# Patient Record
Sex: MALE | Race: WHITE | Employment: OTHER | URBAN - NONMETROPOLITAN AREA
[De-identification: names, ages, dates, MRNs, and addresses within clinical notes are randomized per-mention and may not be internally consistent; named-entity substitution may affect disease eponyms.]

---

## 2017-05-03 ENCOUNTER — HOSPITAL ENCOUNTER (OUTPATIENT)
Dept: LAB | Age: 67
Discharge: HOME | End: 2017-05-03
Payer: COMMERCIAL

## 2017-05-03 ENCOUNTER — HOSPITAL ENCOUNTER (OUTPATIENT)
Dept: GENERAL RADIOLOGY | Age: 67
Discharge: HOME | End: 2017-05-03
Payer: COMMERCIAL

## 2017-05-03 DIAGNOSIS — L29.9 ITCH OF SKIN: ICD-10-CM

## 2017-05-03 LAB
ALBUMIN SERPL-MCNC: 4.5 G/DL (ref 3.5–5.2)
ALP BLD-CCNC: 106 U/L (ref 40–130)
ALT SERPL-CCNC: 14 U/L (ref 5–41)
ANION GAP SERPL CALCULATED.3IONS-SCNC: 17 MMOL/L (ref 7–19)
AST SERPL-CCNC: 15 U/L (ref 5–40)
BASOPHILS ABSOLUTE: 0.1 K/UL (ref 0–0.2)
BASOPHILS RELATIVE PERCENT: 1.3 % (ref 0–1)
BILIRUB SERPL-MCNC: 0.7 MG/DL (ref 0.2–1.2)
BUN BLDV-MCNC: 12 MG/DL (ref 8–23)
CALCIUM SERPL-MCNC: 9.2 MG/DL (ref 8.8–10.2)
CHLORIDE BLD-SCNC: 102 MMOL/L (ref 98–111)
CO2: 24 MMOL/L (ref 22–29)
CREAT SERPL-MCNC: 0.9 MG/DL (ref 0.5–1.2)
EOSINOPHILS ABSOLUTE: 0.5 K/UL (ref 0–0.6)
EOSINOPHILS RELATIVE PERCENT: 7.8 % (ref 0–5)
GFR NON-AFRICAN AMERICAN: >60
GLOBULIN: 2.8 G/DL
GLUCOSE BLD-MCNC: 77 MG/DL (ref 74–109)
HAV IGM SER IA-ACNC: NORMAL
HCT VFR BLD CALC: 48.8 % (ref 42–52)
HEMOGLOBIN: 16.4 G/DL (ref 14–18)
HEPATITIS B CORE IGM ANTIBODY: NORMAL
HEPATITIS B SURFACE ANTIGEN INTERPRETATION: NORMAL
HEPATITIS C ANTIBODY INTERPRETATION: NORMAL
LYMPHOCYTES ABSOLUTE: 1.6 K/UL (ref 1.1–4.5)
LYMPHOCYTES RELATIVE PERCENT: 24.9 % (ref 20–40)
MCH RBC QN AUTO: 31.1 PG (ref 27–31)
MCHC RBC AUTO-ENTMCNC: 33.6 G/DL (ref 33–37)
MCV RBC AUTO: 92.6 FL (ref 80–94)
MONOCYTES ABSOLUTE: 0.7 K/UL (ref 0–0.9)
MONOCYTES RELATIVE PERCENT: 10.6 % (ref 0–10)
NEUTROPHILS ABSOLUTE: 3.5 K/UL (ref 1.5–7.5)
NEUTROPHILS RELATIVE PERCENT: 55.2 % (ref 50–65)
PDW BLD-RTO: 12.9 % (ref 11.5–14.5)
PLATELET # BLD: 303 K/UL (ref 130–400)
PMV BLD AUTO: 9.3 FL (ref 7.4–10.4)
POTASSIUM SERPL-SCNC: 4 MMOL/L (ref 3.5–5)
RBC # BLD: 5.27 M/UL (ref 4.7–6.1)
SEDIMENTATION RATE, ERYTHROCYTE: 2 MM/HR (ref 0–15)
SODIUM BLD-SCNC: 143 MMOL/L (ref 136–145)
T3 FREE: 3.5 PG/ML (ref 2–4.4)
T4 FREE: 1.5 NG/ML (ref 0.9–1.7)
TOTAL PROTEIN: 7.3 G/DL (ref 6.6–8.7)
TSH SERPL DL<=0.05 MIU/L-ACNC: 1.15 UIU/ML (ref 0.27–4.2)
WBC # BLD: 6.3 K/UL (ref 4.8–10.8)

## 2017-05-03 PROCEDURE — 71020 XR CHEST STANDARD TWO VW: CPT

## 2017-05-06 LAB — TRYPTASE: 13.7 UG/L

## 2017-05-09 LAB — MISCELLANEOUS LAB TEST RESULT: NORMAL

## 2017-05-10 LAB — IGE: 95 KU/L

## 2017-05-16 ENCOUNTER — APPOINTMENT (OUTPATIENT)
Dept: GENERAL RADIOLOGY | Age: 67
End: 2017-05-16
Payer: COMMERCIAL

## 2017-05-16 ENCOUNTER — HOSPITAL ENCOUNTER (EMERGENCY)
Age: 67
Discharge: HOME | End: 2017-05-16
Payer: COMMERCIAL

## 2017-05-16 VITALS
WEIGHT: 172 LBS | DIASTOLIC BLOOD PRESSURE: 79 MMHG | HEART RATE: 80 BPM | TEMPERATURE: 98.8 F | RESPIRATION RATE: 16 BRPM | OXYGEN SATURATION: 98 % | SYSTOLIC BLOOD PRESSURE: 155 MMHG | BODY MASS INDEX: 22.07 KG/M2 | HEIGHT: 74 IN

## 2017-05-16 DIAGNOSIS — S90.32XA CONTUSION OF LEFT FOOT, INITIAL ENCOUNTER: Primary | ICD-10-CM

## 2017-05-16 PROCEDURE — 99282 EMERGENCY DEPT VISIT SF MDM: CPT | Performed by: PHYSICIAN ASSISTANT

## 2017-05-16 PROCEDURE — 73630 X-RAY EXAM OF FOOT: CPT

## 2017-05-16 PROCEDURE — 99283 EMERGENCY DEPT VISIT LOW MDM: CPT

## 2017-05-16 RX ORDER — BUDESONIDE AND FORMOTEROL FUMARATE DIHYDRATE 160; 4.5 UG/1; UG/1
2 AEROSOL RESPIRATORY (INHALATION) 2 TIMES DAILY
COMMUNITY
End: 2020-07-07

## 2017-05-16 ASSESSMENT — PAIN DESCRIPTION - DESCRIPTORS: DESCRIPTORS: SHARP;ACHING

## 2017-05-16 ASSESSMENT — ENCOUNTER SYMPTOMS
DIARRHEA: 0
ABDOMINAL PAIN: 0
VOMITING: 0
SHORTNESS OF BREATH: 0
NAUSEA: 0
BACK PAIN: 0
CONSTIPATION: 0
WHEEZING: 0
COUGH: 0

## 2017-05-16 ASSESSMENT — PAIN DESCRIPTION - ORIENTATION: ORIENTATION: LEFT

## 2017-05-16 ASSESSMENT — PAIN DESCRIPTION - LOCATION: LOCATION: FOOT

## 2017-05-16 ASSESSMENT — PAIN SCALES - GENERAL: PAINLEVEL_OUTOF10: 6

## 2017-05-16 ASSESSMENT — PAIN DESCRIPTION - PAIN TYPE: TYPE: ACUTE PAIN

## 2017-07-07 ENCOUNTER — HOSPITAL ENCOUNTER (OUTPATIENT)
Dept: CT IMAGING | Facility: HOSPITAL | Age: 67
Discharge: HOME | End: 2017-07-07
Admitting: NURSE PRACTITIONER

## 2017-07-07 ENCOUNTER — LAB (OUTPATIENT)
Dept: LAB | Facility: HOSPITAL | Age: 67
End: 2017-07-07

## 2017-07-07 ENCOUNTER — TRANSCRIBE ORDERS (OUTPATIENT)
Dept: ADMINISTRATIVE | Facility: HOSPITAL | Age: 67
End: 2017-07-07

## 2017-07-07 DIAGNOSIS — R05.9 COUGH: ICD-10-CM

## 2017-07-07 DIAGNOSIS — R05.9 COUGH: Primary | ICD-10-CM

## 2017-07-07 DIAGNOSIS — M79.10 MYALGIA: ICD-10-CM

## 2017-07-07 LAB
ALBUMIN SERPL-MCNC: 4.5 G/DL (ref 3.5–5)
ALBUMIN/GLOB SERPL: 1.1 G/DL (ref 1.1–2.5)
ALP SERPL-CCNC: 136 U/L (ref 24–120)
ALT SERPL W P-5'-P-CCNC: <15 U/L (ref 0–54)
ANION GAP SERPL CALCULATED.3IONS-SCNC: 11 MMOL/L (ref 4–13)
AST SERPL-CCNC: 19 U/L (ref 7–45)
AUTO MIXED CELLS #: 1.2 10*3/MM3 (ref 0.1–2.6)
AUTO MIXED CELLS %: 7.8 % (ref 0.1–24)
BACTERIA UR QL AUTO: ABNORMAL /HPF
BILIRUB SERPL-MCNC: 2.2 MG/DL (ref 0.1–1)
BILIRUB UR QL STRIP: ABNORMAL
BUN BLD-MCNC: 13 MG/DL (ref 5–21)
BUN/CREAT SERPL: 12.7
CALCIUM SPEC-SCNC: 9.5 MG/DL (ref 8.4–10.4)
CHLORIDE SERPL-SCNC: 101 MMOL/L (ref 98–110)
CLARITY UR: CLEAR
CO2 SERPL-SCNC: 27 MMOL/L (ref 24–31)
COLOR UR: ABNORMAL
CREAT BLD-MCNC: 1.02 MG/DL (ref 0.5–1.4)
CRP SERPL-MCNC: 8.49 MG/DL (ref 0–0.99)
ERYTHROCYTE [DISTWIDTH] IN BLOOD BY AUTOMATED COUNT: 13.3 % (ref 12–15)
ERYTHROCYTE [SEDIMENTATION RATE] IN BLOOD: 8 MM/HR (ref 0–15)
GFR SERPL CREATININE-BSD FRML MDRD: 74 ML/MIN/1.73
GLOBULIN UR ELPH-MCNC: 4.1 GM/DL
GLUCOSE BLD-MCNC: 111 MG/DL (ref 70–100)
GLUCOSE UR STRIP-MCNC: NEGATIVE MG/DL
GRAN CASTS URNS QL MICRO: ABNORMAL /LPF
HCT VFR BLD AUTO: 44.9 % (ref 40–52)
HGB BLD-MCNC: 16.3 G/DL (ref 14–18)
HGB UR QL STRIP.AUTO: ABNORMAL
HYALINE CASTS UR QL AUTO: ABNORMAL /LPF
KETONES UR QL STRIP: ABNORMAL
LEUKOCYTE ESTERASE UR QL STRIP.AUTO: NEGATIVE
LYMPHOCYTES # BLD AUTO: 1.4 10*3/MM3 (ref 0.8–7)
LYMPHOCYTES NFR BLD AUTO: 8.9 % (ref 15–45)
MCH RBC QN AUTO: 32 PG (ref 28–32)
MCHC RBC AUTO-ENTMCNC: 36.3 G/DL (ref 33–36)
MCV RBC AUTO: 88.2 FL (ref 82–95)
MUCOUS THREADS URNS QL MICRO: ABNORMAL /HPF
NEUTROPHILS # BLD AUTO: 13 10*3/MM3 (ref 1.5–8.3)
NEUTROPHILS NFR BLD AUTO: 83.3 % (ref 39–78)
NITRITE UR QL STRIP: POSITIVE
PH UR STRIP.AUTO: 6 [PH] (ref 5–8)
PLATELET # BLD AUTO: 284 10*3/MM3 (ref 130–400)
PMV BLD AUTO: 8.1 FL (ref 6–12)
POTASSIUM BLD-SCNC: 3.8 MMOL/L (ref 3.5–5.3)
PROCALCITONIN SERPL-MCNC: <0.25 NG/ML
PROT SERPL-MCNC: 8.6 G/DL (ref 6.3–8.7)
PROT UR QL STRIP: ABNORMAL
RBC # BLD AUTO: 5.09 10*6/MM3 (ref 4.2–5.4)
RBC # UR: ABNORMAL /HPF
REF LAB TEST METHOD: ABNORMAL
SODIUM BLD-SCNC: 139 MMOL/L (ref 135–145)
SP GR UR STRIP: >=1.03 (ref 1–1.03)
SQUAMOUS #/AREA URNS HPF: ABNORMAL /HPF
UROBILINOGEN UR QL STRIP: ABNORMAL
WBC NRBC COR # BLD: 15.6 10*3/MM3 (ref 4.8–10.8)
WBC UR QL AUTO: ABNORMAL /HPF

## 2017-07-07 PROCEDURE — 87086 URINE CULTURE/COLONY COUNT: CPT | Performed by: NURSE PRACTITIONER

## 2017-07-07 PROCEDURE — 84145 PROCALCITONIN (PCT): CPT | Performed by: NURSE PRACTITIONER

## 2017-07-07 PROCEDURE — 87205 SMEAR GRAM STAIN: CPT | Performed by: NURSE PRACTITIONER

## 2017-07-07 PROCEDURE — 86666 EHRLICHIA ANTIBODY: CPT | Performed by: NURSE PRACTITIONER

## 2017-07-07 PROCEDURE — 71250 CT THORAX DX C-: CPT

## 2017-07-07 PROCEDURE — 80053 COMPREHEN METABOLIC PANEL: CPT | Performed by: NURSE PRACTITIONER

## 2017-07-07 PROCEDURE — 87185 SC STD ENZYME DETCJ PER NZM: CPT | Performed by: NURSE PRACTITIONER

## 2017-07-07 PROCEDURE — 87070 CULTURE OTHR SPECIMN AEROBIC: CPT | Performed by: NURSE PRACTITIONER

## 2017-07-07 PROCEDURE — 85025 COMPLETE CBC W/AUTO DIFF WBC: CPT | Performed by: NURSE PRACTITIONER

## 2017-07-07 PROCEDURE — 87184 SC STD DISK METHOD PER PLATE: CPT | Performed by: NURSE PRACTITIONER

## 2017-07-07 PROCEDURE — 81001 URINALYSIS AUTO W/SCOPE: CPT

## 2017-07-07 PROCEDURE — 36415 COLL VENOUS BLD VENIPUNCTURE: CPT

## 2017-07-07 PROCEDURE — 86617 LYME DISEASE ANTIBODY: CPT | Performed by: NURSE PRACTITIONER

## 2017-07-07 PROCEDURE — 86140 C-REACTIVE PROTEIN: CPT | Performed by: NURSE PRACTITIONER

## 2017-07-09 LAB — BACTERIA SPEC AEROBE CULT: NORMAL

## 2017-07-11 LAB
B BURGDOR IGG PATRN SER IB-IMP: NEGATIVE
B BURGDOR IGM PATRN SER IB-IMP: NEGATIVE
B BURGDOR18KD IGG SER QL IB: NORMAL
B BURGDOR23KD IGG SER QL IB: NORMAL
B BURGDOR23KD IGM SER QL IB: NORMAL
B BURGDOR28KD IGG SER QL IB: NORMAL
B BURGDOR30KD IGG SER QL IB: NORMAL
B BURGDOR39KD IGG SER QL IB: NORMAL
B BURGDOR39KD IGM SER QL IB: NORMAL
B BURGDOR41KD IGG SER QL IB: NORMAL
B BURGDOR41KD IGM SER QL IB: NORMAL
B BURGDOR45KD IGG SER QL IB: NORMAL
B BURGDOR58KD IGG SER QL IB: NORMAL
B BURGDOR66KD IGG SER QL IB: NORMAL
B BURGDOR93KD IGG SER QL IB: NORMAL
M PNEUMONIAE IGG ABS: 290 U/ML (ref 0–99)
M PNEUMONIAE IGM ABS: <770 U/ML (ref 0–769)
RICK SF IGG TITR SER IF: NEGATIVE {TITER}

## 2017-07-12 LAB
A PHAGOCYTOPH IGM TITR SER IF: NEGATIVE {TITER}
CONV HGE IGG TITER: NEGATIVE
E CHAFFEENSIS IGG TITR SER IF: NEGATIVE {TITER}
E. CHAFFEENSIS (HME) IGM TITER: NEGATIVE
R RICKETTSI IGM TITR SER: 0.57 INDEX (ref 0–0.89)

## 2017-07-15 LAB
B-LACTAMASE USUAL SUSC ISLT: NEGATIVE
BACTERIA SPEC RESP CULT: ABNORMAL
BACTERIA SPEC RESP CULT: ABNORMAL
GRAM STN SPEC: ABNORMAL

## 2017-11-13 ENCOUNTER — TRANSCRIBE ORDERS (OUTPATIENT)
Dept: ADMINISTRATIVE | Facility: HOSPITAL | Age: 67
End: 2017-11-13

## 2017-11-13 DIAGNOSIS — R91.8 PULMONARY INFILTRATE: Primary | ICD-10-CM

## 2017-11-20 ENCOUNTER — HOSPITAL ENCOUNTER (OUTPATIENT)
Dept: CT IMAGING | Facility: HOSPITAL | Age: 67
Discharge: HOME | End: 2017-11-20
Attending: INTERNAL MEDICINE | Admitting: INTERNAL MEDICINE

## 2017-11-20 DIAGNOSIS — R91.8 PULMONARY INFILTRATE: ICD-10-CM

## 2017-11-20 PROCEDURE — 71250 CT THORAX DX C-: CPT

## 2017-11-22 ENCOUNTER — TRANSCRIBE ORDERS (OUTPATIENT)
Dept: LAB | Facility: HOSPITAL | Age: 67
End: 2017-11-22

## 2017-11-22 ENCOUNTER — LAB (OUTPATIENT)
Dept: LAB | Facility: HOSPITAL | Age: 67
End: 2017-11-22

## 2017-11-22 ENCOUNTER — HOSPITAL ENCOUNTER (OUTPATIENT)
Dept: GENERAL RADIOLOGY | Facility: HOSPITAL | Age: 67
Discharge: HOME | End: 2017-11-22
Admitting: NURSE PRACTITIONER

## 2017-11-22 DIAGNOSIS — R17 ELEVATED BILIRUBIN: ICD-10-CM

## 2017-11-22 DIAGNOSIS — R39.198 ABNORMALITY OF URINATION: ICD-10-CM

## 2017-11-22 DIAGNOSIS — R05.9 COUGH: ICD-10-CM

## 2017-11-22 DIAGNOSIS — R05.9 COUGH: Primary | ICD-10-CM

## 2017-11-22 LAB
ALBUMIN SERPL-MCNC: 4.9 G/DL (ref 3.5–5)
ALBUMIN/GLOB SERPL: 1.1 G/DL (ref 1.1–2.5)
ALP SERPL-CCNC: 125 U/L (ref 24–120)
ALT SERPL W P-5'-P-CCNC: 31 U/L (ref 0–54)
ANION GAP SERPL CALCULATED.3IONS-SCNC: 14 MMOL/L (ref 4–13)
AST SERPL-CCNC: 22 U/L (ref 7–45)
AUTO MIXED CELLS #: 0.8 10*3/MM3 (ref 0.1–2.6)
AUTO MIXED CELLS %: 5.8 % (ref 0.1–24)
BACTERIA UR QL AUTO: ABNORMAL /HPF
BILIRUB SERPL-MCNC: 1.9 MG/DL (ref 0.1–1)
BILIRUB UR QL STRIP: ABNORMAL
BUN BLD-MCNC: 15 MG/DL (ref 5–21)
BUN/CREAT SERPL: 14.9
CALCIUM SPEC-SCNC: 9.3 MG/DL (ref 8.4–10.4)
CHLORIDE SERPL-SCNC: 103 MMOL/L (ref 98–110)
CLARITY UR: CLEAR
CO2 SERPL-SCNC: 26 MMOL/L (ref 24–31)
COLOR UR: ABNORMAL
CREAT BLD-MCNC: 1.01 MG/DL (ref 0.5–1.4)
CRP SERPL-MCNC: 8.6 MG/DL (ref 0–0.99)
ERYTHROCYTE [DISTWIDTH] IN BLOOD BY AUTOMATED COUNT: 12.9 % (ref 12–15)
GFR SERPL CREATININE-BSD FRML MDRD: 74 ML/MIN/1.73
GLOBULIN UR ELPH-MCNC: 4.3 GM/DL
GLUCOSE BLD-MCNC: 107 MG/DL (ref 70–100)
GLUCOSE UR STRIP-MCNC: NEGATIVE MG/DL
HAV IGM SERPL QL IA: NEGATIVE
HBV CORE IGM SERPL QL IA: NEGATIVE
HBV SURFACE AG SERPL QL IA: NEGATIVE
HCT VFR BLD AUTO: 48.5 % (ref 40–52)
HCV AB SER DONR QL: NEGATIVE
HCV S/C RATIO: 0.06 (ref 0–0.99)
HGB BLD-MCNC: 17.3 G/DL (ref 14–18)
HGB UR QL STRIP.AUTO: ABNORMAL
HYALINE CASTS UR QL AUTO: ABNORMAL /LPF
KETONES UR QL STRIP: ABNORMAL
LEUKOCYTE ESTERASE UR QL STRIP.AUTO: NEGATIVE
LYMPHOCYTES # BLD AUTO: 1.3 10*3/MM3 (ref 0.8–7)
LYMPHOCYTES NFR BLD AUTO: 8.9 % (ref 15–45)
MCH RBC QN AUTO: 31.7 PG (ref 28–32)
MCHC RBC AUTO-ENTMCNC: 35.7 G/DL (ref 33–36)
MCV RBC AUTO: 89 FL (ref 82–95)
NEUTROPHILS # BLD AUTO: 12.2 10*3/MM3 (ref 1.5–8.3)
NEUTROPHILS NFR BLD AUTO: 85.3 % (ref 39–78)
NITRITE UR QL STRIP: NEGATIVE
PH UR STRIP.AUTO: 6 [PH] (ref 5–8)
PLATELET # BLD AUTO: 321 10*3/MM3 (ref 130–400)
PMV BLD AUTO: 7.9 FL (ref 6–12)
POTASSIUM BLD-SCNC: 4 MMOL/L (ref 3.5–5.3)
PROCALCITONIN SERPL-MCNC: <0.25 NG/ML
PROT SERPL-MCNC: 9.2 G/DL (ref 6.3–8.7)
PROT UR QL STRIP: ABNORMAL
PSA SERPL-MCNC: 1.64 NG/ML (ref 0–4)
RBC # BLD AUTO: 5.45 10*6/MM3 (ref 4.2–5.4)
RBC # UR: ABNORMAL /HPF
REF LAB TEST METHOD: ABNORMAL
SODIUM BLD-SCNC: 143 MMOL/L (ref 135–145)
SP GR UR STRIP: 1.02 (ref 1–1.03)
SQUAMOUS #/AREA URNS HPF: ABNORMAL /HPF
UROBILINOGEN UR QL STRIP: ABNORMAL
WBC NRBC COR # BLD: 14.3 10*3/MM3 (ref 4.8–10.8)
WBC UR QL AUTO: ABNORMAL /HPF

## 2017-11-22 PROCEDURE — 86140 C-REACTIVE PROTEIN: CPT | Performed by: NURSE PRACTITIONER

## 2017-11-22 PROCEDURE — 71020 HC CHEST PA AND LATERAL: CPT

## 2017-11-22 PROCEDURE — 81001 URINALYSIS AUTO W/SCOPE: CPT

## 2017-11-22 PROCEDURE — G0103 PSA SCREENING: HCPCS | Performed by: NURSE PRACTITIONER

## 2017-11-22 PROCEDURE — 85025 COMPLETE CBC W/AUTO DIFF WBC: CPT

## 2017-11-22 PROCEDURE — 36415 COLL VENOUS BLD VENIPUNCTURE: CPT

## 2017-11-22 PROCEDURE — 80074 ACUTE HEPATITIS PANEL: CPT | Performed by: NURSE PRACTITIONER

## 2017-11-22 PROCEDURE — 84145 PROCALCITONIN (PCT): CPT | Performed by: NURSE PRACTITIONER

## 2017-11-22 PROCEDURE — 80053 COMPREHEN METABOLIC PANEL: CPT

## 2017-11-24 LAB
M PNEUMONIAE IGG ABS: 229 U/ML (ref 0–99)
M PNEUMONIAE IGM ABS: <770 U/ML (ref 0–769)

## 2017-11-29 ENCOUNTER — OFFICE VISIT (OUTPATIENT)
Dept: UROLOGY | Facility: CLINIC | Age: 67
End: 2017-11-29

## 2017-11-29 VITALS
TEMPERATURE: 97.6 F | HEIGHT: 74 IN | DIASTOLIC BLOOD PRESSURE: 90 MMHG | BODY MASS INDEX: 22.33 KG/M2 | WEIGHT: 174 LBS | SYSTOLIC BLOOD PRESSURE: 150 MMHG

## 2017-11-29 DIAGNOSIS — R31.21 ASYMPTOMATIC MICROSCOPIC HEMATURIA: Primary | ICD-10-CM

## 2017-11-29 LAB
BILIRUB BLD-MCNC: NEGATIVE MG/DL
CLARITY, POC: CLEAR
COLOR UR: YELLOW
GLUCOSE UR STRIP-MCNC: NEGATIVE MG/DL
KETONES UR QL: NEGATIVE
LEUKOCYTE EST, POC: NEGATIVE
NITRITE UR-MCNC: NEGATIVE MG/ML
PH UR: 6.5 [PH] (ref 5–8)
PROT UR STRIP-MCNC: NEGATIVE MG/DL
RBC # UR STRIP: ABNORMAL /UL
SP GR UR: 1.03 (ref 1–1.03)
UROBILINOGEN UR QL: NORMAL

## 2017-11-29 PROCEDURE — 51798 US URINE CAPACITY MEASURE: CPT | Performed by: UROLOGY

## 2017-11-29 PROCEDURE — 99203 OFFICE O/P NEW LOW 30 MIN: CPT | Performed by: UROLOGY

## 2017-11-29 PROCEDURE — 81003 URINALYSIS AUTO W/O SCOPE: CPT | Performed by: UROLOGY

## 2017-11-29 RX ORDER — RANITIDINE 150 MG/1
150 TABLET ORAL 2 TIMES DAILY
COMMUNITY
End: 2018-08-21

## 2017-11-29 RX ORDER — CETIRIZINE HYDROCHLORIDE 10 MG/1
10 TABLET ORAL 2 TIMES DAILY
COMMUNITY
End: 2018-08-21

## 2017-11-29 RX ORDER — BUDESONIDE AND FORMOTEROL FUMARATE DIHYDRATE 160; 4.5 UG/1; UG/1
2 AEROSOL RESPIRATORY (INHALATION) NIGHTLY
COMMUNITY
End: 2022-12-30 | Stop reason: SDDI

## 2017-12-06 ENCOUNTER — OFFICE VISIT (OUTPATIENT)
Dept: GASTROENTEROLOGY | Facility: CLINIC | Age: 67
End: 2017-12-06

## 2017-12-06 VITALS
HEIGHT: 74 IN | DIASTOLIC BLOOD PRESSURE: 80 MMHG | OXYGEN SATURATION: 97 % | SYSTOLIC BLOOD PRESSURE: 152 MMHG | HEART RATE: 70 BPM | WEIGHT: 174 LBS | BODY MASS INDEX: 22.33 KG/M2 | TEMPERATURE: 96.7 F

## 2017-12-06 DIAGNOSIS — R17 JAUNDICE: ICD-10-CM

## 2017-12-06 DIAGNOSIS — Z86.19 HISTORY OF HEPATITIS: Primary | ICD-10-CM

## 2017-12-06 DIAGNOSIS — R17 ELEVATED BILIRUBIN: ICD-10-CM

## 2017-12-06 PROCEDURE — 99204 OFFICE O/P NEW MOD 45 MIN: CPT | Performed by: NURSE PRACTITIONER

## 2017-12-07 ENCOUNTER — LAB (OUTPATIENT)
Dept: LAB | Facility: HOSPITAL | Age: 67
End: 2017-12-07

## 2017-12-07 ENCOUNTER — HOSPITAL ENCOUNTER (OUTPATIENT)
Dept: ULTRASOUND IMAGING | Facility: HOSPITAL | Age: 67
Discharge: HOME | End: 2017-12-07
Admitting: NURSE PRACTITIONER

## 2017-12-07 DIAGNOSIS — Z86.19 HISTORY OF HEPATITIS: ICD-10-CM

## 2017-12-07 DIAGNOSIS — R17 ELEVATED BILIRUBIN: ICD-10-CM

## 2017-12-07 DIAGNOSIS — R17 JAUNDICE: ICD-10-CM

## 2017-12-07 LAB
FERRITIN SERPL-MCNC: 362 NG/ML (ref 17.9–464)
IRON 24H UR-MRATE: 98 MCG/DL (ref 42–180)
IRON SATN MFR SERPL: 35 % (ref 20–45)
TIBC SERPL-MCNC: 281 MCG/DL (ref 225–420)

## 2017-12-07 PROCEDURE — 86235 NUCLEAR ANTIGEN ANTIBODY: CPT | Performed by: NURSE PRACTITIONER

## 2017-12-07 PROCEDURE — 83516 IMMUNOASSAY NONANTIBODY: CPT | Performed by: NURSE PRACTITIONER

## 2017-12-07 PROCEDURE — 83550 IRON BINDING TEST: CPT | Performed by: NURSE PRACTITIONER

## 2017-12-07 PROCEDURE — 82728 ASSAY OF FERRITIN: CPT | Performed by: NURSE PRACTITIONER

## 2017-12-07 PROCEDURE — 86225 DNA ANTIBODY NATIVE: CPT | Performed by: NURSE PRACTITIONER

## 2017-12-07 PROCEDURE — 82390 ASSAY OF CERULOPLASMIN: CPT | Performed by: NURSE PRACTITIONER

## 2017-12-07 PROCEDURE — 83540 ASSAY OF IRON: CPT | Performed by: NURSE PRACTITIONER

## 2017-12-07 PROCEDURE — 82103 ALPHA-1-ANTITRYPSIN TOTAL: CPT | Performed by: NURSE PRACTITIONER

## 2017-12-07 PROCEDURE — 86038 ANTINUCLEAR ANTIBODIES: CPT | Performed by: NURSE PRACTITIONER

## 2017-12-07 PROCEDURE — 76705 ECHO EXAM OF ABDOMEN: CPT

## 2017-12-07 PROCEDURE — 36415 COLL VENOUS BLD VENIPUNCTURE: CPT | Performed by: NURSE PRACTITIONER

## 2017-12-08 LAB
A1AT SERPL-MCNC: 178 MG/DL (ref 90–200)
ANA SER QL IA: POSITIVE
ANA SPECKLED TITR SER: NORMAL {TITER}
CENTROMERE B AB SER-ACNC: <0.2 AI (ref 0–0.9)
CERULOPLASMIN SERPL-MCNC: 28 MG/DL (ref 16–31)
CHROMATIN AB SERPL-ACNC: <0.2 AI (ref 0–0.9)
DEPRECATED MITOCHONDRIA M2 IGG SER-ACNC: <20 UNITS (ref 0–20)
DSDNA AB SER-ACNC: <1 IU/ML (ref 0–9)
ENA JO1 AB SER-ACNC: <0.2 AI (ref 0–0.9)
ENA RNP AB SER-ACNC: 1.3 AI (ref 0–0.9)
ENA SCL70 AB SER-ACNC: <0.2 AI (ref 0–0.9)
ENA SM AB SER-ACNC: <0.2 AI (ref 0–0.9)
ENA SS-A AB SER-ACNC: 0.4 AI (ref 0–0.9)
ENA SS-B AB SER-ACNC: <0.2 AI (ref 0–0.9)
Lab: ABNORMAL
Lab: NORMAL

## 2017-12-09 LAB — ACTIN IGG SERPL-ACNC: 17 UNITS (ref 0–19)

## 2017-12-11 ENCOUNTER — TELEPHONE (OUTPATIENT)
Dept: GASTROENTEROLOGY | Facility: CLINIC | Age: 67
End: 2017-12-11

## 2017-12-11 DIAGNOSIS — R17 ELEVATED BILIRUBIN: Primary | ICD-10-CM

## 2018-03-16 ENCOUNTER — LAB (OUTPATIENT)
Dept: LAB | Facility: HOSPITAL | Age: 68
End: 2018-03-16

## 2018-03-16 ENCOUNTER — TRANSCRIBE ORDERS (OUTPATIENT)
Dept: ADMINISTRATIVE | Facility: HOSPITAL | Age: 68
End: 2018-03-16

## 2018-03-16 ENCOUNTER — HOSPITAL ENCOUNTER (OUTPATIENT)
Dept: GENERAL RADIOLOGY | Facility: HOSPITAL | Age: 68
Discharge: HOME | End: 2018-03-16

## 2018-03-16 ENCOUNTER — HOSPITAL ENCOUNTER (OUTPATIENT)
Dept: CT IMAGING | Facility: HOSPITAL | Age: 68
Discharge: HOME | End: 2018-03-16
Admitting: NURSE PRACTITIONER

## 2018-03-16 DIAGNOSIS — R05.9 COUGH: ICD-10-CM

## 2018-03-16 DIAGNOSIS — R59.0 LOCALIZED ENLARGED LYMPH NODES: ICD-10-CM

## 2018-03-16 DIAGNOSIS — R59.0 LOCALIZED ENLARGED LYMPH NODES: Primary | ICD-10-CM

## 2018-03-16 DIAGNOSIS — R53.83 OTHER FATIGUE: ICD-10-CM

## 2018-03-16 LAB
ALBUMIN SERPL-MCNC: 4.8 G/DL (ref 3.5–5)
ALBUMIN/GLOB SERPL: 1.1 G/DL (ref 1.1–2.5)
ALP SERPL-CCNC: 118 U/L (ref 24–120)
ALT SERPL W P-5'-P-CCNC: 27 U/L (ref 0–54)
ANION GAP SERPL CALCULATED.3IONS-SCNC: 10 MMOL/L (ref 4–13)
AST SERPL-CCNC: 21 U/L (ref 7–45)
AUTO MIXED CELLS #: 1 10*3/MM3 (ref 0.1–2.6)
AUTO MIXED CELLS %: 15.8 % (ref 0.1–24)
BILIRUB SERPL-MCNC: 0.6 MG/DL (ref 0.1–1)
BUN BLD-MCNC: 14 MG/DL (ref 5–21)
BUN/CREAT SERPL: 12.4
CALCIUM SPEC-SCNC: 9.6 MG/DL (ref 8.4–10.4)
CHLORIDE SERPL-SCNC: 103 MMOL/L (ref 98–110)
CO2 SERPL-SCNC: 32 MMOL/L (ref 24–31)
CREAT BLD-MCNC: 1.13 MG/DL (ref 0.5–1.4)
CRP SERPL-MCNC: 1.68 MG/DL (ref 0–0.99)
ERYTHROCYTE [DISTWIDTH] IN BLOOD BY AUTOMATED COUNT: 13 % (ref 12–15)
ERYTHROCYTE [SEDIMENTATION RATE] IN BLOOD: <1 MM/HR (ref 0–15)
GFR SERPL CREATININE-BSD FRML MDRD: 65 ML/MIN/1.73
GLOBULIN UR ELPH-MCNC: 4.3 GM/DL
GLUCOSE BLD-MCNC: 83 MG/DL (ref 70–100)
HCT VFR BLD AUTO: 48.8 % (ref 40–52)
HGB BLD-MCNC: 17.8 G/DL (ref 14–18)
LYMPHOCYTES # BLD AUTO: 1.8 10*3/MM3 (ref 0.8–7)
LYMPHOCYTES NFR BLD AUTO: 27.2 % (ref 15–45)
MCH RBC QN AUTO: 32.4 PG (ref 28–32)
MCHC RBC AUTO-ENTMCNC: 36.5 G/DL (ref 33–36)
MCV RBC AUTO: 88.9 FL (ref 82–95)
NEUTROPHILS # BLD AUTO: 3.7 10*3/MM3 (ref 1.5–8.3)
NEUTROPHILS NFR BLD AUTO: 57 % (ref 39–78)
PLATELET # BLD AUTO: 328 10*3/MM3 (ref 130–400)
PMV BLD AUTO: 7.9 FL (ref 6–12)
POTASSIUM BLD-SCNC: 4 MMOL/L (ref 3.5–5.3)
PROT SERPL-MCNC: 9.1 G/DL (ref 6.3–8.7)
RBC # BLD AUTO: 5.49 10*6/MM3 (ref 4.2–5.4)
SODIUM BLD-SCNC: 145 MMOL/L (ref 135–145)
TSH SERPL DL<=0.05 MIU/L-ACNC: 0.93 MIU/ML (ref 0.47–4.68)
WBC NRBC COR # BLD: 6.5 10*3/MM3 (ref 4.8–10.8)

## 2018-03-16 PROCEDURE — 71046 X-RAY EXAM CHEST 2 VIEWS: CPT

## 2018-03-16 PROCEDURE — 85025 COMPLETE CBC W/AUTO DIFF WBC: CPT

## 2018-03-16 PROCEDURE — 84443 ASSAY THYROID STIM HORMONE: CPT | Performed by: NURSE PRACTITIONER

## 2018-03-16 PROCEDURE — 81003 URINALYSIS AUTO W/O SCOPE: CPT

## 2018-03-16 PROCEDURE — 70491 CT SOFT TISSUE NECK W/DYE: CPT

## 2018-03-16 PROCEDURE — 80053 COMPREHEN METABOLIC PANEL: CPT

## 2018-03-16 PROCEDURE — 86140 C-REACTIVE PROTEIN: CPT | Performed by: NURSE PRACTITIONER

## 2018-03-16 PROCEDURE — 25010000002 IOPAMIDOL 61 % SOLUTION: Performed by: NURSE PRACTITIONER

## 2018-03-16 PROCEDURE — 85652 RBC SED RATE AUTOMATED: CPT

## 2018-03-16 PROCEDURE — 36415 COLL VENOUS BLD VENIPUNCTURE: CPT

## 2018-03-16 RX ADMIN — IOPAMIDOL 100 ML: 612 INJECTION, SOLUTION INTRAVENOUS at 15:45

## 2018-03-17 LAB
BILIRUB UR QL STRIP: NEGATIVE
CLARITY UR: CLEAR
COLOR UR: YELLOW
GLUCOSE UR STRIP-MCNC: NEGATIVE MG/DL
HGB UR QL STRIP.AUTO: NEGATIVE
KETONES UR QL STRIP: NEGATIVE
LEUKOCYTE ESTERASE UR QL STRIP.AUTO: NEGATIVE
NITRITE UR QL STRIP: NEGATIVE
PH UR STRIP.AUTO: 6.5 [PH] (ref 5–8)
PROT UR QL STRIP: NEGATIVE
SP GR UR STRIP: 1.01 (ref 1–1.03)
UROBILINOGEN UR QL STRIP: NORMAL

## 2018-03-20 ENCOUNTER — OFFICE VISIT (OUTPATIENT)
Dept: OTOLARYNGOLOGY | Facility: CLINIC | Age: 68
End: 2018-03-20

## 2018-03-20 VITALS
TEMPERATURE: 97.8 F | DIASTOLIC BLOOD PRESSURE: 88 MMHG | BODY MASS INDEX: 23.1 KG/M2 | SYSTOLIC BLOOD PRESSURE: 162 MMHG | HEIGHT: 74 IN | WEIGHT: 180 LBS

## 2018-03-20 DIAGNOSIS — R59.0 CERVICAL LYMPHADENOPATHY: Primary | ICD-10-CM

## 2018-03-20 DIAGNOSIS — Z72.0 TOBACCO USE: ICD-10-CM

## 2018-03-20 PROCEDURE — 99204 OFFICE O/P NEW MOD 45 MIN: CPT | Performed by: OTOLARYNGOLOGY

## 2018-03-20 PROCEDURE — 31575 DIAGNOSTIC LARYNGOSCOPY: CPT | Performed by: OTOLARYNGOLOGY

## 2018-03-21 ENCOUNTER — APPOINTMENT (OUTPATIENT)
Dept: PREADMISSION TESTING | Facility: HOSPITAL | Age: 68
End: 2018-03-21

## 2018-03-21 VITALS
HEART RATE: 84 BPM | SYSTOLIC BLOOD PRESSURE: 158 MMHG | WEIGHT: 180.78 LBS | OXYGEN SATURATION: 99 % | RESPIRATION RATE: 18 BRPM | DIASTOLIC BLOOD PRESSURE: 74 MMHG | BODY MASS INDEX: 23.96 KG/M2 | HEIGHT: 73 IN

## 2018-03-21 DIAGNOSIS — R59.0 CERVICAL LYMPHADENOPATHY: ICD-10-CM

## 2018-03-21 LAB
ALBUMIN SERPL-MCNC: 4.3 G/DL (ref 3.5–5)
ALBUMIN/GLOB SERPL: 1.3 G/DL (ref 1.1–2.5)
ALP SERPL-CCNC: 96 U/L (ref 24–120)
ALT SERPL W P-5'-P-CCNC: 20 U/L (ref 0–54)
ANION GAP SERPL CALCULATED.3IONS-SCNC: 12 MMOL/L (ref 4–13)
AST SERPL-CCNC: 19 U/L (ref 7–45)
BILIRUB SERPL-MCNC: 0.4 MG/DL (ref 0.1–1)
BUN BLD-MCNC: 13 MG/DL (ref 5–21)
BUN/CREAT SERPL: 13.4 (ref 7–25)
CALCIUM SPEC-SCNC: 9.4 MG/DL (ref 8.4–10.4)
CHLORIDE SERPL-SCNC: 103 MMOL/L (ref 98–110)
CO2 SERPL-SCNC: 27 MMOL/L (ref 24–31)
CREAT BLD-MCNC: 0.97 MG/DL (ref 0.5–1.4)
DEPRECATED RDW RBC AUTO: 40.5 FL (ref 40–54)
ERYTHROCYTE [DISTWIDTH] IN BLOOD BY AUTOMATED COUNT: 12.7 % (ref 12–15)
GFR SERPL CREATININE-BSD FRML MDRD: 78 ML/MIN/1.73
GLOBULIN UR ELPH-MCNC: 3.4 GM/DL
GLUCOSE BLD-MCNC: 83 MG/DL (ref 70–100)
HCT VFR BLD AUTO: 47.6 % (ref 40–52)
HGB BLD-MCNC: 16.7 G/DL (ref 14–18)
MCH RBC QN AUTO: 30.8 PG (ref 28–32)
MCHC RBC AUTO-ENTMCNC: 35.1 G/DL (ref 33–36)
MCV RBC AUTO: 87.7 FL (ref 82–95)
PLATELET # BLD AUTO: 306 10*3/MM3 (ref 130–400)
PMV BLD AUTO: 9.2 FL (ref 6–12)
POTASSIUM BLD-SCNC: 4 MMOL/L (ref 3.5–5.3)
PROT SERPL-MCNC: 7.7 G/DL (ref 6.3–8.7)
RBC # BLD AUTO: 5.43 10*6/MM3 (ref 4.8–5.9)
SODIUM BLD-SCNC: 142 MMOL/L (ref 135–145)
WBC NRBC COR # BLD: 6.59 10*3/MM3 (ref 4.8–10.8)

## 2018-03-21 PROCEDURE — 93005 ELECTROCARDIOGRAM TRACING: CPT

## 2018-03-21 PROCEDURE — 36415 COLL VENOUS BLD VENIPUNCTURE: CPT

## 2018-03-21 PROCEDURE — 93010 ELECTROCARDIOGRAM REPORT: CPT | Performed by: INTERNAL MEDICINE

## 2018-03-21 PROCEDURE — 80053 COMPREHEN METABOLIC PANEL: CPT | Performed by: OTOLARYNGOLOGY

## 2018-03-21 PROCEDURE — 85027 COMPLETE CBC AUTOMATED: CPT | Performed by: OTOLARYNGOLOGY

## 2018-03-26 ENCOUNTER — TELEPHONE (OUTPATIENT)
Dept: OTOLARYNGOLOGY | Facility: CLINIC | Age: 68
End: 2018-03-26

## 2018-03-26 ENCOUNTER — HOSPITAL ENCOUNTER (OUTPATIENT)
Facility: HOSPITAL | Age: 68
Setting detail: OBSERVATION
Discharge: HOME | End: 2018-03-28
Attending: FAMILY MEDICINE | Admitting: FAMILY MEDICINE

## 2018-03-26 ENCOUNTER — ANESTHESIA (OUTPATIENT)
Dept: PERIOP | Facility: HOSPITAL | Age: 68
End: 2018-03-26

## 2018-03-26 ENCOUNTER — ANESTHESIA EVENT (OUTPATIENT)
Dept: PERIOP | Facility: HOSPITAL | Age: 68
End: 2018-03-26

## 2018-03-26 ENCOUNTER — HOSPITAL ENCOUNTER (OUTPATIENT)
Facility: HOSPITAL | Age: 68
Setting detail: HOSPITAL OUTPATIENT SURGERY
Discharge: HOME | End: 2018-03-26
Attending: OTOLARYNGOLOGY | Admitting: OTOLARYNGOLOGY

## 2018-03-26 VITALS
OXYGEN SATURATION: 96 % | DIASTOLIC BLOOD PRESSURE: 82 MMHG | HEART RATE: 70 BPM | RESPIRATION RATE: 16 BRPM | SYSTOLIC BLOOD PRESSURE: 181 MMHG | TEMPERATURE: 97.8 F

## 2018-03-26 DIAGNOSIS — R59.0 CERVICAL LYMPHADENOPATHY: ICD-10-CM

## 2018-03-26 DIAGNOSIS — K92.0 COFFEE GROUND EMESIS: Primary | ICD-10-CM

## 2018-03-26 LAB
ANION GAP SERPL CALCULATED.3IONS-SCNC: 14 MMOL/L (ref 4–13)
BASOPHILS # BLD AUTO: 0.03 10*3/MM3 (ref 0–0.2)
BASOPHILS NFR BLD AUTO: 0.4 % (ref 0–2)
BUN BLD-MCNC: 19 MG/DL (ref 5–21)
BUN/CREAT SERPL: 17.1 (ref 7–25)
CALCIUM SPEC-SCNC: 9.5 MG/DL (ref 8.4–10.4)
CHLORIDE SERPL-SCNC: 101 MMOL/L (ref 98–110)
CLUMPED PLATELETS: PRESENT
CO2 SERPL-SCNC: 28 MMOL/L (ref 24–31)
CREAT BLD-MCNC: 1.11 MG/DL (ref 0.5–1.4)
DEPRECATED RDW RBC AUTO: 40.1 FL (ref 40–54)
EOSINOPHIL # BLD AUTO: 0 10*3/MM3 (ref 0–0.7)
EOSINOPHIL NFR BLD AUTO: 0 % (ref 0–4)
ERYTHROCYTE [DISTWIDTH] IN BLOOD BY AUTOMATED COUNT: 15.7 % (ref 12–15)
GFR SERPL CREATININE-BSD FRML MDRD: 66 ML/MIN/1.73
GLUCOSE BLD-MCNC: 160 MG/DL (ref 70–100)
HCT VFR BLD AUTO: 50 % (ref 40–52)
HGB BLD-MCNC: 17.5 G/DL (ref 14–18)
IMM GRANULOCYTES # BLD: 0.05 10*3/MM3 (ref 0–0.03)
IMM GRANULOCYTES NFR BLD: 0.7 % (ref 0–5)
LARGE PLATELETS: NORMAL
LYMPHOCYTES # BLD AUTO: 0.71 10*3/MM3 (ref 0.72–4.86)
LYMPHOCYTES NFR BLD AUTO: 9.5 % (ref 15–45)
MACROCYTES BLD QL SMEAR: NORMAL
MCH RBC QN AUTO: 37.2 PG (ref 28–32)
MCHC RBC AUTO-ENTMCNC: 35 G/DL (ref 33–36)
MCV RBC AUTO: 106.2 FL (ref 82–95)
MONOCYTES # BLD AUTO: 0.26 10*3/MM3 (ref 0.19–1.3)
MONOCYTES NFR BLD AUTO: 3.5 % (ref 4–12)
NEUTROPHILS # BLD AUTO: 6.4 10*3/MM3 (ref 1.87–8.4)
NEUTROPHILS NFR BLD AUTO: 85.9 % (ref 39–78)
NRBC BLD MANUAL-RTO: 0 /100 WBC (ref 0–0)
PLATELET # BLD AUTO: 392 10*3/MM3 (ref 130–400)
PMV BLD AUTO: 8.9 FL (ref 6–12)
POLYCHROMASIA BLD QL SMEAR: NORMAL
POTASSIUM BLD-SCNC: 4 MMOL/L (ref 3.5–5.3)
RBC # BLD AUTO: 4.71 10*6/MM3 (ref 4.8–5.9)
SODIUM BLD-SCNC: 143 MMOL/L (ref 135–145)
WBC MORPH BLD: NORMAL
WBC NRBC COR # BLD: 7.45 10*3/MM3 (ref 4.8–10.8)

## 2018-03-26 PROCEDURE — 25010000002 DEXAMETHASONE PER 1 MG: Performed by: ANESTHESIOLOGY

## 2018-03-26 PROCEDURE — 25010000002 ONDANSETRON PER 1 MG: Performed by: FAMILY MEDICINE

## 2018-03-26 PROCEDURE — 25010000002 PROPOFOL 10 MG/ML EMULSION: Performed by: NURSE ANESTHETIST, CERTIFIED REGISTERED

## 2018-03-26 PROCEDURE — 96375 TX/PRO/DX INJ NEW DRUG ADDON: CPT

## 2018-03-26 PROCEDURE — 25010000002 FENTANYL CITRATE (PF) 100 MCG/2ML SOLUTION: Performed by: NURSE ANESTHETIST, CERTIFIED REGISTERED

## 2018-03-26 PROCEDURE — 85025 COMPLETE CBC W/AUTO DIFF WBC: CPT | Performed by: FAMILY MEDICINE

## 2018-03-26 PROCEDURE — 88341 IMHCHEM/IMCYTCHM EA ADD ANTB: CPT

## 2018-03-26 PROCEDURE — 88342 IMHCHEM/IMCYTCHM 1ST ANTB: CPT

## 2018-03-26 PROCEDURE — 99284 EMERGENCY DEPT VISIT MOD MDM: CPT

## 2018-03-26 PROCEDURE — 31535 LARYNGOSCOPY W/BIOPSY: CPT | Performed by: OTOLARYNGOLOGY

## 2018-03-26 PROCEDURE — 80048 BASIC METABOLIC PNL TOTAL CA: CPT | Performed by: FAMILY MEDICINE

## 2018-03-26 PROCEDURE — 88185 FLOWCYTOMETRY/TC ADD-ON: CPT | Performed by: OTOLARYNGOLOGY

## 2018-03-26 PROCEDURE — 96376 TX/PRO/DX INJ SAME DRUG ADON: CPT

## 2018-03-26 PROCEDURE — 88184 FLOWCYTOMETRY/ TC 1 MARKER: CPT

## 2018-03-26 PROCEDURE — 25010000002 MIDAZOLAM PER 1 MG: Performed by: ANESTHESIOLOGY

## 2018-03-26 PROCEDURE — 25010000002 PROMETHAZINE PER 50 MG: Performed by: FAMILY MEDICINE

## 2018-03-26 PROCEDURE — 88185 FLOWCYTOMETRY/TC ADD-ON: CPT

## 2018-03-26 PROCEDURE — 38510 BIOPSY/REMOVAL LYMPH NODES: CPT | Performed by: OTOLARYNGOLOGY

## 2018-03-26 PROCEDURE — 96374 THER/PROPH/DIAG INJ IV PUSH: CPT

## 2018-03-26 PROCEDURE — 88305 TISSUE EXAM BY PATHOLOGIST: CPT | Performed by: OTOLARYNGOLOGY

## 2018-03-26 PROCEDURE — 88184 FLOWCYTOMETRY/ TC 1 MARKER: CPT | Performed by: OTOLARYNGOLOGY

## 2018-03-26 PROCEDURE — 85007 BL SMEAR W/DIFF WBC COUNT: CPT | Performed by: FAMILY MEDICINE

## 2018-03-26 RX ORDER — DEXAMETHASONE SODIUM PHOSPHATE 4 MG/ML
4 INJECTION, SOLUTION INTRA-ARTICULAR; INTRALESIONAL; INTRAMUSCULAR; INTRAVENOUS; SOFT TISSUE ONCE AS NEEDED
Status: COMPLETED | OUTPATIENT
Start: 2018-03-26 | End: 2018-03-26

## 2018-03-26 RX ORDER — LIDOCAINE HYDROCHLORIDE 20 MG/ML
INJECTION, SOLUTION INFILTRATION; PERINEURAL AS NEEDED
Status: DISCONTINUED | OUTPATIENT
Start: 2018-03-26 | End: 2018-03-26 | Stop reason: SURG

## 2018-03-26 RX ORDER — MIDAZOLAM HYDROCHLORIDE 1 MG/ML
1 INJECTION INTRAMUSCULAR; INTRAVENOUS
Status: DISCONTINUED | OUTPATIENT
Start: 2018-03-26 | End: 2018-03-26 | Stop reason: HOSPADM

## 2018-03-26 RX ORDER — MEPERIDINE HYDROCHLORIDE 25 MG/ML
12.5 INJECTION INTRAMUSCULAR; INTRAVENOUS; SUBCUTANEOUS
Status: DISCONTINUED | OUTPATIENT
Start: 2018-03-26 | End: 2018-03-26 | Stop reason: HOSPADM

## 2018-03-26 RX ORDER — MAGNESIUM HYDROXIDE 1200 MG/15ML
LIQUID ORAL AS NEEDED
Status: DISCONTINUED | OUTPATIENT
Start: 2018-03-26 | End: 2018-03-26 | Stop reason: HOSPADM

## 2018-03-26 RX ORDER — SODIUM CHLORIDE 0.9 % (FLUSH) 0.9 %
1-10 SYRINGE (ML) INJECTION AS NEEDED
Status: DISCONTINUED | OUTPATIENT
Start: 2018-03-26 | End: 2018-03-26 | Stop reason: HOSPADM

## 2018-03-26 RX ORDER — SODIUM CHLORIDE, SODIUM LACTATE, POTASSIUM CHLORIDE, CALCIUM CHLORIDE 600; 310; 30; 20 MG/100ML; MG/100ML; MG/100ML; MG/100ML
1000 INJECTION, SOLUTION INTRAVENOUS CONTINUOUS
Status: DISCONTINUED | OUTPATIENT
Start: 2018-03-26 | End: 2018-03-26 | Stop reason: HOSPADM

## 2018-03-26 RX ORDER — ONDANSETRON 2 MG/ML
4 INJECTION INTRAMUSCULAR; INTRAVENOUS AS NEEDED
Status: DISCONTINUED | OUTPATIENT
Start: 2018-03-26 | End: 2018-03-26 | Stop reason: HOSPADM

## 2018-03-26 RX ORDER — ONDANSETRON 2 MG/ML
4 INJECTION INTRAMUSCULAR; INTRAVENOUS ONCE
Status: COMPLETED | OUTPATIENT
Start: 2018-03-26 | End: 2018-03-26

## 2018-03-26 RX ORDER — FENTANYL CITRATE 50 UG/ML
INJECTION, SOLUTION INTRAMUSCULAR; INTRAVENOUS AS NEEDED
Status: DISCONTINUED | OUTPATIENT
Start: 2018-03-26 | End: 2018-03-26 | Stop reason: SURG

## 2018-03-26 RX ORDER — MIDAZOLAM HYDROCHLORIDE 1 MG/ML
2 INJECTION INTRAMUSCULAR; INTRAVENOUS
Status: DISCONTINUED | OUTPATIENT
Start: 2018-03-26 | End: 2018-03-26 | Stop reason: HOSPADM

## 2018-03-26 RX ORDER — OXYMETAZOLINE HYDROCHLORIDE 0.05 G/100ML
SPRAY NASAL AS NEEDED
Status: DISCONTINUED | OUTPATIENT
Start: 2018-03-26 | End: 2018-03-26 | Stop reason: HOSPADM

## 2018-03-26 RX ORDER — HYDROCODONE BITARTRATE AND ACETAMINOPHEN 5; 325 MG/1; MG/1
1 TABLET ORAL ONCE AS NEEDED
Status: DISCONTINUED | OUTPATIENT
Start: 2018-03-26 | End: 2018-03-26 | Stop reason: HOSPADM

## 2018-03-26 RX ORDER — PROPOFOL 10 MG/ML
VIAL (ML) INTRAVENOUS AS NEEDED
Status: DISCONTINUED | OUTPATIENT
Start: 2018-03-26 | End: 2018-03-26 | Stop reason: SURG

## 2018-03-26 RX ORDER — PROMETHAZINE HYDROCHLORIDE 25 MG/ML
25 INJECTION, SOLUTION INTRAMUSCULAR; INTRAVENOUS ONCE
Status: COMPLETED | OUTPATIENT
Start: 2018-03-26 | End: 2018-03-26

## 2018-03-26 RX ORDER — ATRACURIUM BESYLATE 10 MG/ML
INJECTION, SOLUTION INTRAVENOUS AS NEEDED
Status: DISCONTINUED | OUTPATIENT
Start: 2018-03-26 | End: 2018-03-26 | Stop reason: SURG

## 2018-03-26 RX ORDER — IPRATROPIUM BROMIDE AND ALBUTEROL SULFATE 2.5; .5 MG/3ML; MG/3ML
3 SOLUTION RESPIRATORY (INHALATION) ONCE
Status: COMPLETED | OUTPATIENT
Start: 2018-03-26 | End: 2018-03-26

## 2018-03-26 RX ORDER — IPRATROPIUM BROMIDE AND ALBUTEROL SULFATE 2.5; .5 MG/3ML; MG/3ML
3 SOLUTION RESPIRATORY (INHALATION) ONCE AS NEEDED
Status: DISCONTINUED | OUTPATIENT
Start: 2018-03-26 | End: 2018-03-26 | Stop reason: HOSPADM

## 2018-03-26 RX ORDER — HYDRALAZINE HYDROCHLORIDE 20 MG/ML
5 INJECTION INTRAMUSCULAR; INTRAVENOUS
Status: DISCONTINUED | OUTPATIENT
Start: 2018-03-26 | End: 2018-03-26 | Stop reason: HOSPADM

## 2018-03-26 RX ORDER — FLUMAZENIL 0.1 MG/ML
0.2 INJECTION INTRAVENOUS AS NEEDED
Status: DISCONTINUED | OUTPATIENT
Start: 2018-03-26 | End: 2018-03-26 | Stop reason: HOSPADM

## 2018-03-26 RX ORDER — SODIUM CHLORIDE, SODIUM LACTATE, POTASSIUM CHLORIDE, CALCIUM CHLORIDE 600; 310; 30; 20 MG/100ML; MG/100ML; MG/100ML; MG/100ML
100 INJECTION, SOLUTION INTRAVENOUS CONTINUOUS
Status: DISCONTINUED | OUTPATIENT
Start: 2018-03-26 | End: 2018-03-26 | Stop reason: HOSPADM

## 2018-03-26 RX ORDER — ONDANSETRON 2 MG/ML
4 INJECTION INTRAMUSCULAR; INTRAVENOUS ONCE AS NEEDED
Status: DISCONTINUED | OUTPATIENT
Start: 2018-03-26 | End: 2018-03-26 | Stop reason: HOSPADM

## 2018-03-26 RX ORDER — LIDOCAINE HYDROCHLORIDE AND EPINEPHRINE 10; 10 MG/ML; UG/ML
INJECTION, SOLUTION INFILTRATION; PERINEURAL AS NEEDED
Status: DISCONTINUED | OUTPATIENT
Start: 2018-03-26 | End: 2018-03-26 | Stop reason: HOSPADM

## 2018-03-26 RX ORDER — SODIUM CHLORIDE 9 MG/ML
75 INJECTION, SOLUTION INTRAVENOUS CONTINUOUS
Status: DISCONTINUED | OUTPATIENT
Start: 2018-03-26 | End: 2018-03-28 | Stop reason: HOSPADM

## 2018-03-26 RX ORDER — METOCLOPRAMIDE HYDROCHLORIDE 5 MG/ML
5 INJECTION INTRAMUSCULAR; INTRAVENOUS
Status: DISCONTINUED | OUTPATIENT
Start: 2018-03-26 | End: 2018-03-26 | Stop reason: HOSPADM

## 2018-03-26 RX ORDER — NALOXONE HCL 0.4 MG/ML
0.04 VIAL (ML) INJECTION AS NEEDED
Status: DISCONTINUED | OUTPATIENT
Start: 2018-03-26 | End: 2018-03-26 | Stop reason: HOSPADM

## 2018-03-26 RX ORDER — ONDANSETRON 4 MG/1
4 TABLET, FILM COATED ORAL EVERY 8 HOURS PRN
Qty: 10 TABLET | Refills: 1 | Status: SHIPPED | OUTPATIENT
Start: 2018-03-26 | End: 2018-08-21

## 2018-03-26 RX ORDER — CEPHALEXIN 500 MG/1
500 CAPSULE ORAL 4 TIMES DAILY
Qty: 28 CAPSULE | Refills: 0 | Status: SHIPPED | OUTPATIENT
Start: 2018-03-26 | End: 2018-03-31

## 2018-03-26 RX ORDER — HYDROCODONE BITARTRATE AND ACETAMINOPHEN 5; 325 MG/1; MG/1
1-2 TABLET ORAL EVERY 4 HOURS PRN
Qty: 15 TABLET | Refills: 0 | Status: SHIPPED | OUTPATIENT
Start: 2018-03-26 | End: 2018-03-29

## 2018-03-26 RX ORDER — SODIUM CHLORIDE 0.9 % (FLUSH) 0.9 %
3 SYRINGE (ML) INJECTION AS NEEDED
Status: DISCONTINUED | OUTPATIENT
Start: 2018-03-26 | End: 2018-03-26 | Stop reason: HOSPADM

## 2018-03-26 RX ORDER — PANTOPRAZOLE SODIUM 40 MG/10ML
80 INJECTION, POWDER, LYOPHILIZED, FOR SOLUTION INTRAVENOUS ONCE
Status: COMPLETED | OUTPATIENT
Start: 2018-03-26 | End: 2018-03-26

## 2018-03-26 RX ORDER — LABETALOL HYDROCHLORIDE 5 MG/ML
5 INJECTION, SOLUTION INTRAVENOUS
Status: DISCONTINUED | OUTPATIENT
Start: 2018-03-26 | End: 2018-03-26 | Stop reason: HOSPADM

## 2018-03-26 RX ORDER — MORPHINE SULFATE 2 MG/ML
2 INJECTION, SOLUTION INTRAMUSCULAR; INTRAVENOUS AS NEEDED
Status: DISCONTINUED | OUTPATIENT
Start: 2018-03-26 | End: 2018-03-26 | Stop reason: HOSPADM

## 2018-03-26 RX ADMIN — IPRATROPIUM BROMIDE AND ALBUTEROL SULFATE 3 ML: 2.5; .5 SOLUTION RESPIRATORY (INHALATION) at 12:38

## 2018-03-26 RX ADMIN — SODIUM CHLORIDE, POTASSIUM CHLORIDE, SODIUM LACTATE AND CALCIUM CHLORIDE: 600; 310; 30; 20 INJECTION, SOLUTION INTRAVENOUS at 13:12

## 2018-03-26 RX ADMIN — LIDOCAINE HYDROCHLORIDE 0.5 ML: 10 INJECTION, SOLUTION EPIDURAL; INFILTRATION; INTRACAUDAL; PERINEURAL at 09:24

## 2018-03-26 RX ADMIN — DEXAMETHASONE SODIUM PHOSPHATE 4 MG: 4 INJECTION, SOLUTION INTRA-ARTICULAR; INTRALESIONAL; INTRAMUSCULAR; INTRAVENOUS; SOFT TISSUE at 12:40

## 2018-03-26 RX ADMIN — ONDANSETRON HYDROCHLORIDE 4 MG: 2 INJECTION INTRAMUSCULAR; INTRAVENOUS at 22:25

## 2018-03-26 RX ADMIN — PANTOPRAZOLE SODIUM 80 MG: 40 INJECTION, POWDER, FOR SOLUTION INTRAVENOUS at 23:47

## 2018-03-26 RX ADMIN — ATRACURIUM BESYLATE 10 MG: 10 INJECTION, SOLUTION INTRAVENOUS at 13:12

## 2018-03-26 RX ADMIN — SODIUM CHLORIDE 125 ML/HR: 9 INJECTION, SOLUTION INTRAVENOUS at 22:30

## 2018-03-26 RX ADMIN — SODIUM CHLORIDE, POTASSIUM CHLORIDE, SODIUM LACTATE AND CALCIUM CHLORIDE 1000 ML: 600; 310; 30; 20 INJECTION, SOLUTION INTRAVENOUS at 09:25

## 2018-03-26 RX ADMIN — FENTANYL CITRATE 100 MCG: 50 INJECTION, SOLUTION INTRAMUSCULAR; INTRAVENOUS at 13:12

## 2018-03-26 RX ADMIN — PROMETHAZINE HYDROCHLORIDE 25 MG: 25 INJECTION INTRAMUSCULAR; INTRAVENOUS at 23:11

## 2018-03-26 RX ADMIN — LIDOCAINE HYDROCHLORIDE 100 MG: 20 INJECTION, SOLUTION INFILTRATION; PERINEURAL at 13:12

## 2018-03-26 RX ADMIN — PROMETHAZINE HYDROCHLORIDE 25 MG: 25 INJECTION INTRAMUSCULAR; INTRAVENOUS at 23:50

## 2018-03-26 RX ADMIN — MIDAZOLAM HYDROCHLORIDE 2 MG: 1 INJECTION, SOLUTION INTRAMUSCULAR; INTRAVENOUS at 12:40

## 2018-03-26 RX ADMIN — PROPOFOL 200 MG: 10 INJECTION, EMULSION INTRAVENOUS at 13:12

## 2018-03-27 ENCOUNTER — APPOINTMENT (OUTPATIENT)
Dept: CT IMAGING | Facility: HOSPITAL | Age: 68
End: 2018-03-27

## 2018-03-27 ENCOUNTER — TELEPHONE (OUTPATIENT)
Dept: GASTROENTEROLOGY | Facility: CLINIC | Age: 68
End: 2018-03-27

## 2018-03-27 PROBLEM — K92.0 COFFEE GROUND EMESIS: Status: ACTIVE | Noted: 2018-03-27

## 2018-03-27 LAB
HCT VFR BLD AUTO: 31.5 % (ref 40–52)
HCT VFR BLD AUTO: 33.5 % (ref 40–52)
HCT VFR BLD AUTO: 36.7 % (ref 40–52)
HGB BLD-MCNC: 13.8 G/DL (ref 14–18)
HGB BLD-MCNC: 14.6 G/DL (ref 14–18)
HGB BLD-MCNC: 15.2 G/DL (ref 14–18)

## 2018-03-27 PROCEDURE — 25010000002 IOPAMIDOL 61 % SOLUTION: Performed by: FAMILY MEDICINE

## 2018-03-27 PROCEDURE — 99213 OFFICE O/P EST LOW 20 MIN: CPT | Performed by: OTOLARYNGOLOGY

## 2018-03-27 PROCEDURE — 85014 HEMATOCRIT: CPT | Performed by: FAMILY MEDICINE

## 2018-03-27 PROCEDURE — 99214 OFFICE O/P EST MOD 30 MIN: CPT | Performed by: INTERNAL MEDICINE

## 2018-03-27 PROCEDURE — 94799 UNLISTED PULMONARY SVC/PX: CPT

## 2018-03-27 PROCEDURE — 85014 HEMATOCRIT: CPT | Performed by: NURSE PRACTITIONER

## 2018-03-27 PROCEDURE — 96365 THER/PROPH/DIAG IV INF INIT: CPT

## 2018-03-27 PROCEDURE — G0378 HOSPITAL OBSERVATION PER HR: HCPCS

## 2018-03-27 PROCEDURE — 85018 HEMOGLOBIN: CPT | Performed by: NURSE PRACTITIONER

## 2018-03-27 PROCEDURE — 85018 HEMOGLOBIN: CPT | Performed by: FAMILY MEDICINE

## 2018-03-27 PROCEDURE — 74177 CT ABD & PELVIS W/CONTRAST: CPT

## 2018-03-27 PROCEDURE — 96366 THER/PROPH/DIAG IV INF ADDON: CPT

## 2018-03-27 PROCEDURE — 94640 AIRWAY INHALATION TREATMENT: CPT

## 2018-03-27 RX ORDER — CEPHALEXIN 500 MG/1
500 CAPSULE ORAL 4 TIMES DAILY
Status: DISCONTINUED | OUTPATIENT
Start: 2018-03-27 | End: 2018-03-28 | Stop reason: HOSPADM

## 2018-03-27 RX ORDER — BUDESONIDE AND FORMOTEROL FUMARATE DIHYDRATE 160; 4.5 UG/1; UG/1
2 AEROSOL RESPIRATORY (INHALATION) NIGHTLY
Status: DISCONTINUED | OUTPATIENT
Start: 2018-03-27 | End: 2018-03-28 | Stop reason: HOSPADM

## 2018-03-27 RX ORDER — SODIUM CHLORIDE 0.9 % (FLUSH) 0.9 %
1-10 SYRINGE (ML) INJECTION AS NEEDED
Status: DISCONTINUED | OUTPATIENT
Start: 2018-03-27 | End: 2018-03-28 | Stop reason: HOSPADM

## 2018-03-27 RX ORDER — CETIRIZINE HYDROCHLORIDE 10 MG/1
10 TABLET ORAL DAILY
Status: DISCONTINUED | OUTPATIENT
Start: 2018-03-27 | End: 2018-03-28 | Stop reason: HOSPADM

## 2018-03-27 RX ADMIN — CEPHALEXIN 500 MG: 500 CAPSULE ORAL at 21:04

## 2018-03-27 RX ADMIN — SODIUM CHLORIDE 125 ML/HR: 9 INJECTION, SOLUTION INTRAVENOUS at 07:28

## 2018-03-27 RX ADMIN — SODIUM CHLORIDE 8 MG/HR: 900 INJECTION INTRAVENOUS at 14:54

## 2018-03-27 RX ADMIN — SODIUM CHLORIDE 8 MG/HR: 900 INJECTION INTRAVENOUS at 21:04

## 2018-03-27 RX ADMIN — SODIUM CHLORIDE 8 MG/HR: 900 INJECTION INTRAVENOUS at 00:01

## 2018-03-27 RX ADMIN — CETIRIZINE HYDROCHLORIDE 10 MG: 10 TABLET, FILM COATED ORAL at 10:04

## 2018-03-27 RX ADMIN — BUDESONIDE AND FORMOTEROL FUMARATE DIHYDRATE 2 PUFF: 160; 4.5 AEROSOL RESPIRATORY (INHALATION) at 21:39

## 2018-03-27 RX ADMIN — CEPHALEXIN 500 MG: 500 CAPSULE ORAL at 13:01

## 2018-03-27 RX ADMIN — SODIUM CHLORIDE 8 MG/HR: 900 INJECTION INTRAVENOUS at 09:29

## 2018-03-27 RX ADMIN — CEPHALEXIN 500 MG: 500 CAPSULE ORAL at 17:14

## 2018-03-27 RX ADMIN — IOPAMIDOL 100 ML: 612 INJECTION, SOLUTION INTRAVENOUS at 02:45

## 2018-03-27 RX ADMIN — CEPHALEXIN 500 MG: 500 CAPSULE ORAL at 10:04

## 2018-03-27 RX ADMIN — SODIUM CHLORIDE 75 ML/HR: 9 INJECTION, SOLUTION INTRAVENOUS at 14:54

## 2018-03-27 RX ADMIN — SODIUM CHLORIDE 8 MG/HR: 900 INJECTION INTRAVENOUS at 05:21

## 2018-03-28 VITALS
BODY MASS INDEX: 22.05 KG/M2 | OXYGEN SATURATION: 94 % | SYSTOLIC BLOOD PRESSURE: 156 MMHG | TEMPERATURE: 98.7 F | HEIGHT: 74 IN | RESPIRATION RATE: 16 BRPM | WEIGHT: 171.8 LBS | DIASTOLIC BLOOD PRESSURE: 71 MMHG | HEART RATE: 51 BPM

## 2018-03-28 LAB
ANION GAP SERPL CALCULATED.3IONS-SCNC: 6 MMOL/L (ref 4–13)
BASOPHILS # BLD AUTO: 0.05 10*3/MM3 (ref 0–0.2)
BASOPHILS NFR BLD AUTO: 0.7 % (ref 0–2)
BUN BLD-MCNC: 19 MG/DL (ref 5–21)
BUN/CREAT SERPL: 18.8 (ref 7–25)
CALCIUM SPEC-SCNC: 8 MG/DL (ref 8.4–10.4)
CHLORIDE SERPL-SCNC: 108 MMOL/L (ref 98–110)
CO2 SERPL-SCNC: 26 MMOL/L (ref 24–31)
CREAT BLD-MCNC: 1.01 MG/DL (ref 0.5–1.4)
DEPRECATED RDW RBC AUTO: 42 FL (ref 40–54)
EOSINOPHIL # BLD AUTO: 0.31 10*3/MM3 (ref 0–0.7)
EOSINOPHIL NFR BLD AUTO: 4.6 % (ref 0–4)
ERYTHROCYTE [DISTWIDTH] IN BLOOD BY AUTOMATED COUNT: 12.9 % (ref 12–15)
GFR SERPL CREATININE-BSD FRML MDRD: 74 ML/MIN/1.73
GLUCOSE BLD-MCNC: 82 MG/DL (ref 70–100)
HCT VFR BLD AUTO: 36.2 % (ref 40–52)
HCT VFR BLD AUTO: 37.5 % (ref 40–52)
HGB BLD-MCNC: 12.8 G/DL (ref 14–18)
HGB BLD-MCNC: 13.1 G/DL (ref 14–18)
IMM GRANULOCYTES # BLD: 0.02 10*3/MM3 (ref 0–0.03)
IMM GRANULOCYTES NFR BLD: 0.3 % (ref 0–5)
LYMPHOCYTES # BLD AUTO: 1.95 10*3/MM3 (ref 0.72–4.86)
LYMPHOCYTES NFR BLD AUTO: 29.1 % (ref 15–45)
MCH RBC QN AUTO: 31.1 PG (ref 28–32)
MCHC RBC AUTO-ENTMCNC: 34.9 G/DL (ref 33–36)
MCV RBC AUTO: 89.1 FL (ref 82–95)
MONOCYTES # BLD AUTO: 0.58 10*3/MM3 (ref 0.19–1.3)
MONOCYTES NFR BLD AUTO: 8.7 % (ref 4–12)
NEUTROPHILS # BLD AUTO: 3.78 10*3/MM3 (ref 1.87–8.4)
NEUTROPHILS NFR BLD AUTO: 56.6 % (ref 39–78)
NRBC BLD MANUAL-RTO: 0 /100 WBC (ref 0–0)
PLATELET # BLD AUTO: 250 10*3/MM3 (ref 130–400)
PMV BLD AUTO: 9 FL (ref 6–12)
POTASSIUM BLD-SCNC: 4 MMOL/L (ref 3.5–5.3)
RBC # BLD AUTO: 4.21 10*6/MM3 (ref 4.8–5.9)
SODIUM BLD-SCNC: 140 MMOL/L (ref 135–145)
WBC NRBC COR # BLD: 6.69 10*3/MM3 (ref 4.8–10.8)

## 2018-03-28 PROCEDURE — 80048 BASIC METABOLIC PNL TOTAL CA: CPT | Performed by: NURSE PRACTITIONER

## 2018-03-28 PROCEDURE — 99213 OFFICE O/P EST LOW 20 MIN: CPT | Performed by: INTERNAL MEDICINE

## 2018-03-28 PROCEDURE — 94799 UNLISTED PULMONARY SVC/PX: CPT

## 2018-03-28 PROCEDURE — 96361 HYDRATE IV INFUSION ADD-ON: CPT

## 2018-03-28 PROCEDURE — G0378 HOSPITAL OBSERVATION PER HR: HCPCS

## 2018-03-28 PROCEDURE — 85014 HEMATOCRIT: CPT | Performed by: NURSE PRACTITIONER

## 2018-03-28 PROCEDURE — 85025 COMPLETE CBC W/AUTO DIFF WBC: CPT | Performed by: NURSE PRACTITIONER

## 2018-03-28 PROCEDURE — 96366 THER/PROPH/DIAG IV INF ADDON: CPT

## 2018-03-28 PROCEDURE — 85018 HEMOGLOBIN: CPT | Performed by: NURSE PRACTITIONER

## 2018-03-28 RX ADMIN — CETIRIZINE HYDROCHLORIDE 10 MG: 10 TABLET, FILM COATED ORAL at 09:00

## 2018-03-28 RX ADMIN — SODIUM CHLORIDE 8 MG/HR: 900 INJECTION INTRAVENOUS at 05:39

## 2018-03-28 RX ADMIN — BUDESONIDE AND FORMOTEROL FUMARATE DIHYDRATE 2 PUFF: 160; 4.5 AEROSOL RESPIRATORY (INHALATION) at 06:47

## 2018-03-28 RX ADMIN — CEPHALEXIN 500 MG: 500 CAPSULE ORAL at 08:59

## 2018-03-28 RX ADMIN — CEPHALEXIN 500 MG: 500 CAPSULE ORAL at 12:05

## 2018-03-28 RX ADMIN — SODIUM CHLORIDE 8 MG/HR: 900 INJECTION INTRAVENOUS at 00:35

## 2018-03-28 RX ADMIN — SODIUM CHLORIDE 75 ML/HR: 9 INJECTION, SOLUTION INTRAVENOUS at 03:58

## 2018-03-30 ENCOUNTER — PREP FOR SURGERY (OUTPATIENT)
Dept: OTHER | Facility: HOSPITAL | Age: 68
End: 2018-03-30

## 2018-03-30 DIAGNOSIS — R13.10 DYSPHAGIA, UNSPECIFIED TYPE: Primary | ICD-10-CM

## 2018-04-04 LAB
CYTO UR: NORMAL
LAB AP CASE REPORT: NORMAL
LAB AP SYNOPTIC CHECKLIST: NORMAL
Lab: NORMAL
PATH REPORT.FINAL DX SPEC: NORMAL
PATH REPORT.GROSS SPEC: NORMAL

## 2018-04-09 ENCOUNTER — TELEPHONE (OUTPATIENT)
Dept: OTOLARYNGOLOGY | Facility: CLINIC | Age: 68
End: 2018-04-09

## 2018-04-09 PROBLEM — R13.10 DYSPHAGIA: Status: ACTIVE | Noted: 2018-04-09

## 2018-04-12 PROBLEM — C81.11 NODULAR SCLEROSIS HODGKIN LYMPHOMA OF LYMPH NODES OF NECK (HCC): Status: ACTIVE | Noted: 2018-04-12

## 2018-04-13 ENCOUNTER — OFFICE VISIT (OUTPATIENT)
Dept: OTOLARYNGOLOGY | Facility: CLINIC | Age: 68
End: 2018-04-13

## 2018-04-13 VITALS
WEIGHT: 178 LBS | TEMPERATURE: 97.9 F | HEIGHT: 74 IN | BODY MASS INDEX: 22.84 KG/M2 | SYSTOLIC BLOOD PRESSURE: 158 MMHG | DIASTOLIC BLOOD PRESSURE: 90 MMHG

## 2018-04-13 DIAGNOSIS — C81.11 NODULAR SCLEROSIS HODGKIN LYMPHOMA OF LYMPH NODES OF NECK (HCC): Primary | ICD-10-CM

## 2018-04-13 PROCEDURE — 99212 OFFICE O/P EST SF 10 MIN: CPT | Performed by: OTOLARYNGOLOGY

## 2018-04-19 ENCOUNTER — TRANSCRIBE ORDERS (OUTPATIENT)
Dept: ADMINISTRATIVE | Facility: HOSPITAL | Age: 68
End: 2018-04-19

## 2018-04-19 DIAGNOSIS — C81.01 NODLR LYMPHOCY PREDOM HDGKN LYMPH, NODES OF HEAD, FACE, & NK (HCC): Primary | ICD-10-CM

## 2018-04-24 ENCOUNTER — HOSPITAL ENCOUNTER (OUTPATIENT)
Dept: CT IMAGING | Facility: HOSPITAL | Age: 68
Discharge: HOME | End: 2018-04-24
Attending: INTERNAL MEDICINE

## 2018-04-24 ENCOUNTER — HOSPITAL ENCOUNTER (OUTPATIENT)
Dept: CT IMAGING | Facility: HOSPITAL | Age: 68
Discharge: HOME | End: 2018-04-24
Attending: INTERNAL MEDICINE | Admitting: INTERNAL MEDICINE

## 2018-04-24 DIAGNOSIS — C81.01 NODLR LYMPHOCY PREDOM HDGKN LYMPH, NODES OF HEAD, FACE, & NK (HCC): ICD-10-CM

## 2018-04-24 PROCEDURE — 78815 PET IMAGE W/CT SKULL-THIGH: CPT

## 2018-04-24 PROCEDURE — A9552 F18 FDG: HCPCS | Performed by: INTERNAL MEDICINE

## 2018-04-24 PROCEDURE — 0 FLUDEOXYGLUCOSE F18 SOLUTION: Performed by: INTERNAL MEDICINE

## 2018-04-24 RX ADMIN — FLUDEOXYGLUCOSE F18 1 DOSE: 300 INJECTION INTRAVENOUS at 09:30

## 2018-04-25 ENCOUNTER — HOSPITAL ENCOUNTER (OUTPATIENT)
Dept: CT IMAGING | Facility: HOSPITAL | Age: 68
Discharge: HOME | End: 2018-04-25
Attending: INTERNAL MEDICINE | Admitting: INTERNAL MEDICINE

## 2018-04-25 DIAGNOSIS — C81.01 NODLR LYMPHOCY PREDOM HDGKN LYMPH, NODES OF HEAD, FACE, & NK (HCC): ICD-10-CM

## 2018-04-25 LAB — CREAT BLDA-MCNC: 0.9 MG/DL (ref 0.6–1.3)

## 2018-04-25 PROCEDURE — 71260 CT THORAX DX C+: CPT

## 2018-04-25 PROCEDURE — 82565 ASSAY OF CREATININE: CPT

## 2018-04-25 PROCEDURE — 25010000002 IOPAMIDOL 61 % SOLUTION: Performed by: INTERNAL MEDICINE

## 2018-04-25 RX ADMIN — IOPAMIDOL 100 ML: 612 INJECTION, SOLUTION INTRAVENOUS at 13:00

## 2018-04-26 ENCOUNTER — TRANSCRIBE ORDERS (OUTPATIENT)
Dept: ADMINISTRATIVE | Facility: HOSPITAL | Age: 68
End: 2018-04-26

## 2018-04-26 DIAGNOSIS — R91.1 PULMONARY NODULE: Primary | ICD-10-CM

## 2018-05-08 ENCOUNTER — TELEPHONE (OUTPATIENT)
Dept: GASTROENTEROLOGY | Facility: CLINIC | Age: 68
End: 2018-05-08

## 2018-05-09 ENCOUNTER — HOSPITAL ENCOUNTER (OUTPATIENT)
Dept: PREADMISSION TESTING | Age: 68
Setting detail: OUTPATIENT SURGERY
Discharge: HOME | End: 2018-05-13

## 2018-05-09 VITALS — HEIGHT: 74 IN | WEIGHT: 175 LBS | BODY MASS INDEX: 22.46 KG/M2

## 2018-05-09 RX ORDER — CETIRIZINE HYDROCHLORIDE 10 MG/1
10 TABLET ORAL DAILY
COMMUNITY
End: 2020-02-19

## 2018-05-09 RX ORDER — RANITIDINE 150 MG/1
150 TABLET ORAL 2 TIMES DAILY
COMMUNITY
End: 2020-02-19

## 2018-05-10 ENCOUNTER — HOSPITAL ENCOUNTER (OUTPATIENT)
Age: 68
Setting detail: OUTPATIENT SURGERY
Discharge: HOME | End: 2018-05-10
Attending: SPECIALIST | Admitting: SPECIALIST

## 2018-05-10 ENCOUNTER — ANESTHESIA EVENT (OUTPATIENT)
Dept: OPERATING ROOM | Age: 68
End: 2018-05-10

## 2018-05-10 ENCOUNTER — ANESTHESIA (OUTPATIENT)
Dept: OPERATING ROOM | Age: 68
End: 2018-05-10

## 2018-05-10 ENCOUNTER — HOSPITAL ENCOUNTER (OUTPATIENT)
Dept: GENERAL RADIOLOGY | Age: 68
Discharge: HOME | End: 2018-05-10
Payer: MEDICARE

## 2018-05-10 VITALS
RESPIRATION RATE: 18 BRPM | TEMPERATURE: 98.7 F | HEART RATE: 59 BPM | WEIGHT: 175 LBS | HEIGHT: 74 IN | OXYGEN SATURATION: 98 % | BODY MASS INDEX: 22.46 KG/M2 | SYSTOLIC BLOOD PRESSURE: 136 MMHG | DIASTOLIC BLOOD PRESSURE: 79 MMHG

## 2018-05-10 VITALS — OXYGEN SATURATION: 99 % | SYSTOLIC BLOOD PRESSURE: 149 MMHG | DIASTOLIC BLOOD PRESSURE: 81 MMHG

## 2018-05-10 DIAGNOSIS — C85.90 LYMPHOMA, UNSPECIFIED BODY REGION, UNSPECIFIED LYMPHOMA TYPE (HCC): Primary | ICD-10-CM

## 2018-05-10 PROCEDURE — G8907 PT DOC NO EVENTS ON DISCHARG: HCPCS

## 2018-05-10 PROCEDURE — C1788 PORT, INDWELLING, IMP: HCPCS | Performed by: SPECIALIST

## 2018-05-10 PROCEDURE — G8916 PT W IV AB GIVEN ON TIME: HCPCS

## 2018-05-10 PROCEDURE — 3209999900 FLUORO FOR SURGICAL PROCEDURES

## 2018-05-10 PROCEDURE — 71045 X-RAY EXAM CHEST 1 VIEW: CPT

## 2018-05-10 PROCEDURE — 36561 INSERT TUNNELED CV CATH: CPT

## 2018-05-10 DEVICE — PORT VASC ACCS SGL LUMN DETACHED SIL CATH 9.6 FR SMRT PRT: Type: IMPLANTABLE DEVICE | Site: CHEST | Status: FUNCTIONAL

## 2018-05-10 RX ORDER — LIDOCAINE HYDROCHLORIDE 10 MG/ML
INJECTION, SOLUTION INFILTRATION; PERINEURAL PRN
Status: DISCONTINUED | OUTPATIENT
Start: 2018-05-10 | End: 2018-05-10 | Stop reason: HOSPADM

## 2018-05-10 RX ORDER — LIDOCAINE HYDROCHLORIDE 10 MG/ML
1 INJECTION, SOLUTION EPIDURAL; INFILTRATION; INTRACAUDAL; PERINEURAL
Status: DISCONTINUED | OUTPATIENT
Start: 2018-05-10 | End: 2018-05-10 | Stop reason: HOSPADM

## 2018-05-10 RX ORDER — ONDANSETRON 4 MG/1
4 TABLET, FILM COATED ORAL ONCE
Status: COMPLETED | OUTPATIENT
Start: 2018-05-10 | End: 2018-05-10

## 2018-05-10 RX ORDER — MIDAZOLAM HYDROCHLORIDE 1 MG/ML
INJECTION INTRAMUSCULAR; INTRAVENOUS PRN
Status: DISCONTINUED | OUTPATIENT
Start: 2018-05-10 | End: 2018-05-10 | Stop reason: SDUPTHER

## 2018-05-10 RX ORDER — HYDROCODONE BITARTRATE AND ACETAMINOPHEN 7.5; 325 MG/1; MG/1
1 TABLET ORAL EVERY 4 HOURS PRN
Qty: 30 TABLET | Refills: 0
Start: 2018-05-10 | End: 2018-05-17

## 2018-05-10 RX ORDER — PROPOFOL 10 MG/ML
INJECTION, EMULSION INTRAVENOUS PRN
Status: DISCONTINUED | OUTPATIENT
Start: 2018-05-10 | End: 2018-05-10 | Stop reason: SDUPTHER

## 2018-05-10 RX ORDER — FENTANYL CITRATE 50 UG/ML
INJECTION, SOLUTION INTRAMUSCULAR; INTRAVENOUS PRN
Status: DISCONTINUED | OUTPATIENT
Start: 2018-05-10 | End: 2018-05-10 | Stop reason: SDUPTHER

## 2018-05-10 RX ORDER — SODIUM CHLORIDE, SODIUM LACTATE, POTASSIUM CHLORIDE, CALCIUM CHLORIDE 600; 310; 30; 20 MG/100ML; MG/100ML; MG/100ML; MG/100ML
INJECTION, SOLUTION INTRAVENOUS CONTINUOUS
Status: DISCONTINUED | OUTPATIENT
Start: 2018-05-10 | End: 2018-05-10 | Stop reason: HOSPADM

## 2018-05-10 RX ORDER — PROMETHAZINE HYDROCHLORIDE 25 MG/ML
12.5 INJECTION, SOLUTION INTRAMUSCULAR; INTRAVENOUS EVERY 6 HOURS PRN
Status: DISCONTINUED | OUTPATIENT
Start: 2018-05-10 | End: 2018-05-10 | Stop reason: HOSPADM

## 2018-05-10 RX ADMIN — PROMETHAZINE HYDROCHLORIDE 12.5 MG: 25 INJECTION, SOLUTION INTRAMUSCULAR; INTRAVENOUS at 14:20

## 2018-05-10 RX ADMIN — PROPOFOL 150 MG: 10 INJECTION, EMULSION INTRAVENOUS at 12:55

## 2018-05-10 RX ADMIN — MIDAZOLAM HYDROCHLORIDE 2 MG: 1 INJECTION INTRAMUSCULAR; INTRAVENOUS at 12:49

## 2018-05-10 RX ADMIN — FENTANYL CITRATE 25 MCG: 50 INJECTION, SOLUTION INTRAMUSCULAR; INTRAVENOUS at 13:07

## 2018-05-10 RX ADMIN — FENTANYL CITRATE 50 MCG: 50 INJECTION, SOLUTION INTRAMUSCULAR; INTRAVENOUS at 13:03

## 2018-05-10 RX ADMIN — SODIUM CHLORIDE, SODIUM LACTATE, POTASSIUM CHLORIDE, CALCIUM CHLORIDE: 600; 310; 30; 20 INJECTION, SOLUTION INTRAVENOUS at 12:27

## 2018-05-10 RX ADMIN — ONDANSETRON 4 MG: 4 TABLET, FILM COATED ORAL at 14:25

## 2018-05-10 RX ADMIN — FENTANYL CITRATE 50 MCG: 50 INJECTION, SOLUTION INTRAMUSCULAR; INTRAVENOUS at 12:58

## 2018-05-10 ASSESSMENT — COPD QUESTIONNAIRES: CAT_SEVERITY: NO INTERVAL CHANGE

## 2018-05-10 ASSESSMENT — LIFESTYLE VARIABLES: SMOKING_STATUS: 1

## 2018-07-02 ENCOUNTER — HOSPITAL ENCOUNTER (OUTPATIENT)
Dept: RADIATION ONCOLOGY | Facility: HOSPITAL | Age: 68
Setting detail: RADIATION/ONCOLOGY SERIES
End: 2018-07-02

## 2018-07-06 ENCOUNTER — TRANSCRIBE ORDERS (OUTPATIENT)
Dept: ADMINISTRATIVE | Facility: HOSPITAL | Age: 68
End: 2018-07-06

## 2018-07-06 DIAGNOSIS — C81.01 NODULAR LYMPHOCYTE PREDOMINANT HODGKIN LYMPHOMA OF LYMPH NODES OF NECK (HCC): Primary | ICD-10-CM

## 2018-07-18 ENCOUNTER — HOSPITAL ENCOUNTER (OUTPATIENT)
Dept: CT IMAGING | Facility: HOSPITAL | Age: 68
Discharge: HOME | End: 2018-07-18
Attending: INTERNAL MEDICINE

## 2018-07-18 ENCOUNTER — HOSPITAL ENCOUNTER (OUTPATIENT)
Dept: CT IMAGING | Facility: HOSPITAL | Age: 68
Discharge: HOME | End: 2018-07-18
Attending: INTERNAL MEDICINE | Admitting: INTERNAL MEDICINE

## 2018-07-18 DIAGNOSIS — C81.01 NODULAR LYMPHOCYTE PREDOMINANT HODGKIN LYMPHOMA OF LYMPH NODES OF NECK (HCC): ICD-10-CM

## 2018-07-18 PROCEDURE — A9552 F18 FDG: HCPCS | Performed by: INTERNAL MEDICINE

## 2018-07-18 PROCEDURE — 0 FLUDEOXYGLUCOSE F18 SOLUTION: Performed by: INTERNAL MEDICINE

## 2018-07-18 PROCEDURE — 78815 PET IMAGE W/CT SKULL-THIGH: CPT

## 2018-07-18 RX ADMIN — FLUDEOXYGLUCOSE F18 1 DOSE: 300 INJECTION INTRAVENOUS at 09:15

## 2018-07-26 ENCOUNTER — CONSULT (OUTPATIENT)
Dept: RADIATION ONCOLOGY | Facility: HOSPITAL | Age: 68
End: 2018-07-26

## 2018-07-26 ENCOUNTER — LAB (OUTPATIENT)
Dept: LAB | Facility: HOSPITAL | Age: 68
End: 2018-07-26

## 2018-07-26 VITALS
BODY MASS INDEX: 20.77 KG/M2 | DIASTOLIC BLOOD PRESSURE: 63 MMHG | HEART RATE: 83 BPM | SYSTOLIC BLOOD PRESSURE: 119 MMHG | WEIGHT: 161.8 LBS

## 2018-07-26 DIAGNOSIS — T45.1X5A ANTINEOPLASTIC CHEMOTHERAPY INDUCED ANEMIA: ICD-10-CM

## 2018-07-26 DIAGNOSIS — F17.200 CURRENT EVERY DAY SMOKER: ICD-10-CM

## 2018-07-26 DIAGNOSIS — C81.11 NODULAR SCLEROSIS HODGKIN LYMPHOMA OF LYMPH NODES OF NECK (HCC): Primary | ICD-10-CM

## 2018-07-26 DIAGNOSIS — Z71.9 ENCOUNTER FOR CONSULTATION: ICD-10-CM

## 2018-07-26 DIAGNOSIS — D64.81 ANTINEOPLASTIC CHEMOTHERAPY INDUCED ANEMIA: ICD-10-CM

## 2018-07-26 DIAGNOSIS — R53.1 WEAKNESS: ICD-10-CM

## 2018-07-26 DIAGNOSIS — C81.11 NODULAR SCLEROSIS HODGKIN LYMPHOMA OF LYMPH NODES OF NECK (HCC): ICD-10-CM

## 2018-07-26 LAB
BASOPHILS # BLD AUTO: 0.04 10*3/MM3 (ref 0–0.2)
BASOPHILS NFR BLD AUTO: 0.6 % (ref 0–2)
DEPRECATED RDW RBC AUTO: 50.1 FL (ref 40–54)
EOSINOPHIL # BLD AUTO: 0.07 10*3/MM3 (ref 0–0.7)
EOSINOPHIL NFR BLD AUTO: 1.1 % (ref 0–4)
ERYTHROCYTE [DISTWIDTH] IN BLOOD BY AUTOMATED COUNT: 15.5 % (ref 12–15)
HCT VFR BLD AUTO: 32.4 % (ref 40–52)
HGB BLD-MCNC: 11.3 G/DL (ref 14–18)
LYMPHOCYTES # BLD AUTO: 0.88 10*3/MM3 (ref 0.72–4.86)
LYMPHOCYTES NFR BLD AUTO: 14 % (ref 15–45)
MCH RBC QN AUTO: 31.8 PG (ref 28–32)
MCHC RBC AUTO-ENTMCNC: 34.9 G/DL (ref 33–36)
MCV RBC AUTO: 91.3 FL (ref 82–95)
MONOCYTES # BLD AUTO: 1.19 10*3/MM3 (ref 0.19–1.3)
MONOCYTES NFR BLD AUTO: 19 % (ref 4–12)
NEUTROPHILS # BLD AUTO: 4.03 10*3/MM3 (ref 1.87–8.4)
NEUTROPHILS NFR BLD AUTO: 64.3 % (ref 39–78)
PLATELET # BLD AUTO: 361 10*3/MM3 (ref 130–400)
PMV BLD AUTO: 8.3 FL (ref 6–12)
RBC # BLD AUTO: 3.55 10*6/MM3 (ref 4.8–5.9)
WBC NRBC COR # BLD: 6.43 10*3/MM3 (ref 4.8–10.8)

## 2018-07-26 PROCEDURE — 85025 COMPLETE CBC W/AUTO DIFF WBC: CPT | Performed by: PHYSICIAN ASSISTANT

## 2018-07-26 PROCEDURE — 36415 COLL VENOUS BLD VENIPUNCTURE: CPT

## 2018-07-26 PROCEDURE — 77290 THER RAD SIMULAJ FIELD CPLX: CPT | Performed by: RADIOLOGY

## 2018-08-01 ENCOUNTER — HOSPITAL ENCOUNTER (OUTPATIENT)
Dept: RADIATION ONCOLOGY | Facility: HOSPITAL | Age: 68
Setting detail: RADIATION/ONCOLOGY SERIES
End: 2018-08-01

## 2018-08-13 PROCEDURE — 77334 RADIATION TREATMENT AID(S): CPT | Performed by: RADIOLOGY

## 2018-08-13 PROCEDURE — 77300 RADIATION THERAPY DOSE PLAN: CPT | Performed by: RADIOLOGY

## 2018-08-13 PROCEDURE — 77295 3-D RADIOTHERAPY PLAN: CPT | Performed by: RADIOLOGY

## 2018-08-21 ENCOUNTER — OFFICE VISIT (OUTPATIENT)
Dept: GASTROENTEROLOGY | Facility: CLINIC | Age: 68
End: 2018-08-21

## 2018-08-21 VITALS
DIASTOLIC BLOOD PRESSURE: 70 MMHG | WEIGHT: 166 LBS | OXYGEN SATURATION: 99 % | SYSTOLIC BLOOD PRESSURE: 142 MMHG | BODY MASS INDEX: 21.3 KG/M2 | TEMPERATURE: 97.8 F | HEIGHT: 74 IN | HEART RATE: 75 BPM

## 2018-08-21 DIAGNOSIS — K59.09 OTHER CONSTIPATION: ICD-10-CM

## 2018-08-21 DIAGNOSIS — R94.8 ABNORMAL GASTROINTESTINAL POSITRON EMISSION TOMOGRAPHY (PET) SCAN: Primary | ICD-10-CM

## 2018-08-21 DIAGNOSIS — Z86.010 HX OF COLONIC POLYPS: ICD-10-CM

## 2018-08-21 PROBLEM — Z86.0100 HX OF COLONIC POLYPS: Status: ACTIVE | Noted: 2018-08-21

## 2018-08-21 PROCEDURE — 99214 OFFICE O/P EST MOD 30 MIN: CPT | Performed by: NURSE PRACTITIONER

## 2018-08-21 RX ORDER — DOXEPIN HYDROCHLORIDE 25 MG/1
CAPSULE ORAL
Refills: 3 | COMMUNITY
Start: 2018-08-17 | End: 2020-09-03

## 2018-08-21 RX ORDER — SODIUM, POTASSIUM,MAG SULFATES 17.5-3.13G
2 SOLUTION, RECONSTITUTED, ORAL ORAL ONCE
Qty: 2 BOTTLE | Refills: 0 | Status: SHIPPED | OUTPATIENT
Start: 2018-08-21 | End: 2018-08-21

## 2018-08-22 ENCOUNTER — HOSPITAL ENCOUNTER (OUTPATIENT)
Dept: RADIATION ONCOLOGY | Facility: HOSPITAL | Age: 68
Discharge: HOME | End: 2018-08-22

## 2018-08-22 PROCEDURE — 77386: CPT | Performed by: RADIOLOGY

## 2018-08-22 PROCEDURE — 77412 RADIATION TX DELIVERY LVL 3: CPT | Performed by: RADIOLOGY

## 2018-08-23 ENCOUNTER — HOSPITAL ENCOUNTER (OUTPATIENT)
Dept: RADIATION ONCOLOGY | Facility: HOSPITAL | Age: 68
Discharge: HOME | End: 2018-08-23

## 2018-08-23 PROCEDURE — 77412 RADIATION TX DELIVERY LVL 3: CPT | Performed by: RADIOLOGY

## 2018-08-24 ENCOUNTER — HOSPITAL ENCOUNTER (OUTPATIENT)
Dept: RADIATION ONCOLOGY | Facility: HOSPITAL | Age: 68
Setting detail: RADIATION/ONCOLOGY SERIES
Discharge: HOME | End: 2018-08-24

## 2018-08-24 PROCEDURE — 77412 RADIATION TX DELIVERY LVL 3: CPT | Performed by: RADIOLOGY

## 2018-08-24 PROCEDURE — 77336 RADIATION PHYSICS CONSULT: CPT | Performed by: RADIOLOGY

## 2018-08-27 ENCOUNTER — HOSPITAL ENCOUNTER (OUTPATIENT)
Dept: RADIATION ONCOLOGY | Facility: HOSPITAL | Age: 68
Setting detail: RADIATION/ONCOLOGY SERIES
Discharge: HOME | End: 2018-08-27

## 2018-08-27 PROCEDURE — 77412 RADIATION TX DELIVERY LVL 3: CPT | Performed by: RADIOLOGY

## 2018-08-28 ENCOUNTER — HOSPITAL ENCOUNTER (OUTPATIENT)
Dept: RADIATION ONCOLOGY | Facility: HOSPITAL | Age: 68
Setting detail: RADIATION/ONCOLOGY SERIES
Discharge: HOME | End: 2018-08-28

## 2018-08-28 DIAGNOSIS — C81.11 NODULAR SCLEROSIS HODGKIN LYMPHOMA OF LYMPH NODES OF NECK (HCC): Primary | ICD-10-CM

## 2018-08-28 DIAGNOSIS — R13.10 DYSPHAGIA, UNSPECIFIED TYPE: ICD-10-CM

## 2018-08-28 PROCEDURE — 77412 RADIATION TX DELIVERY LVL 3: CPT | Performed by: RADIOLOGY

## 2018-08-29 ENCOUNTER — HOSPITAL ENCOUNTER (OUTPATIENT)
Dept: RADIATION ONCOLOGY | Facility: HOSPITAL | Age: 68
Setting detail: RADIATION/ONCOLOGY SERIES
Discharge: HOME | End: 2018-08-29

## 2018-08-29 PROCEDURE — 77412 RADIATION TX DELIVERY LVL 3: CPT | Performed by: RADIOLOGY

## 2018-08-30 ENCOUNTER — HOSPITAL ENCOUNTER (OUTPATIENT)
Dept: RADIATION ONCOLOGY | Facility: HOSPITAL | Age: 68
Setting detail: RADIATION/ONCOLOGY SERIES
Discharge: HOME | End: 2018-08-30

## 2018-08-30 PROCEDURE — 77412 RADIATION TX DELIVERY LVL 3: CPT | Performed by: RADIOLOGY

## 2018-08-31 ENCOUNTER — HOSPITAL ENCOUNTER (OUTPATIENT)
Dept: RADIATION ONCOLOGY | Facility: HOSPITAL | Age: 68
Setting detail: RADIATION/ONCOLOGY SERIES
Discharge: HOME | End: 2018-08-31

## 2018-08-31 PROCEDURE — 77412 RADIATION TX DELIVERY LVL 3: CPT | Performed by: RADIOLOGY

## 2018-09-04 ENCOUNTER — HOSPITAL ENCOUNTER (OUTPATIENT)
Dept: RADIATION ONCOLOGY | Facility: HOSPITAL | Age: 68
Setting detail: RADIATION/ONCOLOGY SERIES
Discharge: HOME | End: 2018-09-04

## 2018-09-04 ENCOUNTER — TREATMENT (OUTPATIENT)
Dept: SPEECH THERAPY | Facility: HOSPITAL | Age: 68
End: 2018-09-04

## 2018-09-04 ENCOUNTER — HOSPITAL ENCOUNTER (OUTPATIENT)
Dept: RADIATION ONCOLOGY | Facility: HOSPITAL | Age: 68
Setting detail: RADIATION/ONCOLOGY SERIES
End: 2018-09-04

## 2018-09-04 DIAGNOSIS — R13.12 OROPHARYNGEAL DYSPHAGIA: ICD-10-CM

## 2018-09-04 PROCEDURE — 92610 EVALUATE SWALLOWING FUNCTION: CPT

## 2018-09-04 PROCEDURE — G8997 SWALLOW GOAL STATUS: HCPCS

## 2018-09-04 PROCEDURE — G8996 SWALLOW CURRENT STATUS: HCPCS

## 2018-09-04 PROCEDURE — 77412 RADIATION TX DELIVERY LVL 3: CPT | Performed by: RADIOLOGY

## 2018-09-05 ENCOUNTER — HOSPITAL ENCOUNTER (OUTPATIENT)
Dept: RADIATION ONCOLOGY | Facility: HOSPITAL | Age: 68
Setting detail: RADIATION/ONCOLOGY SERIES
Discharge: HOME | End: 2018-09-05

## 2018-09-05 PROCEDURE — 77412 RADIATION TX DELIVERY LVL 3: CPT | Performed by: RADIOLOGY

## 2018-09-05 PROCEDURE — 77336 RADIATION PHYSICS CONSULT: CPT | Performed by: RADIOLOGY

## 2018-09-18 ENCOUNTER — ANESTHESIA EVENT (OUTPATIENT)
Dept: GASTROENTEROLOGY | Facility: HOSPITAL | Age: 68
End: 2018-09-18

## 2018-09-18 ENCOUNTER — ANESTHESIA (OUTPATIENT)
Dept: GASTROENTEROLOGY | Facility: HOSPITAL | Age: 68
End: 2018-09-18

## 2018-09-18 ENCOUNTER — HOSPITAL ENCOUNTER (OUTPATIENT)
Facility: HOSPITAL | Age: 68
Setting detail: HOSPITAL OUTPATIENT SURGERY
Discharge: HOME | End: 2018-09-18
Attending: INTERNAL MEDICINE | Admitting: INTERNAL MEDICINE

## 2018-09-18 VITALS
OXYGEN SATURATION: 98 % | HEART RATE: 78 BPM | HEIGHT: 74 IN | DIASTOLIC BLOOD PRESSURE: 65 MMHG | SYSTOLIC BLOOD PRESSURE: 113 MMHG | TEMPERATURE: 97.1 F | BODY MASS INDEX: 20.02 KG/M2 | RESPIRATION RATE: 18 BRPM | WEIGHT: 156 LBS

## 2018-09-18 DIAGNOSIS — K59.09 OTHER CONSTIPATION: ICD-10-CM

## 2018-09-18 DIAGNOSIS — Z86.010 HX OF COLONIC POLYPS: ICD-10-CM

## 2018-09-18 DIAGNOSIS — R94.8 ABNORMAL GASTROINTESTINAL POSITRON EMISSION TOMOGRAPHY (PET) SCAN: ICD-10-CM

## 2018-09-18 PROCEDURE — 88305 TISSUE EXAM BY PATHOLOGIST: CPT | Performed by: INTERNAL MEDICINE

## 2018-09-18 PROCEDURE — 45380 COLONOSCOPY AND BIOPSY: CPT | Performed by: INTERNAL MEDICINE

## 2018-09-18 PROCEDURE — 25010000002 PROPOFOL 10 MG/ML EMULSION: Performed by: NURSE ANESTHETIST, CERTIFIED REGISTERED

## 2018-09-18 RX ORDER — SODIUM CHLORIDE 9 MG/ML
500 INJECTION, SOLUTION INTRAVENOUS CONTINUOUS PRN
Status: DISCONTINUED | OUTPATIENT
Start: 2018-09-18 | End: 2018-09-18 | Stop reason: HOSPADM

## 2018-09-18 RX ORDER — SODIUM CHLORIDE 0.9 % (FLUSH) 0.9 %
1-10 SYRINGE (ML) INJECTION AS NEEDED
Status: CANCELLED | OUTPATIENT
Start: 2018-09-18

## 2018-09-18 RX ORDER — LIDOCAINE HYDROCHLORIDE 20 MG/ML
INJECTION, SOLUTION INFILTRATION; PERINEURAL AS NEEDED
Status: DISCONTINUED | OUTPATIENT
Start: 2018-09-18 | End: 2018-09-18 | Stop reason: SURG

## 2018-09-18 RX ORDER — PROPOFOL 10 MG/ML
VIAL (ML) INTRAVENOUS AS NEEDED
Status: DISCONTINUED | OUTPATIENT
Start: 2018-09-18 | End: 2018-09-18 | Stop reason: SURG

## 2018-09-18 RX ORDER — SODIUM CHLORIDE 0.9 % (FLUSH) 0.9 %
3 SYRINGE (ML) INJECTION AS NEEDED
Status: DISCONTINUED | OUTPATIENT
Start: 2018-09-18 | End: 2018-09-18 | Stop reason: HOSPADM

## 2018-09-18 RX ORDER — SODIUM CHLORIDE 9 MG/ML
100 INJECTION, SOLUTION INTRAVENOUS CONTINUOUS
Status: CANCELLED | OUTPATIENT
Start: 2018-09-18

## 2018-09-18 RX ORDER — IPRATROPIUM BROMIDE AND ALBUTEROL SULFATE 2.5; .5 MG/3ML; MG/3ML
3 SOLUTION RESPIRATORY (INHALATION) ONCE
Status: COMPLETED | OUTPATIENT
Start: 2018-09-18 | End: 2018-09-18

## 2018-09-18 RX ADMIN — PROPOFOL 400 MG: 10 INJECTION, EMULSION INTRAVENOUS at 10:25

## 2018-09-18 RX ADMIN — IPRATROPIUM BROMIDE AND ALBUTEROL SULFATE 3 ML: 2.5; .5 SOLUTION RESPIRATORY (INHALATION) at 08:18

## 2018-09-18 RX ADMIN — LIDOCAINE HYDROCHLORIDE 50 MG: 20 INJECTION, SOLUTION INFILTRATION; PERINEURAL at 10:23

## 2018-09-18 RX ADMIN — SODIUM CHLORIDE 500 ML: 9 INJECTION, SOLUTION INTRAVENOUS at 08:18

## 2018-09-19 LAB
CYTO UR: NORMAL
LAB AP CASE REPORT: NORMAL
LAB AP CLINICAL INFORMATION: NORMAL
PATH REPORT.FINAL DX SPEC: NORMAL
PATH REPORT.GROSS SPEC: NORMAL

## 2018-09-20 ENCOUNTER — TELEPHONE (OUTPATIENT)
Dept: GASTROENTEROLOGY | Facility: CLINIC | Age: 68
End: 2018-09-20

## 2018-10-01 ENCOUNTER — HOSPITAL ENCOUNTER (OUTPATIENT)
Dept: RADIATION ONCOLOGY | Facility: HOSPITAL | Age: 68
Setting detail: RADIATION/ONCOLOGY SERIES
End: 2018-10-01

## 2018-10-16 ENCOUNTER — TELEPHONE (OUTPATIENT)
Dept: GASTROENTEROLOGY | Facility: CLINIC | Age: 68
End: 2018-10-16

## 2018-10-20 PROBLEM — Z08 ENCOUNTER FOR FOLLOW-UP EXAMINATION AFTER TREATMENT FOR MALIGNANT NEOPLASM: Status: ACTIVE | Noted: 2018-10-20

## 2018-10-20 PROBLEM — Z92.3 HISTORY OF RADIATION THERAPY: Status: ACTIVE | Noted: 2018-10-20

## 2018-10-22 ENCOUNTER — OFFICE VISIT (OUTPATIENT)
Dept: RADIATION ONCOLOGY | Facility: HOSPITAL | Age: 68
End: 2018-10-22

## 2018-10-22 ENCOUNTER — APPOINTMENT (OUTPATIENT)
Dept: SPEECH THERAPY | Facility: HOSPITAL | Age: 68
End: 2018-10-22

## 2018-10-22 ENCOUNTER — TREATMENT (OUTPATIENT)
Dept: SPEECH THERAPY | Facility: HOSPITAL | Age: 68
End: 2018-10-22

## 2018-10-22 VITALS
BODY MASS INDEX: 20.66 KG/M2 | SYSTOLIC BLOOD PRESSURE: 154 MMHG | HEIGHT: 74 IN | DIASTOLIC BLOOD PRESSURE: 82 MMHG | WEIGHT: 161 LBS

## 2018-10-22 DIAGNOSIS — Z08 ENCOUNTER FOR FOLLOW-UP EXAMINATION AFTER TREATMENT FOR MALIGNANT NEOPLASM: ICD-10-CM

## 2018-10-22 DIAGNOSIS — C81.11 NODULAR SCLEROSIS HODGKIN LYMPHOMA OF LYMPH NODES OF NECK (HCC): Primary | ICD-10-CM

## 2018-10-22 DIAGNOSIS — F17.200 CURRENT EVERY DAY SMOKER: ICD-10-CM

## 2018-10-22 DIAGNOSIS — Z92.3 HISTORY OF RADIATION THERAPY: ICD-10-CM

## 2018-10-22 PROCEDURE — G0463 HOSPITAL OUTPT CLINIC VISIT: HCPCS | Performed by: RADIOLOGY

## 2019-01-11 ENCOUNTER — TRANSCRIBE ORDERS (OUTPATIENT)
Dept: ADMINISTRATIVE | Facility: HOSPITAL | Age: 69
End: 2019-01-11

## 2019-01-11 DIAGNOSIS — C81.01 NODULAR LYMPHOCYTE PREDOMINANT HODGKIN LYMPHOMA, LYMPH NODES OF HEAD, FACE, AND NECK (HCC): Primary | ICD-10-CM

## 2019-04-26 ENCOUNTER — HOSPITAL ENCOUNTER (OUTPATIENT)
Dept: CT IMAGING | Facility: HOSPITAL | Age: 69
Discharge: HOME | End: 2019-04-26
Attending: INTERNAL MEDICINE | Admitting: INTERNAL MEDICINE

## 2019-04-26 ENCOUNTER — APPOINTMENT (OUTPATIENT)
Dept: CT IMAGING | Facility: HOSPITAL | Age: 69
End: 2019-04-26
Attending: INTERNAL MEDICINE

## 2019-04-26 DIAGNOSIS — C81.01 NODULAR LYMPHOCYTE PREDOMINANT HODGKIN LYMPHOMA, LYMPH NODES OF HEAD, FACE, AND NECK (HCC): ICD-10-CM

## 2019-04-26 LAB — CREAT BLDA-MCNC: 1.1 MG/DL (ref 0.6–1.3)

## 2019-04-26 PROCEDURE — 82565 ASSAY OF CREATININE: CPT

## 2019-04-26 PROCEDURE — 0 IOPAMIDOL PER 1 ML: Performed by: INTERNAL MEDICINE

## 2019-04-26 PROCEDURE — 71260 CT THORAX DX C+: CPT

## 2019-04-26 PROCEDURE — 70491 CT SOFT TISSUE NECK W/DYE: CPT

## 2019-04-26 PROCEDURE — 74177 CT ABD & PELVIS W/CONTRAST: CPT

## 2019-04-26 RX ADMIN — IOPAMIDOL 150 ML: 755 INJECTION, SOLUTION INTRAVENOUS at 08:12

## 2019-05-03 ENCOUNTER — TRANSCRIBE ORDERS (OUTPATIENT)
Dept: ADMINISTRATIVE | Facility: HOSPITAL | Age: 69
End: 2019-05-03

## 2019-05-03 DIAGNOSIS — C81.01 NODLR LYMPHOCY PREDOM HDGKN LYMPH, NODES OF HEAD, FACE, & NK (HCC): Primary | ICD-10-CM

## 2019-06-28 ENCOUNTER — HOSPITAL ENCOUNTER (OUTPATIENT)
Dept: INFUSION THERAPY | Age: 69
Discharge: HOME | End: 2019-06-28
Payer: MEDICARE

## 2019-06-28 PROCEDURE — 2580000003 HC RX 258: Performed by: INTERNAL MEDICINE

## 2019-06-28 PROCEDURE — 96523 IRRIG DRUG DELIVERY DEVICE: CPT

## 2019-06-28 PROCEDURE — 6360000002 HC RX W HCPCS: Performed by: INTERNAL MEDICINE

## 2019-06-28 RX ORDER — SODIUM CHLORIDE 0.9 % (FLUSH) 0.9 %
10 SYRINGE (ML) INJECTION ONCE
Status: DISCONTINUED | OUTPATIENT
Start: 2019-06-28 | End: 2019-06-30 | Stop reason: HOSPADM

## 2019-10-29 ENCOUNTER — TELEPHONE (OUTPATIENT)
Dept: HEMATOLOGY | Age: 69
End: 2019-10-29

## 2019-11-05 ENCOUNTER — APPOINTMENT (OUTPATIENT)
Dept: CT IMAGING | Facility: HOSPITAL | Age: 69
End: 2019-11-05

## 2020-01-31 VITALS
OXYGEN SATURATION: 99 % | WEIGHT: 165 LBS | BODY MASS INDEX: 21.18 KG/M2 | DIASTOLIC BLOOD PRESSURE: 74 MMHG | SYSTOLIC BLOOD PRESSURE: 122 MMHG | HEART RATE: 64 BPM

## 2020-02-12 ENCOUNTER — HOSPITAL ENCOUNTER (OUTPATIENT)
Dept: CT IMAGING | Facility: HOSPITAL | Age: 70
Discharge: HOME | End: 2020-02-12
Admitting: INTERNAL MEDICINE

## 2020-02-12 ENCOUNTER — HOSPITAL ENCOUNTER (OUTPATIENT)
Dept: CT IMAGING | Facility: HOSPITAL | Age: 70
Discharge: HOME | End: 2020-02-12

## 2020-02-12 LAB — CREAT BLDA-MCNC: 1.1 MG/DL (ref 0.6–1.3)

## 2020-02-12 PROCEDURE — 71260 CT THORAX DX C+: CPT

## 2020-02-12 PROCEDURE — 82565 ASSAY OF CREATININE: CPT

## 2020-02-12 PROCEDURE — 70491 CT SOFT TISSUE NECK W/DYE: CPT

## 2020-02-12 PROCEDURE — 25010000002 IOPAMIDOL 61 % SOLUTION: Performed by: INTERNAL MEDICINE

## 2020-02-12 RX ADMIN — IOPAMIDOL 100 ML: 612 INJECTION, SOLUTION INTRAVENOUS at 09:27

## 2020-02-14 VITALS
HEART RATE: 64 BPM | BODY MASS INDEX: 21.18 KG/M2 | SYSTOLIC BLOOD PRESSURE: 122 MMHG | WEIGHT: 165 LBS | DIASTOLIC BLOOD PRESSURE: 74 MMHG

## 2020-02-14 PROBLEM — C85.90 LYMPHOMA (HCC): Status: ACTIVE | Noted: 2020-02-14

## 2020-02-17 NOTE — PROGRESS NOTES
Jorge Sees   1950 2/19/2020     Chief Complaint   Patient presents with    Lymphoma        INTERVAL HISTORY/HISTORY OF PRESENT ILLNESS:  Diagnosis   NS-Classical Hodgkin lymphoma, March 2018   IPS score 2 points   Stage IA  Treatment summary  5/11/2018 through 7/6/2018- completion of ABVD chemotherapy x 2 cycles. 7/18/2018-PET CT scan performed after 2 cycles of ABVD showed complete resolution of the hypermetabolic lymph nodes consistent with a complete metabolic response and therefore Deauville score 1. As per NCCN guideline Version 3.2018 recommendation for favorable stage IA is 2 cycles of ABVD followed by involved site radiation 20Gy. Referred to radiation oncology   8/22/2018 through 9/5/2018- ISRT 20Gy  Interval history:  Fercho Ramos is a pleasant 82-year-old gentleman who is status-post completion of treatment with ABVD for classical Hodgkin lymphoma. Post completion of 2 cycles of ABVD on 7/6/2018 followed by ISRT completed on 9/5/2018 by Dr. Buddy Phillips. He denies any peripheral adenopathy or B symptoms. Unfortunately, he continues to smoke 1 pack day. He has been doing well otherwise. Patient reports reaction to \"nuclear\" from PET scan. Pt states he experienced hives and \"heart-attack\" like chest pain 30 minutes after arriving home. Pt has intermittent neuropathy in bilateral hands and L foot. Pt does not see primary care provider at this time. Hematology history  Mr Anthony Vazquez was seen in initial oncology consultation on 4/19/2018 referred by Dr. Usman Ha, ENT at [facility] for a diagnosis of nodular sclerosis Hodgkin's lymphoma. He was seen initially by Dr. Usman Ha for complains of left side neck adenopathy. He underwent a biopsy revealing nodular sclerosis Hodgkin's lymphoma. 3/16/2018-CT of the neck showed multiple enlarged lymph nodes at the left side of the neck with the largest measures 3.1 x 1.9 cm. Another lymph node measuring 2.1 x 1.8 cm.  A third lymph node measuring 1.9 x 1.6 cm. 2 soft tissue nodules within the inferior left parathyroid gland largest measuring 1.2 cm centrally necrotic. 3/26/2018-he underwent a laryngoscopy and excisional lymph node biopsy by Dr. Clare Devine. 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 They're negative for CD20, PAX-5, BCL 6 and all tested B-cell antigens. Flow cytometry showed no immunophenotypic evidence of a clonal B-cell or abnormal T-cell population. 3/20/2018-CT of the abdomen and pelvis with contrast showed no evidence of intra-abdominal adenopathy   4/19/2018-he was first seen by me. HIV negative, hepatitis B negative, ESR 3, albumin 4.5. IPI score = 2.   4/24/2018-bone marrow biopsy showed no signs of involvement by classical Hodgkin lymphoma. One atypical lymphoid aggregates composed of CD20 positive B cells with positive B-cell gene rearrangement, which raises a concern for non-Hodgkin lymphoma. 4/24/2018-PET CT scan showed a left cervical chain and left supraclavicular region adenopathy with maximum SUV 8.2. Largest lymph node measuring 2.1 cm. Otherwise, no hypermetabolic adenopathy in the chest or abdomen. 4/25/2018- CT scan of the chest with contrast documented prominent lymph nodes in the left subclavicular region measuring up to 1.1 cm. A noncalcified 3 mm nodule in the lingual and an ill-defined 1.2 cm groundglass opacity in the right lower lobe. 5/4/2018-referred for Mediport placement. Stage IA classical Hodgkin lymphoma. As per NCCN guidelines recommended 2 cycles of ABVD followed by PET scan. If PET/CT scan Deauville 1-3 -> ISRT. If PET/CT scan Deauville score 4-> consider ABVD ×2 additional cycles followed by PET/CT. 5/11/2018 through 7/6/2018- completion of ABVD chemotherapy x 2 cycles.    7/18/2018-PET CT scan performed after 2 cycles of ABVD showed complete resolution of the hypermetabolic lymph nodes HERNIA REPAIR      x3    DE INSJ PRPH CTR VAD W/SUBQ PORT AGE 5 YR/> N/A 5/10/2018    PORT INSERTION AND PORT ACCESSED performed by Marcus Lara MD at [place] [address]:  Social History     Socioeconomic History    Marital status:      Spouse name: Not on file    Number of children: Not on file    Years of education: Not on file    Highest education level: Not on file   Occupational History    Not on file   Social Needs    Financial resource strain: Not on file    Food insecurity:     Worry: Not on file     Inability: Not on file    Transportation needs:     Medical: Not on file     Non-medical: Not on file   Tobacco Use    Smoking status: Current Every Day Smoker     Packs/day: 1.00     Types: Cigarettes    Smokeless tobacco: Never Used   Substance and Sexual Activity    Alcohol use: No    Drug use: No    Sexual activity: Not on file   Lifestyle    Physical activity:     Days per week: Not on file     Minutes per session: Not on file    Stress: Not on file   Relationships    Social connections:     Talks on phone: Not on file     Gets together: Not on file     Attends Protestant service: Not on file     Active member of club or organization: Not on file     Attends meetings of clubs or organizations: Not on file     Relationship status: Not on file    Intimate partner violence:     Fear of current or ex partner: Not on file     Emotionally abused: Not on file     Physically abused: Not on file     Forced sexual activity: Not on file   Other Topics Concern    Not on file   Social History Narrative    Not on file       FAMILY HISTORY:  Family History   Problem Relation Age of Onset    Cancer Mother     Breast Cancer Mother     Cancer Son     Diabetes Son         Current Outpatient Medications   Medication Sig Dispense Refill    budesonide-formoterol (SYMBICORT) 160-4.5 MCG/ACT AERO Inhale 2 puffs into the lungs 2 times daily       No current facility-administered medications for this visit. REVIEW OF SYSTEMS:    Constitutional: no fever, no night sweats, no fatigue;   HEENT: no blurring of vision, no double vision, no hearing difficulty, no tinnitus,no ulceration, no dysphagia  Lungs: no cough, no shortness of breath, no wheeze;   CVS: no palpitation, no chest pain, no shortness of breath;  GI: no abdominal pain, no nausea , no vomiting, no constipation;   MARTHA: no dysuria, frequency and urgency, no hematuria, no kidney stones;   Musculoskeletal: no joint pain, swelling , stiffness;   Endocrine: no polyuria, polydypsia, no cold or heat intolerence; Hematology/lymphatic: no easy brusing or bleeding, no hx of clotting disorder; no peripheral adenopathy. Dermatology: no skin rash, no eczema, no pruritis;   Psychiatry: no depression, no anxiety,no panic attacks, no suicide ideation; Neurology: no syncope, no seizures, no numbness or tingling of hands, no numbness or tingling of feet, no paresis;     PHYSICAL EXAM:    Vitals signs:  Pulse 62   Ht 6' 2\" (1.88 m)   Wt 167 lb 14.4 oz (76.2 kg)   SpO2 91%   BMI 21.56 kg/m²    /80    Pulse 62   Ht 6' 2\" (1.88 m)   Wt 167 lb 14.4 oz (76.2 kg)   SpO2 91%   BMI 21.56 kg/m²    Pain scale:  Pain Score:   0 - No pain     CONSTITUTIONAL: Alert, appropriate, no acute distress,   EYES: Non icteric, EOM intact, pupils equal round and reactive to light and accommodation. ENT: Oral mucus membranes moist, no oral pharyngeal lesions. External inspection of ears and nose are normal.   NECK: Supple, no masses. No palpable thyroid mass    CHEST/LUNGS: CTA bilaterally, normal respiratory effort   CARDIOVASCULAR: RRR, no murmurs. No lower extremity edema   ABDOMEN: soft non-tender, active bowel sounds, no hepatosplenomegaly. No palpable masses. EXTREMITIES: warm, Full ROM of all fours extremities. No focal weakness.   SKIN: warm, dry with no rashes or lesions  LYMPH: No cervical, clavicular, axillary, or inguinal therefore Deauville score 1. As per NCCN guideline Version 3.2018 recommendation for favorable stage IA is 2 cycles of ABVD followed by involved site radiation 20Gy. Referred to radiation oncology   8/22/2018 through 9/5/2018- ISRT 20Gy  Hodgkin lymphoma, stage IA  Yamile Tabor is a pleasant 61-year-old gentleman who is presently receiving treatment with ABVD for classical Hodgkin lymphoma. He returns today status post completion of 2 cycles of ABVD on 7/6/2018 followed by ISRT completed on 9/5/2018 by Dr. Fabiana Orozco. He denies any peripheral adenopathy or B symptoms. Unfortunately, he continues to smoke. Continue surveillance as per NCCN guidelines:  H&P 3-6 months for 1-2 years, then every 6-12 months until year 3 and then annually. TSH at least annually if RT to neck. CBC, CMP as clinically indicated. CT neck, chest, abdomen, pelvis at 6, 12 and 24 months following completion of therapy. CD20 positive Non-Hodgkin's lymphoma??  Incidental finding on bone marrow biopsy on 4/24/2018 of a small aggregate of a monoclonal B-cell CD20 positive population. The significance of this finding is unknown. The patient has no other adenopathy or B symptoms. There was no involvement by classical Hodgkin lymphoma in the bone marrow. This will be followed closely clinically and CBCs. Abnormal GI PET scan-PET scan showed increase SUV activity in the esophagus and rectal area with high SUV, concern for a secondary process. 9/18/2018 Colonoscopy was unremarkable. EGD was not performed. He has no new GI complaints. Allergy to PET scan contrast- hives and \"heart-attack\" like chest pain. Health maintenance  - The patient is to establish care to follow-up with primary care for further recommendation regarding age appropriate screening, well-being visit, follow-up and treatment of other medical comorbidities. Offer CT low dose lung cancer screening starting next year.    Side effects monitoring-  The selection, dosing administration

## 2020-02-19 ENCOUNTER — TRANSCRIBE ORDERS (OUTPATIENT)
Dept: ADMINISTRATIVE | Facility: HOSPITAL | Age: 70
End: 2020-02-19

## 2020-02-19 ENCOUNTER — OFFICE VISIT (OUTPATIENT)
Dept: HEMATOLOGY | Age: 70
End: 2020-02-19
Payer: MEDICARE

## 2020-02-19 ENCOUNTER — HOSPITAL ENCOUNTER (OUTPATIENT)
Dept: INFUSION THERAPY | Age: 70
Discharge: HOME | End: 2020-02-19
Payer: MEDICARE

## 2020-02-19 VITALS — BODY MASS INDEX: 21.55 KG/M2 | HEIGHT: 74 IN | HEART RATE: 62 BPM | WEIGHT: 167.9 LBS | OXYGEN SATURATION: 91 %

## 2020-02-19 DIAGNOSIS — C81.91 HODGKIN LYMPHOMA OF LYMPH NODES OF NECK, UNSPECIFIED HODGKIN LYMPHOMA TYPE (HCC): ICD-10-CM

## 2020-02-19 DIAGNOSIS — C81.90 HODGKIN LYMPHOMA, UNSPECIFIED HODGKIN LYMPHOMA TYPE, UNSPECIFIED BODY REGION (HCC): ICD-10-CM

## 2020-02-19 DIAGNOSIS — C81.90 HODGKIN LYMPHOMA, UNSPECIFIED HODGKIN LYMPHOMA TYPE, UNSPECIFIED BODY REGION (HCC): Primary | ICD-10-CM

## 2020-02-19 DIAGNOSIS — Z45.2 ENCOUNTER FOR CENTRAL LINE CARE: Primary | ICD-10-CM

## 2020-02-19 PROCEDURE — 80053 COMPREHEN METABOLIC PANEL: CPT

## 2020-02-19 PROCEDURE — 96523 IRRIG DRUG DELIVERY DEVICE: CPT

## 2020-02-19 PROCEDURE — G8427 DOCREV CUR MEDS BY ELIG CLIN: HCPCS | Performed by: INTERNAL MEDICINE

## 2020-02-19 PROCEDURE — 3017F COLORECTAL CA SCREEN DOC REV: CPT | Performed by: INTERNAL MEDICINE

## 2020-02-19 PROCEDURE — 4040F PNEUMOC VAC/ADMIN/RCVD: CPT | Performed by: INTERNAL MEDICINE

## 2020-02-19 PROCEDURE — G8484 FLU IMMUNIZE NO ADMIN: HCPCS | Performed by: INTERNAL MEDICINE

## 2020-02-19 PROCEDURE — 6360000002 HC RX W HCPCS: Performed by: INTERNAL MEDICINE

## 2020-02-19 PROCEDURE — 2580000003 HC RX 258: Performed by: INTERNAL MEDICINE

## 2020-02-19 PROCEDURE — 36415 COLL VENOUS BLD VENIPUNCTURE: CPT

## 2020-02-19 PROCEDURE — 1123F ACP DISCUSS/DSCN MKR DOCD: CPT | Performed by: INTERNAL MEDICINE

## 2020-02-19 PROCEDURE — 4004F PT TOBACCO SCREEN RCVD TLK: CPT | Performed by: INTERNAL MEDICINE

## 2020-02-19 PROCEDURE — G8420 CALC BMI NORM PARAMETERS: HCPCS | Performed by: INTERNAL MEDICINE

## 2020-02-19 PROCEDURE — 99213 OFFICE O/P EST LOW 20 MIN: CPT | Performed by: INTERNAL MEDICINE

## 2020-02-19 PROCEDURE — 84443 ASSAY THYROID STIM HORMONE: CPT

## 2020-02-19 PROCEDURE — 85025 COMPLETE CBC W/AUTO DIFF WBC: CPT

## 2020-02-19 RX ORDER — HEPARIN SODIUM (PORCINE) LOCK FLUSH IV SOLN 100 UNIT/ML 100 UNIT/ML
500 SOLUTION INTRAVENOUS PRN
Status: CANCELLED | OUTPATIENT
Start: 2020-02-19

## 2020-02-19 RX ORDER — SODIUM CHLORIDE 0.9 % (FLUSH) 0.9 %
20 SYRINGE (ML) INJECTION PRN
Status: CANCELLED | OUTPATIENT
Start: 2020-02-19

## 2020-02-19 RX ORDER — SODIUM CHLORIDE 0.9 % (FLUSH) 0.9 %
20 SYRINGE (ML) INJECTION PRN
Status: DISCONTINUED | OUTPATIENT
Start: 2020-02-19 | End: 2020-02-20 | Stop reason: HOSPADM

## 2020-02-19 RX ORDER — HEPARIN SODIUM (PORCINE) LOCK FLUSH IV SOLN 100 UNIT/ML 100 UNIT/ML
500 SOLUTION INTRAVENOUS PRN
Status: DISCONTINUED | OUTPATIENT
Start: 2020-02-19 | End: 2020-02-20 | Stop reason: HOSPADM

## 2020-02-19 RX ORDER — SODIUM CHLORIDE 0.9 % (FLUSH) 0.9 %
10 SYRINGE (ML) INJECTION PRN
Status: CANCELLED | OUTPATIENT
Start: 2020-02-19

## 2020-02-19 RX ORDER — SODIUM CHLORIDE 0.9 % (FLUSH) 0.9 %
10 SYRINGE (ML) INJECTION PRN
Status: DISCONTINUED | OUTPATIENT
Start: 2020-02-19 | End: 2020-02-20 | Stop reason: HOSPADM

## 2020-02-19 RX ADMIN — SODIUM CHLORIDE, PRESERVATIVE FREE 10 ML: 5 INJECTION INTRAVENOUS at 09:47

## 2020-02-19 RX ADMIN — HEPARIN 500 UNITS: 100 SYRINGE at 09:48

## 2020-04-15 ENCOUNTER — HOSPITAL ENCOUNTER (OUTPATIENT)
Dept: INFUSION THERAPY | Age: 70
End: 2020-04-15
Payer: MEDICARE

## 2020-04-17 ENCOUNTER — HOSPITAL ENCOUNTER (OUTPATIENT)
Dept: INFUSION THERAPY | Age: 70
Discharge: HOME | End: 2020-04-17
Payer: MEDICARE

## 2020-04-17 DIAGNOSIS — Z45.2 ENCOUNTER FOR CENTRAL LINE CARE: Primary | ICD-10-CM

## 2020-04-17 PROCEDURE — 6360000002 HC RX W HCPCS: Performed by: INTERNAL MEDICINE

## 2020-04-17 PROCEDURE — 96523 IRRIG DRUG DELIVERY DEVICE: CPT

## 2020-04-17 PROCEDURE — 2580000003 HC RX 258: Performed by: INTERNAL MEDICINE

## 2020-04-17 RX ORDER — HEPARIN SODIUM (PORCINE) LOCK FLUSH IV SOLN 100 UNIT/ML 100 UNIT/ML
500 SOLUTION INTRAVENOUS PRN
Status: CANCELLED | OUTPATIENT
Start: 2020-04-17

## 2020-04-17 RX ORDER — SODIUM CHLORIDE 0.9 % (FLUSH) 0.9 %
20 SYRINGE (ML) INJECTION PRN
Status: CANCELLED | OUTPATIENT
Start: 2020-04-17

## 2020-04-17 RX ORDER — HEPARIN SODIUM (PORCINE) LOCK FLUSH IV SOLN 100 UNIT/ML 100 UNIT/ML
500 SOLUTION INTRAVENOUS PRN
Status: DISCONTINUED | OUTPATIENT
Start: 2020-04-17 | End: 2020-04-18 | Stop reason: HOSPADM

## 2020-04-17 RX ORDER — SODIUM CHLORIDE 0.9 % (FLUSH) 0.9 %
20 SYRINGE (ML) INJECTION PRN
Status: DISCONTINUED | OUTPATIENT
Start: 2020-04-17 | End: 2020-04-18 | Stop reason: HOSPADM

## 2020-04-17 RX ORDER — SODIUM CHLORIDE 0.9 % (FLUSH) 0.9 %
10 SYRINGE (ML) INJECTION PRN
Status: CANCELLED | OUTPATIENT
Start: 2020-04-17

## 2020-04-17 RX ADMIN — SODIUM CHLORIDE, PRESERVATIVE FREE 20 ML: 5 INJECTION INTRAVENOUS at 15:29

## 2020-04-17 RX ADMIN — HEPARIN 500 UNITS: 100 SYRINGE at 15:29

## 2020-06-17 ENCOUNTER — HOSPITAL ENCOUNTER (OUTPATIENT)
Dept: INFUSION THERAPY | Age: 70
Discharge: HOME | End: 2020-06-17
Payer: MEDICARE

## 2020-06-17 DIAGNOSIS — Z45.2 ENCOUNTER FOR CENTRAL LINE CARE: Primary | ICD-10-CM

## 2020-06-17 PROCEDURE — 2580000003 HC RX 258: Performed by: INTERNAL MEDICINE

## 2020-06-17 PROCEDURE — 6360000002 HC RX W HCPCS: Performed by: INTERNAL MEDICINE

## 2020-06-17 PROCEDURE — 96523 IRRIG DRUG DELIVERY DEVICE: CPT

## 2020-06-17 RX ORDER — SODIUM CHLORIDE 0.9 % (FLUSH) 0.9 %
10 SYRINGE (ML) INJECTION PRN
Status: CANCELLED | OUTPATIENT
Start: 2020-06-17

## 2020-06-17 RX ORDER — SODIUM CHLORIDE 0.9 % (FLUSH) 0.9 %
20 SYRINGE (ML) INJECTION PRN
Status: CANCELLED | OUTPATIENT
Start: 2020-06-17

## 2020-06-17 RX ORDER — HEPARIN SODIUM (PORCINE) LOCK FLUSH IV SOLN 100 UNIT/ML 100 UNIT/ML
500 SOLUTION INTRAVENOUS PRN
Status: CANCELLED | OUTPATIENT
Start: 2020-06-17

## 2020-06-17 RX ORDER — HEPARIN SODIUM (PORCINE) LOCK FLUSH IV SOLN 100 UNIT/ML 100 UNIT/ML
500 SOLUTION INTRAVENOUS PRN
Status: DISCONTINUED | OUTPATIENT
Start: 2020-06-17 | End: 2020-06-18 | Stop reason: HOSPADM

## 2020-06-17 RX ORDER — SODIUM CHLORIDE 0.9 % (FLUSH) 0.9 %
20 SYRINGE (ML) INJECTION PRN
Status: DISCONTINUED | OUTPATIENT
Start: 2020-06-17 | End: 2020-06-18 | Stop reason: HOSPADM

## 2020-06-17 RX ADMIN — HEPARIN 500 UNITS: 100 SYRINGE at 14:12

## 2020-06-17 RX ADMIN — SODIUM CHLORIDE, PRESERVATIVE FREE 20 ML: 5 INJECTION INTRAVENOUS at 14:12

## 2020-07-07 ENCOUNTER — APPOINTMENT (OUTPATIENT)
Dept: CT IMAGING | Age: 70
End: 2020-07-07
Payer: MEDICARE

## 2020-07-07 ENCOUNTER — HOSPITAL ENCOUNTER (EMERGENCY)
Age: 70
Discharge: HOME | End: 2020-07-07
Attending: EMERGENCY MEDICINE
Payer: MEDICARE

## 2020-07-07 VITALS
TEMPERATURE: 97.7 F | DIASTOLIC BLOOD PRESSURE: 74 MMHG | RESPIRATION RATE: 18 BRPM | WEIGHT: 170 LBS | HEART RATE: 58 BPM | BODY MASS INDEX: 21.82 KG/M2 | HEIGHT: 74 IN | OXYGEN SATURATION: 93 % | SYSTOLIC BLOOD PRESSURE: 144 MMHG

## 2020-07-07 PROCEDURE — 96372 THER/PROPH/DIAG INJ SC/IM: CPT

## 2020-07-07 PROCEDURE — 6360000002 HC RX W HCPCS: Performed by: EMERGENCY MEDICINE

## 2020-07-07 PROCEDURE — 99283 EMERGENCY DEPT VISIT LOW MDM: CPT

## 2020-07-07 PROCEDURE — 72128 CT CHEST SPINE W/O DYE: CPT

## 2020-07-07 PROCEDURE — 72131 CT LUMBAR SPINE W/O DYE: CPT

## 2020-07-07 RX ORDER — HYDROMORPHONE HYDROCHLORIDE 1 MG/ML
1 INJECTION, SOLUTION INTRAMUSCULAR; INTRAVENOUS; SUBCUTANEOUS ONCE
Status: COMPLETED | OUTPATIENT
Start: 2020-07-07 | End: 2020-07-07

## 2020-07-07 RX ORDER — ORPHENADRINE CITRATE 30 MG/ML
60 INJECTION INTRAMUSCULAR; INTRAVENOUS ONCE
Status: COMPLETED | OUTPATIENT
Start: 2020-07-07 | End: 2020-07-07

## 2020-07-07 RX ORDER — ONDANSETRON 4 MG/1
4 TABLET, ORALLY DISINTEGRATING ORAL EVERY 8 HOURS PRN
Qty: 30 TABLET | Refills: 0 | Status: SHIPPED | OUTPATIENT
Start: 2020-07-07 | End: 2020-09-16

## 2020-07-07 RX ORDER — PREDNISONE 20 MG/1
20 TABLET ORAL DAILY
Qty: 5 TABLET | Refills: 0 | Status: SHIPPED | OUTPATIENT
Start: 2020-07-07 | End: 2020-07-12

## 2020-07-07 RX ORDER — METHOCARBAMOL 750 MG/1
750 TABLET, FILM COATED ORAL EVERY 8 HOURS PRN
Qty: 15 TABLET | Refills: 0 | Status: SHIPPED | OUTPATIENT
Start: 2020-07-07 | End: 2020-09-16

## 2020-07-07 RX ADMIN — ORPHENADRINE CITRATE 60 MG: 30 INJECTION INTRAMUSCULAR; INTRAVENOUS at 07:52

## 2020-07-07 RX ADMIN — HYDROMORPHONE HYDROCHLORIDE 1 MG: 1 INJECTION, SOLUTION INTRAMUSCULAR; INTRAVENOUS; SUBCUTANEOUS at 07:52

## 2020-07-07 ASSESSMENT — PAIN SCALES - GENERAL: PAINLEVEL_OUTOF10: 8

## 2020-07-07 ASSESSMENT — ENCOUNTER SYMPTOMS
BACK PAIN: 1
RHINORRHEA: 0
ABDOMINAL PAIN: 0
SHORTNESS OF BREATH: 0
DIARRHEA: 0
SORE THROAT: 0
VOMITING: 0
COUGH: 0
NAUSEA: 0

## 2020-07-08 ENCOUNTER — HOSPITAL ENCOUNTER (EMERGENCY)
Facility: HOSPITAL | Age: 70
Discharge: HOME | End: 2020-07-08
Attending: EMERGENCY MEDICINE | Admitting: EMERGENCY MEDICINE

## 2020-07-08 ENCOUNTER — APPOINTMENT (OUTPATIENT)
Dept: CT IMAGING | Facility: HOSPITAL | Age: 70
End: 2020-07-08

## 2020-07-08 VITALS
TEMPERATURE: 98.2 F | BODY MASS INDEX: 20.79 KG/M2 | DIASTOLIC BLOOD PRESSURE: 75 MMHG | HEIGHT: 74 IN | WEIGHT: 162 LBS | SYSTOLIC BLOOD PRESSURE: 151 MMHG | RESPIRATION RATE: 18 BRPM | HEART RATE: 55 BPM | OXYGEN SATURATION: 99 %

## 2020-07-08 DIAGNOSIS — I77.819 AORTIC ECTASIA (HCC): ICD-10-CM

## 2020-07-08 DIAGNOSIS — R10.9 LEFT FLANK PAIN: ICD-10-CM

## 2020-07-08 DIAGNOSIS — N20.0 RENAL CALCULUS, BILATERAL: ICD-10-CM

## 2020-07-08 DIAGNOSIS — M54.50 CHRONIC LEFT-SIDED LOW BACK PAIN WITHOUT SCIATICA: Primary | ICD-10-CM

## 2020-07-08 DIAGNOSIS — G89.29 CHRONIC LEFT-SIDED LOW BACK PAIN WITHOUT SCIATICA: Primary | ICD-10-CM

## 2020-07-08 LAB
ALBUMIN SERPL-MCNC: 4.4 G/DL (ref 3.5–5.2)
ALBUMIN/GLOB SERPL: 1.8 G/DL
ALP SERPL-CCNC: 108 U/L (ref 39–117)
ALT SERPL W P-5'-P-CCNC: 43 U/L (ref 1–41)
ANION GAP SERPL CALCULATED.3IONS-SCNC: 10 MMOL/L (ref 5–15)
AST SERPL-CCNC: 31 U/L (ref 1–40)
BILIRUB SERPL-MCNC: 0.8 MG/DL (ref 0–1.2)
BILIRUB UR QL STRIP: NEGATIVE
BUN SERPL-MCNC: 13 MG/DL (ref 8–23)
BUN/CREAT SERPL: 13.3 (ref 7–25)
CALCIUM SPEC-SCNC: 8.9 MG/DL (ref 8.6–10.5)
CHLORIDE SERPL-SCNC: 104 MMOL/L (ref 98–107)
CLARITY UR: CLEAR
CO2 SERPL-SCNC: 26 MMOL/L (ref 22–29)
COLOR UR: YELLOW
CREAT SERPL-MCNC: 0.98 MG/DL (ref 0.76–1.27)
DEPRECATED RDW RBC AUTO: 39.8 FL (ref 37–54)
ERYTHROCYTE [DISTWIDTH] IN BLOOD BY AUTOMATED COUNT: 12.4 % (ref 12.3–15.4)
GFR SERPL CREATININE-BSD FRML MDRD: 76 ML/MIN/1.73
GLOBULIN UR ELPH-MCNC: 2.4 GM/DL
GLUCOSE SERPL-MCNC: 116 MG/DL (ref 65–99)
GLUCOSE UR STRIP-MCNC: NEGATIVE MG/DL
HCT VFR BLD AUTO: 43.3 % (ref 37.5–51)
HGB BLD-MCNC: 15.4 G/DL (ref 13–17.7)
HGB UR QL STRIP.AUTO: NEGATIVE
KETONES UR QL STRIP: NEGATIVE
LEUKOCYTE ESTERASE UR QL STRIP.AUTO: NEGATIVE
LYMPHOCYTES # BLD MANUAL: 0.91 10*3/MM3 (ref 0.7–3.1)
LYMPHOCYTES NFR BLD MANUAL: 13 % (ref 19.6–45.3)
LYMPHOCYTES NFR BLD MANUAL: 3 % (ref 5–12)
MCH RBC QN AUTO: 31.5 PG (ref 26.6–33)
MCHC RBC AUTO-ENTMCNC: 35.6 G/DL (ref 31.5–35.7)
MCV RBC AUTO: 88.5 FL (ref 79–97)
MONOCYTES # BLD AUTO: 0.21 10*3/MM3 (ref 0.1–0.9)
NEUTROPHILS # BLD AUTO: 5.86 10*3/MM3 (ref 1.7–7)
NEUTROPHILS NFR BLD MANUAL: 84 % (ref 42.7–76)
NITRITE UR QL STRIP: NEGATIVE
PH UR STRIP.AUTO: 5.5 [PH] (ref 5–8)
PLAT MORPH BLD: NORMAL
PLATELET # BLD AUTO: 137 10*3/MM3 (ref 140–450)
PMV BLD AUTO: 9.8 FL (ref 6–12)
POLYCHROMASIA BLD QL SMEAR: ABNORMAL
POTASSIUM SERPL-SCNC: 3.9 MMOL/L (ref 3.5–5.2)
PROT SERPL-MCNC: 6.8 G/DL (ref 6–8.5)
PROT UR QL STRIP: NEGATIVE
RBC # BLD AUTO: 4.89 10*6/MM3 (ref 4.14–5.8)
SODIUM SERPL-SCNC: 140 MMOL/L (ref 136–145)
SP GR UR STRIP: 1.01 (ref 1–1.03)
UROBILINOGEN UR QL STRIP: NORMAL
WBC # BLD AUTO: 6.98 10*3/MM3 (ref 3.4–10.8)
WBC MORPH BLD: NORMAL

## 2020-07-08 PROCEDURE — 74176 CT ABD & PELVIS W/O CONTRAST: CPT

## 2020-07-08 PROCEDURE — 80053 COMPREHEN METABOLIC PANEL: CPT | Performed by: EMERGENCY MEDICINE

## 2020-07-08 PROCEDURE — 96374 THER/PROPH/DIAG INJ IV PUSH: CPT

## 2020-07-08 PROCEDURE — 85025 COMPLETE CBC W/AUTO DIFF WBC: CPT | Performed by: EMERGENCY MEDICINE

## 2020-07-08 PROCEDURE — 25010000003 HYDROMORPHONE 1 MG/ML SOLUTION: Performed by: EMERGENCY MEDICINE

## 2020-07-08 PROCEDURE — 99284 EMERGENCY DEPT VISIT MOD MDM: CPT

## 2020-07-08 PROCEDURE — 96376 TX/PRO/DX INJ SAME DRUG ADON: CPT

## 2020-07-08 PROCEDURE — 96375 TX/PRO/DX INJ NEW DRUG ADDON: CPT

## 2020-07-08 PROCEDURE — 25010000002 ONDANSETRON PER 1 MG: Performed by: EMERGENCY MEDICINE

## 2020-07-08 PROCEDURE — 81003 URINALYSIS AUTO W/O SCOPE: CPT | Performed by: EMERGENCY MEDICINE

## 2020-07-08 PROCEDURE — 85007 BL SMEAR W/DIFF WBC COUNT: CPT | Performed by: EMERGENCY MEDICINE

## 2020-07-08 RX ORDER — ONDANSETRON 2 MG/ML
4 INJECTION INTRAMUSCULAR; INTRAVENOUS ONCE
Status: COMPLETED | OUTPATIENT
Start: 2020-07-08 | End: 2020-07-08

## 2020-07-08 RX ORDER — BACLOFEN 10 MG/1
10 TABLET ORAL 3 TIMES DAILY
Qty: 12 TABLET | Refills: 0 | Status: SHIPPED | OUTPATIENT
Start: 2020-07-08 | End: 2020-09-03

## 2020-07-08 RX ORDER — KETOROLAC TROMETHAMINE 10 MG/1
10 TABLET, FILM COATED ORAL EVERY 6 HOURS PRN
Qty: 10 TABLET | Refills: 0 | Status: SHIPPED | OUTPATIENT
Start: 2020-07-08 | End: 2020-09-03

## 2020-07-08 RX ADMIN — HYDROMORPHONE HYDROCHLORIDE 1 MG: 1 INJECTION, SOLUTION INTRAMUSCULAR; INTRAVENOUS; SUBCUTANEOUS at 10:48

## 2020-07-08 RX ADMIN — ONDANSETRON HYDROCHLORIDE 4 MG: 2 SOLUTION INTRAMUSCULAR; INTRAVENOUS at 10:46

## 2020-07-08 RX ADMIN — ONDANSETRON HYDROCHLORIDE 4 MG: 2 SOLUTION INTRAMUSCULAR; INTRAVENOUS at 11:56

## 2020-08-25 ENCOUNTER — TRANSCRIBE ORDERS (OUTPATIENT)
Dept: ADMINISTRATIVE | Facility: HOSPITAL | Age: 70
End: 2020-08-25

## 2020-08-25 ENCOUNTER — LAB (OUTPATIENT)
Dept: LAB | Facility: HOSPITAL | Age: 70
End: 2020-08-25

## 2020-08-25 ENCOUNTER — TELEPHONE (OUTPATIENT)
Dept: VASCULAR SURGERY | Facility: CLINIC | Age: 70
End: 2020-08-25

## 2020-08-25 DIAGNOSIS — Z00.00 ENCOUNTER FOR GENERAL ADULT MEDICAL EXAMINATION WITHOUT ABNORMAL FINDINGS: Primary | ICD-10-CM

## 2020-08-25 DIAGNOSIS — R53.83 OTHER FATIGUE: ICD-10-CM

## 2020-08-25 DIAGNOSIS — Z12.5 ENCOUNTER FOR SPECIAL SCREENING EXAMINATION FOR NEOPLASM OF PROSTATE: ICD-10-CM

## 2020-08-25 DIAGNOSIS — E55.9 VITAMIN D DEFICIENCY, UNSPECIFIED: ICD-10-CM

## 2020-08-25 LAB
ALBUMIN SERPL-MCNC: 4.1 G/DL (ref 3.5–5)
ALBUMIN/GLOB SERPL: 1.3 G/DL (ref 1.1–2.5)
ALP SERPL-CCNC: 94 U/L (ref 24–120)
ALT SERPL W P-5'-P-CCNC: 16 U/L (ref 0–50)
ANION GAP SERPL CALCULATED.3IONS-SCNC: 7 MMOL/L (ref 4–13)
AST SERPL-CCNC: 21 U/L (ref 7–45)
AUTO MIXED CELLS #: 0.8 10*3/MM3 (ref 0.1–2.6)
AUTO MIXED CELLS %: 12 % (ref 0.1–24)
BACTERIA UR QL AUTO: ABNORMAL /HPF
BILIRUB SERPL-MCNC: 0.7 MG/DL (ref 0.1–1)
BILIRUB UR QL STRIP: ABNORMAL
BUN SERPL-MCNC: 10 MG/DL (ref 5–21)
BUN/CREAT SERPL: 9
CALCIUM SPEC-SCNC: 9 MG/DL (ref 8.4–10.4)
CHLORIDE SERPL-SCNC: 104 MMOL/L (ref 98–110)
CHOLEST SERPL-MCNC: 199 MG/DL (ref 130–200)
CLARITY UR: CLEAR
CO2 SERPL-SCNC: 28 MMOL/L (ref 24–31)
COLOR UR: YELLOW
CREAT SERPL-MCNC: 1.11 MG/DL (ref 0.5–1.4)
ERYTHROCYTE [DISTWIDTH] IN BLOOD BY AUTOMATED COUNT: 13 % (ref 12.3–15.4)
GFR SERPL CREATININE-BSD FRML MDRD: 66 ML/MIN/1.73
GLOBULIN UR ELPH-MCNC: 3.2 GM/DL
GLUCOSE SERPL-MCNC: 114 MG/DL (ref 70–100)
GLUCOSE UR STRIP-MCNC: NEGATIVE MG/DL
GRAN CASTS URNS QL MICRO: ABNORMAL /LPF
HCT VFR BLD AUTO: 42 % (ref 37.5–51)
HDLC SERPL-MCNC: 37 MG/DL
HGB BLD-MCNC: 14.9 G/DL (ref 13–17.7)
HGB UR QL STRIP.AUTO: ABNORMAL
HYALINE CASTS UR QL AUTO: ABNORMAL /LPF
KETONES UR QL STRIP: ABNORMAL
LDLC SERPL CALC-MCNC: 125 MG/DL (ref 0–99)
LDLC/HDLC SERPL: 3.37 {RATIO}
LEUKOCYTE ESTERASE UR QL STRIP.AUTO: NEGATIVE
LYMPHOCYTES # BLD AUTO: 1.2 10*3/MM3 (ref 0.7–3.1)
LYMPHOCYTES NFR BLD AUTO: 18.3 % (ref 19.6–45.3)
MCH RBC QN AUTO: 31.8 PG (ref 26.6–33)
MCHC RBC AUTO-ENTMCNC: 35.5 G/DL (ref 31.5–35.7)
MCV RBC AUTO: 89.7 FL (ref 79–97)
MUCOUS THREADS URNS QL MICRO: ABNORMAL /HPF
NEUTROPHILS NFR BLD AUTO: 4.5 10*3/MM3 (ref 1.7–7)
NEUTROPHILS NFR BLD AUTO: 69.7 % (ref 42.7–76)
NITRITE UR QL STRIP: NEGATIVE
PH UR STRIP.AUTO: 6 [PH] (ref 5–8)
PLATELET # BLD AUTO: 238 10*3/MM3 (ref 140–450)
PMV BLD AUTO: 7.6 FL (ref 6–12)
POTASSIUM SERPL-SCNC: 3.8 MMOL/L (ref 3.5–5.3)
PROT SERPL-MCNC: 7.3 G/DL (ref 6.3–8.7)
PROT UR QL STRIP: ABNORMAL
RBC # BLD AUTO: 4.68 10*6/MM3 (ref 4.14–5.8)
RBC # UR: ABNORMAL /HPF
REF LAB TEST METHOD: ABNORMAL
SODIUM SERPL-SCNC: 139 MMOL/L (ref 135–145)
SP GR UR STRIP: >=1.03 (ref 1–1.03)
SQUAMOUS #/AREA URNS HPF: ABNORMAL /HPF
TRIGL SERPL-MCNC: 186 MG/DL (ref 0–149)
UROBILINOGEN UR QL STRIP: ABNORMAL
VLDLC SERPL-MCNC: 37.2 MG/DL
WBC # BLD AUTO: 6.5 10*3/MM3 (ref 3.4–10.8)
WBC UR QL AUTO: ABNORMAL /HPF

## 2020-08-25 PROCEDURE — G0103 PSA SCREENING: HCPCS | Performed by: NURSE PRACTITIONER

## 2020-08-25 PROCEDURE — 85025 COMPLETE CBC W/AUTO DIFF WBC: CPT | Performed by: NURSE PRACTITIONER

## 2020-08-25 PROCEDURE — 80061 LIPID PANEL: CPT | Performed by: NURSE PRACTITIONER

## 2020-08-25 PROCEDURE — 84443 ASSAY THYROID STIM HORMONE: CPT | Performed by: NURSE PRACTITIONER

## 2020-08-25 PROCEDURE — 81001 URINALYSIS AUTO W/SCOPE: CPT | Performed by: NURSE PRACTITIONER

## 2020-08-25 PROCEDURE — 87086 URINE CULTURE/COLONY COUNT: CPT | Performed by: NURSE PRACTITIONER

## 2020-08-25 PROCEDURE — 36415 COLL VENOUS BLD VENIPUNCTURE: CPT | Performed by: NURSE PRACTITIONER

## 2020-08-25 PROCEDURE — 80053 COMPREHEN METABOLIC PANEL: CPT | Performed by: NURSE PRACTITIONER

## 2020-08-25 PROCEDURE — 82306 VITAMIN D 25 HYDROXY: CPT | Performed by: NURSE PRACTITIONER

## 2020-08-26 LAB
25(OH)D3 SERPL-MCNC: 40.9 NG/ML (ref 30–100)
BACTERIA SPEC AEROBE CULT: NO GROWTH
PSA SERPL-MCNC: 2.29 NG/ML (ref 0–4)
TSH SERPL DL<=0.05 MIU/L-ACNC: 1.1 UIU/ML (ref 0.27–4.2)

## 2020-09-02 ENCOUNTER — TELEPHONE (OUTPATIENT)
Dept: VASCULAR SURGERY | Facility: CLINIC | Age: 70
End: 2020-09-02

## 2020-09-03 ENCOUNTER — OFFICE VISIT (OUTPATIENT)
Dept: VASCULAR SURGERY | Facility: CLINIC | Age: 70
End: 2020-09-03

## 2020-09-03 VITALS
OXYGEN SATURATION: 100 % | SYSTOLIC BLOOD PRESSURE: 138 MMHG | WEIGHT: 163 LBS | HEART RATE: 64 BPM | DIASTOLIC BLOOD PRESSURE: 88 MMHG | HEIGHT: 74 IN | BODY MASS INDEX: 20.92 KG/M2

## 2020-09-03 DIAGNOSIS — F17.200 CURRENT EVERY DAY SMOKER: ICD-10-CM

## 2020-09-03 DIAGNOSIS — I73.9 PAD (PERIPHERAL ARTERY DISEASE) (HCC): Primary | ICD-10-CM

## 2020-09-03 DIAGNOSIS — I65.23 BILATERAL CAROTID ARTERY STENOSIS: ICD-10-CM

## 2020-09-03 PROCEDURE — 99204 OFFICE O/P NEW MOD 45 MIN: CPT | Performed by: SURGERY

## 2020-09-03 RX ORDER — AMLODIPINE BESYLATE 10 MG/1
10 TABLET ORAL DAILY
COMMUNITY
Start: 2020-08-25 | End: 2021-10-26

## 2020-09-04 RX ORDER — PREDNISONE 50 MG/1
TABLET ORAL
Qty: 3 TABLET | Refills: 0 | Status: SHIPPED | OUTPATIENT
Start: 2020-09-04 | End: 2020-09-16

## 2020-09-08 ENCOUNTER — APPOINTMENT (OUTPATIENT)
Dept: CT IMAGING | Facility: HOSPITAL | Age: 70
End: 2020-09-08

## 2020-09-10 ENCOUNTER — HOSPITAL ENCOUNTER (OUTPATIENT)
Dept: CT IMAGING | Facility: HOSPITAL | Age: 70
Discharge: HOME | End: 2020-09-10
Admitting: INTERNAL MEDICINE

## 2020-09-10 DIAGNOSIS — C81.90 HODGKIN LYMPHOMA, UNSPECIFIED HODGKIN LYMPHOMA TYPE, UNSPECIFIED BODY REGION (HCC): ICD-10-CM

## 2020-09-10 LAB — CREAT BLDA-MCNC: 1 MG/DL (ref 0.6–1.3)

## 2020-09-10 PROCEDURE — 71260 CT THORAX DX C+: CPT

## 2020-09-10 PROCEDURE — 82565 ASSAY OF CREATININE: CPT

## 2020-09-10 PROCEDURE — 25010000002 IOPAMIDOL 61 % SOLUTION: Performed by: INTERNAL MEDICINE

## 2020-09-10 PROCEDURE — 70491 CT SOFT TISSUE NECK W/DYE: CPT

## 2020-09-10 RX ADMIN — IOPAMIDOL 100 ML: 612 INJECTION, SOLUTION INTRAVENOUS at 13:51

## 2020-09-15 NOTE — PROGRESS NOTES
left side of the neck with the largest measures 3.1 x 1.9 cm. Another lymph node measuring 2.1 x 1.8 cm. A third lymph node measuring 1.9 x 1.6 cm. 2 soft tissue nodules within the inferior left parathyroid gland largest measuring 1.2 cm centrally necrotic. 3/26/2018-he underwent a laryngoscopy and excisional lymph node biopsy by Dr. Ashley Miranda. 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 They're negative for CD20, PAX-5, BCL 6 and all tested B-cell antigens. Flow cytometry showed no immunophenotypic evidence of a clonal B-cell or abnormal T-cell population. 3/20/2018-CT of the abdomen and pelvis with contrast showed no evidence of intra-abdominal adenopathy   4/19/2018-he was first seen by me. HIV negative, hepatitis B negative, ESR 3, albumin 4.5. IPI score = 2.   4/24/2018-bone marrow biopsy showed no signs of involvement by classical Hodgkin lymphoma. One atypical lymphoid aggregates composed of CD20 positive B cells with positive B-cell gene rearrangement, which raises a concern for non-Hodgkin lymphoma. 4/24/2018-PET CT scan showed a left cervical chain and left supraclavicular region adenopathy with maximum SUV 8.2. Largest lymph node measuring 2.1 cm. Otherwise, no hypermetabolic adenopathy in the chest or abdomen. 4/25/2018- CT scan of the chest with contrast documented prominent lymph nodes in the left subclavicular region measuring up to 1.1 cm. A noncalcified 3 mm nodule in the lingual and an ill-defined 1.2 cm groundglass opacity in the right lower lobe. 5/4/2018-referred for Mediport placement. Stage IA classical Hodgkin lymphoma. As per NCCN guidelines recommended 2 cycles of ABVD followed by PET scan. If PET/CT scan Deauville 1-3 -> ISRT. If PET/CT scan Deauville score 4-> consider ABVD ×2 additional cycles followed by PET/CT.    5/11/2018 through 7/6/2018- completion of disease) ([facility] [address]     Emphysema (subcutaneous) (surgical) resulting from a procedure     Hx of hepatitis     Liver disease           PAST SURGICAL HISTORY:  Past Surgical History:   Procedure Laterality Date    CHOLECYSTECTOMY      COLONOSCOPY  09/18/2018    Hany Wright MD     HERNIA REPAIR      x3    DE INSJ PRPH CTR VAD W/SUBQ PORT AGE 5 YR/> N/A 5/10/2018    PORT INSERTION AND PORT ACCESSED performed by Shelbi Ramos MD at [place] [address]:  Social History     Socioeconomic History    Marital status:      Spouse name: Not on file    Number of children: Not on file    Years of education: Not on file    Highest education level: Not on file   Occupational History    Not on file   Social Needs    Financial resource strain: Not on file    Food insecurity     Worry: Not on file     Inability: Not on file    Transportation needs     Medical: Not on file     Non-medical: Not on file   Tobacco Use    Smoking status: Current Every Day Smoker     Packs/day: 1.00     Types: Cigarettes    Smokeless tobacco: Never Used   Substance and Sexual Activity    Alcohol use: No    Drug use: No    Sexual activity: Not on file   Lifestyle    Physical activity     Days per week: Not on file     Minutes per session: Not on file    Stress: Not on file   Relationships    Social connections     Talks on phone: Not on file     Gets together: Not on file     Attends Scientologist service: Not on file     Active member of club or organization: Not on file     Attends meetings of clubs or organizations: Not on file     Relationship status: Not on file    Intimate partner violence     Fear of current or ex partner: Not on file     Emotionally abused: Not on file     Physically abused: Not on file     Forced sexual activity: Not on file   Other Topics Concern    Not on file   Social History Narrative    Not on file       FAMILY HISTORY:  Family History   Problem Relation Age of Onset    Cancer Mother  Breast Cancer Mother     Cancer Son     Diabetes Son         Current Outpatient Medications   Medication Sig Dispense Refill    budesonide-formoterol (SYMBICORT) 160-4.5 MCG/ACT AERO Inhale 2 puffs into the lungs 2 times daily       No current facility-administered medications for this visit. Facility-Administered Medications Ordered in Other Visits   Medication Dose Route Frequency Provider Last Rate Last Dose    sodium chloride flush 0.9 % injection 20 mL  20 mL Intravenous PRN Cam Lacy MD   10 mL at 09/16/20 1058    heparin flush 100 UNIT/ML injection 500 Units  500 Units Intracatheter PRN Cam Lacy MD   500 Units at 09/16/20 1058        REVIEW OF SYSTEMS:    Constitutional: no fever, no night sweats, no fatigue;   HEENT: no blurring of vision, no double vision, no hearing difficulty, no tinnitus,no ulceration, no dysphagia  Lungs: no cough, no shortness of breath, no wheeze;   CVS: no palpitation, no chest pain, no shortness of breath;  GI: no abdominal pain, no nausea , no vomiting, no constipation;   MARTHA: no dysuria, frequency and urgency, no hematuria, no kidney stones;   Musculoskeletal: no joint pain, swelling , stiffness;   Endocrine: no polyuria, polydypsia, no cold or heat intolerence; Hematology/lymphatic: no easy brusing or bleeding, no hx of clotting disorder; no peripheral adenopathy. Dermatology: no skin rash, no eczema, no pruritis;   Psychiatry: no depression, no anxiety,no panic attacks, no suicide ideation;    Neurology: no syncope, no seizures, no numbness or tingling of hands, no numbness or tingling of feet, no paresis;     PHYSICAL EXAM:    Vitals signs:  /78   Pulse 65   Temp 97.3 °F (36.3 °C) (Temporal)   Ht 6' 2\" (1.88 m)   Wt 161 lb 6.4 oz (73.2 kg)   SpO2 99%   BMI 20.72 kg/m²    /80    /78   Pulse 65   Temp 97.3 °F (36.3 °C) (Temporal)   Ht 6' 2\" (1.88 m)   Wt 161 lb 6.4 oz (73.2 kg)   SpO2 99%   BMI 20.72 kg/m² Pain scale:  Pain Score:   0 - No pain     CONSTITUTIONAL: Alert, appropriate, no acute distress,   EYES: Non icteric, EOM intact, pupils equal round and reactive to light and accommodation. ENT: Oral mucus membranes moist, no oral pharyngeal lesions. External inspection of ears and nose are normal.   NECK: Supple, no masses. No palpable thyroid mass    CHEST/LUNGS: CTA bilaterally, normal respiratory effort   CARDIOVASCULAR: RRR, no murmurs. No lower extremity edema   ABDOMEN: soft non-tender, active bowel sounds, no hepatosplenomegaly. No palpable masses. EXTREMITIES: warm, Full ROM of all fours extremities. No focal weakness. SKIN: warm, dry with no rashes or lesions  LYMPH: No cervical, clavicular, axillary, or inguinal lymphadenopathy  NEUROLOGIC: follows commands, non focal.   PSYCH: mood and affect appropriate. Alert and oriented to time and place and person. Relevant Lab findings/reviewed by me:  Lab Results   Component Value Date    WBC 7.91 09/16/2020    HGB 15.4 09/16/2020    HCT 45.4 09/16/2020    MCV 99.1 (H) 09/16/2020     09/16/2020     Lab Results   Component Value Date    NEUTROABS 4.85 09/16/2020     Lab Results   Component Value Date     05/03/2017    K 4.0 05/03/2017     05/03/2017    CO2 24 05/03/2017    BUN 12 05/03/2017    CREATININE 0.9 05/03/2017    GLUCOSE 77 05/03/2017    CALCIUM 9.2 05/03/2017    PROT 7.3 05/03/2017    LABALBU 4.5 05/03/2017    BILITOT 0.7 05/03/2017    ALKPHOS 106 05/03/2017    AST 15 05/03/2017    ALT 14 05/03/2017    LABGLOM >60 05/03/2017    GLOB 2.8 05/03/2017           Relevant Imaging studies/reviewed by me:  9/10/20 CT chest -No evidence of thoracic lymphadenopathy. 9/10/20 CT soft tissue neck-No lymphadenopathy in the neck.       ASSESSMENT    Orders Placed This Encounter   Procedures   Tono Laughlin MD, [facility]     Referral Priority:   Routine     Referral Type:   Eval and Treat     Referral Reason: Specialty Services Required     Referred to Provider:   Irais Higgins PA-C     Requested Specialty:   Physician Assistant     Number of Visits Requested:   1      eLw Sr was seen today for follow-up. Diagnoses and all orders for this visit:    Hodgkin lymphoma, unspecified Hodgkin lymphoma type, unspecified body region [facility])    Care plan discussed with patient    Toxicity, late effect, due to drug, medicine, or biological substance    Other microscopic hematuria  -     Andrew Canales MD, Urology, [place]    Kidney stones  -     Andrew Canales MD, Urology, [place]    Hematuria, unspecified type       Diagnosis   NS-Classical Hodgkin lymphoma, March 2018   IPS score 2 points   Stage IA  Treatment summary  5/11/2018 through 7/6/2018- completion of ABVD chemotherapy x 2 cycles. 7/18/2018-PET CT scan performed after 2 cycles of ABVD showed complete resolution of the hypermetabolic lymph nodes consistent with a complete metabolic response and therefore Deauville score 1. As per NCCN guideline Version 3.2018 recommendation for favorable stage IA is 2 cycles of ABVD followed by involved site radiation 20Gy. Referred to radiation oncology   8/22/2018 through 9/5/2018- ISRT 20Gy  Hodgkin lymphoma, stage IA  Lew Sr is a pleasant 68-year-old gentleman who is presently receiving treatment with ABVD for classical Hodgkin lymphoma. He returns today status post completion of 2 cycles of ABVD on 7/6/2018 followed by ISRT completed on 9/5/2018 by Dr. Kavitha Templeton. He denies any peripheral adenopathy or B symptoms. Unfortunately, he continues to smoke. Continue surveillance as per NCCN guidelines:  H&P 3-6 months for 1-2 years, then every 6-12 months until year 3 and then annually. TSH at least annually if RT to neck. CBC, CMP as clinically indicated. CT neck, chest, abdomen, pelvis at 6, 12 and 24 months following completion of therapy.   CD20 positive Non-Hodgkin's lymphoma??  Incidental finding on bone marrow biopsy on 4/24/2018 of a small aggregate of a monoclonal B-cell CD20 positive population. The significance of this finding is unknown. The patient has no other adenopathy or B symptoms. There was no involvement by classical Hodgkin lymphoma in the bone marrow. This will be followed closely clinically and CBCs. Abnormal GI PET scan-PET scan showed increase SUV activity in the esophagus and rectal area with high SUV, concern for a secondary process. 9/18/2018 Colonoscopy was unremarkable. EGD was not performed. He has no new GI complaints. Allergy to PET scan contrast- hives and \"heart-attack\" like chest pain. Health maintenance  - The patient is to establish care to follow-up with primary care for further recommendation regarding age appropriate screening, well-being visit, follow-up and treatment of other medical comorbidities. Offer CT low dose lung cancer screening starting next year. Side effects monitoring-  The selection, dosing administration of anticancer agents in the management of associated toxicities are complex. Modifications of drug dose and schedule and the initiation of supportive care interventions are often necessary because of expected toxicities individual patient variability, prior treatment and comorbidity. Smoke cessation- I strongly encouraged him to quit. He is contemplative. Hematuria-patient had one episode of hematuria. He has a history of kidney stones. Refer to urology for further evaluation. He is a smoker. Summary of plan:  1. RTC 6 months   2. Port flush today and every 8 weeks   3. Encouraged to establish care with a PCP for other health issues. 4. Refer to [facility] for hematuria and kidney stones    I have seen, examined and reviewed this patient medication list, appropriate labs and imaging studies. I reviewed relevant medical records and others physicians notes. I discussed the plans of care with the patient.  I answered all the questions to the patients satisfaction. Please note that portions of this note may have been completed with a voice recognition program. Efforts were made to edit the dictations, but occasionally words are mis- transcribed. Follow Up:     Return in about 6 months (around 3/16/2021) for CBC. Refer to Dr. Mehdi Crockett for hematuria & kidney stones  Port flush every 8 weeks  RTC with MD 6 months     IHumaira, vannesa scribing for Shireen Jansen MD. Electronically signed by Humaira Aj on 9/16/2020 at 4:57 PM.     I, Dr Candelario Alvarez, personally performed the services described in this documentation as scribed by Humaira Aj MA in my presence and is both accurate and complete. Over 50% of the total visit time of 25 minutes in face to face encounter with the patient, out of which more than 50% of the time was spent in counseling patient or family and coordination of care. Counseling included but was not limited to time spent reviewing labs, imaging studies/ treatment plan and answering questions.

## 2020-09-16 ENCOUNTER — OFFICE VISIT (OUTPATIENT)
Dept: HEMATOLOGY | Age: 70
End: 2020-09-16
Payer: MEDICARE

## 2020-09-16 ENCOUNTER — HOSPITAL ENCOUNTER (OUTPATIENT)
Dept: INFUSION THERAPY | Age: 70
Discharge: HOME | End: 2020-09-16
Payer: MEDICARE

## 2020-09-16 VITALS
WEIGHT: 161.4 LBS | BODY MASS INDEX: 20.71 KG/M2 | OXYGEN SATURATION: 99 % | HEIGHT: 74 IN | TEMPERATURE: 97.3 F | SYSTOLIC BLOOD PRESSURE: 132 MMHG | HEART RATE: 65 BPM | DIASTOLIC BLOOD PRESSURE: 78 MMHG

## 2020-09-16 DIAGNOSIS — C81.91 HODGKIN LYMPHOMA OF LYMPH NODES OF NECK, UNSPECIFIED HODGKIN LYMPHOMA TYPE (HCC): Primary | ICD-10-CM

## 2020-09-16 DIAGNOSIS — Z45.2 ENCOUNTER FOR CENTRAL LINE CARE: ICD-10-CM

## 2020-09-16 LAB
BASOPHILS ABSOLUTE: 0.04 K/UL (ref 0.01–0.08)
BASOPHILS RELATIVE PERCENT: 0.5 % (ref 0.1–1.2)
EOSINOPHILS ABSOLUTE: 0.36 K/UL (ref 0.04–0.54)
EOSINOPHILS RELATIVE PERCENT: 4.6 % (ref 0.7–7)
HCT VFR BLD CALC: 45.4 % (ref 40.1–51)
HEMOGLOBIN: 15.4 G/DL (ref 13.7–17.5)
LYMPHOCYTES ABSOLUTE: 1.52 K/UL (ref 1.18–3.74)
LYMPHOCYTES RELATIVE PERCENT: 19.2 % (ref 19.3–53.1)
MCH RBC QN AUTO: 33.6 PG (ref 25.7–32.2)
MCHC RBC AUTO-ENTMCNC: 33.9 G/DL (ref 32.3–36.5)
MCV RBC AUTO: 99.1 FL (ref 79–92.2)
MONOCYTES ABSOLUTE: 1.14 K/UL (ref 0.24–0.82)
MONOCYTES RELATIVE PERCENT: 14.4 % (ref 4.7–12.5)
NEUTROPHILS ABSOLUTE: 4.85 K/UL (ref 1.56–6.13)
NEUTROPHILS RELATIVE PERCENT: 61.3 % (ref 34–71.1)
PDW BLD-RTO: 13.1 % (ref 11.6–14.4)
PLATELET # BLD: 215 K/UL (ref 163–337)
PMV BLD AUTO: 8.7 FL (ref 7.4–10.4)
RBC # BLD: 4.58 M/UL (ref 4.63–6.08)
WBC # BLD: 7.91 K/UL (ref 4.23–9.07)

## 2020-09-16 PROCEDURE — 1123F ACP DISCUSS/DSCN MKR DOCD: CPT | Performed by: INTERNAL MEDICINE

## 2020-09-16 PROCEDURE — 85025 COMPLETE CBC W/AUTO DIFF WBC: CPT

## 2020-09-16 PROCEDURE — 99214 OFFICE O/P EST MOD 30 MIN: CPT | Performed by: INTERNAL MEDICINE

## 2020-09-16 PROCEDURE — 4004F PT TOBACCO SCREEN RCVD TLK: CPT | Performed by: INTERNAL MEDICINE

## 2020-09-16 PROCEDURE — 4040F PNEUMOC VAC/ADMIN/RCVD: CPT | Performed by: INTERNAL MEDICINE

## 2020-09-16 PROCEDURE — 2580000003 HC RX 258: Performed by: INTERNAL MEDICINE

## 2020-09-16 PROCEDURE — 3017F COLORECTAL CA SCREEN DOC REV: CPT | Performed by: INTERNAL MEDICINE

## 2020-09-16 PROCEDURE — 6360000002 HC RX W HCPCS: Performed by: INTERNAL MEDICINE

## 2020-09-16 PROCEDURE — G8420 CALC BMI NORM PARAMETERS: HCPCS | Performed by: INTERNAL MEDICINE

## 2020-09-16 PROCEDURE — G8427 DOCREV CUR MEDS BY ELIG CLIN: HCPCS | Performed by: INTERNAL MEDICINE

## 2020-09-16 PROCEDURE — 96523 IRRIG DRUG DELIVERY DEVICE: CPT

## 2020-09-16 RX ORDER — SODIUM CHLORIDE 0.9 % (FLUSH) 0.9 %
20 SYRINGE (ML) INJECTION PRN
Status: CANCELLED | OUTPATIENT
Start: 2020-09-16

## 2020-09-16 RX ORDER — SODIUM CHLORIDE 0.9 % (FLUSH) 0.9 %
20 SYRINGE (ML) INJECTION PRN
Status: DISCONTINUED | OUTPATIENT
Start: 2020-09-16 | End: 2020-09-17 | Stop reason: HOSPADM

## 2020-09-16 RX ORDER — DOCUSATE SODIUM 100 MG/1
CAPSULE, LIQUID FILLED ORAL
COMMUNITY
Start: 2020-08-25 | End: 2020-09-16

## 2020-09-16 RX ORDER — BACLOFEN 10 MG/1
TABLET ORAL
COMMUNITY
Start: 2020-07-08 | End: 2020-09-16 | Stop reason: ALTCHOICE

## 2020-09-16 RX ORDER — KETOROLAC TROMETHAMINE 10 MG/1
TABLET, FILM COATED ORAL
COMMUNITY
Start: 2020-07-08 | End: 2020-09-16

## 2020-09-16 RX ORDER — AMLODIPINE BESYLATE 5 MG/1
TABLET ORAL
COMMUNITY
Start: 2020-08-08 | End: 2020-09-16 | Stop reason: ALTCHOICE

## 2020-09-16 RX ORDER — SODIUM CHLORIDE 0.9 % (FLUSH) 0.9 %
10 SYRINGE (ML) INJECTION PRN
Status: CANCELLED | OUTPATIENT
Start: 2020-09-16

## 2020-09-16 RX ORDER — HEPARIN SODIUM (PORCINE) LOCK FLUSH IV SOLN 100 UNIT/ML 100 UNIT/ML
500 SOLUTION INTRAVENOUS PRN
Status: DISCONTINUED | OUTPATIENT
Start: 2020-09-16 | End: 2020-09-17 | Stop reason: HOSPADM

## 2020-09-16 RX ORDER — BUDESONIDE AND FORMOTEROL FUMARATE DIHYDRATE 160; 4.5 UG/1; UG/1
2 AEROSOL RESPIRATORY (INHALATION) 2 TIMES DAILY
COMMUNITY

## 2020-09-16 RX ORDER — HEPARIN SODIUM (PORCINE) LOCK FLUSH IV SOLN 100 UNIT/ML 100 UNIT/ML
500 SOLUTION INTRAVENOUS PRN
Status: CANCELLED | OUTPATIENT
Start: 2020-09-16

## 2020-09-16 RX ADMIN — SODIUM CHLORIDE, PRESERVATIVE FREE 10 ML: 5 INJECTION INTRAVENOUS at 10:58

## 2020-09-16 RX ADMIN — HEPARIN 500 UNITS: 100 SYRINGE at 10:58

## 2020-09-29 ENCOUNTER — OFFICE VISIT (OUTPATIENT)
Dept: UROLOGY | Age: 70
End: 2020-09-29
Payer: MEDICARE

## 2020-09-29 VITALS — HEIGHT: 74 IN | BODY MASS INDEX: 21.05 KG/M2 | WEIGHT: 164 LBS | TEMPERATURE: 97.7 F

## 2020-09-29 LAB
APPEARANCE FLUID: CLEAR
BILIRUBIN, POC: NORMAL
BLOOD URINE, POC: NORMAL
CLARITY, POC: CLEAR
COLOR, POC: YELLOW
GLUCOSE URINE, POC: NORMAL
KETONES, POC: NORMAL
LEUKOCYTE EST, POC: NORMAL
NITRITE, POC: NORMAL
PH, POC: 7
PROTEIN, POC: NORMAL
SPECIFIC GRAVITY, POC: 1.02
UROBILINOGEN, POC: 0.2

## 2020-09-29 PROCEDURE — 3017F COLORECTAL CA SCREEN DOC REV: CPT | Performed by: PHYSICIAN ASSISTANT

## 2020-09-29 PROCEDURE — G8420 CALC BMI NORM PARAMETERS: HCPCS | Performed by: PHYSICIAN ASSISTANT

## 2020-09-29 PROCEDURE — 1123F ACP DISCUSS/DSCN MKR DOCD: CPT | Performed by: PHYSICIAN ASSISTANT

## 2020-09-29 PROCEDURE — 4040F PNEUMOC VAC/ADMIN/RCVD: CPT | Performed by: PHYSICIAN ASSISTANT

## 2020-09-29 PROCEDURE — 99202 OFFICE O/P NEW SF 15 MIN: CPT | Performed by: PHYSICIAN ASSISTANT

## 2020-09-29 PROCEDURE — 81002 URINALYSIS NONAUTO W/O SCOPE: CPT | Performed by: PHYSICIAN ASSISTANT

## 2020-09-29 PROCEDURE — 4004F PT TOBACCO SCREEN RCVD TLK: CPT | Performed by: PHYSICIAN ASSISTANT

## 2020-09-29 PROCEDURE — G8427 DOCREV CUR MEDS BY ELIG CLIN: HCPCS | Performed by: PHYSICIAN ASSISTANT

## 2020-09-29 RX ORDER — AMLODIPINE BESYLATE 10 MG/1
TABLET ORAL
COMMUNITY
Start: 2020-09-23

## 2020-09-29 ASSESSMENT — ENCOUNTER SYMPTOMS
COUGH: 0
EYE DISCHARGE: 0
BACK PAIN: 0
DIARRHEA: 0
ABDOMINAL PAIN: 0
EYE REDNESS: 0
CONSTIPATION: 0
WHEEZING: 0
SHORTNESS OF BREATH: 0

## 2020-09-29 NOTE — PROGRESS NOTES
Ana Luisa Hoff is a 81 y.o. male who presents today   Chief Complaint   Patient presents with    New Patient     I am here through referral for microhematuria and kidney stones. I do have a history of stones        Kidney stones:  Patient referred to us by Dr. Estephania Fink. Patient has history of Hodgkin's lymphoma and in July 2020 in the course of his follow-ups he had a CT of the abdomen and pelvis done at [facility] which described bilateral kidney stones. Largest stone measured at 9 mm in the right kidney. Also stones in the left kidney seen. Patient states he had no stone episodes but did pass some stone several years ago. He had some microscopic hematuria although his urine is clear today he states he is never actually seen gross hematuria. He is never had any urologic intervention for stones in the past.    Past Medical History:   Diagnosis Date    Arthritis     Cancer ([facility] [address]     hodgkins lymphoma    Colon polyps     COPD (chronic obstructive pulmonary disease) ([facility] [address]     Emphysema (subcutaneous) (surgical) resulting from a procedure     Hx of hepatitis     Liver disease        Past Surgical History:   Procedure Laterality Date    CHOLECYSTECTOMY      COLONOSCOPY  09/18/2018    Lane Wright MD     HERNIA REPAIR      x3    CT INSJ PRPH CTR VAD W/SUBQ PORT AGE 5 YR/> N/A 5/10/2018    PORT INSERTION AND PORT ACCESSED performed by Wily Ye MD at [facility] OR       Current Outpatient Medications   Medication Sig Dispense Refill    amLODIPine (NORVASC) 10 MG tablet TAKE 1 TABLET BY MOUTH EVERY DAY      budesonide-formoterol (SYMBICORT) 160-4.5 MCG/ACT AERO Inhale 2 puffs into the lungs 2 times daily       No current facility-administered medications for this visit.         Allergies   Allergen Reactions    Codeine Nausea Only       Social History     Socioeconomic History    Marital status:      Spouse name: None    Number of children: None    Years of education: None    Highest education level: None   Occupational History    None   Social Needs    Financial resource strain: None    Food insecurity     Worry: None     Inability: None    Transportation needs     Medical: None     Non-medical: None   Tobacco Use    Smoking status: Current Every Day Smoker     Packs/day: 1.00     Types: Cigarettes    Smokeless tobacco: Never Used   Substance and Sexual Activity    Alcohol use: No    Drug use: No    Sexual activity: None   Lifestyle    Physical activity     Days per week: None     Minutes per session: None    Stress: None   Relationships    Social connections     Talks on phone: None     Gets together: None     Attends Worship service: None     Active member of club or organization: None     Attends meetings of clubs or organizations: None     Relationship status: None    Intimate partner violence     Fear of current or ex partner: None     Emotionally abused: None     Physically abused: None     Forced sexual activity: None   Other Topics Concern    None   Social History Narrative    None       Family History   Problem Relation Age of Onset    Cancer Mother     Breast Cancer Mother     Cancer Son     Diabetes Son        REVIEW OF SYSTEMS:  Review of Systems   Constitutional: Negative for chills and fever. HENT: Negative for congestion and hearing loss. Eyes: Negative for discharge and redness. Respiratory: Negative for cough, shortness of breath and wheezing. Cardiovascular: Negative for leg swelling. Gastrointestinal: Negative for abdominal pain, constipation and diarrhea. Endocrine: Negative for cold intolerance and heat intolerance. Genitourinary: Negative for decreased urine volume, difficulty urinating, dysuria, enuresis, flank pain, frequency, genital sores, hematuria and urgency. Musculoskeletal: Negative for back pain and gait problem. Skin: Negative for rash. Allergic/Immunologic: Negative for environmental allergies.    Neurological: Negative for dizziness, weakness and headaches. Hematological: Does not bruise/bleed easily. Psychiatric/Behavioral: Negative for behavioral problems, confusion and sleep disturbance. PHYSICAL EXAM:  Temp 97.7 °F (36.5 °C) (Temporal)   Ht 6' 2\" (1.88 m)   Wt 164 lb (74.4 kg)   BMI 21.06 kg/m²   Physical Exam  Vitals signs reviewed. Constitutional:       General: He is not in acute distress. Appearance: Normal appearance. He is normal weight. He is not ill-appearing. HENT:      Head: Normocephalic and atraumatic. Right Ear: External ear normal.      Left Ear: External ear normal.      Nose: No congestion. Eyes:      Conjunctiva/sclera: Conjunctivae normal.   Neck:      Comments: I observed no obvious neck masses  Pulmonary:      Effort: Pulmonary effort is normal.   Abdominal:      General: Abdomen is flat. There is no distension. Palpations: Abdomen is soft. Tenderness: There is no abdominal tenderness. There is no right CVA tenderness or left CVA tenderness. Musculoskeletal:      Comments: Patient ambulates without difficulty   Skin:     Coloration: Skin is not pale. Neurological:      General: No focal deficit present. Mental Status: He is alert and oriented to person, place, and time.    Psychiatric:         Mood and Affect: Mood normal.         Behavior: Behavior normal.             DATA:  U/A:    Lab Results   Component Value Date    NITRITE neg 09/29/2020    COLORU yellow 09/29/2020    COLORU YELLOW 05/29/2015    PROTEINU neg 09/29/2020    PROTEINU NEGATIVE 05/29/2015    PHUR 7.0 09/29/2020    PHUR 5.5 05/29/2015    WBCUA 1 05/29/2015    RBCUA 2 05/29/2015    BACTERIA NEGATIVE 05/29/2015    CLARITYU clear 09/29/2020    SPECGRAV 1.020 09/29/2020    LEUKOCYTESUR neg 09/29/2020    LEUKOCYTESUR NEGATIVE 05/29/2015    UROBILINOGEN 1.0 05/29/2015    BILIRUBINUR neg 09/29/2020    BLOODU neg 09/29/2020    GLUCOSEU neg 09/29/2020    GLUCOSEU NEGATIVE 05/29/2015 Imaging:  Was not able to view and image however according to the CT report done July 2020 at [facility] there are bilateral kidney stones largest stone measuring 9 mm in the right kidney. 1. Bilateral kidney stones  CT July revealed no ureteral stones or hydronephrosis he does have bilateral stones the largest stone measuring 9 mm in the right kidney. I discussed that stones that are larger and if these are seen on KUB can be treated preemptively with shockwave lithotripsy however he would like to wait for now just follow-up in 3 months with a KUB. Again if the stones are well visualized on KUB could treat the larger stones with ESWL. Orders Placed This Encounter   Procedures    POCT Urinalysis no Micro     No orders of the defined types were placed in this encounter. Plan:  Follow-up 3 months with KUB prior.

## 2020-11-19 ENCOUNTER — OFFICE VISIT (OUTPATIENT)
Dept: URGENT CARE | Age: 70
End: 2020-11-19
Payer: MEDICARE

## 2020-11-19 VITALS
DIASTOLIC BLOOD PRESSURE: 74 MMHG | RESPIRATION RATE: 18 BRPM | SYSTOLIC BLOOD PRESSURE: 142 MMHG | OXYGEN SATURATION: 98 % | WEIGHT: 166 LBS | HEIGHT: 74 IN | BODY MASS INDEX: 21.3 KG/M2 | HEART RATE: 75 BPM | TEMPERATURE: 97.8 F

## 2020-11-19 PROCEDURE — 4040F PNEUMOC VAC/ADMIN/RCVD: CPT | Performed by: NURSE PRACTITIONER

## 2020-11-19 PROCEDURE — G8484 FLU IMMUNIZE NO ADMIN: HCPCS | Performed by: NURSE PRACTITIONER

## 2020-11-19 PROCEDURE — 99203 OFFICE O/P NEW LOW 30 MIN: CPT | Performed by: NURSE PRACTITIONER

## 2020-11-19 PROCEDURE — 3017F COLORECTAL CA SCREEN DOC REV: CPT | Performed by: NURSE PRACTITIONER

## 2020-11-19 PROCEDURE — 1123F ACP DISCUSS/DSCN MKR DOCD: CPT | Performed by: NURSE PRACTITIONER

## 2020-11-19 PROCEDURE — 4004F PT TOBACCO SCREEN RCVD TLK: CPT | Performed by: NURSE PRACTITIONER

## 2020-11-19 PROCEDURE — G8420 CALC BMI NORM PARAMETERS: HCPCS | Performed by: NURSE PRACTITIONER

## 2020-11-19 PROCEDURE — G8427 DOCREV CUR MEDS BY ELIG CLIN: HCPCS | Performed by: NURSE PRACTITIONER

## 2020-11-19 ASSESSMENT — ENCOUNTER SYMPTOMS
SHORTNESS OF BREATH: 0
COUGH: 0
RHINORRHEA: 0
SORE THROAT: 0

## 2020-11-19 NOTE — LETTER
[facility]  [address] [place] [address] [address] [id number]  Phone: [phone]  Fax: GOPAL Lazo        November 19, 2020     Patient: Tim Chamberlain   YOB: 1950   Date of Visit: 11/19/2020       To Whom it May Concern:    Kiana Lofton was seen in my clinic on 11/19/2020. He may return to work on 11/21/2020. If you have any questions or concerns, please don't hesitate to call.     Sincerely,         GOPAL Keita

## 2020-11-19 NOTE — PATIENT INSTRUCTIONS
Patient Education        Upper Respiratory Infection (Cold): Care Instructions  Your Care Instructions     An upper respiratory infection, or URI, is an infection of the nose, sinuses, or throat. URIs are spread by coughs, sneezes, and direct contact. The common cold is the most frequent kind of URI. The flu and sinus infections are other kinds of URIs. Almost all URIs are caused by viruses. Antibiotics won't cure them. But you can treat most infections with home care. This may include drinking lots of fluids and taking over-the-counter pain medicine. You will probably feel better in 4 to 10 days. The doctor has checked you carefully, but problems can develop later. If you notice any problems or new symptoms, get medical treatment right away. Follow-up care is a key part of your treatment and safety. Be sure to make and go to all appointments, and call your doctor if you are having problems. It's also a good idea to know your test results and keep a list of the medicines you take. How can you care for yourself at home? · To prevent dehydration, drink plenty of fluids, enough so that your urine is light yellow or clear like water. Choose water and other caffeine-free clear liquids until you feel better. If you have kidney, heart, or liver disease and have to limit fluids, talk with your doctor before you increase the amount of fluids you drink. · Take an over-the-counter pain medicine, such as acetaminophen (Tylenol), ibuprofen (Advil, Motrin), or naproxen (Aleve). Read and follow all instructions on the label. · Before you use cough and cold medicines, check the label. These medicines may not be safe for young children or for people with certain health problems. · Be careful when taking over-the-counter cold or flu medicines and Tylenol at the same time. Many of these medicines have acetaminophen, which is Tylenol. Read the labels to make sure that you are not taking more than the recommended dose.  Too much acetaminophen (Tylenol) can be harmful. · Get plenty of rest.  · Do not smoke or allow others to smoke around you. If you need help quitting, talk to your doctor about stop-smoking programs and medicines. These can increase your chances of quitting for good. When should you call for help? Call 911 anytime you think you may need emergency care. For example, call if:    · You have severe trouble breathing. Call your doctor now or seek immediate medical care if:    · You seem to be getting much sicker.     · You have new or worse trouble breathing.     · You have a new or higher fever.     · You have a new rash. Watch closely for changes in your health, and be sure to contact your doctor if:    · You have a new symptom, such as a sore throat, an earache, or sinus pain.     · You cough more deeply or more often, especially if you notice more mucus or a change in the color of your mucus.     · You do not get better as expected. Where can you learn more? Go to https://HeartThis.FlyCleaners. org and sign in to your Security Scorecard account. Enter L034 in the Contract Cloud box to learn more about \"Upper Respiratory Infection (Cold): Care Instructions. \"     If you do not have an account, please click on the \"Sign Up Now\" link. Current as of: February 24, 2020               Content Version: 12.6  © 2323-4709 [employer]. Care instructions adapted under license by [facility] ([facility]). If you have questions about a medical condition or this instruction, always ask your healthcare professional. [place] [address] any warranty or liability for your use of this information. 1. Rest and increase fluid intake. 2. Continue over the counter medicine per package instructions   3. Monitor for fever and treat as needed with tylenol or ibuprofen  4.  If patient is not improving or developing any new/worsening symptoms then return to clinic

## 2020-11-19 NOTE — PROGRESS NOTES
[address] [facility]  [address] [facility] [address] [address]  Dept: [phone]  Dept Fax: [phone]  Loc: [phone]    Lenita Phoenix is a 78 y.o. male who presents today for his medical conditions/complaintsas noted below. Lenita Phoenix is c/o of Head Congestion (onset few days ago, sneezing )        HPI:     HPI  John Kraft presents today with a complaint of head congestion and sneezing. Started a few days ago. No fever. No cough. No congestion. States his work is concerned and wanted him seen at a clinic. No known exposure to covid-19. Has taken yesi seltzer and has felt better. No chills no bodyaches. Past Medical History:   Diagnosis Date    Arthritis     Cancer ([facility] [address]     hodgkins lymphoma    Colon polyps     COPD (chronic obstructive pulmonary disease) ([facility] [address]     Emphysema (subcutaneous) (surgical) resulting from a procedure     Hx of hepatitis     Liver disease      Past Surgical History:   Procedure Laterality Date    CHOLECYSTECTOMY      COLONOSCOPY  09/18/2018    Hermann Wright MD     HERNIA REPAIR      x3    MI INSJ PRPH CTR VAD W/SUBQ PORT AGE 5 YR/> N/A 5/10/2018    PORT INSERTION AND PORT ACCESSED performed by Cristian Joaquin MD at [facility]       Family History   Problem Relation Age of Onset    Cancer Mother     Breast Cancer Mother     Cancer Son     Diabetes Son        Social History     Tobacco Use    Smoking status: Current Every Day Smoker     Packs/day: 1.00     Types: Cigarettes    Smokeless tobacco: Never Used   Substance Use Topics    Alcohol use: No      Current Outpatient Medications   Medication Sig Dispense Refill    amLODIPine (NORVASC) 10 MG tablet TAKE 1 TABLET BY MOUTH EVERY DAY      budesonide-formoterol (SYMBICORT) 160-4.5 MCG/ACT AERO Inhale 2 puffs into the lungs 2 times daily       No current facility-administered medications for this visit.       Allergies   Allergen Reactions    Codeine Nausea Only       Health Maintenance   Topic Date Due    AAA screen  1950    DTaP/Tdap/Td vaccine (1 - Tdap) 11/04/1971    Lipid screen  11/04/1992    Shingles Vaccine (1 of 2) 11/04/2002    Colon cancer screen colonoscopy  11/04/2002    Pneumococcal 65+ yrs at Risk Vaccine (1 of 2 - PCV13) 11/04/2017    Annual Wellness Visit (AWV)  06/23/2019    Flu vaccine (1) 09/01/2020    Hepatitis C screen  Completed    Hepatitis A vaccine  Aged Out    Hepatitis B vaccine  Aged Out    Hib vaccine  Aged Out    Meningococcal (ACWY) vaccine  Aged Out       Subjective:     Review of Systems   Constitutional: Negative for chills, fatigue and fever. HENT: Positive for congestion, postnasal drip and sneezing. Negative for rhinorrhea and sore throat. Respiratory: Negative for cough and shortness of breath. Musculoskeletal: Negative for myalgias. Neurological: Negative for headaches. All other systems reviewed and are negative.      :Objective      Physical Exam  Vitals signs and nursing note reviewed. Constitutional:       General: He is not in acute distress. Appearance: Normal appearance. He is well-developed. He is not ill-appearing or diaphoretic. HENT:      Head: Normocephalic and atraumatic. Right Ear: Tympanic membrane, ear canal and external ear normal.      Left Ear: Tympanic membrane, ear canal and external ear normal.      Nose: Nose normal.      Mouth/Throat:      Mouth: Mucous membranes are moist.      Pharynx: Oropharynx is clear. No posterior oropharyngeal erythema. Eyes:      Conjunctiva/sclera: Conjunctivae normal.      Pupils: Pupils are equal, round, and reactive to light. Cardiovascular:      Rate and Rhythm: Normal rate and regular rhythm. Heart sounds: Normal heart sounds. No murmur. Pulmonary:      Effort: Pulmonary effort is normal. No respiratory distress. Breath sounds: Normal breath sounds. No wheezing. Skin:     General: Skin is warm and dry. Findings: No rash. Neurological:      Mental Status: He is alert and oriented to person, place, and time. Psychiatric:         Mood and Affect: Mood normal.         Behavior: Behavior normal.       BP (!) 142/74   Pulse 75   Temp 97.8 °F (36.6 °C) (Temporal)   Resp 18   Ht 6' 2\" (1.88 m)   Wt 166 lb (75.3 kg)   SpO2 98%   BMI 21.31 kg/m²     :Assessment       Diagnosis Orders   1. Acute nasopharyngitis (common cold)         :Plan   Pt declines COVID-19 testing today. We did discuss that with 357% certainty that I could not say he does not have covid-19. Pt to be off work today and tomorrow. Off the weekend. Pt advised to return to clinic with fever or worsening symptoms for covid testing. Pt voiced understanding and is agreeable with plan. No orders of the defined types were placed in this encounter. No follow-ups on file. No orders of the defined types were placed in this encounter. Patient given educational materials- see patient instructions. Discussed use, benefit, and side effects of prescribedmedications. All patient questions answered. Pt voiced understanding. Patient Instructions       Patient Education        Upper Respiratory Infection (Cold): Care Instructions  Your Care Instructions     An upper respiratory infection, or URI, is an infection of the nose, sinuses, or throat. URIs are spread by coughs, sneezes, and direct contact. The common cold is the most frequent kind of URI. The flu and sinus infections are other kinds of URIs. Almost all URIs are caused by viruses. Antibiotics won't cure them. But you can treat most infections with home care. This may include drinking lots of fluids and taking over-the-counter pain medicine. You will probably feel better in 4 to 10 days. The doctor has checked you carefully, but problems can develop later. If you notice any problems or new symptoms, get medical treatment right away. Follow-up care is a key part of your treatment and safety.  Be sure to make and go to all appointments, and call your doctor if you are having problems. It's also a good idea to know your test results and keep a list of the medicines you take. How can you care for yourself at home? · To prevent dehydration, drink plenty of fluids, enough so that your urine is light yellow or clear like water. Choose water and other caffeine-free clear liquids until you feel better. If you have kidney, heart, or liver disease and have to limit fluids, talk with your doctor before you increase the amount of fluids you drink. · Take an over-the-counter pain medicine, such as acetaminophen (Tylenol), ibuprofen (Advil, Motrin), or naproxen (Aleve). Read and follow all instructions on the label. · Before you use cough and cold medicines, check the label. These medicines may not be safe for young children or for people with certain health problems. · Be careful when taking over-the-counter cold or flu medicines and Tylenol at the same time. Many of these medicines have acetaminophen, which is Tylenol. Read the labels to make sure that you are not taking more than the recommended dose. Too much acetaminophen (Tylenol) can be harmful. · Get plenty of rest.  · Do not smoke or allow others to smoke around you. If you need help quitting, talk to your doctor about stop-smoking programs and medicines. These can increase your chances of quitting for good. When should you call for help? Call 911 anytime you think you may need emergency care. For example, call if:    · You have severe trouble breathing. Call your doctor now or seek immediate medical care if:    · You seem to be getting much sicker.     · You have new or worse trouble breathing.     · You have a new or higher fever.     · You have a new rash.    Watch closely for changes in your health, and be sure to contact your doctor if:    · You have a new symptom, such as a sore throat, an earache, or sinus pain.     · You cough more deeply or more often, especially if you notice more mucus or a change in the color of your mucus.     · You do not get better as expected. Where can you learn more? Go to https://RF-iT SolutionspeChekkt.com.PathJump. org and sign in to your Fifteen Reasons account. Enter P246 in the [facility] box to learn more about \"Upper Respiratory Infection (Cold): Care Instructions. \"     If you do not have an account, please click on the \"Sign Up Now\" link. Current as of: February 24, 2020               Content Version: 12.6  © 7974-0140 [employer]. Care instructions adapted under license by [facility] ([facility]). If you have questions about a medical condition or this instruction, always ask your healthcare professional. [address] any warranty or liability for your use of this information. 1. Rest and increase fluid intake. 2. Continue over the counter medicine per package instructions   3. Monitor for fever and treat as needed with tylenol or ibuprofen  4.  If patient is not improving or developing any new/worsening symptoms then return to clinic           Electronically signed by GOPAL Doshi on 11/19/2020 at 12:27 PM

## 2020-11-28 ENCOUNTER — APPOINTMENT (OUTPATIENT)
Dept: GENERAL RADIOLOGY | Facility: HOSPITAL | Age: 70
End: 2020-11-28

## 2020-11-28 ENCOUNTER — HOSPITAL ENCOUNTER (EMERGENCY)
Facility: HOSPITAL | Age: 70
Discharge: HOME | End: 2020-11-28
Admitting: EMERGENCY MEDICINE

## 2020-11-28 VITALS
RESPIRATION RATE: 18 BRPM | HEART RATE: 88 BPM | HEIGHT: 74 IN | WEIGHT: 160 LBS | SYSTOLIC BLOOD PRESSURE: 145 MMHG | OXYGEN SATURATION: 99 % | TEMPERATURE: 98 F | BODY MASS INDEX: 20.53 KG/M2 | DIASTOLIC BLOOD PRESSURE: 80 MMHG

## 2020-11-28 DIAGNOSIS — M25.552 LEFT HIP PAIN: ICD-10-CM

## 2020-11-28 DIAGNOSIS — M25.522 LEFT ELBOW PAIN: ICD-10-CM

## 2020-11-28 DIAGNOSIS — S93.402A SPRAIN OF LEFT ANKLE, UNSPECIFIED LIGAMENT, INITIAL ENCOUNTER: ICD-10-CM
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
## 2020-11-28 PROCEDURE — 73502 X-RAY EXAM HIP UNI 2-3 VIEWS: CPT

## 2020-11-28 PROCEDURE — 73080 X-RAY EXAM OF ELBOW: CPT

## 2020-11-28 PROCEDURE — 96372 THER/PROPH/DIAG INJ SC/IM: CPT

## 2020-11-28 PROCEDURE — 99283 EMERGENCY DEPT VISIT LOW MDM: CPT

## 2020-11-28 PROCEDURE — 73590 X-RAY EXAM OF LOWER LEG: CPT

## 2020-11-28 PROCEDURE — 25010000002 KETOROLAC TROMETHAMINE PER 15 MG: Performed by: PHYSICIAN ASSISTANT

## 2020-11-28 PROCEDURE — 73610 X-RAY EXAM OF ANKLE: CPT

## 2020-11-28 RX ORDER — MELOXICAM 7.5 MG/1
7.5 TABLET ORAL DAILY
Qty: 7 TABLET | Refills: 0 | Status: SHIPPED | OUTPATIENT
Start: 2020-11-28 | End: 2021-02-28 | Stop reason: HOSPADM

## 2020-11-28 RX ORDER — ACETAMINOPHEN 500 MG
1000 TABLET ORAL ONCE
Status: COMPLETED | OUTPATIENT
Start: 2020-11-28 | End: 2020-11-28

## 2020-11-28 RX ORDER — KETOROLAC TROMETHAMINE 30 MG/ML
30 INJECTION, SOLUTION INTRAMUSCULAR; INTRAVENOUS ONCE
Status: COMPLETED | OUTPATIENT
Start: 2020-11-28 | End: 2020-11-28

## 2020-11-28 RX ADMIN — ACETAMINOPHEN 1000 MG: 500 TABLET, FILM COATED ORAL at 09:44

## 2020-11-28 RX ADMIN — KETOROLAC TROMETHAMINE 30 MG: 30 INJECTION, SOLUTION INTRAMUSCULAR at 09:44

## 2021-01-06 ENCOUNTER — HOSPITAL ENCOUNTER (OUTPATIENT)
Dept: INFUSION THERAPY | Age: 71
Discharge: HOME | End: 2021-01-06
Payer: MEDICARE

## 2021-01-06 DIAGNOSIS — Z45.2 ENCOUNTER FOR CENTRAL LINE CARE: Primary | ICD-10-CM

## 2021-01-06 PROCEDURE — 6360000002 HC RX W HCPCS: Performed by: INTERNAL MEDICINE

## 2021-01-06 PROCEDURE — 96523 IRRIG DRUG DELIVERY DEVICE: CPT

## 2021-01-06 PROCEDURE — 2580000003 HC RX 258: Performed by: INTERNAL MEDICINE

## 2021-01-06 RX ORDER — HEPARIN SODIUM (PORCINE) LOCK FLUSH IV SOLN 100 UNIT/ML 100 UNIT/ML
500 SOLUTION INTRAVENOUS PRN
Status: CANCELLED | OUTPATIENT
Start: 2021-01-06

## 2021-01-06 RX ORDER — SODIUM CHLORIDE 0.9 % (FLUSH) 0.9 %
20 SYRINGE (ML) INJECTION PRN
Status: CANCELLED | OUTPATIENT
Start: 2021-01-06

## 2021-01-06 RX ORDER — SODIUM CHLORIDE 0.9 % (FLUSH) 0.9 %
20 SYRINGE (ML) INJECTION PRN
Status: DISCONTINUED | OUTPATIENT
Start: 2021-01-06 | End: 2021-01-07 | Stop reason: HOSPADM

## 2021-01-06 RX ORDER — SODIUM CHLORIDE 0.9 % (FLUSH) 0.9 %
10 SYRINGE (ML) INJECTION PRN
Status: CANCELLED | OUTPATIENT
Start: 2021-01-06

## 2021-01-06 RX ORDER — HEPARIN SODIUM (PORCINE) LOCK FLUSH IV SOLN 100 UNIT/ML 100 UNIT/ML
500 SOLUTION INTRAVENOUS PRN
Status: DISCONTINUED | OUTPATIENT
Start: 2021-01-06 | End: 2021-01-07 | Stop reason: HOSPADM

## 2021-01-06 RX ADMIN — HEPARIN 500 UNITS: 100 SYRINGE at 14:42

## 2021-01-06 RX ADMIN — SODIUM CHLORIDE, PRESERVATIVE FREE 20 ML: 5 INJECTION INTRAVENOUS at 14:42

## 2021-02-16 DIAGNOSIS — I10 ESSENTIAL (PRIMARY) HYPERTENSION: ICD-10-CM

## 2021-02-16 RX ORDER — AMLODIPINE BESYLATE 10 MG/1
TABLET ORAL
Qty: 90 TABLET | OUTPATIENT
Start: 2021-02-16

## 2021-02-20 ENCOUNTER — NURSE TRIAGE (OUTPATIENT)
Dept: CALL CENTER | Facility: HOSPITAL | Age: 71
End: 2021-02-20

## 2021-02-20 RX ORDER — AMLODIPINE BESYLATE 10 MG/1
TABLET ORAL
Qty: 90 TABLET | OUTPATIENT
Start: 2021-02-20

## 2021-02-22 RX ORDER — AMLODIPINE BESYLATE 10 MG/1
TABLET ORAL
Qty: 90 TABLET | OUTPATIENT
Start: 2021-02-22

## 2021-02-27 ENCOUNTER — APPOINTMENT (OUTPATIENT)
Dept: NUCLEAR MEDICINE | Facility: HOSPITAL | Age: 71
End: 2021-02-27

## 2021-02-27 ENCOUNTER — HOSPITAL ENCOUNTER (OUTPATIENT)
Facility: HOSPITAL | Age: 71
Setting detail: OBSERVATION
LOS: 1 days | Discharge: HOME | End: 2021-02-28
Attending: EMERGENCY MEDICINE | Admitting: INTERNAL MEDICINE

## 2021-02-27 ENCOUNTER — APPOINTMENT (OUTPATIENT)
Dept: GENERAL RADIOLOGY | Facility: HOSPITAL | Age: 71
End: 2021-02-27

## 2021-02-27 ENCOUNTER — APPOINTMENT (OUTPATIENT)
Dept: CT IMAGING | Facility: HOSPITAL | Age: 71
End: 2021-02-27

## 2021-02-27 ENCOUNTER — APPOINTMENT (OUTPATIENT)
Dept: ULTRASOUND IMAGING | Facility: HOSPITAL | Age: 71
End: 2021-02-27

## 2021-02-27 DIAGNOSIS — R55 SYNCOPE, UNSPECIFIED SYNCOPE TYPE: Primary | ICD-10-CM

## 2021-02-27 LAB
ALBUMIN SERPL-MCNC: 4.2 G/DL (ref 3.5–5.2)
ALBUMIN/GLOB SERPL: 1.4 G/DL
ALP SERPL-CCNC: 136 U/L (ref 39–117)
ALT SERPL W P-5'-P-CCNC: 11 U/L (ref 1–41)
ANION GAP SERPL CALCULATED.3IONS-SCNC: 16 MMOL/L (ref 5–15)
AST SERPL-CCNC: 13 U/L (ref 1–40)
BACTERIA UR QL AUTO: ABNORMAL /HPF
BASOPHILS # BLD AUTO: 0.1 10*3/MM3 (ref 0–0.2)
BASOPHILS NFR BLD AUTO: 1 % (ref 0–1.5)
BILIRUB SERPL-MCNC: 0.7 MG/DL (ref 0–1.2)
BILIRUB UR QL STRIP: NEGATIVE
BUN SERPL-MCNC: 12 MG/DL (ref 8–23)
BUN/CREAT SERPL: 10.9 (ref 7–25)
CALCIUM SPEC-SCNC: 9.6 MG/DL (ref 8.6–10.5)
CHLORIDE SERPL-SCNC: 103 MMOL/L (ref 98–107)
CLARITY UR: CLEAR
CO2 SERPL-SCNC: 22 MMOL/L (ref 22–29)
COLOR UR: YELLOW
CREAT SERPL-MCNC: 1.1 MG/DL (ref 0.76–1.27)
D DIMER PPP FEU-MCNC: 0.85 MG/L (FEU) (ref 0–0.5)
DEPRECATED RDW RBC AUTO: 42.3 FL (ref 37–54)
EOSINOPHIL # BLD AUTO: 0.25 10*3/MM3 (ref 0–0.4)
EOSINOPHIL NFR BLD AUTO: 2.6 % (ref 0.3–6.2)
ERYTHROCYTE [DISTWIDTH] IN BLOOD BY AUTOMATED COUNT: 12.8 % (ref 12.3–15.4)
GFR SERPL CREATININE-BSD FRML MDRD: 67 ML/MIN/1.73
GLOBULIN UR ELPH-MCNC: 3.1 GM/DL
GLUCOSE SERPL-MCNC: 123 MG/DL (ref 65–99)
GLUCOSE UR STRIP-MCNC: NEGATIVE MG/DL
HCT VFR BLD AUTO: 45.8 % (ref 37.5–51)
HGB BLD-MCNC: 15.8 G/DL (ref 13–17.7)
HGB UR QL STRIP.AUTO: ABNORMAL
HOLD SPECIMEN: NORMAL
HOLD SPECIMEN: NORMAL
HYALINE CASTS UR QL AUTO: ABNORMAL /LPF
IMM GRANULOCYTES # BLD AUTO: 0.04 10*3/MM3 (ref 0–0.05)
IMM GRANULOCYTES NFR BLD AUTO: 0.4 % (ref 0–0.5)
INR PPP: 0.95 (ref 0.91–1.09)
KETONES UR QL STRIP: ABNORMAL
LEUKOCYTE ESTERASE UR QL STRIP.AUTO: NEGATIVE
LIPASE SERPL-CCNC: 26 U/L (ref 13–60)
LYMPHOCYTES # BLD AUTO: 1.85 10*3/MM3 (ref 0.7–3.1)
LYMPHOCYTES NFR BLD AUTO: 19.3 % (ref 19.6–45.3)
MCH RBC QN AUTO: 31.6 PG (ref 26.6–33)
MCHC RBC AUTO-ENTMCNC: 34.5 G/DL (ref 31.5–35.7)
MCV RBC AUTO: 91.6 FL (ref 79–97)
MONOCYTES # BLD AUTO: 0.86 10*3/MM3 (ref 0.1–0.9)
MONOCYTES NFR BLD AUTO: 9 % (ref 5–12)
NEUTROPHILS NFR BLD AUTO: 6.5 10*3/MM3 (ref 1.7–7)
NEUTROPHILS NFR BLD AUTO: 67.7 % (ref 42.7–76)
NITRITE UR QL STRIP: NEGATIVE
NRBC BLD AUTO-RTO: 0 /100 WBC (ref 0–0.2)
PH UR STRIP.AUTO: 7.5 [PH] (ref 5–8)
PLATELET # BLD AUTO: 271 10*3/MM3 (ref 140–450)
PMV BLD AUTO: 8.7 FL (ref 6–12)
POTASSIUM SERPL-SCNC: 4.2 MMOL/L (ref 3.5–5.2)
PROT SERPL-MCNC: 7.3 G/DL (ref 6–8.5)
PROT UR QL STRIP: NEGATIVE
PROTHROMBIN TIME: 12.3 SECONDS (ref 11.9–14.6)
RBC # BLD AUTO: 5 10*6/MM3 (ref 4.14–5.8)
RBC # UR: ABNORMAL /HPF
REF LAB TEST METHOD: ABNORMAL
SARS-COV-2 RDRP RESP QL NAA+PROBE: NORMAL
SODIUM SERPL-SCNC: 141 MMOL/L (ref 136–145)
SP GR UR STRIP: 1.02 (ref 1–1.03)
SQUAMOUS #/AREA URNS HPF: ABNORMAL /HPF
TROPONIN T SERPL-MCNC: <0.01 NG/ML (ref 0–0.03)
TROPONIN T SERPL-MCNC: <0.01 NG/ML (ref 0–0.03)
UROBILINOGEN UR QL STRIP: ABNORMAL
WBC # BLD AUTO: 9.6 10*3/MM3 (ref 3.4–10.8)
WBC UR QL AUTO: ABNORMAL /HPF
WHOLE BLOOD HOLD SPECIMEN: NORMAL
WHOLE BLOOD HOLD SPECIMEN: NORMAL

## 2021-02-27 PROCEDURE — 85379 FIBRIN DEGRADATION QUANT: CPT | Performed by: EMERGENCY MEDICINE

## 2021-02-27 PROCEDURE — 93970 EXTREMITY STUDY: CPT

## 2021-02-27 PROCEDURE — 78582 LUNG VENTILAT&PERFUS IMAGING: CPT

## 2021-02-27 PROCEDURE — G0378 HOSPITAL OBSERVATION PER HR: HCPCS

## 2021-02-27 PROCEDURE — 84484 ASSAY OF TROPONIN QUANT: CPT | Performed by: EMERGENCY MEDICINE

## 2021-02-27 PROCEDURE — 94640 AIRWAY INHALATION TREATMENT: CPT

## 2021-02-27 PROCEDURE — 96375 TX/PRO/DX INJ NEW DRUG ADDON: CPT

## 2021-02-27 PROCEDURE — 93005 ELECTROCARDIOGRAM TRACING: CPT | Performed by: EMERGENCY MEDICINE

## 2021-02-27 PROCEDURE — 25010000002 LORAZEPAM PER 2 MG: Performed by: EMERGENCY MEDICINE

## 2021-02-27 PROCEDURE — A9540 TC99M MAA: HCPCS | Performed by: EMERGENCY MEDICINE

## 2021-02-27 PROCEDURE — 80053 COMPREHEN METABOLIC PANEL: CPT | Performed by: EMERGENCY MEDICINE

## 2021-02-27 PROCEDURE — 94799 UNLISTED PULMONARY SVC/PX: CPT

## 2021-02-27 PROCEDURE — 71045 X-RAY EXAM CHEST 1 VIEW: CPT

## 2021-02-27 PROCEDURE — 25010000002 ENOXAPARIN PER 10 MG: Performed by: EMERGENCY MEDICINE

## 2021-02-27 PROCEDURE — 85025 COMPLETE CBC W/AUTO DIFF WBC: CPT | Performed by: EMERGENCY MEDICINE

## 2021-02-27 PROCEDURE — 25010000002 ONDANSETRON PER 1 MG: Performed by: EMERGENCY MEDICINE

## 2021-02-27 PROCEDURE — 93970 EXTREMITY STUDY: CPT | Performed by: SURGERY

## 2021-02-27 PROCEDURE — 25010000002 CEFTRIAXONE PER 250 MG: Performed by: EMERGENCY MEDICINE

## 2021-02-27 PROCEDURE — 85610 PROTHROMBIN TIME: CPT | Performed by: EMERGENCY MEDICINE

## 2021-02-27 PROCEDURE — 74176 CT ABD & PELVIS W/O CONTRAST: CPT

## 2021-02-27 PROCEDURE — 83690 ASSAY OF LIPASE: CPT | Performed by: EMERGENCY MEDICINE

## 2021-02-27 PROCEDURE — 93010 ELECTROCARDIOGRAM REPORT: CPT | Performed by: INTERNAL MEDICINE

## 2021-02-27 PROCEDURE — 96372 THER/PROPH/DIAG INJ SC/IM: CPT

## 2021-02-27 PROCEDURE — 96365 THER/PROPH/DIAG IV INF INIT: CPT

## 2021-02-27 PROCEDURE — 96376 TX/PRO/DX INJ SAME DRUG ADON: CPT

## 2021-02-27 PROCEDURE — 0 TECHNETIUM ALBUMIN AGGREGATED: Performed by: EMERGENCY MEDICINE

## 2021-02-27 PROCEDURE — 36415 COLL VENOUS BLD VENIPUNCTURE: CPT

## 2021-02-27 PROCEDURE — 99285 EMERGENCY DEPT VISIT HI MDM: CPT

## 2021-02-27 PROCEDURE — C9803 HOPD COVID-19 SPEC COLLECT: HCPCS

## 2021-02-27 PROCEDURE — 87635 SARS-COV-2 COVID-19 AMP PRB: CPT | Performed by: EMERGENCY MEDICINE

## 2021-02-27 PROCEDURE — 81001 URINALYSIS AUTO W/SCOPE: CPT | Performed by: EMERGENCY MEDICINE

## 2021-02-27 RX ORDER — SODIUM CHLORIDE 9 MG/ML
75 INJECTION, SOLUTION INTRAVENOUS CONTINUOUS
Status: DISCONTINUED | OUTPATIENT
Start: 2021-02-27 | End: 2021-02-28

## 2021-02-27 RX ORDER — AMLODIPINE BESYLATE 10 MG/1
10 TABLET ORAL DAILY
Status: DISCONTINUED | OUTPATIENT
Start: 2021-02-28 | End: 2021-02-28 | Stop reason: HOSPADM

## 2021-02-27 RX ORDER — SODIUM CHLORIDE 0.9 % (FLUSH) 0.9 %
10 SYRINGE (ML) INJECTION AS NEEDED
Status: DISCONTINUED | OUTPATIENT
Start: 2021-02-27 | End: 2021-02-28

## 2021-02-27 RX ORDER — HYDROMORPHONE HYDROCHLORIDE 1 MG/ML
0.25 INJECTION, SOLUTION INTRAMUSCULAR; INTRAVENOUS; SUBCUTANEOUS EVERY 4 HOURS PRN
Status: DISCONTINUED | OUTPATIENT
Start: 2021-02-27 | End: 2021-02-28

## 2021-02-27 RX ORDER — SODIUM CHLORIDE 0.9 % (FLUSH) 0.9 %
10 SYRINGE (ML) INJECTION EVERY 12 HOURS SCHEDULED
Status: DISCONTINUED | OUTPATIENT
Start: 2021-02-27 | End: 2021-02-28

## 2021-02-27 RX ORDER — GUAIFENESIN 600 MG/1
1200 TABLET, EXTENDED RELEASE ORAL EVERY 12 HOURS SCHEDULED
Status: DISCONTINUED | OUTPATIENT
Start: 2021-02-27 | End: 2021-02-28 | Stop reason: HOSPADM

## 2021-02-27 RX ORDER — ACETAMINOPHEN 325 MG/1
650 TABLET ORAL EVERY 4 HOURS PRN
Status: DISCONTINUED | OUTPATIENT
Start: 2021-02-27 | End: 2021-02-28 | Stop reason: HOSPADM

## 2021-02-27 RX ORDER — SODIUM CHLORIDE 0.9 % (FLUSH) 0.9 %
10 SYRINGE (ML) INJECTION AS NEEDED
Status: DISCONTINUED | OUTPATIENT
Start: 2021-02-27 | End: 2021-02-28 | Stop reason: HOSPADM

## 2021-02-27 RX ORDER — ONDANSETRON 2 MG/ML
4 INJECTION INTRAMUSCULAR; INTRAVENOUS ONCE
Status: COMPLETED | OUTPATIENT
Start: 2021-02-27 | End: 2021-02-27

## 2021-02-27 RX ORDER — LORAZEPAM 2 MG/ML
0.5 INJECTION INTRAMUSCULAR ONCE
Status: COMPLETED | OUTPATIENT
Start: 2021-02-27 | End: 2021-02-27

## 2021-02-27 RX ORDER — ONDANSETRON 2 MG/ML
4 INJECTION INTRAMUSCULAR; INTRAVENOUS EVERY 6 HOURS PRN
Status: DISCONTINUED | OUTPATIENT
Start: 2021-02-27 | End: 2021-02-28 | Stop reason: HOSPADM

## 2021-02-27 RX ORDER — IPRATROPIUM BROMIDE AND ALBUTEROL SULFATE 2.5; .5 MG/3ML; MG/3ML
3 SOLUTION RESPIRATORY (INHALATION)
Status: DISCONTINUED | OUTPATIENT
Start: 2021-02-27 | End: 2021-02-28

## 2021-02-27 RX ORDER — BUDESONIDE AND FORMOTEROL FUMARATE DIHYDRATE 160; 4.5 UG/1; UG/1
2 AEROSOL RESPIRATORY (INHALATION) NIGHTLY
Status: DISCONTINUED | OUTPATIENT
Start: 2021-02-27 | End: 2021-02-28 | Stop reason: HOSPADM

## 2021-02-27 RX ORDER — FAMOTIDINE 10 MG/ML
20 INJECTION, SOLUTION INTRAVENOUS EVERY 12 HOURS SCHEDULED
Status: DISCONTINUED | OUTPATIENT
Start: 2021-02-27 | End: 2021-02-28

## 2021-02-27 RX ADMIN — ENOXAPARIN SODIUM 70 MG: 80 INJECTION SUBCUTANEOUS at 20:18

## 2021-02-27 RX ADMIN — IPRATROPIUM BROMIDE AND ALBUTEROL SULFATE 3 ML: 2.5; .5 SOLUTION RESPIRATORY (INHALATION) at 23:44

## 2021-02-27 RX ADMIN — CEFTRIAXONE SODIUM 1 G: 1 INJECTION, POWDER, FOR SOLUTION INTRAMUSCULAR; INTRAVENOUS at 15:33

## 2021-02-27 RX ADMIN — SODIUM CHLORIDE, POTASSIUM CHLORIDE, SODIUM LACTATE AND CALCIUM CHLORIDE 500 ML: 600; 310; 30; 20 INJECTION, SOLUTION INTRAVENOUS at 12:17

## 2021-02-27 RX ADMIN — BUDESONIDE AND FORMOTEROL FUMARATE DIHYDRATE 2 PUFF: 160; 4.5 AEROSOL RESPIRATORY (INHALATION) at 23:45

## 2021-02-27 RX ADMIN — KIT FOR THE PREPARATION OF TECHNETIUM TC 99M ALBUMIN AGGREGATED 1 DOSE: 2.5 INJECTION, POWDER, FOR SOLUTION INTRAVENOUS at 16:25

## 2021-02-27 RX ADMIN — ONDANSETRON HYDROCHLORIDE 4 MG: 2 SOLUTION INTRAMUSCULAR; INTRAVENOUS at 12:17

## 2021-02-27 RX ADMIN — ONDANSETRON HYDROCHLORIDE 4 MG: 2 SOLUTION INTRAMUSCULAR; INTRAVENOUS at 13:23

## 2021-02-27 RX ADMIN — LORAZEPAM 0.5 MG: 2 INJECTION INTRAMUSCULAR; INTRAVENOUS at 15:33

## 2021-02-28 ENCOUNTER — APPOINTMENT (OUTPATIENT)
Dept: CARDIOLOGY | Facility: HOSPITAL | Age: 71
End: 2021-02-28

## 2021-02-28 ENCOUNTER — APPOINTMENT (OUTPATIENT)
Dept: CT IMAGING | Facility: HOSPITAL | Age: 71
End: 2021-02-28

## 2021-02-28 VITALS
HEIGHT: 74 IN | DIASTOLIC BLOOD PRESSURE: 63 MMHG | WEIGHT: 152.4 LBS | OXYGEN SATURATION: 94 % | HEART RATE: 67 BPM | SYSTOLIC BLOOD PRESSURE: 128 MMHG | RESPIRATION RATE: 16 BRPM | BODY MASS INDEX: 19.56 KG/M2 | TEMPERATURE: 98.4 F

## 2021-02-28 PROBLEM — R79.89 ELEVATED D-DIMER: Status: ACTIVE | Noted: 2021-02-28

## 2021-02-28 PROBLEM — J18.9 RIGHT LOWER LOBE PNEUMONIA: Status: ACTIVE | Noted: 2021-02-28

## 2021-02-28 LAB
ALBUMIN SERPL-MCNC: 3.8 G/DL (ref 3.5–5.2)
ALBUMIN/GLOB SERPL: 1.4 G/DL
ALP SERPL-CCNC: 127 U/L (ref 39–117)
ALT SERPL W P-5'-P-CCNC: 8 U/L (ref 1–41)
AMPHET+METHAMPHET UR QL: NEGATIVE
AMPHETAMINES UR QL: NEGATIVE
ANION GAP SERPL CALCULATED.3IONS-SCNC: 9 MMOL/L (ref 5–15)
AST SERPL-CCNC: 16 U/L (ref 1–40)
BARBITURATES UR QL SCN: NEGATIVE
BASOPHILS # BLD AUTO: 0.03 10*3/MM3 (ref 0–0.2)
BASOPHILS NFR BLD AUTO: 0.3 % (ref 0–1.5)
BENZODIAZ UR QL SCN: POSITIVE
BH CV STRESS BP STAGE 1: NORMAL
BH CV STRESS BP STAGE 2: NORMAL
BH CV STRESS BP STAGE 3: NORMAL
BH CV STRESS BP STAGE 4: NORMAL
BH CV STRESS DOSE DOBUTAMINE STAGE 1: 10
BH CV STRESS DOSE DOBUTAMINE STAGE 2: 20
BH CV STRESS DOSE DOBUTAMINE STAGE 3: 30
BH CV STRESS DOSE DOBUTAMINE STAGE 4: 40
BH CV STRESS DURATION MIN STAGE 1: 3
BH CV STRESS DURATION MIN STAGE 2: 3
BH CV STRESS DURATION MIN STAGE 3: 3
BH CV STRESS DURATION MIN STAGE 4: 1
BH CV STRESS DURATION SEC STAGE 1: 0
BH CV STRESS DURATION SEC STAGE 2: 0
BH CV STRESS DURATION SEC STAGE 3: 0
BH CV STRESS DURATION SEC STAGE 4: 44
BH CV STRESS HR STAGE 1: 68
BH CV STRESS HR STAGE 2: 102
BH CV STRESS HR STAGE 3: 116
BH CV STRESS HR STAGE 4: 131
BH CV STRESS PROTOCOL 1: NORMAL
BH CV STRESS RECOVERY BP: NORMAL MMHG
BH CV STRESS RECOVERY HR: 96 BPM
BH CV STRESS STAGE 1: 1
BH CV STRESS STAGE 2: 2
BH CV STRESS STAGE 3: 3
BH CV STRESS STAGE 4: 4
BILIRUB SERPL-MCNC: 0.6 MG/DL (ref 0–1.2)
BUN SERPL-MCNC: 20 MG/DL (ref 8–23)
BUN/CREAT SERPL: 21.1 (ref 7–25)
BUPRENORPHINE SERPL-MCNC: NEGATIVE NG/ML
CALCIUM SPEC-SCNC: 8.5 MG/DL (ref 8.6–10.5)
CANNABINOIDS SERPL QL: NEGATIVE
CHLORIDE SERPL-SCNC: 105 MMOL/L (ref 98–107)
CO2 SERPL-SCNC: 27 MMOL/L (ref 22–29)
COCAINE UR QL: NEGATIVE
CREAT SERPL-MCNC: 0.95 MG/DL (ref 0.76–1.27)
DEPRECATED RDW RBC AUTO: 39 FL (ref 37–54)
EOSINOPHIL # BLD AUTO: 0.01 10*3/MM3 (ref 0–0.4)
EOSINOPHIL NFR BLD AUTO: 0.1 % (ref 0.3–6.2)
ERYTHROCYTE [DISTWIDTH] IN BLOOD BY AUTOMATED COUNT: 12.6 % (ref 12.3–15.4)
GFR SERPL CREATININE-BSD FRML MDRD: 79 ML/MIN/1.73
GLOBULIN UR ELPH-MCNC: 2.7 GM/DL
GLUCOSE SERPL-MCNC: 106 MG/DL (ref 65–99)
HCT VFR BLD AUTO: 39.9 % (ref 37.5–51)
HGB BLD-MCNC: 14.8 G/DL (ref 13–17.7)
IMM GRANULOCYTES # BLD AUTO: 0.06 10*3/MM3 (ref 0–0.05)
IMM GRANULOCYTES NFR BLD AUTO: 0.6 % (ref 0–0.5)
L PNEUMO1 AG UR QL IA: NEGATIVE
LYMPHOCYTES # BLD AUTO: 1.39 10*3/MM3 (ref 0.7–3.1)
LYMPHOCYTES NFR BLD AUTO: 13.4 % (ref 19.6–45.3)
MAGNESIUM SERPL-MCNC: 2.2 MG/DL (ref 1.6–2.4)
MAXIMAL PREDICTED HEART RATE: 152 BPM
MCH RBC QN AUTO: 31.5 PG (ref 26.6–33)
MCHC RBC AUTO-ENTMCNC: 37.1 G/DL (ref 31.5–35.7)
MCV RBC AUTO: 84.9 FL (ref 79–97)
METHADONE UR QL SCN: NEGATIVE
MONOCYTES # BLD AUTO: 0.78 10*3/MM3 (ref 0.1–0.9)
MONOCYTES NFR BLD AUTO: 7.5 % (ref 5–12)
NEUTROPHILS NFR BLD AUTO: 78.1 % (ref 42.7–76)
NEUTROPHILS NFR BLD AUTO: 8.13 10*3/MM3 (ref 1.7–7)
NRBC BLD AUTO-RTO: 0 /100 WBC (ref 0–0.2)
OPIATES UR QL: NEGATIVE
OXYCODONE UR QL SCN: NEGATIVE
PCP UR QL SCN: NEGATIVE
PERCENT MAX PREDICTED HR: 86.18 %
PHOSPHATE SERPL-MCNC: 3.5 MG/DL (ref 2.5–4.5)
PLATELET # BLD AUTO: 207 10*3/MM3 (ref 140–450)
PMV BLD AUTO: 8.5 FL (ref 6–12)
POTASSIUM SERPL-SCNC: 3.8 MMOL/L (ref 3.5–5.2)
PROCALCITONIN SERPL-MCNC: 0.18 NG/ML (ref 0–0.25)
PROPOXYPH UR QL: NEGATIVE
PROT SERPL-MCNC: 6.5 G/DL (ref 6–8.5)
RBC # BLD AUTO: 4.7 10*6/MM3 (ref 4.14–5.8)
S PNEUM AG SPEC QL LA: NEGATIVE
SODIUM SERPL-SCNC: 141 MMOL/L (ref 136–145)
STRESS BASELINE BP: NORMAL MMHG
STRESS BASELINE HR: 72 BPM
STRESS PERCENT HR: 101 %
STRESS POST PEAK BP: NORMAL MMHG
STRESS POST PEAK HR: 131 BPM
STRESS TARGET HR: 129 BPM
TRICYCLICS UR QL SCN: NEGATIVE
TROPONIN T SERPL-MCNC: <0.01 NG/ML (ref 0–0.03)
WBC # BLD AUTO: 10.4 10*3/MM3 (ref 3.4–10.8)

## 2021-02-28 PROCEDURE — 96372 THER/PROPH/DIAG INJ SC/IM: CPT

## 2021-02-28 PROCEDURE — 80053 COMPREHEN METABOLIC PANEL: CPT | Performed by: INTERNAL MEDICINE

## 2021-02-28 PROCEDURE — 84145 PROCALCITONIN (PCT): CPT | Performed by: INTERNAL MEDICINE

## 2021-02-28 PROCEDURE — 25010000002 AZITHROMYCIN PER 500 MG: Performed by: INTERNAL MEDICINE

## 2021-02-28 PROCEDURE — 96375 TX/PRO/DX INJ NEW DRUG ADDON: CPT

## 2021-02-28 PROCEDURE — 93352 ADMIN ECG CONTRAST AGENT: CPT | Performed by: INTERNAL MEDICINE

## 2021-02-28 PROCEDURE — 25010000002 ONDANSETRON PER 1 MG: Performed by: INTERNAL MEDICINE

## 2021-02-28 PROCEDURE — 83735 ASSAY OF MAGNESIUM: CPT | Performed by: INTERNAL MEDICINE

## 2021-02-28 PROCEDURE — 25010000002 ENOXAPARIN PER 10 MG: Performed by: INTERNAL MEDICINE

## 2021-02-28 PROCEDURE — 25010000002 DIPHENHYDRAMINE PER 50 MG: Performed by: INTERNAL MEDICINE

## 2021-02-28 PROCEDURE — 25010000002 CEFTRIAXONE PER 250 MG: Performed by: INTERNAL MEDICINE

## 2021-02-28 PROCEDURE — 80306 DRUG TEST PRSMV INSTRMNT: CPT | Performed by: INTERNAL MEDICINE

## 2021-02-28 PROCEDURE — 93350 STRESS TTE ONLY: CPT

## 2021-02-28 PROCEDURE — 71275 CT ANGIOGRAPHY CHEST: CPT

## 2021-02-28 PROCEDURE — 87899 AGENT NOS ASSAY W/OPTIC: CPT | Performed by: INTERNAL MEDICINE

## 2021-02-28 PROCEDURE — 93325 DOPPLER ECHO COLOR FLOW MAPG: CPT

## 2021-02-28 PROCEDURE — 0 IOPAMIDOL PER 1 ML: Performed by: INTERNAL MEDICINE

## 2021-02-28 PROCEDURE — 87205 SMEAR GRAM STAIN: CPT | Performed by: INTERNAL MEDICINE

## 2021-02-28 PROCEDURE — 87070 CULTURE OTHR SPECIMN AEROBIC: CPT | Performed by: INTERNAL MEDICINE

## 2021-02-28 PROCEDURE — 25010000002 PERFLUTREN 6.52 MG/ML SUSPENSION: Performed by: INTERNAL MEDICINE

## 2021-02-28 PROCEDURE — 84100 ASSAY OF PHOSPHORUS: CPT | Performed by: INTERNAL MEDICINE

## 2021-02-28 PROCEDURE — 93018 CV STRESS TEST I&R ONLY: CPT | Performed by: INTERNAL MEDICINE

## 2021-02-28 PROCEDURE — G0378 HOSPITAL OBSERVATION PER HR: HCPCS

## 2021-02-28 PROCEDURE — 93320 DOPPLER ECHO COMPLETE: CPT

## 2021-02-28 PROCEDURE — 25010000002 METHYLPREDNISOLONE PER 40 MG: Performed by: INTERNAL MEDICINE

## 2021-02-28 PROCEDURE — 93350 STRESS TTE ONLY: CPT | Performed by: INTERNAL MEDICINE

## 2021-02-28 PROCEDURE — 25010000003 DOBUTAMINE PER 250 MG: Performed by: INTERNAL MEDICINE

## 2021-02-28 PROCEDURE — 85025 COMPLETE CBC W/AUTO DIFF WBC: CPT | Performed by: INTERNAL MEDICINE

## 2021-02-28 PROCEDURE — 93017 CV STRESS TEST TRACING ONLY: CPT

## 2021-02-28 PROCEDURE — 84484 ASSAY OF TROPONIN QUANT: CPT | Performed by: INTERNAL MEDICINE

## 2021-02-28 PROCEDURE — 96376 TX/PRO/DX INJ SAME DRUG ADON: CPT

## 2021-02-28 PROCEDURE — 94799 UNLISTED PULMONARY SVC/PX: CPT

## 2021-02-28 RX ORDER — LEVOFLOXACIN 750 MG/1
750 TABLET ORAL DAILY
Qty: 5 TABLET | Refills: 0 | Status: SHIPPED | OUTPATIENT
Start: 2021-02-28 | End: 2021-10-26

## 2021-02-28 RX ORDER — IPRATROPIUM BROMIDE AND ALBUTEROL SULFATE 2.5; .5 MG/3ML; MG/3ML
3 SOLUTION RESPIRATORY (INHALATION)
Status: DISCONTINUED | OUTPATIENT
Start: 2021-02-28 | End: 2021-02-28 | Stop reason: HOSPADM

## 2021-02-28 RX ORDER — AMOXICILLIN 250 MG
1 CAPSULE ORAL 2 TIMES DAILY
Status: DISCONTINUED | OUTPATIENT
Start: 2021-02-28 | End: 2021-02-28 | Stop reason: HOSPADM

## 2021-02-28 RX ORDER — FAMOTIDINE 20 MG/1
20 TABLET, FILM COATED ORAL
Status: DISCONTINUED | OUTPATIENT
Start: 2021-02-28 | End: 2021-02-28 | Stop reason: HOSPADM

## 2021-02-28 RX ORDER — HYDROCODONE BITARTRATE AND ACETAMINOPHEN 5; 325 MG/1; MG/1
1 TABLET ORAL EVERY 6 HOURS PRN
Status: DISCONTINUED | OUTPATIENT
Start: 2021-02-28 | End: 2021-02-28 | Stop reason: HOSPADM

## 2021-02-28 RX ORDER — ALBUTEROL SULFATE 90 UG/1
2 AEROSOL, METERED RESPIRATORY (INHALATION) EVERY 4 HOURS PRN
Qty: 6.7 G | Refills: 0 | Status: SHIPPED | OUTPATIENT
Start: 2021-02-28 | End: 2022-07-25

## 2021-02-28 RX ORDER — DIPHENHYDRAMINE HYDROCHLORIDE 50 MG/ML
50 INJECTION INTRAMUSCULAR; INTRAVENOUS ONCE
Status: COMPLETED | OUTPATIENT
Start: 2021-02-28 | End: 2021-02-28

## 2021-02-28 RX ORDER — METHYLPREDNISOLONE SODIUM SUCCINATE 40 MG/ML
40 INJECTION, POWDER, LYOPHILIZED, FOR SOLUTION INTRAMUSCULAR; INTRAVENOUS EVERY 4 HOURS
Status: COMPLETED | OUTPATIENT
Start: 2021-02-28 | End: 2021-02-28

## 2021-02-28 RX ORDER — DOBUTAMINE HYDROCHLORIDE 100 MG/100ML
10-50 INJECTION INTRAVENOUS CONTINUOUS
Status: DISCONTINUED | OUTPATIENT
Start: 2021-02-28 | End: 2021-02-28

## 2021-02-28 RX ORDER — POLYETHYLENE GLYCOL 3350 17 G/17G
17 POWDER, FOR SOLUTION ORAL DAILY
Status: DISCONTINUED | OUTPATIENT
Start: 2021-02-28 | End: 2021-02-28 | Stop reason: HOSPADM

## 2021-02-28 RX ADMIN — DIPHENHYDRAMINE HYDROCHLORIDE 50 MG: 50 INJECTION, SOLUTION INTRAMUSCULAR; INTRAVENOUS at 09:03

## 2021-02-28 RX ADMIN — GUAIFENESIN 1200 MG: 600 TABLET, EXTENDED RELEASE ORAL at 09:03

## 2021-02-28 RX ADMIN — AZITHROMYCIN DIHYDRATE 500 MG: 500 INJECTION, POWDER, LYOPHILIZED, FOR SOLUTION INTRAVENOUS at 00:29

## 2021-02-28 RX ADMIN — ENOXAPARIN SODIUM 70 MG: 100 INJECTION SUBCUTANEOUS at 09:04

## 2021-02-28 RX ADMIN — SODIUM CHLORIDE 75 ML/HR: 9 INJECTION, SOLUTION INTRAVENOUS at 00:29

## 2021-02-28 RX ADMIN — METHYLPREDNISOLONE SODIUM SUCCINATE 40 MG: 40 INJECTION, POWDER, FOR SOLUTION INTRAMUSCULAR; INTRAVENOUS at 15:56

## 2021-02-28 RX ADMIN — METHYLPREDNISOLONE SODIUM SUCCINATE 40 MG: 40 INJECTION, POWDER, FOR SOLUTION INTRAMUSCULAR; INTRAVENOUS at 12:38

## 2021-02-28 RX ADMIN — METHYLPREDNISOLONE SODIUM SUCCINATE 40 MG: 40 INJECTION, POWDER, FOR SOLUTION INTRAMUSCULAR; INTRAVENOUS at 09:04

## 2021-02-28 RX ADMIN — GUAIFENESIN 1200 MG: 600 TABLET, EXTENDED RELEASE ORAL at 00:29

## 2021-02-28 RX ADMIN — Medication 10 MCG/KG/MIN: at 13:21

## 2021-02-28 RX ADMIN — SODIUM CHLORIDE, PRESERVATIVE FREE 10 ML: 5 INJECTION INTRAVENOUS at 00:30

## 2021-02-28 RX ADMIN — ONDANSETRON HYDROCHLORIDE 4 MG: 2 SOLUTION INTRAMUSCULAR; INTRAVENOUS at 00:48

## 2021-02-28 RX ADMIN — IOPAMIDOL 100 ML: 755 INJECTION, SOLUTION INTRAVENOUS at 11:30

## 2021-02-28 RX ADMIN — AMLODIPINE BESYLATE 10 MG: 10 TABLET ORAL at 10:06

## 2021-02-28 RX ADMIN — PERFLUTREN 8.48 MG: 6.52 INJECTION, SUSPENSION INTRAVENOUS at 13:22

## 2021-02-28 RX ADMIN — CEFTRIAXONE SODIUM 1 G: 1 INJECTION, POWDER, FOR SOLUTION INTRAMUSCULAR; INTRAVENOUS at 15:56

## 2021-02-28 RX ADMIN — FAMOTIDINE 20 MG: 10 INJECTION INTRAVENOUS at 09:03

## 2021-02-28 RX ADMIN — FAMOTIDINE 20 MG: 10 INJECTION INTRAVENOUS at 00:30

## 2021-03-01 LAB
QT INTERVAL: 402 MS
QT INTERVAL: 440 MS
QTC INTERVAL: 435 MS
QTC INTERVAL: 475 MS

## 2021-03-02 LAB
BACTERIA SPEC RESP CULT: NORMAL
GRAM STN SPEC: NORMAL

## 2021-03-17 ENCOUNTER — HOSPITAL ENCOUNTER (OUTPATIENT)
Dept: INFUSION THERAPY | Age: 71
Discharge: HOME | End: 2021-03-17
Payer: MEDICARE

## 2021-03-17 ENCOUNTER — HOSPITAL ENCOUNTER (OUTPATIENT)
Dept: GENERAL RADIOLOGY | Age: 71
Discharge: HOME | End: 2021-03-17
Payer: MEDICARE

## 2021-03-17 ENCOUNTER — OFFICE VISIT (OUTPATIENT)
Dept: HEMATOLOGY | Age: 71
End: 2021-03-17
Payer: MEDICARE

## 2021-03-17 VITALS
DIASTOLIC BLOOD PRESSURE: 76 MMHG | BODY MASS INDEX: 20.83 KG/M2 | WEIGHT: 162.3 LBS | OXYGEN SATURATION: 97 % | HEIGHT: 74 IN | TEMPERATURE: 97.5 F | SYSTOLIC BLOOD PRESSURE: 140 MMHG | HEART RATE: 69 BPM

## 2021-03-17 DIAGNOSIS — Z45.2 ENCOUNTER FOR CENTRAL LINE CARE: ICD-10-CM

## 2021-03-17 DIAGNOSIS — Z71.6 TOBACCO ABUSE COUNSELING: ICD-10-CM

## 2021-03-17 DIAGNOSIS — Z71.89 CARE PLAN DISCUSSED WITH PATIENT: ICD-10-CM

## 2021-03-17 DIAGNOSIS — C81.90 HODGKIN LYMPHOMA, UNSPECIFIED HODGKIN LYMPHOMA TYPE, UNSPECIFIED BODY REGION (HCC): Primary | ICD-10-CM

## 2021-03-17 DIAGNOSIS — J44.9 CHRONIC OBSTRUCTIVE PULMONARY DISEASE, UNSPECIFIED COPD TYPE (HCC): ICD-10-CM

## 2021-03-17 DIAGNOSIS — Z13.29 THYROID DISORDER SCREEN: ICD-10-CM

## 2021-03-17 DIAGNOSIS — R55 SYNCOPE, UNSPECIFIED SYNCOPE TYPE: ICD-10-CM

## 2021-03-17 DIAGNOSIS — N20.0 KIDNEY STONES: ICD-10-CM

## 2021-03-17 LAB
BASOPHILS ABSOLUTE: 0.04 K/UL (ref 0.01–0.08)
BASOPHILS RELATIVE PERCENT: 0.6 % (ref 0.1–1.2)
EOSINOPHILS ABSOLUTE: 0.3 K/UL (ref 0.04–0.54)
EOSINOPHILS RELATIVE PERCENT: 4.5 % (ref 0.7–7)
HCT VFR BLD CALC: 42.8 % (ref 40.1–51)
HEMOGLOBIN: 14.6 G/DL (ref 13.7–17.5)
LYMPHOCYTES ABSOLUTE: 1.66 K/UL (ref 1.18–3.74)
LYMPHOCYTES RELATIVE PERCENT: 24.7 % (ref 19.3–53.1)
MCH RBC QN AUTO: 33.3 PG (ref 25.7–32.2)
MCHC RBC AUTO-ENTMCNC: 34.1 G/DL (ref 32.3–36.5)
MCV RBC AUTO: 97.7 FL (ref 79–92.2)
MONOCYTES ABSOLUTE: 0.84 K/UL (ref 0.24–0.82)
MONOCYTES RELATIVE PERCENT: 12.5 % (ref 4.7–12.5)
NEUTROPHILS ABSOLUTE: 3.88 K/UL (ref 1.56–6.13)
NEUTROPHILS RELATIVE PERCENT: 57.7 % (ref 34–71.1)
PDW BLD-RTO: 13.2 % (ref 11.6–14.4)
PLATELET # BLD: 217 K/UL (ref 163–337)
PMV BLD AUTO: 9.3 FL (ref 7.4–10.4)
RBC # BLD: 4.38 M/UL (ref 4.63–6.08)
WBC # BLD: 6.72 K/UL (ref 4.23–9.07)

## 2021-03-17 PROCEDURE — 4040F PNEUMOC VAC/ADMIN/RCVD: CPT | Performed by: INTERNAL MEDICINE

## 2021-03-17 PROCEDURE — G8427 DOCREV CUR MEDS BY ELIG CLIN: HCPCS | Performed by: INTERNAL MEDICINE

## 2021-03-17 PROCEDURE — G8420 CALC BMI NORM PARAMETERS: HCPCS | Performed by: INTERNAL MEDICINE

## 2021-03-17 PROCEDURE — 3017F COLORECTAL CA SCREEN DOC REV: CPT | Performed by: INTERNAL MEDICINE

## 2021-03-17 PROCEDURE — G8484 FLU IMMUNIZE NO ADMIN: HCPCS | Performed by: INTERNAL MEDICINE

## 2021-03-17 PROCEDURE — 99213 OFFICE O/P EST LOW 20 MIN: CPT | Performed by: INTERNAL MEDICINE

## 2021-03-17 PROCEDURE — 71046 X-RAY EXAM CHEST 2 VIEWS: CPT

## 2021-03-17 PROCEDURE — 2580000003 HC RX 258: Performed by: INTERNAL MEDICINE

## 2021-03-17 PROCEDURE — 96523 IRRIG DRUG DELIVERY DEVICE: CPT

## 2021-03-17 PROCEDURE — 85025 COMPLETE CBC W/AUTO DIFF WBC: CPT

## 2021-03-17 PROCEDURE — 1123F ACP DISCUSS/DSCN MKR DOCD: CPT | Performed by: INTERNAL MEDICINE

## 2021-03-17 PROCEDURE — 4004F PT TOBACCO SCREEN RCVD TLK: CPT | Performed by: INTERNAL MEDICINE

## 2021-03-17 PROCEDURE — 6360000002 HC RX W HCPCS: Performed by: INTERNAL MEDICINE

## 2021-03-17 RX ORDER — SODIUM CHLORIDE 0.9 % (FLUSH) 0.9 %
20 SYRINGE (ML) INJECTION PRN
Status: CANCELLED | OUTPATIENT
Start: 2021-03-17

## 2021-03-17 RX ORDER — SODIUM CHLORIDE 0.9 % (FLUSH) 0.9 %
10 SYRINGE (ML) INJECTION PRN
Status: CANCELLED | OUTPATIENT
Start: 2021-03-17

## 2021-03-17 RX ORDER — SODIUM CHLORIDE 0.9 % (FLUSH) 0.9 %
20 SYRINGE (ML) INJECTION PRN
Status: DISCONTINUED | OUTPATIENT
Start: 2021-03-17 | End: 2021-03-18 | Stop reason: HOSPADM

## 2021-03-17 RX ORDER — HEPARIN SODIUM (PORCINE) LOCK FLUSH IV SOLN 100 UNIT/ML 100 UNIT/ML
500 SOLUTION INTRAVENOUS PRN
Status: DISCONTINUED | OUTPATIENT
Start: 2021-03-17 | End: 2021-03-18 | Stop reason: HOSPADM

## 2021-03-17 RX ORDER — HEPARIN SODIUM (PORCINE) LOCK FLUSH IV SOLN 100 UNIT/ML 100 UNIT/ML
500 SOLUTION INTRAVENOUS PRN
Status: CANCELLED | OUTPATIENT
Start: 2021-03-17

## 2021-03-17 RX ADMIN — SODIUM CHLORIDE, PRESERVATIVE FREE 20 ML: 5 INJECTION INTRAVENOUS at 13:43

## 2021-03-17 RX ADMIN — HEPARIN 500 UNITS: 100 SYRINGE at 13:43

## 2021-03-17 NOTE — PROGRESS NOTES
side of the neck with the largest measures 3.1 x 1.9 cm. Another lymph node measuring 2.1 x 1.8 cm. A third lymph node measuring 1.9 x 1.6 cm. 2 soft tissue nodules within the inferior left parathyroid gland largest measuring 1.2 cm centrally necrotic. 3/26/2018-he underwent a laryngoscopy and excisional lymph node biopsy by Dr. Maryam Lunsford. 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 They're negative for CD20, PAX-5, BCL 6 and all tested B-cell antigens. Flow cytometry showed no immunophenotypic evidence of a clonal B-cell or abnormal T-cell population. 3/20/2018-CT of the abdomen and pelvis with contrast showed no evidence of intra-abdominal adenopathy   4/19/2018-he was first seen by me. HIV negative, hepatitis B negative, ESR 3, albumin 4.5. IPI score = 2.   4/24/2018-bone marrow biopsy showed no signs of involvement by classical Hodgkin lymphoma. One atypical lymphoid aggregates composed of CD20 positive B cells with positive B-cell gene rearrangement, which raises a concern for non-Hodgkin lymphoma. 4/24/2018-PET CT scan showed a left cervical chain and left supraclavicular region adenopathy with maximum SUV 8.2. Largest lymph node measuring 2.1 cm. Otherwise, no hypermetabolic adenopathy in the chest or abdomen. 4/25/2018- CT scan of the chest with contrast documented prominent lymph nodes in the left subclavicular region measuring up to 1.1 cm. A noncalcified 3 mm nodule in the lingual and an ill-defined 1.2 cm groundglass opacity in the right lower lobe. 5/4/2018-referred for Mediport placement. Stage IA classical Hodgkin lymphoma. As per NCCN guidelines recommended 2 cycles of ABVD followed by PET scan. If PET/CT scan Deauville 1-3 -> ISRT. If PET/CT scan Deauville score 4-> consider ABVD ×2 additional cycles followed by PET/CT.    5/11/2018 through 7/6/2018- completion of ABVD chemotherapy x 2 cycles. 7/18/2018-PET CT scan performed after 2 cycles of ABVD showed complete resolution of the hypermetabolic lymph nodes consistent with a complete metabolic response and therefore Deauville score 1. As per NCCN guideline Version 3.2018 recommendation for favorable stage IA is 2 cycles of ABVD followed by involved site radiation 20Gy. Referred to radiation oncology. PET/CT scan also showed airspace opacity in the lung with increased FDG activity concerning for infectious or inflammatory process with SUV 7.8. In addition, showed mild uptake along the course of the esophagus. Tiny lymph node in the superior mediastinum adjacent to the esophagus with SUV 2.2. Increased activity in the rectum demonstrate SUV of 7.9. Malignancy cannot be excluded. 8/22/2018 through 9/5/2018- ISRT 20Gy   4/26/2019-CT chest, abdomen, pelvis unremarkable for recurrent lymphadenopathy. Other findings included bilateral nephrolithiasis, stable pulmonary nodules seen in July 2017 is considered benign with near 2 years stability. 2/12/2020 CT CHEST W CONTRASTStable chronic change with no soft tissue mass or lymphadenopathy. No acute abnormality. 2/12/2020 CT neck and nasopharynx with contrast No evidence of recurring disease in the neck. 9/10/20-CT soft tissue neck and CT chest -No evidence of thoracic lymphadenopathy. Small stable subcentimeter pulmonary nodules. 2/27/21 CT abd/pelvis  Nodular opacity right middle lobe most likely infectious/inflammatory. Short-term follow up CT chest in 3-6 months recommended to assure improvement. Dependent basilar atelectasis with emphysematous changes of the visible lungs. No acute inflammatory process seen within the abdomen or pelvis with nonenhanced imaging. Nonobstructing intrarenal stones with no ureteral stones or hydronephrosis. Moderate gas and stool of the colon with no evidence bowel obstruction. Normal appendix. Previous cholecystectomy.  Stable bile duct ectasia [facility] CTR VAD W/SUBQ PORT AGE 7 YR/> N/A 5/10/2018    PORT INSERTION AND PORT ACCESSED performed by Cristobal Caldera MD at [place] [id number]:  Social History     Socioeconomic History    Marital status:      Spouse name: None    Number of children: None    Years of education: None    Highest education level: None   Occupational History    None   Social Needs    Financial resource strain: None    Food insecurity     Worry: None     Inability: None    Transportation needs     Medical: None     Non-medical: None   Tobacco Use    Smoking status: Current Every Day Smoker     Packs/day: 1.00     Types: Cigarettes    Smokeless tobacco: Never Used   Substance and Sexual Activity    Alcohol use: No    Drug use: No    Sexual activity: None   Lifestyle    Physical activity     Days per week: None     Minutes per session: None    Stress: None   Relationships    Social connections     Talks on phone: None     Gets together: None     Attends Zoroastrian service: None     Active member of club or organization: None     Attends meetings of clubs or organizations: None     Relationship status: None    Intimate partner violence     Fear of current or ex partner: None     Emotionally abused: None     Physically abused: None     Forced sexual activity: None   Other Topics Concern    None   Social History Narrative    None       FAMILY HISTORY:  Family History   Problem Relation Age of Onset    Cancer Mother     Breast Cancer Mother     Cancer Son     Diabetes Son         Current Outpatient Medications   Medication Sig Dispense Refill    amLODIPine (NORVASC) 10 MG tablet TAKE 1 TABLET BY MOUTH EVERY DAY      budesonide-formoterol (SYMBICORT) 160-4.5 MCG/ACT AERO Inhale 2 puffs into the lungs 2 times daily       No current facility-administered medications for this visit.       Facility-Administered Medications Ordered in Other Visits   Medication Dose Route Frequency Provider Last Rate Last Admin    sodium chloride flush 0.9 % injection 20 mL  20 mL Intravenous PRN Courtney Hoffman MD   20 mL at 03/17/21 1343    heparin flush 100 UNIT/ML injection 500 Units  500 Units Intracatheter PRN Courtney Hoffman MD   500 Units at 03/17/21 1343        REVIEW OF SYSTEMS:    Constitutional: no fever, no night sweats, fatigue;   HEENT: no blurring of vision, no double vision, no hearing difficulty, no tinnitus,no ulceration, no dysphagia  Lungs: no cough, no shortness of breath, no wheeze;   CVS: no palpitation, no chest pain, no shortness of breath;  GI: no abdominal pain, no nausea , no vomiting, no constipation;   MARTHA: no dysuria, frequency and urgency, no hematuria, no kidney stones;   Musculoskeletal: no joint pain, swelling , stiffness;   Endocrine: no polyuria, polydypsia, no cold or heat intolerence; Hematology/lymphatic: no easy brusing or bleeding, no hx of clotting disorder; no peripheral adenopathy. Dermatology: no skin rash, no eczema, no pruritis;   Psychiatry: no depression, no anxiety,no panic attacks, no suicide ideation; Neurology: no syncope, no seizures, no numbness or tingling of hands, no numbness or tingling of feet, no paresis; syncopal episode 2 weeks ago r/t pneumonia (?)    PHYSICAL EXAM:    Vitals signs:  BP (!) 140/76   Pulse 69   Temp 97.5 °F (36.4 °C)   Ht 6' 2\" (1.88 m)   Wt 162 lb 4.8 oz (73.6 kg)   SpO2 97%   BMI 20.84 kg/m²        BP (!) 140/76   Pulse 69   Temp 97.5 °F (36.4 °C)   Ht 6' 2\" (1.88 m)   Wt 162 lb 4.8 oz (73.6 kg)   SpO2 97%   BMI 20.84 kg/m²    Pain scale:  Pain Score:   0 - No pain     CONSTITUTIONAL: Alert, appropriate, no acute distress,   EYES: Non icteric, EOM intact, pupils equal round and reactive to light and accommodation. ENT: Oral mucus membranes moist, no oral pharyngeal lesions. External inspection of ears and nose are normal.   NECK: Supple, no masses.  No palpable thyroid mass    CHEST/LUNGS: CTA bilaterally, normal pleasant 72-year-old gentleman who is presently receiving treatment with ABVD for classical Hodgkin lymphoma. He returns today status post completion of 2 cycles of ABVD on 7/6/2018 followed by ISRT completed on 9/5/2018 by Dr. Hazel Hein. He denies any peripheral adenopathy or B symptoms. Unfortunately, he continues to smoke. Continue surveillance as per NCCN guidelines:  H&P 3-6 months for 1-2 years, then every 6-12 months until year 3 and then annually. TSH at least annually if RT to neck. CBC, CMP as clinically indicated. CT neck, chest, abdomen, pelvis at 6, 12 and 24 months following completion of therapy. At this point, no more CT scans for Hodgkin lymphoma surveillance. However, he is a candidate for CT lung cancer screening once yearly. Next CT would be due February 2022    CD20 positive Non-Hodgkin's lymphoma??  Incidental finding on bone marrow biopsy on 4/24/2018 of a small aggregate of a monoclonal B-cell CD20 positive population. The significance of this finding is unknown. The patient has no other adenopathy or B symptoms. There was no involvement by classical Hodgkin lymphoma in the bone marrow. This will be followed closely clinically and CBCs. History of allergy to PET scan-Allergy to PET scan contrast- hives and \"heart-attack\" like chest pain. Health maintenance  - The patient is to establish care to follow-up with primary care for further recommendation regarding age appropriate screening, well-being visit, follow-up and treatment of other medical comorbidities. Offer CT low dose lung cancer screening starting next year. Side effects monitoring-  The selection, dosing administration of anticancer agents in the management of associated toxicities are complex. Modifications of drug dose and schedule and the initiation of supportive care interventions are often necessary because of expected toxicities individual patient variability, prior treatment and comorbidity.      Smoke cessation- I strongly encouraged him to quit. He is contemplative. [id number] was seen by urology in the past.  Resolved. At risk thyroid disorder-patient received radiation to the neck area. Will check TSH today. Lung cancer screening-PCP to consider lung cancer screening February 2022 and then annually. Patient meets criteria. Summary of plan:  1. RTC 6 months with GOPAL Stubbs  2. Port flush today and every 8 weeks   3. Recommend yearly low dose CT for lung cancer screening with PCP   4. CMP, TSH today      Follow Up:     Return in about 6 months (around 9/17/2021) for Appointment with GOPAL Stubbs. Port flush every 8 weeks     I, Tigist Chung, am scribing for Cassi Salazar MD. Electronically signed by Tigist Chung RN on 3/17/2021 at 1:42 PM CDT. I, Dr Roddy Arango, personally performed the services described in this documentation as scribed by Tigist Chung RN in my presence and is both accurate and complete. I have seen, examined and reviewed this patient medication list, appropriate labs and imaging studies. I reviewed relevant medical records and others physicians notes. I discussed the plans of care with the patient. I answered all the questions to the patients satisfaction. I have also reviewed the chief complaint (CC) and part of the history (History of Present Illness (HPI), Past Family Social History [facility]), or Review of Systems (ROS) and made changes when appropriated.        (Please note that portions of this note were completed with a voice recognition program. Efforts were made to edit the dictations but occasionally words are mis-transcribed.)

## 2021-05-06 ENCOUNTER — TRANSCRIBE ORDERS (OUTPATIENT)
Dept: ADMINISTRATIVE | Facility: HOSPITAL | Age: 71
End: 2021-05-06

## 2021-05-06 DIAGNOSIS — R91.1 LUNG NODULE: Primary | ICD-10-CM

## 2021-07-06 ENCOUNTER — HOSPITAL ENCOUNTER (OUTPATIENT)
Dept: CT IMAGING | Facility: HOSPITAL | Age: 71
Discharge: HOME | End: 2021-07-06
Admitting: FAMILY MEDICINE

## 2021-07-06 DIAGNOSIS — R91.1 LUNG NODULE: ICD-10-CM

## 2021-07-06 PROCEDURE — 71250 CT THORAX DX C-: CPT

## 2021-08-03 ENCOUNTER — OFFICE VISIT (OUTPATIENT)
Dept: CARDIAC SURGERY | Facility: CLINIC | Age: 71
End: 2021-08-03

## 2021-08-03 VITALS
SYSTOLIC BLOOD PRESSURE: 134 MMHG | BODY MASS INDEX: 20.51 KG/M2 | HEART RATE: 62 BPM | HEIGHT: 74 IN | OXYGEN SATURATION: 100 % | WEIGHT: 159.8 LBS | DIASTOLIC BLOOD PRESSURE: 82 MMHG

## 2021-08-03 DIAGNOSIS — R91.8 MULTIPLE LUNG NODULES: Primary | ICD-10-CM

## 2021-08-03 DIAGNOSIS — C81.11 NODULAR SCLEROSIS HODGKIN LYMPHOMA OF LYMPH NODES OF NECK (HCC): ICD-10-CM

## 2021-08-03 DIAGNOSIS — F17.200 CURRENT EVERY DAY SMOKER: ICD-10-CM

## 2021-08-03 PROCEDURE — 99204 OFFICE O/P NEW MOD 45 MIN: CPT | Performed by: THORACIC SURGERY (CARDIOTHORACIC VASCULAR SURGERY)

## 2021-08-03 RX ORDER — AMLODIPINE BESYLATE 5 MG/1
5 TABLET ORAL DAILY
COMMUNITY
Start: 2021-06-05

## 2021-08-10 ENCOUNTER — PATIENT ROUNDING (BHMG ONLY) (OUTPATIENT)
Dept: CARDIOLOGY | Facility: CLINIC | Age: 71
End: 2021-08-10

## 2021-09-15 ENCOUNTER — HOSPITAL ENCOUNTER (OUTPATIENT)
Dept: INFUSION THERAPY | Age: 71
Discharge: HOME | End: 2021-09-15
Payer: MEDICARE

## 2021-09-15 ENCOUNTER — OFFICE VISIT (OUTPATIENT)
Dept: HEMATOLOGY | Age: 71
End: 2021-09-15
Payer: MEDICARE

## 2021-09-15 VITALS
HEART RATE: 65 BPM | DIASTOLIC BLOOD PRESSURE: 82 MMHG | SYSTOLIC BLOOD PRESSURE: 130 MMHG | HEIGHT: 74 IN | BODY MASS INDEX: 20.67 KG/M2 | WEIGHT: 161.1 LBS | OXYGEN SATURATION: 100 %

## 2021-09-15 DIAGNOSIS — Z45.2 ENCOUNTER FOR CENTRAL LINE CARE: ICD-10-CM

## 2021-09-15 DIAGNOSIS — C81.90 HODGKIN LYMPHOMA, UNSPECIFIED HODGKIN LYMPHOMA TYPE, UNSPECIFIED BODY REGION (HCC): Primary | ICD-10-CM

## 2021-09-15 DIAGNOSIS — T50.905S TOXICITY, LATE EFFECT, DUE TO DRUG, MEDICINE, OR BIOLOGICAL SUBSTANCE: ICD-10-CM

## 2021-09-15 DIAGNOSIS — Z71.6 TOBACCO ABUSE COUNSELING: ICD-10-CM

## 2021-09-15 DIAGNOSIS — Z13.29 THYROID DISORDER SCREEN: ICD-10-CM

## 2021-09-15 DIAGNOSIS — Z71.89 CARE PLAN DISCUSSED WITH PATIENT: ICD-10-CM

## 2021-09-15 LAB
BASOPHILS ABSOLUTE: 0.04 K/UL (ref 0.01–0.08)
BASOPHILS RELATIVE PERCENT: 0.5 % (ref 0.1–1.2)
EOSINOPHILS ABSOLUTE: 0.38 K/UL (ref 0.04–0.54)
EOSINOPHILS RELATIVE PERCENT: 5.1 % (ref 0.7–7)
HCT VFR BLD CALC: 39.9 % (ref 40.1–51)
HEMOGLOBIN: 14.1 G/DL (ref 13.7–17.5)
LYMPHOCYTES ABSOLUTE: 1.66 K/UL (ref 1.18–3.74)
LYMPHOCYTES RELATIVE PERCENT: 22.4 % (ref 19.3–53.1)
MCH RBC QN AUTO: 32.6 PG (ref 25.7–32.2)
MCHC RBC AUTO-ENTMCNC: 35.3 G/DL (ref 32.3–36.5)
MCV RBC AUTO: 92.4 FL (ref 79–92.2)
MONOCYTES ABSOLUTE: 0.99 K/UL (ref 0.24–0.82)
MONOCYTES RELATIVE PERCENT: 13.4 % (ref 4.7–12.5)
NEUTROPHILS ABSOLUTE: 4.34 K/UL (ref 1.56–6.13)
NEUTROPHILS RELATIVE PERCENT: 58.6 % (ref 34–71.1)
PDW BLD-RTO: 12.7 % (ref 11.6–14.4)
PLATELET # BLD: 155 K/UL (ref 163–337)
PMV BLD AUTO: 9.9 FL (ref 7.4–10.4)
RBC # BLD: 4.32 M/UL (ref 4.63–6.08)
WBC # BLD: 7.41 K/UL (ref 4.23–9.07)

## 2021-09-15 PROCEDURE — 1123F ACP DISCUSS/DSCN MKR DOCD: CPT | Performed by: NURSE PRACTITIONER

## 2021-09-15 PROCEDURE — G8420 CALC BMI NORM PARAMETERS: HCPCS | Performed by: NURSE PRACTITIONER

## 2021-09-15 PROCEDURE — 6360000002 HC RX W HCPCS: Performed by: INTERNAL MEDICINE

## 2021-09-15 PROCEDURE — 85025 COMPLETE CBC W/AUTO DIFF WBC: CPT

## 2021-09-15 PROCEDURE — 3017F COLORECTAL CA SCREEN DOC REV: CPT | Performed by: NURSE PRACTITIONER

## 2021-09-15 PROCEDURE — 4004F PT TOBACCO SCREEN RCVD TLK: CPT | Performed by: NURSE PRACTITIONER

## 2021-09-15 PROCEDURE — 99212 OFFICE O/P EST SF 10 MIN: CPT

## 2021-09-15 PROCEDURE — 2580000003 HC RX 258: Performed by: INTERNAL MEDICINE

## 2021-09-15 PROCEDURE — 96523 IRRIG DRUG DELIVERY DEVICE: CPT

## 2021-09-15 PROCEDURE — 4040F PNEUMOC VAC/ADMIN/RCVD: CPT | Performed by: NURSE PRACTITIONER

## 2021-09-15 PROCEDURE — G8427 DOCREV CUR MEDS BY ELIG CLIN: HCPCS | Performed by: NURSE PRACTITIONER

## 2021-09-15 PROCEDURE — 99213 OFFICE O/P EST LOW 20 MIN: CPT | Performed by: NURSE PRACTITIONER

## 2021-09-15 RX ORDER — HEPARIN SODIUM (PORCINE) LOCK FLUSH IV SOLN 100 UNIT/ML 100 UNIT/ML
500 SOLUTION INTRAVENOUS PRN
Status: CANCELLED | OUTPATIENT
Start: 2021-09-15

## 2021-09-15 RX ORDER — HEPARIN SODIUM (PORCINE) LOCK FLUSH IV SOLN 100 UNIT/ML 100 UNIT/ML
500 SOLUTION INTRAVENOUS PRN
Status: DISCONTINUED | OUTPATIENT
Start: 2021-09-15 | End: 2021-09-16 | Stop reason: HOSPADM

## 2021-09-15 RX ORDER — SODIUM CHLORIDE 0.9 % (FLUSH) 0.9 %
10 SYRINGE (ML) INJECTION PRN
Status: CANCELLED | OUTPATIENT
Start: 2021-09-15

## 2021-09-15 RX ORDER — SODIUM CHLORIDE 0.9 % (FLUSH) 0.9 %
20 SYRINGE (ML) INJECTION PRN
Status: CANCELLED | OUTPATIENT
Start: 2021-09-15

## 2021-09-15 RX ORDER — SODIUM CHLORIDE 0.9 % (FLUSH) 0.9 %
10 SYRINGE (ML) INJECTION PRN
Status: DISCONTINUED | OUTPATIENT
Start: 2021-09-15 | End: 2021-09-16 | Stop reason: HOSPADM

## 2021-09-15 RX ORDER — SODIUM CHLORIDE 0.9 % (FLUSH) 0.9 %
20 SYRINGE (ML) INJECTION PRN
Status: DISCONTINUED | OUTPATIENT
Start: 2021-09-15 | End: 2021-09-16 | Stop reason: HOSPADM

## 2021-09-15 RX ADMIN — HEPARIN 500 UNITS: 100 SYRINGE at 15:20

## 2021-09-15 RX ADMIN — Medication 20 ML: at 15:19

## 2021-09-15 ASSESSMENT — ENCOUNTER SYMPTOMS
EYE PAIN: 0
CONSTIPATION: 0
EYES NEGATIVE: 1
BACK PAIN: 0
WHEEZING: 0
EYE DISCHARGE: 0
SHORTNESS OF BREATH: 0
EYE REDNESS: 0
SORE THROAT: 0
RESPIRATORY NEGATIVE: 1
DIARRHEA: 0
GASTROINTESTINAL NEGATIVE: 1
VOMITING: 0
COUGH: 0
ABDOMINAL PAIN: 0
BLOOD IN STOOL: 0
NAUSEA: 0

## 2021-09-15 NOTE — PROGRESS NOTES
Progress Note      Pt Name: Renée Sevilla  YOB: 1950  MRN: [id number]    Date of evaluation: 9/15/2021  History Obtained From:  patient, electronic medical record    CHIEF COMPLAINT:    Chief Complaint   Patient presents with    Follow-up     Hodgkin's lymphoma    Other     Tobacco abuse/at risk for lung cancer     HISTORY OF PRESENT ILLNESS:    Renée Sevilla is a 78 y.o.  male who is currently followed for classic Hodgkin's lymphoma, status-post completion of 2 cycles of ABVD on 7/6/2018 followed by ISRT completed on 9/5/2018 by Dr. Mely Fox. He has continued without known recurrence of disease. Anibal Chau returns today in scheduled follow-up for evaluation, late side effect monitoring, lab monitoring and further treatment recommendations. He presents today with no new complaints, denies any B symptoms and reports overall doing well. He indicates that he has came out of jail and is working every day. He denies any peripheral adenopathy or B symptoms. He was recently hospitalized at [facility] after a syncopal episode. He had a 2D echo, was placed on telemetry and had a CT chest abdomen pelvis. I reviewed the CT chest abdomen pelvis that showed no evidence of lymphadenopathy or splenomegaly. The etiology of his syncope was unknown. He presents today and denies any new complaints. He denies any B symptoms. Unfortunately, he continues to smoke. HEMATOLOGIC HISTORY:     Diagnosis   · NS-Classical Hodgkin lymphoma, March 2018   · IPS score 2 points   · Stage IA    Treatment summary  · 5/11/2018 through 7/6/2018- completion of ABVD chemotherapy x 2 cycles. · 8/22/2018 through 9/5/2018- ISRT 20Gy  . Mr Nitish Thao was seen in initial oncology consultation on 4/19/2018 referred by Dr. Christina Greenfield, ENT at [facility] for a diagnosis of nodular sclerosis Hodgkin's lymphoma. He was seen initially by Dr. Christina Greenfield for complains of left side neck adenopathy.  He underwent a biopsy revealing nodular sclerosis Hodgkin's lymphoma. · 3/16/2018-CT of the neck showed multiple enlarged lymph nodes at the left side of the neck with the largest measures 3.1 x 1.9 cm. Another lymph node measuring 2.1 x 1.8 cm. A third lymph node measuring 1.9 x 1.6 cm. 2 soft tissue nodules within the inferior left parathyroid gland largest measuring 1.2 cm centrally necrotic. · 3/26/2018-he underwent a laryngoscopy and excisional lymph node biopsy by Dr. Mehrdad Estrada. 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 They're negative for CD20, PAX-5, BCL 6 and all tested B-cell antigens. Flow cytometry showed no immunophenotypic evidence of a clonal B-cell or abnormal T-cell population. · 3/20/2018-CT of the abdomen and pelvis with contrast showed no evidence of intra-abdominal adenopathy   · 4/19/2018-he was first seen by Dr. Roselia Tobias. HIV negative, hepatitis B negative, ESR 3, albumin 4.5. IPI score = 2.   · 4/24/2018-bone marrow biopsy showed no signs of involvement by classical Hodgkin lymphoma. One atypical lymphoid aggregates composed of CD20 positive B cells with positive B-cell gene rearrangement, which raises a concern for non-Hodgkin lymphoma. · 4/24/2018-PET CT scan showed a left cervical chain and left supraclavicular region adenopathy with maximum SUV 8.2. Largest lymph node measuring 2.1 cm. Otherwise, no hypermetabolic adenopathy in the chest or abdomen. · 4/25/2018- CT scan of the chest with contrast documented prominent lymph nodes in the left subclavicular region measuring up to 1.1 cm. A noncalcified 3 mm nodule in the lingual and an ill-defined 1.2 cm groundglass opacity in the right lower lobe. · 5/4/2018-referred for Mediport placement. Stage IA classical Hodgkin lymphoma. As per NCCN guidelines recommended 2 cycles of ABVD followed by PET scan.  If PET/CT scan Deauville 1-3 -> ISRT. If PET/CT scan Deauville score 4-> consider ABVD ×2 additional cycles followed by PET/CT.   · 5/11/2018 through 7/6/2018- completion of ABVD chemotherapy x 2 cycles. · 7/18/2018-PET CT scan performed after 2 cycles of ABVD showed complete resolution of the hypermetabolic lymph nodes consistent with a complete metabolic response and therefore Deauville score 1. As per NCCN guideline Version 3.2018 recommendation for favorable stage IA is 2 cycles of ABVD followed by involved site radiation 20Gy. Referred to radiation oncology. PET/CT scan also showed airspace opacity in the lung with increased FDG activity concerning for infectious or inflammatory process with SUV 7.8. In addition, showed mild uptake along the course of the esophagus. Tiny lymph node in the superior mediastinum adjacent to the esophagus with SUV 2.2. Increased activity in the rectum demonstrate SUV of 7.9. Malignancy cannot be excluded. · 8/22/2018 through 9/5/2018- ISRT 20Gy   · 4/26/2019-CT chest, abdomen, pelvis unremarkable for recurrent lymphadenopathy. Other findings included bilateral nephrolithiasis, stable pulmonary nodules seen in July 2017 is considered benign with near 2 years stability. · 2/12/2020 CT CHEST W CONTRASTStable chronic change with no soft tissue mass or lymphadenopathy. No acute abnormality. · 2/12/2020 CT neck and nasopharynx with contrast No evidence of recurring disease in the neck. · 9/10/20-CT soft tissue neck and CT chest -No evidence of thoracic lymphadenopathy. Small stable subcentimeter pulmonary nodules. · 2/27/21 CT abd/pelvis  Nodular opacity right middle lobe most likely infectious/inflammatory. Short-term follow up CT chest in 3-6 months recommended to assure improvement. Dependent basilar atelectasis with emphysematous changes of the visible lungs. No acute inflammatory process seen within the abdomen or pelvis with nonenhanced imaging.  Nonobstructing intrarenal stones with no ureteral stones or hydronephrosis. Moderate gas and stool of the colon with no evidence bowel obstruction. Normal appendix. Previous cholecystectomy. Stable bile duct ectasia likely reservoir effect. 6. Moderate atherosclerotic changes with ectasia of the aorta to maximum measurement of 2.6 cm. Fatty containing helical hernia with previous inguinal hernia repair. Stable 2.8 cm subcutaneous cyst of the right lower back favorable for sebaceous cyst.   · 2/27/21 NM lung ventilation perfusion scan:  Intermediate probability of pulmonary embolus with small segmental perfusion defects considered as described above. · 2/28/21 CT angiogram chest:  No pulmonary emboli identified. No thoracic aortic aneurysm or dissection. Atherosclerotic changes with mild LAD coronary calcification. Moderate emphysema. Stable subcentimeter pulmonary nodules. New patchy inferior right lower lobe opacities concerning for pneumonia. Dependent atelectasis. Right subclavian port in place with the tip in the lower SVC. · 2/28/21 US venous doppler LE bilateral (duplex): There is no evidence of deep venous thrombosis or superficial thrombophlebitis of right or left lower extremities. · 3/17/2021-I reviewed CT chest abdomen pelvis. No evidence of recurrent lymphoma. · 3/17/2021 CXR: Findings compatible with COPD. No evidence of acute cardiopulmonary process. · 7/6/2021 CT Chest without contrast at [facility] reported unchanged pulmonary nodules. (The 8 mm nodule in the RIGHT middle lobe may be slightly smaller today but is essentially unchanged. Subpleural area of oval-shaped nodularity in the LEFT upper lobe anteriorly measures approximately 6 mm and is unchanged. In the lateral basal LEFT lower lobe is redemonstration of a 6 mm pulmonary nodule which is unchanged. Moderate emphysema. New area of patchy consolidation and groundglass in the RIGHT lower lobe could be atelectatic or could be related to an infectious process.  Mild endobronchial opacification 09/15/21 1520        Allergies: Allergies   Allergen Reactions    Codeine Nausea Only       Social History:    Social History     Tobacco Use    Smoking status: Current Every Day Smoker     Packs/day: 1.00     Years: 20.00     Pack years: 20.00     Types: Cigarettes     Start date: 2001    Smokeless tobacco: Never Used   Substance Use Topics    Alcohol use: No    Drug use: No       Family History:   Family History   Problem Relation Age of Onset    Cancer Mother     Breast Cancer Mother     Cancer Son     Diabetes Son        Vitals:  Vitals:    09/15/21 1445   BP: 130/82   Pulse: 65   SpO2: 100%   Weight: 161 lb 1.6 oz (73.1 kg)   Height: 6' 2\" (1.88 m)        Subjective   REVIEW OF SYSTEMS:   Review of Systems   Constitutional: Positive for fatigue. Negative for chills, diaphoresis and fever. HENT: Negative. Negative for congestion, ear pain, hearing loss, nosebleeds, sore throat and tinnitus. Eyes: Negative. Negative for pain, discharge and redness. Respiratory: Negative. Negative for cough, shortness of breath and wheezing. Cardiovascular: Negative. Negative for chest pain, palpitations and leg swelling. Gastrointestinal: Negative. Negative for abdominal pain, blood in stool, constipation, diarrhea, nausea and vomiting. Endocrine: Negative for polydipsia. Genitourinary: Negative for dysuria, flank pain, frequency, hematuria and urgency. Musculoskeletal: Negative. Negative for back pain, myalgias and neck pain. Skin: Negative. Negative for rash. Neurological: Negative. Negative for dizziness, tremors, seizures, weakness and headaches. Hematological: Does not bruise/bleed easily. Psychiatric/Behavioral: Negative. The patient is not nervous/anxious. Objective   PHYSICAL EXAM:  Physical Exam  Vitals reviewed. Constitutional:       General: He is not in acute distress. Appearance: He is well-developed. He is not diaphoretic.    HENT:      Head: Normocephalic and atraumatic. Mouth/Throat:      Pharynx: Uvula midline. Tonsils: No tonsillar exudate. Eyes:      General: Lids are normal. No scleral icterus. Right eye: No discharge. Left eye: No discharge. Conjunctiva/sclera: Conjunctivae normal.      Pupils: Pupils are equal, round, and reactive to light. Neck:      Thyroid: No thyroid mass or thyromegaly. Vascular: No JVD. Trachea: Trachea normal. No tracheal deviation. Cardiovascular:      Rate and Rhythm: Normal rate and regular rhythm. Heart sounds: Normal heart sounds. No murmur heard. No friction rub. No gallop. Pulmonary:      Effort: Pulmonary effort is normal. No respiratory distress. Breath sounds: Normal breath sounds. No wheezing or rales. Chest:      Chest wall: No tenderness. Abdominal:      General: Bowel sounds are normal. There is no distension. Palpations: Abdomen is soft. There is no mass. Tenderness: There is no abdominal tenderness. There is no guarding or rebound. Hernia: No hernia is present. Musculoskeletal:         General: No tenderness or deformity. Cervical back: Normal range of motion and neck supple. Comments: Range of motion within normal limits x4 extremities   Skin:     General: Skin is warm. Coloration: Skin is not pale. Findings: No erythema or rash. Neurological:      Mental Status: He is alert and oriented to person, place, and time. Cranial Nerves: No cranial nerve deficit. Coordination: Coordination normal.   Psychiatric:         Behavior: Behavior normal.         Thought Content: Thought content normal.         Labs reviewed today:  Lab Results   Component Value Date    WBC 7.41 09/15/2021    HGB 14.1 09/15/2021    HCT 39.9 (L) 09/15/2021    MCV 92.4 (H) 09/15/2021     (L) 09/15/2021     Lab Results   Component Value Date    NEUTROABS 4.34 09/15/2021     ASSESSMENT/PLAN:      1.   Classic Hodgkin's lymphoma, stage Ia, status-post completion of 2 cycles of ABVD on 7/6/2018 followed by ISRT completed on 9/5/2018. No known evidence of recurrent disease. Denies B symptoms. Physical exam without palpable adenopathy. Continue surveillance as per NCCN guidelines:  H&P every 6-12 months until year 3 and then annually. TSH at least annually if RT to neck. CBC, CMP as clinically indicated. CT neck, chest, abdomen, pelvis at 6, 12 and 24 months following completion of therapy. At this point, no more CT scans for Hodgkin lymphoma surveillance. However, he is a candidate for CT lung cancer screening once yearly. Next CT would be due February 2022      - Comprehensive Metabolic Panel; Future    2. At risk for thyroid disorder,  received radiation to the neck area in 2018.      - TSH without Reflex; Future       3. 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 There was no involvement by classical Hodgkin lymphoma in the bone marrow. Remains asymptomatic denying any B symptoms and no palpable adenopathy noted with physical exam.  This will be followed closely clinically and CBCs. CBC today is within normal limits as documented above    4. Survivorship and health maintenance recommendations  Keep a healthy body weight. Dietary recommendations include limit energy intake from total fats and sugars; increase consumption of fruit and vegetables, as well as legumes, whole grains and nuts. Engage in regular physical activity (150 minutes spread through the week). Other recommendations include minimizing alcohol intake, avoid use of tobacco products. Practice sun safety: Utilize a sunscreen with an SPF of at least 27. Avoiding tanning beds. Ensure adequate amount of sleep.       Follow-up with primary care regularly and for further recommendations regarding age-appropriate screening for cancer, wellbeing visit (preventive measures), follow-up and treatment for other medical comorbidities. 4. Tobacco abuse counseling, reports smoking approximately 1 pack of cigarettes daily  We talked about the importance of quitting smoking for approximately 4-5 minutes. Specifically, we discussed the risk related tobacco including but not limited to carcinoma, cardiovascular disease, stroke, and financial loss. I advised to quit smoking and offered resources to include nicotine patches to help with the craving. The patient is contemplated at this time. I will follow-up on smoking cessation during the next visit and continue to encourage quitting tobacco use. Lung cancer screening-PCP to consider lung cancer screening February 2022 and then annually. Patient meets criteria.       5. Toxicity, late effect, due to drug, medicine, or biological substance. Denies any known toxicity effects from chemotherapy and radiation.       6.  Port-A-Cath care: Recommendation is to have Port-A-Cath removed, repeat ports he is delaying the process due to not wanting to miss work the appointment. Chest support needs to be flushed every 8 weeks.    -Port-A-Cath care today and every 8 weeks  -Will make a referral for port removal at patient's request    I discussed all of the above findings included in the assessment and plan with the patient and the patient is in agreement to move forward with current recommendations/treatment. I have addressed all of their questions and concerns that were verbalized.     FOLLOW UP:  Follow-up appointment given for 6 months  Continue to follow with other medical providers as recommended. Discussed precautions related to [address] and being at increased risk. Discussed proper handwashing to be done frequently, limit exposure to other individuals and maintain social distancing of 6 feet.   Recommend contacting primary care provider if having respiratory symptoms for further recommendations and consideration for testing. EMR Dragon/Transcription disclaimer:   Much of this encounter note is an electronic transcription/translation of spoken language to printed text. The electronic translation of spoken language may permit erroneous, or at times, nonsensical words or phrases to be inadvertently transcribed; although attempts have made to review the note for such errors, some may still exist. Also, portions of this note have been copied forward, however, changed to reflect the most current clinical status of this patient.     Electronically signed by GOPAL Rico on 9/15/2021 at 5:37 PM

## 2021-10-05 ENCOUNTER — TELEPHONE (OUTPATIENT)
Dept: VASCULAR SURGERY | Facility: CLINIC | Age: 71
End: 2021-10-05

## 2021-10-05 DIAGNOSIS — I65.23 BILATERAL CAROTID ARTERY STENOSIS: Primary | ICD-10-CM

## 2021-10-05 DIAGNOSIS — I73.9 PVD (PERIPHERAL VASCULAR DISEASE) (HCC): ICD-10-CM

## 2021-10-25 ENCOUNTER — TELEPHONE (OUTPATIENT)
Dept: VASCULAR SURGERY | Facility: CLINIC | Age: 71
End: 2021-10-25

## 2021-10-26 ENCOUNTER — HOSPITAL ENCOUNTER (OUTPATIENT)
Dept: ULTRASOUND IMAGING | Facility: HOSPITAL | Age: 71
Discharge: HOME | End: 2021-10-26

## 2021-10-26 ENCOUNTER — OFFICE VISIT (OUTPATIENT)
Dept: VASCULAR SURGERY | Facility: CLINIC | Age: 71
End: 2021-10-26

## 2021-10-26 VITALS
HEIGHT: 74 IN | BODY MASS INDEX: 20.28 KG/M2 | WEIGHT: 158 LBS | SYSTOLIC BLOOD PRESSURE: 134 MMHG | DIASTOLIC BLOOD PRESSURE: 76 MMHG

## 2021-10-26 DIAGNOSIS — I65.23 BILATERAL CAROTID ARTERY STENOSIS: ICD-10-CM

## 2021-10-26 DIAGNOSIS — I77.811 AORTIC ECTASIA, ABDOMINAL (HCC): ICD-10-CM

## 2021-10-26 DIAGNOSIS — I73.9 PAD (PERIPHERAL ARTERY DISEASE) (HCC): Primary | ICD-10-CM

## 2021-10-26 DIAGNOSIS — I73.9 PVD (PERIPHERAL VASCULAR DISEASE) (HCC): ICD-10-CM

## 2021-10-26 PROCEDURE — 93880 EXTRACRANIAL BILAT STUDY: CPT

## 2021-10-26 PROCEDURE — 93923 UPR/LXTR ART STDY 3+ LVLS: CPT

## 2021-10-26 PROCEDURE — 93880 EXTRACRANIAL BILAT STUDY: CPT | Performed by: SURGERY

## 2021-10-26 PROCEDURE — 99214 OFFICE O/P EST MOD 30 MIN: CPT | Performed by: NURSE PRACTITIONER

## 2021-10-26 PROCEDURE — 93923 UPR/LXTR ART STDY 3+ LVLS: CPT | Performed by: SURGERY

## 2021-11-10 ENCOUNTER — HOSPITAL ENCOUNTER (OUTPATIENT)
Dept: INFUSION THERAPY | Age: 71
Discharge: HOME | End: 2021-11-10
Payer: MEDICARE

## 2021-11-10 DIAGNOSIS — Z45.2 ENCOUNTER FOR CENTRAL LINE CARE: Primary | ICD-10-CM

## 2021-11-10 PROCEDURE — 6360000002 HC RX W HCPCS: Performed by: INTERNAL MEDICINE

## 2021-11-10 PROCEDURE — 2580000003 HC RX 258: Performed by: INTERNAL MEDICINE

## 2021-11-10 PROCEDURE — 96523 IRRIG DRUG DELIVERY DEVICE: CPT

## 2021-11-10 RX ORDER — SODIUM CHLORIDE 0.9 % (FLUSH) 0.9 %
10 SYRINGE (ML) INJECTION PRN
Status: DISCONTINUED | OUTPATIENT
Start: 2021-11-10 | End: 2021-11-11 | Stop reason: HOSPADM

## 2021-11-10 RX ORDER — SODIUM CHLORIDE 0.9 % (FLUSH) 0.9 %
10 SYRINGE (ML) INJECTION PRN
Status: CANCELLED | OUTPATIENT
Start: 2021-11-10

## 2021-11-10 RX ORDER — HEPARIN SODIUM (PORCINE) LOCK FLUSH IV SOLN 100 UNIT/ML 100 UNIT/ML
500 SOLUTION INTRAVENOUS PRN
Status: DISCONTINUED | OUTPATIENT
Start: 2021-11-10 | End: 2021-11-11 | Stop reason: HOSPADM

## 2021-11-10 RX ORDER — HEPARIN SODIUM (PORCINE) LOCK FLUSH IV SOLN 100 UNIT/ML 100 UNIT/ML
500 SOLUTION INTRAVENOUS PRN
Status: CANCELLED | OUTPATIENT
Start: 2021-11-10

## 2021-11-10 RX ORDER — SODIUM CHLORIDE 0.9 % (FLUSH) 0.9 %
20 SYRINGE (ML) INJECTION PRN
Status: CANCELLED | OUTPATIENT
Start: 2021-11-10

## 2021-11-10 RX ADMIN — HEPARIN 500 UNITS: 100 SYRINGE at 10:05

## 2021-11-10 RX ADMIN — SODIUM CHLORIDE, PRESERVATIVE FREE 10 ML: 5 INJECTION INTRAVENOUS at 10:06

## 2022-02-02 ENCOUNTER — HOSPITAL ENCOUNTER (OUTPATIENT)
Dept: CT IMAGING | Facility: HOSPITAL | Age: 72
Discharge: HOME | End: 2022-02-02
Admitting: THORACIC SURGERY (CARDIOTHORACIC VASCULAR SURGERY)

## 2022-02-02 ENCOUNTER — OFFICE VISIT (OUTPATIENT)
Dept: CARDIAC SURGERY | Facility: CLINIC | Age: 72
End: 2022-02-02

## 2022-02-02 VITALS
BODY MASS INDEX: 21.48 KG/M2 | SYSTOLIC BLOOD PRESSURE: 142 MMHG | DIASTOLIC BLOOD PRESSURE: 70 MMHG | WEIGHT: 167.4 LBS | HEART RATE: 64 BPM | OXYGEN SATURATION: 99 % | HEIGHT: 74 IN

## 2022-02-02 DIAGNOSIS — C81.11 NODULAR SCLEROSIS HODGKIN LYMPHOMA OF LYMPH NODES OF NECK: ICD-10-CM

## 2022-02-02 DIAGNOSIS — F17.200 CURRENT EVERY DAY SMOKER: ICD-10-CM

## 2022-02-02 DIAGNOSIS — C81.11 NODULAR SCLEROSIS HODGKIN LYMPHOMA OF LYMPH NODES OF NECK: Primary | ICD-10-CM

## 2022-02-02 DIAGNOSIS — R91.8 MULTIPLE LUNG NODULES: ICD-10-CM

## 2022-02-02 PROCEDURE — 71250 CT THORAX DX C-: CPT

## 2022-02-02 PROCEDURE — 99213 OFFICE O/P EST LOW 20 MIN: CPT | Performed by: THORACIC SURGERY (CARDIOTHORACIC VASCULAR SURGERY)

## 2022-02-07 DIAGNOSIS — R91.8 MULTIPLE LUNG NODULES: Primary | ICD-10-CM

## 2022-03-12 PROBLEM — R91.8 MULTIPLE LUNG NODULES: Status: ACTIVE | Noted: 2022-03-12

## 2022-03-14 DIAGNOSIS — C81.90 HODGKIN LYMPHOMA, UNSPECIFIED HODGKIN LYMPHOMA TYPE, UNSPECIFIED BODY REGION (HCC): Primary | ICD-10-CM

## 2022-03-16 ENCOUNTER — HOSPITAL ENCOUNTER (OUTPATIENT)
Dept: INFUSION THERAPY | Age: 72
Discharge: HOME | End: 2022-03-16
Payer: MEDICARE

## 2022-03-16 ENCOUNTER — OFFICE VISIT (OUTPATIENT)
Dept: HEMATOLOGY | Age: 72
End: 2022-03-16
Payer: MEDICARE

## 2022-03-16 VITALS
BODY MASS INDEX: 21.51 KG/M2 | HEIGHT: 74 IN | DIASTOLIC BLOOD PRESSURE: 80 MMHG | WEIGHT: 167.6 LBS | HEART RATE: 78 BPM | SYSTOLIC BLOOD PRESSURE: 140 MMHG | OXYGEN SATURATION: 98 %

## 2022-03-16 DIAGNOSIS — Z71.89 CARE PLAN DISCUSSED WITH PATIENT: ICD-10-CM

## 2022-03-16 DIAGNOSIS — Z45.2 ENCOUNTER FOR CENTRAL LINE CARE: ICD-10-CM

## 2022-03-16 DIAGNOSIS — C81.90 HODGKIN LYMPHOMA, UNSPECIFIED HODGKIN LYMPHOMA TYPE, UNSPECIFIED BODY REGION (HCC): Primary | ICD-10-CM

## 2022-03-16 DIAGNOSIS — Z13.29 THYROID DISORDER SCREEN: ICD-10-CM

## 2022-03-16 DIAGNOSIS — Z45.2 ENCOUNTER FOR CARE RELATED TO PORT-A-CATH: ICD-10-CM

## 2022-03-16 DIAGNOSIS — Z71.6 TOBACCO ABUSE COUNSELING: ICD-10-CM

## 2022-03-16 LAB
ALBUMIN SERPL-MCNC: 4.2 G/DL (ref 3.5–5.2)
ALP BLD-CCNC: 89 U/L (ref 40–130)
ALT SERPL-CCNC: 15 U/L (ref 21–72)
ANION GAP SERPL CALCULATED.3IONS-SCNC: 10 MMOL/L (ref 7–19)
AST SERPL-CCNC: 22 U/L (ref 17–59)
BASOPHILS ABSOLUTE: 0.04 K/UL (ref 0.01–0.08)
BASOPHILS RELATIVE PERCENT: 0.7 % (ref 0.1–1.2)
BILIRUB SERPL-MCNC: 0.6 MG/DL (ref 0.2–1.3)
BUN BLDV-MCNC: 10 MG/DL (ref 9–20)
CALCIUM SERPL-MCNC: 8.9 MG/DL (ref 8.4–10.2)
CHLORIDE BLD-SCNC: 108 MMOL/L (ref 98–111)
CO2: 29 MMOL/L (ref 22–29)
CREAT SERPL-MCNC: 0.9 MG/DL (ref 0.6–1.2)
EOSINOPHILS ABSOLUTE: 0.23 K/UL (ref 0.04–0.54)
EOSINOPHILS RELATIVE PERCENT: 4 % (ref 0.7–7)
GFR NON-AFRICAN AMERICAN: >60
GLOBULIN: 2.7 G/DL
GLUCOSE BLD-MCNC: 106 MG/DL (ref 74–106)
HCT VFR BLD CALC: 42.5 % (ref 40.1–51)
HEMOGLOBIN: 15.3 G/DL (ref 13.7–17.5)
LYMPHOCYTES ABSOLUTE: 1.37 K/UL (ref 1.18–3.74)
LYMPHOCYTES RELATIVE PERCENT: 24 % (ref 19.3–53.1)
MCH RBC QN AUTO: 32.8 PG (ref 25.7–32.2)
MCHC RBC AUTO-ENTMCNC: 36 G/DL (ref 32.3–36.5)
MCV RBC AUTO: 91 FL (ref 79–92.2)
MONOCYTES ABSOLUTE: 0.72 K/UL (ref 0.24–0.82)
MONOCYTES RELATIVE PERCENT: 12.6 % (ref 4.7–12.5)
NEUTROPHILS ABSOLUTE: 3.34 K/UL (ref 1.56–6.13)
NEUTROPHILS RELATIVE PERCENT: 58.7 % (ref 34–71.1)
PDW BLD-RTO: 12.6 % (ref 11.6–14.4)
PLATELET # BLD: 245 K/UL (ref 163–337)
PMV BLD AUTO: 8.2 FL (ref 7.4–10.4)
POTASSIUM SERPL-SCNC: 3.8 MMOL/L (ref 3.5–5.1)
RBC # BLD: 4.67 M/UL (ref 4.63–6.08)
SODIUM BLD-SCNC: 147 MMOL/L (ref 137–145)
TOTAL PROTEIN: 7 G/DL (ref 6.3–8.2)
TSH SERPL DL<=0.05 MIU/L-ACNC: 1.19 UIU/ML (ref 0.27–4.2)
WBC # BLD: 5.7 K/UL (ref 4.23–9.07)

## 2022-03-16 PROCEDURE — 36415 COLL VENOUS BLD VENIPUNCTURE: CPT

## 2022-03-16 PROCEDURE — 1123F ACP DISCUSS/DSCN MKR DOCD: CPT | Performed by: NURSE PRACTITIONER

## 2022-03-16 PROCEDURE — 4040F PNEUMOC VAC/ADMIN/RCVD: CPT | Performed by: NURSE PRACTITIONER

## 2022-03-16 PROCEDURE — G8420 CALC BMI NORM PARAMETERS: HCPCS | Performed by: NURSE PRACTITIONER

## 2022-03-16 PROCEDURE — 4004F PT TOBACCO SCREEN RCVD TLK: CPT | Performed by: NURSE PRACTITIONER

## 2022-03-16 PROCEDURE — 6360000002 HC RX W HCPCS: Performed by: INTERNAL MEDICINE

## 2022-03-16 PROCEDURE — 80053 COMPREHEN METABOLIC PANEL: CPT

## 2022-03-16 PROCEDURE — 3017F COLORECTAL CA SCREEN DOC REV: CPT | Performed by: NURSE PRACTITIONER

## 2022-03-16 PROCEDURE — 2580000003 HC RX 258: Performed by: INTERNAL MEDICINE

## 2022-03-16 PROCEDURE — 99211 OFF/OP EST MAY X REQ PHY/QHP: CPT

## 2022-03-16 PROCEDURE — 85025 COMPLETE CBC W/AUTO DIFF WBC: CPT

## 2022-03-16 PROCEDURE — 96523 IRRIG DRUG DELIVERY DEVICE: CPT

## 2022-03-16 PROCEDURE — G8484 FLU IMMUNIZE NO ADMIN: HCPCS | Performed by: NURSE PRACTITIONER

## 2022-03-16 PROCEDURE — G8427 DOCREV CUR MEDS BY ELIG CLIN: HCPCS | Performed by: NURSE PRACTITIONER

## 2022-03-16 PROCEDURE — 99213 OFFICE O/P EST LOW 20 MIN: CPT | Performed by: NURSE PRACTITIONER

## 2022-03-16 RX ORDER — HEPARIN SODIUM (PORCINE) LOCK FLUSH IV SOLN 100 UNIT/ML 100 UNIT/ML
500 SOLUTION INTRAVENOUS PRN
OUTPATIENT
Start: 2022-03-16

## 2022-03-16 RX ORDER — HEPARIN SODIUM (PORCINE) LOCK FLUSH IV SOLN 100 UNIT/ML 100 UNIT/ML
500 SOLUTION INTRAVENOUS PRN
Status: DISCONTINUED | OUTPATIENT
Start: 2022-03-16 | End: 2022-03-17 | Stop reason: HOSPADM

## 2022-03-16 RX ORDER — SODIUM CHLORIDE 0.9 % (FLUSH) 0.9 %
10 SYRINGE (ML) INJECTION PRN
OUTPATIENT
Start: 2022-03-16

## 2022-03-16 RX ORDER — SODIUM CHLORIDE 0.9 % (FLUSH) 0.9 %
20 SYRINGE (ML) INJECTION PRN
OUTPATIENT
Start: 2022-03-16

## 2022-03-16 RX ORDER — SODIUM CHLORIDE 0.9 % (FLUSH) 0.9 %
10 SYRINGE (ML) INJECTION PRN
Status: DISCONTINUED | OUTPATIENT
Start: 2022-03-16 | End: 2022-03-17 | Stop reason: HOSPADM

## 2022-03-16 RX ADMIN — HEPARIN 500 UNITS: 100 SYRINGE at 15:06

## 2022-03-16 RX ADMIN — SODIUM CHLORIDE, PRESERVATIVE FREE 10 ML: 5 INJECTION INTRAVENOUS at 15:06

## 2022-03-16 ASSESSMENT — ENCOUNTER SYMPTOMS
CONSTIPATION: 0
SORE THROAT: 0
COUGH: 0
NAUSEA: 0
EYE REDNESS: 0
EYE PAIN: 0
BLOOD IN STOOL: 0
ABDOMINAL PAIN: 0
DIARRHEA: 0
RESPIRATORY NEGATIVE: 1
EYES NEGATIVE: 1
BACK PAIN: 0
VOMITING: 0
WHEEZING: 0
SHORTNESS OF BREATH: 0
EYE DISCHARGE: 0
GASTROINTESTINAL NEGATIVE: 1

## 2022-03-16 NOTE — PROGRESS NOTES
Progress Note      Pt Name: Ashely Crisostomo  YOB: 1950  MRN: [id number]    Date of evaluation: 9/15/2021  History Obtained From:  patient, electronic medical record    CHIEF COMPLAINT:    Chief Complaint   Patient presents with    Follow-up     Hodgkin lymphoma, unspecified Hodgkin lymphoma type, unspecified body region     Other     Port flush     HISTORY OF PRESENT ILLNESS:    Ashely Crisostomo is a 73 y.o.  male who is currently followed for classic Hodgkin's lymphoma, status-post completion of 2 cycles of ABVD on 7/6/2018 followed by ISRT completed on 9/5/2018 by Dr. Ash Polanco. He has continued without known recurrence of disease. Florina Tobias returns today in scheduled follow-up for evaluation, late side effect monitoring, lab monitoring and further treatment recommendations. Presents today with no new complaints and indicates that overall he is doing fairly well. Denies any B symptoms and physical examination without palpable lymph nodes. Returned in follow-up with Dr Suzi France on 02/02/2022 for follow up of subcentimeter lung nodule. CT chest on 02/02/2022 showed a stable 3mm nodule in left upper lobe and stable 6mm nodule in left lateral lung. CBC today is within normal limits as documented below. HEMATOLOGIC HISTORY:     Diagnosis   · NS-Classical Hodgkin lymphoma, March 2018   · IPS score 2 points   · Stage IA    Treatment summary  · 5/11/2018 through 7/6/2018- completion of ABVD chemotherapy x 2 cycles. · 8/22/2018 through 9/5/2018- ISRT 20Gy  . Mr Angeles Reed was seen in initial oncology consultation on 4/19/2018 referred by Dr. Tamica Palacios, ENT at [facility] for a diagnosis of nodular sclerosis Hodgkin's lymphoma. He was seen initially by Dr. Tamica Palacios for complains of left side neck adenopathy. He underwent a biopsy revealing nodular sclerosis Hodgkin's lymphoma.   · 3/16/2018-CT of the neck showed multiple enlarged lymph nodes at the left side of the neck with the largest measures 3.1 x 1.9 cm. Another lymph node measuring 2.1 x 1.8 cm. A third lymph node measuring 1.9 x 1.6 cm. 2 soft tissue nodules within the inferior left parathyroid gland largest measuring 1.2 cm centrally necrotic. · 3/26/2018-he underwent a laryngoscopy and excisional lymph node biopsy by Dr. Robyn Dahl. 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 They're negative for CD20, PAX-5, BCL 6 and all tested B-cell antigens. Flow cytometry showed no immunophenotypic evidence of a clonal B-cell or abnormal T-cell population. · 3/20/2018-CT of the abdomen and pelvis with contrast showed no evidence of intra-abdominal adenopathy   · 4/19/2018-he was first seen by Dr. Monica Bernardo. HIV negative, hepatitis B negative, ESR 3, albumin 4.5. IPI score = 2.   · 4/24/2018-bone marrow biopsy showed no signs of involvement by classical Hodgkin lymphoma. One atypical lymphoid aggregates composed of CD20 positive B cells with positive B-cell gene rearrangement, which raises a concern for non-Hodgkin lymphoma. · 4/24/2018-PET CT scan showed a left cervical chain and left supraclavicular region adenopathy with maximum SUV 8.2. Largest lymph node measuring 2.1 cm. Otherwise, no hypermetabolic adenopathy in the chest or abdomen. · 4/25/2018- CT scan of the chest with contrast documented prominent lymph nodes in the left subclavicular region measuring up to 1.1 cm. A noncalcified 3 mm nodule in the lingual and an ill-defined 1.2 cm groundglass opacity in the right lower lobe. · 5/4/2018-referred for Mediport placement. Stage IA classical Hodgkin lymphoma. As per NCCN guidelines recommended 2 cycles of ABVD followed by PET scan. If PET/CT scan Deauville 1-3 -> ISRT.  If PET/CT scan Deauville score 4-> consider ABVD ×2 additional cycles followed by PET/CT.   · 5/11/2018 through 7/6/2018- completion of ABVD chemotherapy x 2 cycles. · 7/18/2018-PET CT scan performed after 2 cycles of ABVD showed complete resolution of the hypermetabolic lymph nodes consistent with a complete metabolic response and therefore Deauville score 1. As per NCCN guideline Version 3.2018 recommendation for favorable stage IA is 2 cycles of ABVD followed by involved site radiation 20Gy. Referred to radiation oncology. PET/CT scan also showed airspace opacity in the lung with increased FDG activity concerning for infectious or inflammatory process with SUV 7.8. In addition, showed mild uptake along the course of the esophagus. Tiny lymph node in the superior mediastinum adjacent to the esophagus with SUV 2.2. Increased activity in the rectum demonstrate SUV of 7.9. Malignancy cannot be excluded. · 8/22/2018 through 9/5/2018- ISRT 20Gy   · 4/26/2019-CT chest, abdomen, pelvis unremarkable for recurrent lymphadenopathy. Other findings included bilateral nephrolithiasis, stable pulmonary nodules seen in July 2017 is considered benign with near 2 years stability. · 2/12/2020 CT CHEST W CONTRASTStable chronic change with no soft tissue mass or lymphadenopathy. No acute abnormality. · 2/12/2020 CT neck and nasopharynx with contrast No evidence of recurring disease in the neck. · 9/10/20-CT soft tissue neck and CT chest -No evidence of thoracic lymphadenopathy. Small stable subcentimeter pulmonary nodules. · 2/27/21 CT abd/pelvis  Nodular opacity right middle lobe most likely infectious/inflammatory. Short-term follow up CT chest in 3-6 months recommended to assure improvement. Dependent basilar atelectasis with emphysematous changes of the visible lungs. No acute inflammatory process seen within the abdomen or pelvis with nonenhanced imaging. Nonobstructing intrarenal stones with no ureteral stones or hydronephrosis. Moderate gas and stool of the colon with no evidence bowel obstruction. Normal appendix. Previous cholecystectomy.  Stable bile duct ectasia likely reservoir effect. 6. Moderate atherosclerotic changes with ectasia of the aorta to maximum measurement of 2.6 cm. Fatty containing helical hernia with previous inguinal hernia repair. Stable 2.8 cm subcutaneous cyst of the right lower back favorable for sebaceous cyst.   · 2/27/21 NM lung ventilation perfusion scan:  Intermediate probability of pulmonary embolus with small segmental perfusion defects considered as described above. · 2/28/21 CT angiogram chest:  No pulmonary emboli identified. No thoracic aortic aneurysm or dissection. Atherosclerotic changes with mild LAD coronary calcification. Moderate emphysema. Stable subcentimeter pulmonary nodules. New patchy inferior right lower lobe opacities concerning for pneumonia. Dependent atelectasis. Right subclavian port in place with the tip in the lower SVC. · 2/28/21 US venous doppler LE bilateral (duplex): There is no evidence of deep venous thrombosis or superficial thrombophlebitis of right or left lower extremities. · 3/17/2021-I reviewed CT chest abdomen pelvis. No evidence of recurrent lymphoma. · 3/17/2021 CXR: Findings compatible with COPD. No evidence of acute cardiopulmonary process. · 7/6/2021 CT Chest without contrast at [facility] reported unchanged pulmonary nodules. (The 8 mm nodule in the RIGHT middle lobe may be slightly smaller today but is essentially unchanged. Subpleural area of oval-shaped nodularity in the LEFT upper lobe anteriorly measures approximately 6 mm and is unchanged. In the lateral basal LEFT lower lobe is redemonstration of a 6 mm pulmonary nodule which is unchanged. Moderate emphysema. New area of patchy consolidation and groundglass in the RIGHT lower lobe could be atelectatic or could be related to an infectious process. Mild endobronchial opacification in the RIGHT lower lobe.              CD20 positive Non-Hodgkin's lymphoma??  Incidental finding on bone marrow biopsy on 4/24/2018 of a small aggregate of a monoclonal B-cell CD20 positive population. The significance of this finding is unknown. The patient has no other adenopathy or B symptoms. There was no involvement by classical Hodgkin lymphoma in the bone marrow. This will be followed closely.       History of allergy to PET scan-Allergy to PET scan contrast- hives and \"heart-attack\" like chest pain.      Past Medical History:    Past Medical History:   Diagnosis Date    Arthritis     Cancer ([facility] [address]     hodgkins lymphoma    Colon polyps     COPD (chronic obstructive pulmonary disease) ([facility] [address]     Emphysema (subcutaneous) (surgical) resulting from a procedure     Hx of hepatitis     Liver disease        Past Surgical History:    Past Surgical History:   Procedure Laterality Date    CHOLECYSTECTOMY      COLONOSCOPY  09/18/2018    Fanny Wright MD     HERNIA REPAIR      x3    LA INSJ PRPH CTR VAD W/SUBQ PORT AGE 5 YR/> N/A 5/10/2018    PORT INSERTION AND PORT ACCESSED performed by Harjinder [place] MD at [address] [facility] OR       Current Medications:    Current Outpatient Medications   Medication Sig Dispense Refill    amLODIPine (NORVASC) 10 MG tablet TAKE 1 TABLET BY MOUTH EVERY DAY      budesonide-formoterol (SYMBICORT) 160-4.5 MCG/ACT AERO Inhale 2 puffs into the lungs 2 times daily       No current facility-administered medications for this visit. Allergies:    Allergies   Allergen Reactions    Codeine Nausea Only       Social History:    Social History     Tobacco Use    Smoking status: Current Every Day Smoker     Packs/day: 1.00     Years: 20.00     Pack years: 20.00     Types: Cigarettes     Start date: 2001    Smokeless tobacco: Never Used   Substance Use Topics    Alcohol use: No    Drug use: No       Family History:   Family History   Problem Relation Age of Onset    Cancer Mother     Breast Cancer Mother     Cancer Son     Diabetes Son        Vitals:  Vitals:    03/16/22 1435   BP: (!) 140/80   Pulse: 78   SpO2: 98%   Weight: 167 lb 9.6 oz (76 kg)   Height: 6' 2\" (1.88 m)        Subjective   REVIEW OF SYSTEMS:   Review of Systems   Constitutional: Positive for fatigue. Negative for chills, diaphoresis and fever. HENT: Negative. Negative for congestion, ear pain, hearing loss, nosebleeds, sore throat and tinnitus. Eyes: Negative. Negative for pain, discharge and redness. Respiratory: Negative. Negative for cough, shortness of breath and wheezing. Cardiovascular: Negative. Negative for chest pain, palpitations and leg swelling. Gastrointestinal: Negative. Negative for abdominal pain, blood in stool, constipation, diarrhea, nausea and vomiting. Endocrine: Negative for polydipsia. Genitourinary: Negative for dysuria, flank pain, frequency, hematuria and urgency. Musculoskeletal: Negative. Negative for back pain, myalgias and neck pain. Skin: Negative. Negative for rash. Neurological: Negative. Negative for dizziness, tremors, seizures, weakness and headaches. Hematological: Does not bruise/bleed easily. Psychiatric/Behavioral: Negative. The patient is not nervous/anxious. Objective   PHYSICAL EXAM:  Physical Exam  Vitals reviewed. Constitutional:       General: He is not in acute distress. Appearance: He is well-developed. HENT:      Head: Normocephalic and atraumatic. Mouth/Throat:      Pharynx: Uvula midline. Tonsils: No tonsillar exudate. Eyes:      General: Lids are normal.      Conjunctiva/sclera: Conjunctivae normal.      Pupils: Pupils are equal, round, and reactive to light. Neck:      Thyroid: No thyroid mass or thyromegaly. Vascular: No JVD. Trachea: Trachea normal. No tracheal deviation. Cardiovascular:      Rate and Rhythm: Normal rate and regular rhythm. Pulses: Normal pulses. Heart sounds: Normal heart sounds. Pulmonary:      Effort: Pulmonary effort is normal. No respiratory distress. Breath sounds: Normal breath sounds. No wheezing or rales. biopsy on 4/24/2018 of a small aggregate of a monoclonal B-cell CD20 positive population. There was no involvement by classical Hodgkin lymphoma in the bone marrow. Remains asymptomatic denying any B symptoms and no palpable adenopathy noted with physical exam.      CBC today is within normal limits as documented above    -Continue to follow closely with physical exam and CBCs      4. Survivorship and health maintenance recommendations  Keep a healthy body weight. Dietary recommendations include limit energy intake from total fats and sugars; increase consumption of fruit and vegetables, as well as legumes, whole grains and nuts. Engage in regular physical activity (150 minutes spread through the week). Other recommendations include minimizing alcohol intake, avoid use of tobacco products. Practice sun safety: Utilize a sunscreen with an SPF of at least 27. Avoiding tanning beds. Ensure adequate amount of sleep. Follow-up with primary care regularly and for further recommendations regarding age-appropriate screening for cancer, wellbeing visit (preventive measures), follow-up and treatment for other medical comorbidities. 4. Tobacco abuse counseling, continues to smoke approximately 1 pack/day  We talked about the importance of quitting smoking for approximately 4-5 minutes. Specifically, we discussed the risk related tobacco including but not limited to carcinoma, cardiovascular disease, stroke, and financial loss. I advised to quit smoking and offered resources to include nicotine patches to help with the craving. The patient is contemplated at this time. I will follow-up on smoking cessation during the next visit and continue to encourage quitting tobacco use. Lung cancer screening- CT chest on 02/02/2022 showed a stable 3mm nodule in left upper lobe and stable 6mm nodule in left lateral lung. 5. Toxicity, late effect, due to drug, medicine, or biological substance.     Known toxicity effects from chemotherapy and radiation.      6.  Port-A-Cath care: Last port flush on 11/10/2021, have recommended to have Port-A-Cath removed    -Port-A-Cath care today and every 8 weeks  -Will make a referral for port removal at patient's request    I discussed all of the above findings included in the assessment and plan with the patient and the patient is in agreement to move forward with current recommendations/treatment. I have addressed all of their questions and concerns that were verbalized.     FOLLOW UP:  1. Follow-up appointment given for 6 months  2. Port flush every 8 weeks  3. Continue to follow with other medical providers as recommended. 4.  Labs at next visit: TSH, CMP and CBC    EMR Dragon/Transcription disclaimer:   Much of this encounter note is an electronic transcription/translation of spoken language to printed text. The electronic translation of spoken language may permit erroneous, or at times, nonsensical words or phrases to be inadvertently transcribed; although attempts have made to review the note for such errors, some may still exist.  Please excuse any unrecognized transcription errors and contact us if the air is unintelligible or needs documented correction. Also, portions of this note have been copied forward, however, changed to reflect the most current clinical status of this patient.     Electronically signed by GOPAL Villarreal on 3/18/2022 at 12:53 PM

## 2022-07-25 PROCEDURE — 87635 SARS-COV-2 COVID-19 AMP PRB: CPT | Performed by: NURSE PRACTITIONER

## 2022-07-27 ENCOUNTER — APPOINTMENT (OUTPATIENT)
Dept: CT IMAGING | Facility: HOSPITAL | Age: 72
End: 2022-07-27

## 2022-08-03 ENCOUNTER — HOSPITAL ENCOUNTER (OUTPATIENT)
Dept: GENERAL RADIOLOGY | Facility: HOSPITAL | Age: 72
Discharge: HOME | End: 2022-08-03

## 2022-08-03 PROCEDURE — 71046 X-RAY EXAM CHEST 2 VIEWS: CPT

## 2022-10-07 ENCOUNTER — APPOINTMENT (OUTPATIENT)
Dept: ULTRASOUND IMAGING | Facility: HOSPITAL | Age: 72
End: 2022-10-07

## 2022-11-09 ENCOUNTER — TELEPHONE (OUTPATIENT)
Dept: VASCULAR SURGERY | Facility: CLINIC | Age: 72
End: 2022-11-09

## 2022-11-12 ENCOUNTER — HOSPITAL ENCOUNTER (OUTPATIENT)
Dept: GENERAL RADIOLOGY | Facility: HOSPITAL | Age: 72
Discharge: HOME | End: 2022-11-12

## 2022-11-12 PROCEDURE — 71046 X-RAY EXAM CHEST 2 VIEWS: CPT

## 2022-11-13 ENCOUNTER — APPOINTMENT (OUTPATIENT)
Dept: CT IMAGING | Facility: HOSPITAL | Age: 72
End: 2022-11-13

## 2022-11-13 ENCOUNTER — HOSPITAL ENCOUNTER (INPATIENT)
Facility: HOSPITAL | Age: 72
LOS: 2 days | Discharge: HOME | End: 2022-11-15
Attending: FAMILY MEDICINE | Admitting: FAMILY MEDICINE

## 2022-11-13 ENCOUNTER — APPOINTMENT (OUTPATIENT)
Dept: GENERAL RADIOLOGY | Facility: HOSPITAL | Age: 72
End: 2022-11-13

## 2022-11-13 DIAGNOSIS — J18.9 MULTIFOCAL PNEUMONIA: Primary | ICD-10-CM

## 2022-11-13 DIAGNOSIS — J18.9 PNEUMONIA OF RIGHT LOWER LOBE DUE TO INFECTIOUS ORGANISM: ICD-10-CM

## 2022-11-13 DIAGNOSIS — J43.9 PULMONARY EMPHYSEMA, UNSPECIFIED EMPHYSEMA TYPE: ICD-10-CM

## 2022-11-13 DIAGNOSIS — R91.8 MULTIPLE LUNG NODULES: ICD-10-CM

## 2022-11-13 DIAGNOSIS — R11.0 NAUSEA: ICD-10-CM

## 2022-11-13 LAB
ALBUMIN SERPL-MCNC: 4.1 G/DL (ref 3.5–5.2)
ALBUMIN/GLOB SERPL: 1.3 G/DL
ALP SERPL-CCNC: 166 U/L (ref 39–117)
ALT SERPL W P-5'-P-CCNC: 13 U/L (ref 1–41)
ANION GAP SERPL CALCULATED.3IONS-SCNC: 10 MMOL/L (ref 5–15)
ARTERIAL PATENCY WRIST A: ABNORMAL
AST SERPL-CCNC: 14 U/L (ref 1–40)
ATMOSPHERIC PRESS: 758 MMHG
BASE EXCESS BLDA CALC-SCNC: 1.7 MMOL/L (ref 0–2)
BASOPHILS # BLD AUTO: 0.02 10*3/MM3 (ref 0–0.2)
BASOPHILS NFR BLD AUTO: 0.1 % (ref 0–1.5)
BDY SITE: ABNORMAL
BILIRUB SERPL-MCNC: 2 MG/DL (ref 0–1.2)
BODY TEMPERATURE: 37 C
BUN SERPL-MCNC: 17 MG/DL (ref 8–23)
BUN/CREAT SERPL: 19.8 (ref 7–25)
CALCIUM SPEC-SCNC: 9.3 MG/DL (ref 8.6–10.5)
CHLORIDE SERPL-SCNC: 101 MMOL/L (ref 98–107)
CO2 SERPL-SCNC: 26 MMOL/L (ref 22–29)
CREAT SERPL-MCNC: 0.86 MG/DL (ref 0.76–1.27)
CRP SERPL-MCNC: 26.81 MG/DL (ref 0–0.5)
D DIMER PPP FEU-MCNC: 1.06 MCGFEU/ML (ref 0–0.5)
DEPRECATED RDW RBC AUTO: 41.6 FL (ref 37–54)
EGFRCR SERPLBLD CKD-EPI 2021: 93.1 ML/MIN/1.73
EOSINOPHIL # BLD AUTO: 0.01 10*3/MM3 (ref 0–0.4)
EOSINOPHIL NFR BLD AUTO: 0.1 % (ref 0.3–6.2)
ERYTHROCYTE [DISTWIDTH] IN BLOOD BY AUTOMATED COUNT: 13 % (ref 12.3–15.4)
FLUAV RNA RESP QL NAA+PROBE: NOT DETECTED
FLUBV RNA RESP QL NAA+PROBE: NOT DETECTED
GLOBULIN UR ELPH-MCNC: 3.1 GM/DL
GLUCOSE SERPL-MCNC: 149 MG/DL (ref 65–99)
HCO3 BLDA-SCNC: 24.8 MMOL/L (ref 20–26)
HCT VFR BLD AUTO: 38.2 % (ref 37.5–51)
HGB BLD-MCNC: 14 G/DL (ref 13–17.7)
IMM GRANULOCYTES # BLD AUTO: 0.08 10*3/MM3 (ref 0–0.05)
IMM GRANULOCYTES NFR BLD AUTO: 0.6 % (ref 0–0.5)
LYMPHOCYTES # BLD AUTO: 0.45 10*3/MM3 (ref 0.7–3.1)
LYMPHOCYTES NFR BLD AUTO: 3.3 % (ref 19.6–45.3)
Lab: ABNORMAL
MCH RBC QN AUTO: 34.4 PG (ref 26.6–33)
MCHC RBC AUTO-ENTMCNC: 36.6 G/DL (ref 31.5–35.7)
MCV RBC AUTO: 93.9 FL (ref 79–97)
MODALITY: ABNORMAL
MONOCYTES # BLD AUTO: 0.81 10*3/MM3 (ref 0.1–0.9)
MONOCYTES NFR BLD AUTO: 6 % (ref 5–12)
NEUTROPHILS NFR BLD AUTO: 12.21 10*3/MM3 (ref 1.7–7)
NEUTROPHILS NFR BLD AUTO: 89.9 % (ref 42.7–76)
NRBC BLD AUTO-RTO: 0 /100 WBC (ref 0–0.2)
NT-PROBNP SERPL-MCNC: 504.6 PG/ML (ref 0–900)
PCO2 BLDA: 33.6 MM HG (ref 35–45)
PCO2 TEMP ADJ BLD: 33.6 MM HG (ref 35–45)
PH BLDA: 7.47 PH UNITS (ref 7.35–7.45)
PH, TEMP CORRECTED: 7.47 PH UNITS (ref 7.35–7.45)
PLATELET # BLD AUTO: 267 10*3/MM3 (ref 140–450)
PMV BLD AUTO: 8.8 FL (ref 6–12)
PO2 BLDA: 66 MM HG (ref 83–108)
PO2 TEMP ADJ BLD: 66 MM HG (ref 83–108)
POTASSIUM SERPL-SCNC: 4 MMOL/L (ref 3.5–5.2)
PROCALCITONIN SERPL-MCNC: 0.13 NG/ML (ref 0–0.25)
PROT SERPL-MCNC: 7.2 G/DL (ref 6–8.5)
RBC # BLD AUTO: 4.07 10*6/MM3 (ref 4.14–5.8)
SAO2 % BLDCOA: 95.9 % (ref 94–99)
SARS-COV-2 RNA RESP QL NAA+PROBE: NOT DETECTED
SODIUM SERPL-SCNC: 137 MMOL/L (ref 136–145)
TROPONIN T SERPL-MCNC: <0.01 NG/ML (ref 0–0.03)
VENTILATOR MODE: ABNORMAL
WBC NRBC COR # BLD: 13.58 10*3/MM3 (ref 3.4–10.8)

## 2022-11-13 PROCEDURE — 93005 ELECTROCARDIOGRAM TRACING: CPT

## 2022-11-13 PROCEDURE — 83880 ASSAY OF NATRIURETIC PEPTIDE: CPT | Performed by: NURSE PRACTITIONER

## 2022-11-13 PROCEDURE — 25010000002 ONDANSETRON PER 1 MG: Performed by: NURSE PRACTITIONER

## 2022-11-13 PROCEDURE — 87636 SARSCOV2 & INF A&B AMP PRB: CPT | Performed by: NURSE PRACTITIONER

## 2022-11-13 PROCEDURE — 36600 WITHDRAWAL OF ARTERIAL BLOOD: CPT

## 2022-11-13 PROCEDURE — 71045 X-RAY EXAM CHEST 1 VIEW: CPT

## 2022-11-13 PROCEDURE — 25010000002 CEFTRIAXONE PER 250 MG: Performed by: NURSE PRACTITIONER

## 2022-11-13 PROCEDURE — 71275 CT ANGIOGRAPHY CHEST: CPT

## 2022-11-13 PROCEDURE — 84484 ASSAY OF TROPONIN QUANT: CPT | Performed by: NURSE PRACTITIONER

## 2022-11-13 PROCEDURE — 86140 C-REACTIVE PROTEIN: CPT | Performed by: NURSE PRACTITIONER

## 2022-11-13 PROCEDURE — 93010 ELECTROCARDIOGRAM REPORT: CPT | Performed by: EMERGENCY MEDICINE

## 2022-11-13 PROCEDURE — 94640 AIRWAY INHALATION TREATMENT: CPT

## 2022-11-13 PROCEDURE — 0 IOPAMIDOL PER 1 ML: Performed by: NURSE PRACTITIONER

## 2022-11-13 PROCEDURE — 25010000002 METHYLPREDNISOLONE PER 125 MG: Performed by: NURSE PRACTITIONER

## 2022-11-13 PROCEDURE — 25010000002 AZITHROMYCIN PER 500 MG: Performed by: NURSE PRACTITIONER

## 2022-11-13 PROCEDURE — 84145 PROCALCITONIN (PCT): CPT | Performed by: NURSE PRACTITIONER

## 2022-11-13 PROCEDURE — 85379 FIBRIN DEGRADATION QUANT: CPT | Performed by: NURSE PRACTITIONER

## 2022-11-13 PROCEDURE — 25010000002 METOCLOPRAMIDE PER 10 MG: Performed by: NURSE PRACTITIONER

## 2022-11-13 PROCEDURE — 25010000002 PROCHLORPERAZINE 10 MG/2ML SOLUTION: Performed by: EMERGENCY MEDICINE

## 2022-11-13 PROCEDURE — 99285 EMERGENCY DEPT VISIT HI MDM: CPT

## 2022-11-13 PROCEDURE — 85025 COMPLETE CBC W/AUTO DIFF WBC: CPT | Performed by: NURSE PRACTITIONER

## 2022-11-13 PROCEDURE — 87040 BLOOD CULTURE FOR BACTERIA: CPT | Performed by: NURSE PRACTITIONER

## 2022-11-13 PROCEDURE — 36415 COLL VENOUS BLD VENIPUNCTURE: CPT

## 2022-11-13 PROCEDURE — 82803 BLOOD GASES ANY COMBINATION: CPT

## 2022-11-13 PROCEDURE — 80053 COMPREHEN METABOLIC PANEL: CPT | Performed by: NURSE PRACTITIONER

## 2022-11-13 PROCEDURE — 25010000002 DIPHENHYDRAMINE PER 50 MG: Performed by: NURSE PRACTITIONER

## 2022-11-13 RX ORDER — METHYLPREDNISOLONE SODIUM SUCCINATE 125 MG/2ML
125 INJECTION, POWDER, LYOPHILIZED, FOR SOLUTION INTRAMUSCULAR; INTRAVENOUS ONCE
Status: COMPLETED | OUTPATIENT
Start: 2022-11-13 | End: 2022-11-13

## 2022-11-13 RX ORDER — METOCLOPRAMIDE HYDROCHLORIDE 5 MG/ML
10 INJECTION INTRAMUSCULAR; INTRAVENOUS ONCE
Status: COMPLETED | OUTPATIENT
Start: 2022-11-13 | End: 2022-11-13

## 2022-11-13 RX ORDER — SODIUM CHLORIDE 0.9 % (FLUSH) 0.9 %
10 SYRINGE (ML) INJECTION AS NEEDED
Status: DISCONTINUED | OUTPATIENT
Start: 2022-11-13 | End: 2022-11-15 | Stop reason: HOSPADM

## 2022-11-13 RX ORDER — AMLODIPINE BESYLATE 5 MG/1
5 TABLET ORAL
Status: DISCONTINUED | OUTPATIENT
Start: 2022-11-14 | End: 2022-11-14

## 2022-11-13 RX ORDER — PROCHLORPERAZINE EDISYLATE 5 MG/ML
5 INJECTION INTRAMUSCULAR; INTRAVENOUS ONCE
Status: COMPLETED | OUTPATIENT
Start: 2022-11-13 | End: 2022-11-13

## 2022-11-13 RX ORDER — IPRATROPIUM BROMIDE AND ALBUTEROL SULFATE 2.5; .5 MG/3ML; MG/3ML
3 SOLUTION RESPIRATORY (INHALATION)
Status: DISCONTINUED | OUTPATIENT
Start: 2022-11-14 | End: 2022-11-15 | Stop reason: HOSPADM

## 2022-11-13 RX ORDER — ONDANSETRON 2 MG/ML
4 INJECTION INTRAMUSCULAR; INTRAVENOUS EVERY 6 HOURS PRN
Status: DISCONTINUED | OUTPATIENT
Start: 2022-11-13 | End: 2022-11-15 | Stop reason: HOSPADM

## 2022-11-13 RX ORDER — DIPHENHYDRAMINE HYDROCHLORIDE 50 MG/ML
50 INJECTION INTRAMUSCULAR; INTRAVENOUS
Status: COMPLETED | OUTPATIENT
Start: 2022-11-13 | End: 2022-11-13

## 2022-11-13 RX ORDER — IPRATROPIUM BROMIDE AND ALBUTEROL SULFATE 2.5; .5 MG/3ML; MG/3ML
3 SOLUTION RESPIRATORY (INHALATION) ONCE
Status: COMPLETED | OUTPATIENT
Start: 2022-11-13 | End: 2022-11-13

## 2022-11-13 RX ORDER — ONDANSETRON 2 MG/ML
4 INJECTION INTRAMUSCULAR; INTRAVENOUS ONCE
Status: COMPLETED | OUTPATIENT
Start: 2022-11-13 | End: 2022-11-13

## 2022-11-13 RX ORDER — METHYLPREDNISOLONE SODIUM SUCCINATE 40 MG/ML
40 INJECTION, POWDER, LYOPHILIZED, FOR SOLUTION INTRAMUSCULAR; INTRAVENOUS EVERY 6 HOURS
Status: DISCONTINUED | OUTPATIENT
Start: 2022-11-14 | End: 2022-11-14

## 2022-11-13 RX ADMIN — CEFTRIAXONE 1 G: 1 INJECTION, POWDER, FOR SOLUTION INTRAMUSCULAR; INTRAVENOUS at 22:30

## 2022-11-13 RX ADMIN — DIPHENHYDRAMINE HYDROCHLORIDE 50 MG: 50 INJECTION, SOLUTION INTRAMUSCULAR; INTRAVENOUS at 20:35

## 2022-11-13 RX ADMIN — AZITHROMYCIN DIHYDRATE 500 MG: 500 INJECTION, POWDER, LYOPHILIZED, FOR SOLUTION INTRAVENOUS at 23:12

## 2022-11-13 RX ADMIN — ONDANSETRON 4 MG: 2 INJECTION INTRAMUSCULAR; INTRAVENOUS at 20:35

## 2022-11-13 RX ADMIN — METHYLPREDNISOLONE SODIUM SUCCINATE 125 MG: 125 INJECTION, POWDER, FOR SOLUTION INTRAMUSCULAR; INTRAVENOUS at 18:55

## 2022-11-13 RX ADMIN — METOCLOPRAMIDE HYDROCHLORIDE 10 MG: 5 INJECTION INTRAMUSCULAR; INTRAVENOUS at 23:33

## 2022-11-13 RX ADMIN — IPRATROPIUM BROMIDE AND ALBUTEROL SULFATE 3 ML: 2.5; .5 SOLUTION RESPIRATORY (INHALATION) at 18:59

## 2022-11-13 RX ADMIN — ONDANSETRON 4 MG: 2 INJECTION INTRAMUSCULAR; INTRAVENOUS at 18:55

## 2022-11-13 RX ADMIN — IOPAMIDOL 73 ML: 755 INJECTION, SOLUTION INTRAVENOUS at 21:38

## 2022-11-13 RX ADMIN — PROCHLORPERAZINE EDISYLATE 5 MG: 5 INJECTION INTRAMUSCULAR; INTRAVENOUS at 21:59

## 2022-11-14 LAB
MRSA DNA SPEC QL NAA+PROBE: NORMAL
QT INTERVAL: 404 MS
QTC INTERVAL: 439 MS

## 2022-11-14 PROCEDURE — 25010000002 METHYLPREDNISOLONE PER 40 MG: Performed by: NURSE PRACTITIONER

## 2022-11-14 PROCEDURE — 94799 UNLISTED PULMONARY SVC/PX: CPT

## 2022-11-14 PROCEDURE — 25010000002 METHYLPREDNISOLONE PER 40 MG: Performed by: FAMILY MEDICINE

## 2022-11-14 PROCEDURE — 25010000002 CEFTRIAXONE PER 250 MG: Performed by: FAMILY MEDICINE

## 2022-11-14 PROCEDURE — 94760 N-INVAS EAR/PLS OXIMETRY 1: CPT

## 2022-11-14 PROCEDURE — 87205 SMEAR GRAM STAIN: CPT | Performed by: FAMILY MEDICINE

## 2022-11-14 PROCEDURE — 25010000002 AZITHROMYCIN PER 500 MG: Performed by: FAMILY MEDICINE

## 2022-11-14 PROCEDURE — 87641 MR-STAPH DNA AMP PROBE: CPT | Performed by: NURSE PRACTITIONER

## 2022-11-14 PROCEDURE — 94664 DEMO&/EVAL PT USE INHALER: CPT

## 2022-11-14 PROCEDURE — 25010000002 ENOXAPARIN PER 10 MG: Performed by: FAMILY MEDICINE

## 2022-11-14 PROCEDURE — 99222 1ST HOSP IP/OBS MODERATE 55: CPT | Performed by: INTERNAL MEDICINE

## 2022-11-14 RX ORDER — GUAIFENESIN 600 MG/1
1200 TABLET, EXTENDED RELEASE ORAL EVERY 12 HOURS SCHEDULED
Status: DISCONTINUED | OUTPATIENT
Start: 2022-11-14 | End: 2022-11-15 | Stop reason: HOSPADM

## 2022-11-14 RX ORDER — ASPIRIN 81 MG/1
81 TABLET ORAL DAILY
COMMUNITY
End: 2022-12-30 | Stop reason: SDDI

## 2022-11-14 RX ORDER — POLYETHYLENE GLYCOL 3350 17 G/17G
17 POWDER, FOR SOLUTION ORAL 2 TIMES DAILY
Status: DISCONTINUED | OUTPATIENT
Start: 2022-11-14 | End: 2022-11-15 | Stop reason: HOSPADM

## 2022-11-14 RX ORDER — MULTIVIT WITH MINERALS/LUTEIN
1000 TABLET ORAL DAILY
COMMUNITY
End: 2022-12-30 | Stop reason: SDDI

## 2022-11-14 RX ORDER — ENOXAPARIN SODIUM 100 MG/ML
40 INJECTION SUBCUTANEOUS EVERY 24 HOURS
Status: DISCONTINUED | OUTPATIENT
Start: 2022-11-14 | End: 2022-11-15 | Stop reason: HOSPADM

## 2022-11-14 RX ORDER — BISACODYL 10 MG
10 SUPPOSITORY, RECTAL RECTAL DAILY PRN
Status: DISCONTINUED | OUTPATIENT
Start: 2022-11-14 | End: 2022-11-15 | Stop reason: HOSPADM

## 2022-11-14 RX ORDER — AMLODIPINE BESYLATE 5 MG/1
5 TABLET ORAL
Status: DISCONTINUED | OUTPATIENT
Start: 2022-11-14 | End: 2022-11-15 | Stop reason: HOSPADM

## 2022-11-14 RX ORDER — BISACODYL 10 MG
10 SUPPOSITORY, RECTAL RECTAL ONCE
Status: COMPLETED | OUTPATIENT
Start: 2022-11-14 | End: 2022-11-14

## 2022-11-14 RX ORDER — METHYLPREDNISOLONE SODIUM SUCCINATE 40 MG/ML
40 INJECTION, POWDER, LYOPHILIZED, FOR SOLUTION INTRAMUSCULAR; INTRAVENOUS EVERY 8 HOURS
Status: DISCONTINUED | OUTPATIENT
Start: 2022-11-14 | End: 2022-11-15

## 2022-11-14 RX ADMIN — IPRATROPIUM BROMIDE AND ALBUTEROL SULFATE 3 ML: 2.5; .5 SOLUTION RESPIRATORY (INHALATION) at 07:13

## 2022-11-14 RX ADMIN — AMLODIPINE BESYLATE 5 MG: 5 TABLET ORAL at 20:25

## 2022-11-14 RX ADMIN — IPRATROPIUM BROMIDE AND ALBUTEROL SULFATE 3 ML: 2.5; .5 SOLUTION RESPIRATORY (INHALATION) at 19:36

## 2022-11-14 RX ADMIN — ENOXAPARIN SODIUM 40 MG: 100 INJECTION SUBCUTANEOUS at 14:39

## 2022-11-14 RX ADMIN — GUAIFENESIN 1200 MG: 600 TABLET, EXTENDED RELEASE ORAL at 20:25

## 2022-11-14 RX ADMIN — METHYLPREDNISOLONE SODIUM SUCCINATE 40 MG: 40 INJECTION, POWDER, FOR SOLUTION INTRAMUSCULAR; INTRAVENOUS at 12:31

## 2022-11-14 RX ADMIN — CEFTRIAXONE SODIUM 1 G: 1 INJECTION, POWDER, FOR SOLUTION INTRAMUSCULAR; INTRAVENOUS at 22:33

## 2022-11-14 RX ADMIN — METHYLPREDNISOLONE SODIUM SUCCINATE 40 MG: 40 INJECTION, POWDER, FOR SOLUTION INTRAMUSCULAR; INTRAVENOUS at 01:05

## 2022-11-14 RX ADMIN — IPRATROPIUM BROMIDE AND ALBUTEROL SULFATE 3 ML: 2.5; .5 SOLUTION RESPIRATORY (INHALATION) at 11:15

## 2022-11-14 RX ADMIN — SODIUM CHLORIDE 500 MG: 9 INJECTION, SOLUTION INTRAVENOUS at 21:34

## 2022-11-14 RX ADMIN — IPRATROPIUM BROMIDE AND ALBUTEROL SULFATE 3 ML: 2.5; .5 SOLUTION RESPIRATORY (INHALATION) at 15:02

## 2022-11-14 RX ADMIN — METHYLPREDNISOLONE SODIUM SUCCINATE 40 MG: 40 INJECTION, POWDER, FOR SOLUTION INTRAMUSCULAR; INTRAVENOUS at 20:25

## 2022-11-14 RX ADMIN — BISACODYL 10 MG: 10 SUPPOSITORY RECTAL at 14:39

## 2022-11-14 RX ADMIN — AMLODIPINE BESYLATE 5 MG: 5 TABLET ORAL at 01:05

## 2022-11-14 RX ADMIN — POLYETHYLENE GLYCOL 3350 17 G: 17 POWDER, FOR SOLUTION ORAL at 20:26

## 2022-11-14 RX ADMIN — METHYLPREDNISOLONE SODIUM SUCCINATE 40 MG: 40 INJECTION, POWDER, FOR SOLUTION INTRAMUSCULAR; INTRAVENOUS at 06:08

## 2022-11-15 ENCOUNTER — READMISSION MANAGEMENT (OUTPATIENT)
Dept: CALL CENTER | Facility: HOSPITAL | Age: 72
End: 2022-11-15

## 2022-11-15 VITALS
DIASTOLIC BLOOD PRESSURE: 56 MMHG | RESPIRATION RATE: 16 BRPM | TEMPERATURE: 98 F | WEIGHT: 148 LBS | OXYGEN SATURATION: 95 % | HEIGHT: 74 IN | BODY MASS INDEX: 18.99 KG/M2 | HEART RATE: 64 BPM | SYSTOLIC BLOOD PRESSURE: 130 MMHG

## 2022-11-15 LAB
ANION GAP SERPL CALCULATED.3IONS-SCNC: 12 MMOL/L (ref 5–15)
BACTERIA SPEC RESP CULT: NORMAL
BASOPHILS # BLD AUTO: 0.01 10*3/MM3 (ref 0–0.2)
BASOPHILS NFR BLD AUTO: 0.1 % (ref 0–1.5)
BUN SERPL-MCNC: 32 MG/DL (ref 8–23)
BUN/CREAT SERPL: 36.4 (ref 7–25)
CALCIUM SPEC-SCNC: 9 MG/DL (ref 8.6–10.5)
CHLORIDE SERPL-SCNC: 101 MMOL/L (ref 98–107)
CO2 SERPL-SCNC: 25 MMOL/L (ref 22–29)
CREAT SERPL-MCNC: 0.88 MG/DL (ref 0.76–1.27)
DEPRECATED RDW RBC AUTO: 42.1 FL (ref 37–54)
EGFRCR SERPLBLD CKD-EPI 2021: 92.5 ML/MIN/1.73
EOSINOPHIL # BLD AUTO: 0 10*3/MM3 (ref 0–0.4)
EOSINOPHIL NFR BLD AUTO: 0 % (ref 0.3–6.2)
ERYTHROCYTE [DISTWIDTH] IN BLOOD BY AUTOMATED COUNT: 12.5 % (ref 12.3–15.4)
GLUCOSE SERPL-MCNC: 148 MG/DL (ref 65–99)
GRAM STN SPEC: NORMAL
HCT VFR BLD AUTO: 41 % (ref 37.5–51)
HGB BLD-MCNC: 14.1 G/DL (ref 13–17.7)
LYMPHOCYTES # BLD AUTO: 0.62 10*3/MM3 (ref 0.7–3.1)
LYMPHOCYTES NFR BLD AUTO: 4.3 % (ref 19.6–45.3)
MCH RBC QN AUTO: 31.6 PG (ref 26.6–33)
MCHC RBC AUTO-ENTMCNC: 34.4 G/DL (ref 31.5–35.7)
MCV RBC AUTO: 91.9 FL (ref 79–97)
MONOCYTES # BLD AUTO: 0.61 10*3/MM3 (ref 0.1–0.9)
MONOCYTES NFR BLD AUTO: 4.2 % (ref 5–12)
NEUTROPHILS NFR BLD AUTO: 13.03 10*3/MM3 (ref 1.7–7)
NEUTROPHILS NFR BLD AUTO: 90.7 % (ref 42.7–76)
PLATELET # BLD AUTO: 320 10*3/MM3 (ref 140–450)
PMV BLD AUTO: 9.5 FL (ref 6–12)
POTASSIUM SERPL-SCNC: 3.7 MMOL/L (ref 3.5–5.2)
RBC # BLD AUTO: 4.46 10*6/MM3 (ref 4.14–5.8)
SODIUM SERPL-SCNC: 138 MMOL/L (ref 136–145)
WBC NRBC COR # BLD: 14.37 10*3/MM3 (ref 3.4–10.8)

## 2022-11-15 PROCEDURE — 63710000001 PREDNISONE PER 1 MG: Performed by: NURSE PRACTITIONER

## 2022-11-15 PROCEDURE — 85025 COMPLETE CBC W/AUTO DIFF WBC: CPT | Performed by: FAMILY MEDICINE

## 2022-11-15 PROCEDURE — 94664 DEMO&/EVAL PT USE INHALER: CPT

## 2022-11-15 PROCEDURE — 80048 BASIC METABOLIC PNL TOTAL CA: CPT | Performed by: FAMILY MEDICINE

## 2022-11-15 PROCEDURE — 94799 UNLISTED PULMONARY SVC/PX: CPT

## 2022-11-15 PROCEDURE — 99232 SBSQ HOSP IP/OBS MODERATE 35: CPT | Performed by: INTERNAL MEDICINE

## 2022-11-15 PROCEDURE — 25010000002 METHYLPREDNISOLONE PER 40 MG: Performed by: NURSE PRACTITIONER

## 2022-11-15 RX ORDER — IPRATROPIUM BROMIDE AND ALBUTEROL SULFATE 2.5; .5 MG/3ML; MG/3ML
3 SOLUTION RESPIRATORY (INHALATION)
Qty: 360 ML | Refills: 0 | Status: SHIPPED | OUTPATIENT
Start: 2022-11-15 | End: 2022-12-30 | Stop reason: SDDI

## 2022-11-15 RX ORDER — PREDNISONE 1 MG/1
5 TABLET ORAL DAILY
Status: DISCONTINUED | OUTPATIENT
Start: 2022-11-24 | End: 2022-11-15 | Stop reason: HOSPADM

## 2022-11-15 RX ORDER — PREDNISONE 10 MG/1
10 TABLET ORAL DAILY
Qty: 3 TABLET | Refills: 0 | Status: SHIPPED | OUTPATIENT
Start: 2022-11-21 | End: 2022-11-24

## 2022-11-15 RX ORDER — CEFDINIR 300 MG/1
300 CAPSULE ORAL EVERY 12 HOURS SCHEDULED
Status: DISCONTINUED | OUTPATIENT
Start: 2022-11-15 | End: 2022-11-15 | Stop reason: HOSPADM

## 2022-11-15 RX ORDER — PREDNISONE 20 MG/1
20 TABLET ORAL DAILY
Qty: 3 TABLET | Refills: 0 | Status: SHIPPED | OUTPATIENT
Start: 2022-11-18 | End: 2022-11-21

## 2022-11-15 RX ORDER — PREDNISONE 1 MG/1
5 TABLET ORAL DAILY
Qty: 3 TABLET | Refills: 0 | Status: SHIPPED | OUTPATIENT
Start: 2022-11-24 | End: 2022-11-27

## 2022-11-15 RX ORDER — ONDANSETRON 4 MG/1
4 TABLET, FILM COATED ORAL EVERY 8 HOURS PRN
Qty: 10 TABLET | Refills: 0 | Status: SHIPPED | OUTPATIENT
Start: 2022-11-15 | End: 2022-12-30 | Stop reason: SDDI

## 2022-11-15 RX ORDER — PREDNISONE 20 MG/1
40 TABLET ORAL DAILY
Qty: 4 TABLET | Refills: 0 | Status: SHIPPED | OUTPATIENT
Start: 2022-11-16 | End: 2022-11-18

## 2022-11-15 RX ORDER — PREDNISONE 20 MG/1
40 TABLET ORAL DAILY
Status: DISCONTINUED | OUTPATIENT
Start: 2022-11-15 | End: 2022-11-15 | Stop reason: HOSPADM

## 2022-11-15 RX ORDER — GUAIFENESIN 600 MG/1
1200 TABLET, EXTENDED RELEASE ORAL EVERY 12 HOURS SCHEDULED
Qty: 30 TABLET | Refills: 0 | Status: SHIPPED | OUTPATIENT
Start: 2022-11-15 | End: 2022-12-30 | Stop reason: SDDI

## 2022-11-15 RX ORDER — CEFDINIR 300 MG/1
300 CAPSULE ORAL EVERY 12 HOURS SCHEDULED
Qty: 13 CAPSULE | Refills: 0 | Status: SHIPPED | OUTPATIENT
Start: 2022-11-15 | End: 2022-11-22

## 2022-11-15 RX ORDER — PREDNISONE 20 MG/1
20 TABLET ORAL DAILY
Status: DISCONTINUED | OUTPATIENT
Start: 2022-11-18 | End: 2022-11-15 | Stop reason: HOSPADM

## 2022-11-15 RX ORDER — POLYETHYLENE GLYCOL 3350 17 G/17G
17 POWDER, FOR SOLUTION ORAL 2 TIMES DAILY
Qty: 1 EACH | Refills: 0 | Status: SHIPPED | OUTPATIENT
Start: 2022-11-15 | End: 2022-12-30 | Stop reason: SDDI

## 2022-11-15 RX ORDER — PREDNISONE 10 MG/1
10 TABLET ORAL DAILY
Status: DISCONTINUED | OUTPATIENT
Start: 2022-11-21 | End: 2022-11-15 | Stop reason: HOSPADM

## 2022-11-15 RX ADMIN — GUAIFENESIN 1200 MG: 600 TABLET, EXTENDED RELEASE ORAL at 08:07

## 2022-11-15 RX ADMIN — PREDNISONE 40 MG: 20 TABLET ORAL at 09:54

## 2022-11-15 RX ADMIN — CEFDINIR 300 MG: 300 CAPSULE ORAL at 09:54

## 2022-11-15 RX ADMIN — IPRATROPIUM BROMIDE AND ALBUTEROL SULFATE 3 ML: 2.5; .5 SOLUTION RESPIRATORY (INHALATION) at 13:47

## 2022-11-15 RX ADMIN — IPRATROPIUM BROMIDE AND ALBUTEROL SULFATE 3 ML: 2.5; .5 SOLUTION RESPIRATORY (INHALATION) at 05:59

## 2022-11-15 RX ADMIN — POLYETHYLENE GLYCOL 3350 17 G: 17 POWDER, FOR SOLUTION ORAL at 08:07

## 2022-11-15 RX ADMIN — IPRATROPIUM BROMIDE AND ALBUTEROL SULFATE 3 ML: 2.5; .5 SOLUTION RESPIRATORY (INHALATION) at 10:00

## 2022-11-15 RX ADMIN — METHYLPREDNISOLONE SODIUM SUCCINATE 40 MG: 40 INJECTION, POWDER, FOR SOLUTION INTRAMUSCULAR; INTRAVENOUS at 05:25

## 2022-11-18 LAB
BACTERIA SPEC AEROBE CULT: NORMAL
BACTERIA SPEC AEROBE CULT: NORMAL

## 2022-11-22 ENCOUNTER — READMISSION MANAGEMENT (OUTPATIENT)
Dept: CALL CENTER | Facility: HOSPITAL | Age: 72
End: 2022-11-22

## 2022-11-26 ENCOUNTER — HOSPITAL ENCOUNTER (EMERGENCY)
Facility: HOSPITAL | Age: 72
Discharge: HOME | End: 2022-11-26
Attending: EMERGENCY MEDICINE | Admitting: EMERGENCY MEDICINE

## 2022-11-26 ENCOUNTER — APPOINTMENT (OUTPATIENT)
Dept: GENERAL RADIOLOGY | Facility: HOSPITAL | Age: 72
End: 2022-11-26

## 2022-11-26 VITALS
SYSTOLIC BLOOD PRESSURE: 126 MMHG | RESPIRATION RATE: 18 BRPM | BODY MASS INDEX: 18.99 KG/M2 | TEMPERATURE: 98.4 F | HEART RATE: 68 BPM | WEIGHT: 148 LBS | DIASTOLIC BLOOD PRESSURE: 68 MMHG | HEIGHT: 74 IN | OXYGEN SATURATION: 94 %

## 2022-11-26 DIAGNOSIS — K52.9 GASTROENTERITIS: ICD-10-CM

## 2022-11-26 DIAGNOSIS — J06.9 UPPER RESPIRATORY TRACT INFECTION, UNSPECIFIED TYPE: ICD-10-CM

## 2022-11-26 DIAGNOSIS — J10.1 INFLUENZA A: Primary | ICD-10-CM

## 2022-11-26 LAB
ALBUMIN SERPL-MCNC: 3.9 G/DL (ref 3.5–5.2)
ALBUMIN/GLOB SERPL: 1.1 G/DL
ALP SERPL-CCNC: 181 U/L (ref 39–117)
ALT SERPL W P-5'-P-CCNC: 52 U/L (ref 1–41)
ANION GAP SERPL CALCULATED.3IONS-SCNC: 13 MMOL/L (ref 5–15)
AST SERPL-CCNC: 35 U/L (ref 1–40)
BACTERIA UR QL AUTO: ABNORMAL /HPF
BASOPHILS # BLD AUTO: 0.03 10*3/MM3 (ref 0–0.2)
BASOPHILS NFR BLD AUTO: 0.3 % (ref 0–1.5)
BILIRUB SERPL-MCNC: 1.2 MG/DL (ref 0–1.2)
BILIRUB UR QL STRIP: ABNORMAL
BUN SERPL-MCNC: 23 MG/DL (ref 8–23)
BUN/CREAT SERPL: 20.9 (ref 7–25)
CALCIUM SPEC-SCNC: 9 MG/DL (ref 8.6–10.5)
CHLORIDE SERPL-SCNC: 97 MMOL/L (ref 98–107)
CLARITY UR: CLEAR
CO2 SERPL-SCNC: 25 MMOL/L (ref 22–29)
COLOR UR: ABNORMAL
CREAT SERPL-MCNC: 1.1 MG/DL (ref 0.76–1.27)
D-LACTATE SERPL-SCNC: 2.6 MMOL/L (ref 0.5–2)
DEPRECATED RDW RBC AUTO: 45 FL (ref 37–54)
EGFRCR SERPLBLD CKD-EPI 2021: 72.2 ML/MIN/1.73
EOSINOPHIL # BLD AUTO: 0.06 10*3/MM3 (ref 0–0.4)
EOSINOPHIL NFR BLD AUTO: 0.6 % (ref 0.3–6.2)
ERYTHROCYTE [DISTWIDTH] IN BLOOD BY AUTOMATED COUNT: 14.2 % (ref 12.3–15.4)
FLUAV RNA RESP QL NAA+PROBE: DETECTED
FLUBV RNA RESP QL NAA+PROBE: NOT DETECTED
GLOBULIN UR ELPH-MCNC: 3.7 GM/DL
GLUCOSE SERPL-MCNC: 102 MG/DL (ref 65–99)
GLUCOSE UR STRIP-MCNC: NEGATIVE MG/DL
HCT VFR BLD AUTO: 40.7 % (ref 37.5–51)
HGB BLD-MCNC: 14.4 G/DL (ref 13–17.7)
HGB UR QL STRIP.AUTO: NEGATIVE
HYALINE CASTS UR QL AUTO: ABNORMAL /LPF
IMM GRANULOCYTES # BLD AUTO: 0.06 10*3/MM3 (ref 0–0.05)
IMM GRANULOCYTES NFR BLD AUTO: 0.6 % (ref 0–0.5)
KETONES UR QL STRIP: NEGATIVE
LEUKOCYTE ESTERASE UR QL STRIP.AUTO: NEGATIVE
LIPASE SERPL-CCNC: 33 U/L (ref 13–60)
LYMPHOCYTES # BLD AUTO: 0.97 10*3/MM3 (ref 0.7–3.1)
LYMPHOCYTES NFR BLD AUTO: 9.1 % (ref 19.6–45.3)
MAGNESIUM SERPL-MCNC: 2.4 MG/DL (ref 1.6–2.4)
MCH RBC QN AUTO: 34.4 PG (ref 26.6–33)
MCHC RBC AUTO-ENTMCNC: 35.4 G/DL (ref 31.5–35.7)
MCV RBC AUTO: 97.4 FL (ref 79–97)
MONOCYTES # BLD AUTO: 0.96 10*3/MM3 (ref 0.1–0.9)
MONOCYTES NFR BLD AUTO: 9 % (ref 5–12)
NEUTROPHILS NFR BLD AUTO: 8.57 10*3/MM3 (ref 1.7–7)
NEUTROPHILS NFR BLD AUTO: 80.4 % (ref 42.7–76)
NITRITE UR QL STRIP: NEGATIVE
NRBC BLD AUTO-RTO: 0 /100 WBC (ref 0–0.2)
PH UR STRIP.AUTO: 5.5 [PH] (ref 5–8)
PLATELET # BLD AUTO: 490 10*3/MM3 (ref 140–450)
PMV BLD AUTO: 8.3 FL (ref 6–12)
POTASSIUM SERPL-SCNC: 4.2 MMOL/L (ref 3.5–5.2)
PROCALCITONIN SERPL-MCNC: 0.11 NG/ML (ref 0–0.25)
PROT SERPL-MCNC: 7.6 G/DL (ref 6–8.5)
PROT UR QL STRIP: ABNORMAL
RBC # BLD AUTO: 4.18 10*6/MM3 (ref 4.14–5.8)
RBC # UR STRIP: ABNORMAL /HPF
REF LAB TEST METHOD: ABNORMAL
RSV RNA NPH QL NAA+NON-PROBE: NOT DETECTED
SARS-COV-2 RNA RESP QL NAA+PROBE: NOT DETECTED
SODIUM SERPL-SCNC: 135 MMOL/L (ref 136–145)
SP GR UR STRIP: 1.03 (ref 1–1.03)
SQUAMOUS #/AREA URNS HPF: ABNORMAL /HPF
UROBILINOGEN UR QL STRIP: ABNORMAL
WBC # UR STRIP: ABNORMAL /HPF
WBC NRBC COR # BLD: 10.65 10*3/MM3 (ref 3.4–10.8)

## 2022-11-26 PROCEDURE — 96375 TX/PRO/DX INJ NEW DRUG ADDON: CPT

## 2022-11-26 PROCEDURE — 83735 ASSAY OF MAGNESIUM: CPT | Performed by: PHYSICIAN ASSISTANT

## 2022-11-26 PROCEDURE — 25010000002 KETOROLAC TROMETHAMINE PER 15 MG: Performed by: EMERGENCY MEDICINE

## 2022-11-26 PROCEDURE — 99284 EMERGENCY DEPT VISIT MOD MDM: CPT

## 2022-11-26 PROCEDURE — 84145 PROCALCITONIN (PCT): CPT | Performed by: PHYSICIAN ASSISTANT

## 2022-11-26 PROCEDURE — 83605 ASSAY OF LACTIC ACID: CPT | Performed by: PHYSICIAN ASSISTANT

## 2022-11-26 PROCEDURE — 96374 THER/PROPH/DIAG INJ IV PUSH: CPT

## 2022-11-26 PROCEDURE — 83690 ASSAY OF LIPASE: CPT | Performed by: PHYSICIAN ASSISTANT

## 2022-11-26 PROCEDURE — 25010000002 ONDANSETRON PER 1 MG: Performed by: EMERGENCY MEDICINE

## 2022-11-26 PROCEDURE — 81001 URINALYSIS AUTO W/SCOPE: CPT | Performed by: PHYSICIAN ASSISTANT

## 2022-11-26 PROCEDURE — 80053 COMPREHEN METABOLIC PANEL: CPT | Performed by: PHYSICIAN ASSISTANT

## 2022-11-26 PROCEDURE — 71045 X-RAY EXAM CHEST 1 VIEW: CPT

## 2022-11-26 PROCEDURE — 85025 COMPLETE CBC W/AUTO DIFF WBC: CPT | Performed by: PHYSICIAN ASSISTANT

## 2022-11-26 PROCEDURE — 36415 COLL VENOUS BLD VENIPUNCTURE: CPT

## 2022-11-26 PROCEDURE — 87637 SARSCOV2&INF A&B&RSV AMP PRB: CPT | Performed by: PHYSICIAN ASSISTANT

## 2022-11-26 RX ORDER — ONDANSETRON 2 MG/ML
4 INJECTION INTRAMUSCULAR; INTRAVENOUS ONCE
Status: COMPLETED | OUTPATIENT
Start: 2022-11-26 | End: 2022-11-26

## 2022-11-26 RX ORDER — KETOROLAC TROMETHAMINE 15 MG/ML
15 INJECTION, SOLUTION INTRAMUSCULAR; INTRAVENOUS ONCE
Status: COMPLETED | OUTPATIENT
Start: 2022-11-26 | End: 2022-11-26

## 2022-11-26 RX ADMIN — KETOROLAC TROMETHAMINE 15 MG: 15 INJECTION, SOLUTION INTRAMUSCULAR; INTRAVENOUS at 17:25

## 2022-11-26 RX ADMIN — SODIUM CHLORIDE, POTASSIUM CHLORIDE, SODIUM LACTATE AND CALCIUM CHLORIDE 1000 ML: 600; 310; 30; 20 INJECTION, SOLUTION INTRAVENOUS at 17:20

## 2022-11-26 RX ADMIN — ONDANSETRON 4 MG: 2 INJECTION INTRAMUSCULAR; INTRAVENOUS at 17:19

## 2022-12-09 ENCOUNTER — READMISSION MANAGEMENT (OUTPATIENT)
Dept: CALL CENTER | Facility: HOSPITAL | Age: 72
End: 2022-12-09

## 2022-12-30 ENCOUNTER — OFFICE VISIT (OUTPATIENT)
Dept: PULMONOLOGY | Facility: CLINIC | Age: 72
End: 2022-12-30

## 2022-12-30 ENCOUNTER — HOSPITAL ENCOUNTER (OUTPATIENT)
Dept: GENERAL RADIOLOGY | Facility: HOSPITAL | Age: 72
Discharge: HOME | End: 2022-12-30
Admitting: NURSE PRACTITIONER

## 2022-12-30 VITALS
HEART RATE: 78 BPM | HEIGHT: 74 IN | DIASTOLIC BLOOD PRESSURE: 74 MMHG | WEIGHT: 154.4 LBS | OXYGEN SATURATION: 97 % | BODY MASS INDEX: 19.81 KG/M2 | SYSTOLIC BLOOD PRESSURE: 132 MMHG

## 2022-12-30 DIAGNOSIS — J18.9 MULTIFOCAL PNEUMONIA: ICD-10-CM

## 2022-12-30 DIAGNOSIS — Z87.01 HISTORY OF PNEUMONIA: ICD-10-CM

## 2022-12-30 DIAGNOSIS — F17.200 CURRENT EVERY DAY SMOKER: ICD-10-CM

## 2022-12-30 DIAGNOSIS — Z87.891 PERSONAL HISTORY OF NICOTINE DEPENDENCE: ICD-10-CM

## 2022-12-30 DIAGNOSIS — J43.9 PULMONARY EMPHYSEMA, UNSPECIFIED EMPHYSEMA TYPE: Primary | ICD-10-CM

## 2022-12-30 PROCEDURE — 71046 X-RAY EXAM CHEST 2 VIEWS: CPT

## 2022-12-30 PROCEDURE — 99214 OFFICE O/P EST MOD 30 MIN: CPT | Performed by: NURSE PRACTITIONER

## 2022-12-30 RX ORDER — ASPIRIN 81 MG/1
81 TABLET ORAL DAILY
COMMUNITY

## 2023-01-04 DIAGNOSIS — Z87.01 HISTORY OF PNEUMONIA: Primary | ICD-10-CM

## 2023-01-04 DIAGNOSIS — J18.9 COMMUNITY ACQUIRED PNEUMONIA OF RIGHT LOWER LOBE OF LUNG: ICD-10-CM

## 2023-01-04 RX ORDER — DOXYCYCLINE HYCLATE 100 MG/1
100 CAPSULE ORAL 2 TIMES DAILY
Qty: 20 CAPSULE | Refills: 0 | Status: SHIPPED | OUTPATIENT
Start: 2023-01-04 | End: 2023-03-03

## 2023-02-21 ENCOUNTER — HOSPITAL ENCOUNTER (OUTPATIENT)
Dept: GENERAL RADIOLOGY | Facility: HOSPITAL | Age: 73
Discharge: HOME | End: 2023-02-21
Admitting: NURSE PRACTITIONER
Payer: MEDICARE

## 2023-02-21 DIAGNOSIS — J18.9 COMMUNITY ACQUIRED PNEUMONIA OF RIGHT LOWER LOBE OF LUNG: ICD-10-CM

## 2023-02-21 PROCEDURE — 71046 X-RAY EXAM CHEST 2 VIEWS: CPT

## 2023-02-22 DIAGNOSIS — R91.8 LUNG INFILTRATE: ICD-10-CM

## 2023-02-22 DIAGNOSIS — Z87.01 HISTORY OF PNEUMONIA: Primary | ICD-10-CM

## 2023-02-22 DIAGNOSIS — R93.89 ABNORMAL CXR: ICD-10-CM

## 2023-02-22 DIAGNOSIS — F17.200 CURRENT EVERY DAY SMOKER: ICD-10-CM

## 2023-02-23 ENCOUNTER — TELEPHONE (OUTPATIENT)
Dept: VASCULAR SURGERY | Facility: CLINIC | Age: 73
End: 2023-02-23
Payer: MEDICARE

## 2023-03-02 ENCOUNTER — HOSPITAL ENCOUNTER (OUTPATIENT)
Dept: CT IMAGING | Facility: HOSPITAL | Age: 73
Discharge: HOME | End: 2023-03-02
Admitting: NURSE PRACTITIONER
Payer: MEDICARE

## 2023-03-02 DIAGNOSIS — Z87.01 HISTORY OF PNEUMONIA: ICD-10-CM

## 2023-03-02 DIAGNOSIS — F17.200 CURRENT EVERY DAY SMOKER: ICD-10-CM

## 2023-03-02 DIAGNOSIS — R93.89 ABNORMAL CXR: ICD-10-CM

## 2023-03-02 DIAGNOSIS — R91.8 LUNG INFILTRATE: ICD-10-CM

## 2023-03-02 PROCEDURE — 71250 CT THORAX DX C-: CPT

## 2023-03-03 ENCOUNTER — OFFICE VISIT (OUTPATIENT)
Dept: PULMONOLOGY | Facility: CLINIC | Age: 73
End: 2023-03-03
Payer: MEDICARE

## 2023-03-03 VITALS
HEIGHT: 74 IN | DIASTOLIC BLOOD PRESSURE: 82 MMHG | OXYGEN SATURATION: 99 % | WEIGHT: 159 LBS | HEART RATE: 56 BPM | SYSTOLIC BLOOD PRESSURE: 142 MMHG | BODY MASS INDEX: 20.41 KG/M2

## 2023-03-03 DIAGNOSIS — Z92.3 HISTORY OF RADIATION THERAPY: ICD-10-CM

## 2023-03-03 DIAGNOSIS — J43.9 PULMONARY EMPHYSEMA, UNSPECIFIED EMPHYSEMA TYPE: Primary | ICD-10-CM

## 2023-03-03 DIAGNOSIS — C81.11 NODULAR SCLEROSIS HODGKIN LYMPHOMA OF LYMPH NODES OF NECK: ICD-10-CM

## 2023-03-03 DIAGNOSIS — R91.8 MULTIPLE LUNG NODULES: ICD-10-CM

## 2023-03-03 DIAGNOSIS — F17.200 CURRENT EVERY DAY SMOKER: ICD-10-CM

## 2023-03-03 PROCEDURE — 99214 OFFICE O/P EST MOD 30 MIN: CPT | Performed by: NURSE PRACTITIONER

## 2023-03-03 RX ORDER — BUDESONIDE AND FORMOTEROL FUMARATE DIHYDRATE 160; 4.5 UG/1; UG/1
AEROSOL RESPIRATORY (INHALATION)
COMMUNITY
Start: 2023-01-04

## 2023-06-02 ENCOUNTER — HOSPITAL ENCOUNTER (OUTPATIENT)
Dept: CT IMAGING | Facility: HOSPITAL | Age: 73
Discharge: HOME | End: 2023-06-02

## 2023-06-02 DIAGNOSIS — R91.8 MULTIPLE LUNG NODULES: ICD-10-CM

## 2023-06-02 DIAGNOSIS — F17.200 CURRENT EVERY DAY SMOKER: ICD-10-CM

## 2023-06-02 PROCEDURE — 71250 CT THORAX DX C-: CPT

## 2023-06-05 ENCOUNTER — OFFICE VISIT (OUTPATIENT)
Dept: PULMONOLOGY | Facility: CLINIC | Age: 73
End: 2023-06-05
Payer: MEDICARE

## 2023-06-05 VITALS
BODY MASS INDEX: 19.89 KG/M2 | HEIGHT: 74 IN | WEIGHT: 155 LBS | SYSTOLIC BLOOD PRESSURE: 132 MMHG | HEART RATE: 65 BPM | OXYGEN SATURATION: 97 % | DIASTOLIC BLOOD PRESSURE: 76 MMHG

## 2023-06-05 DIAGNOSIS — Z92.3 HISTORY OF RADIATION THERAPY: ICD-10-CM

## 2023-06-05 DIAGNOSIS — R91.8 MULTIPLE LUNG NODULES: ICD-10-CM

## 2023-06-05 DIAGNOSIS — J43.9 PULMONARY EMPHYSEMA, UNSPECIFIED EMPHYSEMA TYPE: Primary | ICD-10-CM

## 2023-06-05 DIAGNOSIS — C81.11 NODULAR SCLEROSIS HODGKIN LYMPHOMA OF LYMPH NODES OF NECK: ICD-10-CM

## 2023-06-05 DIAGNOSIS — F17.200 CURRENT EVERY DAY SMOKER: ICD-10-CM

## 2023-06-05 DIAGNOSIS — R91.1 NODULE OF RIGHT LUNG: ICD-10-CM

## 2023-06-05 PROCEDURE — 1159F MED LIST DOCD IN RCRD: CPT | Performed by: NURSE PRACTITIONER

## 2023-06-05 PROCEDURE — 99214 OFFICE O/P EST MOD 30 MIN: CPT | Performed by: NURSE PRACTITIONER

## 2023-06-05 PROCEDURE — 1160F RVW MEDS BY RX/DR IN RCRD: CPT | Performed by: NURSE PRACTITIONER

## 2023-06-07 DIAGNOSIS — R91.8 MULTIPLE LUNG NODULES: Primary | ICD-10-CM

## 2023-08-28 ENCOUNTER — TELEPHONE (OUTPATIENT)
Dept: VASCULAR SURGERY | Facility: CLINIC | Age: 73
End: 2023-08-28
Payer: MEDICARE

## 2023-08-31 ENCOUNTER — HOSPITAL ENCOUNTER (OUTPATIENT)
Dept: CT IMAGING | Facility: HOSPITAL | Age: 73
Discharge: HOME | End: 2023-08-31
Admitting: NURSE PRACTITIONER
Payer: MEDICARE

## 2023-08-31 DIAGNOSIS — R91.8 MULTIPLE LUNG NODULES: ICD-10-CM

## 2023-08-31 PROCEDURE — 71250 CT THORAX DX C-: CPT

## 2023-09-07 ENCOUNTER — OFFICE VISIT (OUTPATIENT)
Dept: PULMONOLOGY | Facility: CLINIC | Age: 73
End: 2023-09-07
Payer: MEDICARE

## 2023-09-07 VITALS
OXYGEN SATURATION: 97 % | HEIGHT: 74 IN | SYSTOLIC BLOOD PRESSURE: 136 MMHG | HEART RATE: 60 BPM | BODY MASS INDEX: 20.15 KG/M2 | DIASTOLIC BLOOD PRESSURE: 76 MMHG | RESPIRATION RATE: 16 BRPM | WEIGHT: 157 LBS

## 2023-09-07 DIAGNOSIS — R91.1 NODULE OF RIGHT LUNG: ICD-10-CM

## 2023-09-07 DIAGNOSIS — C81.11 NODULAR SCLEROSIS HODGKIN LYMPHOMA OF LYMPH NODES OF NECK: ICD-10-CM

## 2023-09-07 DIAGNOSIS — J43.9 PULMONARY EMPHYSEMA, UNSPECIFIED EMPHYSEMA TYPE: ICD-10-CM

## 2023-09-07 DIAGNOSIS — F17.200 CURRENT EVERY DAY SMOKER: ICD-10-CM

## 2023-09-07 DIAGNOSIS — Z92.3 HISTORY OF RADIATION THERAPY: ICD-10-CM

## 2023-09-07 DIAGNOSIS — R91.8 MULTIPLE LUNG NODULES: Primary | ICD-10-CM

## 2023-09-07 PROCEDURE — 99214 OFFICE O/P EST MOD 30 MIN: CPT | Performed by: NURSE PRACTITIONER

## 2023-09-07 PROCEDURE — 1160F RVW MEDS BY RX/DR IN RCRD: CPT | Performed by: NURSE PRACTITIONER

## 2023-09-07 PROCEDURE — 1159F MED LIST DOCD IN RCRD: CPT | Performed by: NURSE PRACTITIONER

## 2023-09-15 ENCOUNTER — HOSPITAL ENCOUNTER (OUTPATIENT)
Dept: CT IMAGING | Facility: HOSPITAL | Age: 73
Discharge: HOME | End: 2023-09-15
Payer: MEDICARE

## 2023-09-15 DIAGNOSIS — R91.1 NODULE OF RIGHT LUNG: ICD-10-CM

## 2023-09-15 PROCEDURE — 78815 PET IMAGE W/CT SKULL-THIGH: CPT

## 2023-09-15 PROCEDURE — A9552 F18 FDG: HCPCS | Performed by: NURSE PRACTITIONER

## 2023-09-15 PROCEDURE — 0 FLUDEOXYGLUCOSE F18 SOLUTION: Performed by: NURSE PRACTITIONER

## 2023-09-15 RX ADMIN — FLUDEOXYGLUCOSE F18 1 DOSE: 300 INJECTION INTRAVENOUS at 08:47

## 2023-09-18 ENCOUNTER — TELEPHONE (OUTPATIENT)
Dept: HEMATOLOGY | Age: 73
End: 2023-09-18

## 2023-09-18 DIAGNOSIS — C81.72 OTHER CLASSICAL HODGKIN LYMPHOMA OF INTRATHORACIC LYMPH NODES (HCC): Primary | ICD-10-CM

## 2023-09-18 NOTE — PROGRESS NOTES
Progress Note      Pt Name: Jearline Kussmaul  YOB: 1950  MRN: [id number]    Date of evaluation: 09/19/2023  History Obtained From:  patient, electronic medical record    CHIEF COMPLAINT:    Chief Complaint   Patient presents with    Follow-up     Hodgkin lymphoma, unspecified Hodgkin lymphoma type, unspecified body region      HISTORY OF PRESENT ILLNESS:    Jearline Kussmaul is a 81 y.o.  male who has a history of classic Hodgkin's lymphoma, status-post completion of 2 cycles of ABVD on 7/6/2018 followed by ISRT completed on 9/5/2018 by Dr. Tanis Frankel. He has been lost to follow-up last visit on 3/18/2022 there was no known evidence to suggest recurrence of disease. Hernando Gagnon is followed by [facility] pulmonology group for emphysema and pulmonary nodules that are present dating back to 7/7/2017, CT scan of the chest on 3/20/2023 identified new/slightly increasing in size subcentimeter right lower lobe nodules, a PET scan was completed on 9/15/2023 that identified new finding of multiple hypermetabolic bone lesions throughout the thoracic and lumbar spine, sacrum and left iliac wing along with small focus of hypermetabolic activity in the right posterior lateral prostate with an SUV of 3.8. Lung nodules were reported as favoring postinfectious scarring although neoplasm cannot be ruled out, the largest right lung nodule measured 1.9 cm with an SUV of 2.9 in the right lower lobe which was new from 8/31/2023. Hernando Gagnon presented today with complaints of increased fatigue, occasional nausea, appetite change with weight loss of 11 pounds over the last 18 months. He reported having persistent left hip pain for approximately 2 years, he has not reported this to a provider for further evaluation. He also reported mild back pain. He reported nocturia otherwise denied any urinary symptoms. Hernando Gagnon denied any febrile illness, drenching night sweats, or enlarged lymph nodes.   PET scan did not report any

## 2023-09-18 NOTE — TELEPHONE ENCOUNTER
Called patient and reminded them of their upcoming appt. on 09/19/23, Patient voiced their understanding.

## 2023-09-19 ENCOUNTER — HOSPITAL ENCOUNTER (OUTPATIENT)
Dept: INFUSION THERAPY | Age: 73
Discharge: HOME | End: 2023-09-19
Payer: MEDICARE

## 2023-09-19 ENCOUNTER — OFFICE VISIT (OUTPATIENT)
Dept: HEMATOLOGY | Age: 73
End: 2023-09-19
Payer: MEDICARE

## 2023-09-19 VITALS
WEIGHT: 156.4 LBS | SYSTOLIC BLOOD PRESSURE: 128 MMHG | HEART RATE: 53 BPM | DIASTOLIC BLOOD PRESSURE: 78 MMHG | BODY MASS INDEX: 20.07 KG/M2 | HEIGHT: 74 IN | OXYGEN SATURATION: 100 %

## 2023-09-19 DIAGNOSIS — R97.20 ELEVATED PSA: ICD-10-CM

## 2023-09-19 DIAGNOSIS — G89.3 NEOPLASM RELATED PAIN (ACUTE) (CHRONIC): ICD-10-CM

## 2023-09-19 DIAGNOSIS — N40.0 ENLARGED PROSTATE: ICD-10-CM

## 2023-09-19 DIAGNOSIS — N40.0 ENLARGED PROSTATE: Primary | ICD-10-CM

## 2023-09-19 DIAGNOSIS — M89.9 LYTIC BONE LESIONS ON XRAY: ICD-10-CM

## 2023-09-19 DIAGNOSIS — R53.83 OTHER FATIGUE: ICD-10-CM

## 2023-09-19 DIAGNOSIS — C81.72 OTHER CLASSICAL HODGKIN LYMPHOMA OF INTRATHORACIC LYMPH NODES (HCC): Primary | ICD-10-CM

## 2023-09-19 DIAGNOSIS — Z45.2 ENCOUNTER FOR CENTRAL LINE CARE: ICD-10-CM

## 2023-09-19 DIAGNOSIS — R93.89 ABNORMAL CT SCAN: ICD-10-CM

## 2023-09-19 LAB
ALBUMIN SERPL-MCNC: 4.5 G/DL (ref 3.5–5.2)
ALP SERPL-CCNC: 104 U/L (ref 40–130)
ALT SERPL-CCNC: 15 U/L (ref 21–72)
ANION GAP SERPL CALCULATED.3IONS-SCNC: 10 MMOL/L (ref 7–19)
AST SERPL-CCNC: 25 U/L (ref 17–59)
BASOPHILS # BLD: 0.09 K/UL (ref 0.01–0.08)
BASOPHILS NFR BLD: 1.5 % (ref 0.1–1.2)
BILIRUB SERPL-MCNC: 0.6 MG/DL (ref 0.2–1.3)
BUN SERPL-MCNC: 13 MG/DL (ref 9–20)
CALCIUM SERPL-MCNC: 9.3 MG/DL (ref 8.4–10.2)
CEA SERPL-MCNC: 4.4 NG/ML (ref 0–4.7)
CHLORIDE SERPL-SCNC: 107 MMOL/L (ref 98–111)
CO2 SERPL-SCNC: 27 MMOL/L (ref 22–29)
CREAT SERPL-MCNC: 0.9 MG/DL (ref 0.6–1.2)
EOSINOPHIL # BLD: 0.51 K/UL (ref 0.04–0.54)
EOSINOPHIL NFR BLD: 8.6 % (ref 0.7–7)
ERYTHROCYTE [DISTWIDTH] IN BLOOD BY AUTOMATED COUNT: 13 % (ref 11.6–14.4)
GLOBULIN: 2.9 G/DL
GLUCOSE SERPL-MCNC: 94 MG/DL (ref 74–106)
HCT VFR BLD AUTO: 45.9 % (ref 40.1–51)
HGB BLD-MCNC: 16.4 G/DL (ref 13.7–17.5)
LYMPHOCYTES # BLD: 1.16 K/UL (ref 1.18–3.74)
LYMPHOCYTES NFR BLD: 19.6 % (ref 19.3–53.1)
MCH RBC QN AUTO: 31.3 PG (ref 25.7–32.2)
MCHC RBC AUTO-ENTMCNC: 35.7 G/DL (ref 32.3–36.5)
MCV RBC AUTO: 87.6 FL (ref 79–92.2)
MONOCYTES # BLD: 0.71 K/UL (ref 0.24–0.82)
MONOCYTES NFR BLD: 12 % (ref 4.7–12.5)
NEUTROPHILS # BLD: 3.42 K/UL (ref 1.56–6.13)
NEUTS SEG NFR BLD: 57.8 % (ref 34–71.1)
PLATELET # BLD AUTO: 194 K/UL (ref 163–337)
PMV BLD AUTO: 9.8 FL (ref 7.4–10.4)
POTASSIUM SERPL-SCNC: 3.9 MMOL/L (ref 3.5–5.1)
PROT SERPL-MCNC: 7.4 G/DL (ref 6.3–8.2)
PSA SERPL-MCNC: 4.82 NG/ML (ref 0–4)
RBC # BLD AUTO: 5.24 M/UL (ref 4.63–6.08)
SODIUM SERPL-SCNC: 144 MMOL/L (ref 137–145)
TSH SERPL DL<=0.005 MIU/L-ACNC: 1.35 UIU/ML (ref 0.27–4.2)
WBC # BLD AUTO: 5.92 K/UL (ref 4.23–9.07)

## 2023-09-19 PROCEDURE — 36415 COLL VENOUS BLD VENIPUNCTURE: CPT

## 2023-09-19 PROCEDURE — G8427 DOCREV CUR MEDS BY ELIG CLIN: HCPCS | Performed by: NURSE PRACTITIONER

## 2023-09-19 PROCEDURE — 2580000003 HC RX 258: Performed by: NURSE PRACTITIONER

## 2023-09-19 PROCEDURE — 85025 COMPLETE CBC W/AUTO DIFF WBC: CPT

## 2023-09-19 PROCEDURE — 4004F PT TOBACCO SCREEN RCVD TLK: CPT | Performed by: NURSE PRACTITIONER

## 2023-09-19 PROCEDURE — 6360000002 HC RX W HCPCS: Performed by: NURSE PRACTITIONER

## 2023-09-19 PROCEDURE — G8420 CALC BMI NORM PARAMETERS: HCPCS | Performed by: NURSE PRACTITIONER

## 2023-09-19 PROCEDURE — 1123F ACP DISCUSS/DSCN MKR DOCD: CPT | Performed by: NURSE PRACTITIONER

## 2023-09-19 PROCEDURE — 80053 COMPREHEN METABOLIC PANEL: CPT

## 2023-09-19 PROCEDURE — 99215 OFFICE O/P EST HI 40 MIN: CPT | Performed by: NURSE PRACTITIONER

## 2023-09-19 PROCEDURE — 3017F COLORECTAL CA SCREEN DOC REV: CPT | Performed by: NURSE PRACTITIONER

## 2023-09-19 PROCEDURE — 96523 IRRIG DRUG DELIVERY DEVICE: CPT

## 2023-09-19 PROCEDURE — 99212 OFFICE O/P EST SF 10 MIN: CPT

## 2023-09-19 RX ORDER — SODIUM CHLORIDE 0.9 % (FLUSH) 0.9 %
10 SYRINGE (ML) INJECTION PRN
OUTPATIENT
Start: 2023-09-19

## 2023-09-19 RX ORDER — HEPARIN 100 UNIT/ML
500 SYRINGE INTRAVENOUS PRN
OUTPATIENT
Start: 2023-09-19

## 2023-09-19 RX ORDER — ASPIRIN 81 MG/1
81 TABLET ORAL DAILY
COMMUNITY

## 2023-09-19 RX ORDER — SODIUM CHLORIDE 0.9 % (FLUSH) 0.9 %
10 SYRINGE (ML) INJECTION PRN
Status: CANCELLED | OUTPATIENT
Start: 2023-09-19

## 2023-09-19 RX ORDER — SODIUM CHLORIDE 0.9 % (FLUSH) 0.9 %
10 SYRINGE (ML) INJECTION PRN
Status: DISCONTINUED | OUTPATIENT
Start: 2023-09-19 | End: 2023-09-20 | Stop reason: HOSPADM

## 2023-09-19 RX ORDER — HEPARIN SODIUM (PORCINE) LOCK FLUSH IV SOLN 100 UNIT/ML 100 UNIT/ML
500 SOLUTION INTRAVENOUS PRN
Status: CANCELLED | OUTPATIENT
Start: 2023-09-19

## 2023-09-19 RX ORDER — HEPARIN 100 UNIT/ML
500 SYRINGE INTRAVENOUS PRN
Status: DISCONTINUED | OUTPATIENT
Start: 2023-09-19 | End: 2023-09-20 | Stop reason: HOSPADM

## 2023-09-19 RX ADMIN — SODIUM CHLORIDE, PRESERVATIVE FREE 10 ML: 5 INJECTION INTRAVENOUS at 09:53

## 2023-09-19 RX ADMIN — HEPARIN 500 UNITS: 100 SYRINGE at 09:59

## 2023-09-19 ASSESSMENT — ENCOUNTER SYMPTOMS
NAUSEA: 1
CONSTIPATION: 1

## 2023-09-21 ENCOUNTER — TELEPHONE (OUTPATIENT)
Dept: CARDIAC SURGERY | Facility: CLINIC | Age: 73
End: 2023-09-21
Payer: MEDICARE

## 2023-09-22 LAB — B2 MICROGLOB SERPL-MCNC: 3 MG/L

## 2023-09-23 LAB
ALBUMIN SERPL-MCNC: 4.32 G/DL (ref 3.75–5.01)
ALPHA1 GLOB SERPL ELPH-MCNC: 0.29 G/DL (ref 0.19–0.46)
ALPHA2 GLOB SERPL ELPH-MCNC: 0.73 G/DL (ref 0.48–1.05)
B-GLOBULIN SERPL ELPH-MCNC: 0.62 G/DL (ref 0.48–1.1)
DEPRECATED KAPPA LC FREE/LAMBDA SER: 0.83 {RATIO} (ref 0.26–1.65)
EER MONOCLONAL PROTEIN AND FLC, SERUM: ABNORMAL
GAMMA GLOB SERPL ELPH-MCNC: 1.04 G/DL (ref 0.62–1.51)
IGA SERPL-MCNC: 100 MG/DL (ref 68–408)
IGG SERPL-MCNC: 921 MG/DL (ref 768–1632)
IGM SERPL-MCNC: 178 MG/DL (ref 35–263)
INTERPRETATION SERPL IFE-IMP: ABNORMAL
INTERPRETATION SERPL IFE-IMP: ABNORMAL
KAPPA LC FREE SER-MCNC: 35.21 MG/L (ref 3.3–19.4)
LAMBDA LC FREE SERPL-MCNC: 42.42 MG/L (ref 5.71–26.3)
MONOCLONAL PROTEIN, SERUM: ABNORMAL G/DL
PROT SERPL-MCNC: 7 G/DL (ref 6.3–8.2)

## 2023-09-27 LAB
ALBUMIN SERPL-MCNC: 4.6 G/DL (ref 3.5–5.2)
ALP SERPL-CCNC: 117 U/L (ref 40–130)
ALT SERPL-CCNC: 9 U/L (ref 5–41)
ANION GAP SERPL CALCULATED.3IONS-SCNC: 13 MMOL/L (ref 7–19)
AST SERPL-CCNC: 14 U/L (ref 5–40)
BASOPHILS # BLD: 0.1 K/UL (ref 0–0.2)
BASOPHILS # BLD: 0.1 K/UL (ref 0–0.2)
BASOPHILS NFR BLD: 1.6 % (ref 0–1)
BASOPHILS NFR BLD: 1.9 % (ref 0–1)
BILIRUB SERPL-MCNC: 0.5 MG/DL (ref 0.2–1.2)
BUN SERPL-MCNC: 16 MG/DL (ref 8–23)
CALCIUM SERPL-MCNC: 9.3 MG/DL (ref 8.8–10.2)
CHLORIDE SERPL-SCNC: 105 MMOL/L (ref 98–111)
CHOLEST SERPL-MCNC: 201 MG/DL (ref 160–199)
CO2 SERPL-SCNC: 25 MMOL/L (ref 22–29)
CREAT SERPL-MCNC: 0.9 MG/DL (ref 0.5–1.2)
EOSINOPHIL # BLD: 0.5 K/UL (ref 0–0.6)
EOSINOPHIL # BLD: 0.6 K/UL (ref 0–0.6)
EOSINOPHIL NFR BLD: 11.2 % (ref 0–5)
EOSINOPHIL NFR BLD: 9.6 % (ref 0–5)
ERYTHROCYTE [DISTWIDTH] IN BLOOD BY AUTOMATED COUNT: 13 % (ref 11.5–14.5)
ERYTHROCYTE [DISTWIDTH] IN BLOOD BY AUTOMATED COUNT: 18.2 % (ref 11.5–14.5)
GLUCOSE SERPL-MCNC: 83 MG/DL (ref 74–109)
HCT VFR BLD AUTO: 38.6 % (ref 42–52)
HCT VFR BLD AUTO: 47.4 % (ref 42–52)
HDLC SERPL-MCNC: 41 MG/DL (ref 55–121)
HGB BLD-MCNC: 15.5 G/DL (ref 14–18)
HGB BLD-MCNC: 15.9 G/DL (ref 14–18)
IMM GRANULOCYTES # BLD: 0 K/UL
IMM GRANULOCYTES # BLD: 0 K/UL
LDLC SERPL CALC-MCNC: 134 MG/DL
LYMPHOCYTES # BLD: 1.3 K/UL (ref 1.1–4.5)
LYMPHOCYTES # BLD: 1.4 K/UL (ref 1.1–4.5)
LYMPHOCYTES NFR BLD: 24.7 % (ref 20–40)
LYMPHOCYTES NFR BLD: 25.7 % (ref 20–40)
MCH RBC QN AUTO: 31.5 PG (ref 27–31)
MCH RBC QN AUTO: 40.9 PG (ref 27–31)
MCHC RBC AUTO-ENTMCNC: 33.5 G/DL (ref 33–37)
MCHC RBC AUTO-ENTMCNC: 40.2 G/DL (ref 33–37)
MCV RBC AUTO: 101.8 FL (ref 80–94)
MCV RBC AUTO: 93.9 FL (ref 80–94)
MONOCYTES # BLD: 0.7 K/UL (ref 0–0.9)
MONOCYTES # BLD: 0.7 K/UL (ref 0–0.9)
MONOCYTES NFR BLD: 12.1 % (ref 0–10)
MONOCYTES NFR BLD: 13.2 % (ref 0–10)
NEUTROPHILS # BLD: 2.6 K/UL (ref 1.5–7.5)
NEUTROPHILS # BLD: 2.8 K/UL (ref 1.5–7.5)
NEUTS SEG NFR BLD: 49.2 % (ref 50–65)
NEUTS SEG NFR BLD: 50 % (ref 50–65)
PLATELET # BLD AUTO: 297 K/UL (ref 130–400)
PLATELET # BLD AUTO: 297 K/UL (ref 130–400)
PMV BLD AUTO: 8.8 FL (ref 9.4–12.4)
PMV BLD AUTO: 9.2 FL (ref 9.4–12.4)
POTASSIUM SERPL-SCNC: 4.2 MMOL/L (ref 3.5–5)
PROT SERPL-MCNC: 7.4 G/DL (ref 6.6–8.7)
PSA SERPL-MCNC: 4.34 NG/ML (ref 0–4)
RBC # BLD AUTO: 3.79 M/UL (ref 4.7–6.1)
RBC # BLD AUTO: 5.05 M/UL (ref 4.7–6.1)
SODIUM SERPL-SCNC: 143 MMOL/L (ref 136–145)
TRIGL SERPL-MCNC: 130 MG/DL (ref 0–149)
WBC # BLD AUTO: 5.2 K/UL (ref 4.8–10.8)
WBC # BLD AUTO: 5.6 K/UL (ref 4.8–10.8)

## 2023-09-28 ENCOUNTER — PATIENT ROUNDING (BHMG ONLY) (OUTPATIENT)
Dept: UROLOGY | Facility: CLINIC | Age: 73
End: 2023-09-28
Payer: MEDICARE

## 2023-09-28 ENCOUNTER — OFFICE VISIT (OUTPATIENT)
Dept: UROLOGY | Facility: CLINIC | Age: 73
End: 2023-09-28
Payer: MEDICARE

## 2023-09-28 VITALS — HEIGHT: 74 IN | WEIGHT: 155.3 LBS | TEMPERATURE: 97.3 F | BODY MASS INDEX: 19.93 KG/M2

## 2023-09-28 DIAGNOSIS — R97.20 ELEVATED PROSTATE SPECIFIC ANTIGEN (PSA): Primary | ICD-10-CM

## 2023-09-28 DIAGNOSIS — R31.29 MICROSCOPIC HEMATURIA: ICD-10-CM

## 2023-09-28 DIAGNOSIS — N42.89 PROSTATE MASS: ICD-10-CM

## 2023-09-28 LAB
BILIRUB BLD-MCNC: NEGATIVE MG/DL
CLARITY, POC: CLEAR
COLOR UR: YELLOW
GLUCOSE UR STRIP-MCNC: NEGATIVE MG/DL
KETONES UR QL: NEGATIVE
LEUKOCYTE EST, POC: NEGATIVE
NITRITE UR-MCNC: NEGATIVE MG/ML
PH UR: 6 [PH] (ref 5–8)
PROT UR STRIP-MCNC: ABNORMAL MG/DL
RBC # UR STRIP: ABNORMAL /UL
SP GR UR: 1.02 (ref 1–1.03)
UROBILINOGEN UR QL: NORMAL

## 2023-09-28 PROCEDURE — 99204 OFFICE O/P NEW MOD 45 MIN: CPT | Performed by: UROLOGY

## 2023-09-28 PROCEDURE — 81003 URINALYSIS AUTO W/O SCOPE: CPT | Performed by: UROLOGY

## 2023-10-05 ENCOUNTER — PROCEDURE VISIT (OUTPATIENT)
Dept: PULMONOLOGY | Facility: CLINIC | Age: 73
End: 2023-10-05
Payer: MEDICARE

## 2023-10-05 ENCOUNTER — OFFICE VISIT (OUTPATIENT)
Dept: PULMONOLOGY | Facility: CLINIC | Age: 73
End: 2023-10-05
Payer: MEDICARE

## 2023-10-05 ENCOUNTER — TELEPHONE (OUTPATIENT)
Dept: GASTROENTEROLOGY | Facility: CLINIC | Age: 73
End: 2023-10-05
Payer: MEDICARE

## 2023-10-05 VITALS
SYSTOLIC BLOOD PRESSURE: 128 MMHG | DIASTOLIC BLOOD PRESSURE: 72 MMHG | HEIGHT: 74 IN | WEIGHT: 159 LBS | BODY MASS INDEX: 20.41 KG/M2 | HEART RATE: 63 BPM | OXYGEN SATURATION: 99 %

## 2023-10-05 DIAGNOSIS — J43.9 PULMONARY EMPHYSEMA, UNSPECIFIED EMPHYSEMA TYPE: ICD-10-CM

## 2023-10-05 DIAGNOSIS — F17.200 CURRENT EVERY DAY SMOKER: ICD-10-CM

## 2023-10-05 DIAGNOSIS — R91.8 MULTIPLE LUNG NODULES: Primary | ICD-10-CM

## 2023-10-05 DIAGNOSIS — Z92.3 HISTORY OF RADIATION THERAPY: ICD-10-CM

## 2023-10-05 DIAGNOSIS — R91.1 NODULE OF RIGHT LUNG: ICD-10-CM

## 2023-10-06 ENCOUNTER — TELEPHONE (OUTPATIENT)
Dept: HEMATOLOGY | Age: 73
End: 2023-10-06

## 2023-10-09 ENCOUNTER — HOSPITAL ENCOUNTER (OUTPATIENT)
Dept: GENERAL RADIOLOGY | Facility: HOSPITAL | Age: 73
Discharge: HOME | End: 2023-10-09
Payer: MEDICARE

## 2023-10-09 PROCEDURE — 71046 X-RAY EXAM CHEST 2 VIEWS: CPT

## 2023-10-09 ASSESSMENT — ENCOUNTER SYMPTOMS
NAUSEA: 1
CONSTIPATION: 1

## 2023-10-09 NOTE — PROGRESS NOTES
nodule appears new from the August 31 exam. No other pulmonary nodule identified with metabolic activity. Favor an indolent process such as postinfectious scarring although hypometabolic indolent neoplasm is not excluded . A single hypermetabolic pulmonary nodule is present in the RIGHT lower lobe. This nodule appears new compared to recent chest CTs and favored to represent an infectious or inflammatory process rather than neoplasm. . No hypermetabolic activity in the neck or cervical lymphadenopathy. No pathological enlargement of retroperitoneal, mesenteric, pelvic or inguinal lymph n       CD20 positive Non-Hodgkin's lymphoma??  Incidental finding on bone marrow biopsy on 4/24/2018 of a small aggregate of a monoclonal B-cell CD20 positive population. The significance of this finding is unknown. The patient has no other adenopathy or B symptoms. There was no involvement by classical Hodgkin lymphoma in the bone marrow. This will be followed closely. Age appropriate:  09/18/2018 Colonoscopy [facility])- benign polyps; 5 year recall    Past Medical History:    Past Medical History:   Diagnosis Date    Arthritis     Cancer ([address])     hodgkins lymphoma    Colon polyps     COPD (chronic obstructive pulmonary disease) ([address])     Emphysema (subcutaneous) (surgical) resulting from a procedure     Hx of hepatitis     Liver disease        Past Surgical History:    Past Surgical History:   Procedure Laterality Date    CHOLECYSTECTOMY      COLONOSCOPY  09/18/2018    Mirta Wright MD     HERNIA REPAIR      x3    [place] INSJ PRPH CTR VAD W/SUBQ PORT AGE 5 YR/> N/A 5/10/2018    PORT INSERTION AND PORT ACCESSED performed by Grace Osorio MD at [facility] OR       Current Medications:    Current Outpatient Medications   Medication Sig Dispense Refill    azithromycin (ZITHROMAX) 250 MG tablet Take 1 tablet by mouth daily 7 tablet 0    aspirin 81 MG EC tablet Take 1 tablet by mouth daily      amLODIPine (NORVASC) 10 MG tablet TAKE 1

## 2023-10-10 ENCOUNTER — HOSPITAL ENCOUNTER (OUTPATIENT)
Dept: INFUSION THERAPY | Age: 73
Discharge: HOME | End: 2023-10-10
Payer: MEDICARE

## 2023-10-10 ENCOUNTER — OFFICE VISIT (OUTPATIENT)
Dept: HEMATOLOGY | Age: 73
End: 2023-10-10
Payer: MEDICARE

## 2023-10-10 VITALS
DIASTOLIC BLOOD PRESSURE: 70 MMHG | BODY MASS INDEX: 19.94 KG/M2 | OXYGEN SATURATION: 95 % | WEIGHT: 155.4 LBS | HEIGHT: 74 IN | HEART RATE: 63 BPM | SYSTOLIC BLOOD PRESSURE: 130 MMHG

## 2023-10-10 DIAGNOSIS — Z71.6 TOBACCO ABUSE COUNSELING: ICD-10-CM

## 2023-10-10 DIAGNOSIS — Z71.89 CARE PLAN DISCUSSED WITH PATIENT: ICD-10-CM

## 2023-10-10 DIAGNOSIS — N40.0 ENLARGED PROSTATE: ICD-10-CM

## 2023-10-10 DIAGNOSIS — C81.90 HODGKIN LYMPHOMA, UNSPECIFIED HODGKIN LYMPHOMA TYPE, UNSPECIFIED BODY REGION (HCC): Primary | ICD-10-CM

## 2023-10-10 DIAGNOSIS — M89.9 LYTIC BONE LESIONS ON XRAY: ICD-10-CM

## 2023-10-10 DIAGNOSIS — C81.72 OTHER CLASSICAL HODGKIN LYMPHOMA OF INTRATHORACIC LYMPH NODES (HCC): ICD-10-CM

## 2023-10-10 DIAGNOSIS — R97.20 ELEVATED PSA: ICD-10-CM

## 2023-10-10 LAB
BASOPHILS # BLD: 0.08 K/UL (ref 0.01–0.08)
BASOPHILS NFR BLD: 1 % (ref 0.1–1.2)
EOSINOPHIL # BLD: 0.39 K/UL (ref 0.04–0.54)
EOSINOPHIL NFR BLD: 4.7 % (ref 0.7–7)
ERYTHROCYTE [DISTWIDTH] IN BLOOD BY AUTOMATED COUNT: 12.8 % (ref 11.6–14.4)
HCT VFR BLD AUTO: 45.3 % (ref 40.1–51)
HGB BLD-MCNC: 16 G/DL (ref 13.7–17.5)
LYMPHOCYTES # BLD: 1.34 K/UL (ref 1.18–3.74)
LYMPHOCYTES NFR BLD: 16 % (ref 19.3–53.1)
MCH RBC QN AUTO: 31.5 PG (ref 25.7–32.2)
MCHC RBC AUTO-ENTMCNC: 35.3 G/DL (ref 32.3–36.5)
MCV RBC AUTO: 89.2 FL (ref 79–92.2)
MONOCYTES # BLD: 0.98 K/UL (ref 0.24–0.82)
MONOCYTES NFR BLD: 11.7 % (ref 4.7–12.5)
NEUTROPHILS # BLD: 5.52 K/UL (ref 1.56–6.13)
NEUTS SEG NFR BLD: 66.1 % (ref 34–71.1)
PLATELET # BLD AUTO: 149 K/UL (ref 163–337)
PMV BLD AUTO: 10.2 FL (ref 7.4–10.4)
RBC # BLD AUTO: 5.08 M/UL (ref 4.63–6.08)
WBC # BLD AUTO: 8.35 K/UL (ref 4.23–9.07)

## 2023-10-10 PROCEDURE — 99213 OFFICE O/P EST LOW 20 MIN: CPT | Performed by: NURSE PRACTITIONER

## 2023-10-10 PROCEDURE — G8420 CALC BMI NORM PARAMETERS: HCPCS | Performed by: NURSE PRACTITIONER

## 2023-10-10 PROCEDURE — G8484 FLU IMMUNIZE NO ADMIN: HCPCS | Performed by: NURSE PRACTITIONER

## 2023-10-10 PROCEDURE — 85025 COMPLETE CBC W/AUTO DIFF WBC: CPT

## 2023-10-10 PROCEDURE — G8427 DOCREV CUR MEDS BY ELIG CLIN: HCPCS | Performed by: NURSE PRACTITIONER

## 2023-10-10 PROCEDURE — 1123F ACP DISCUSS/DSCN MKR DOCD: CPT | Performed by: NURSE PRACTITIONER

## 2023-10-10 PROCEDURE — 3017F COLORECTAL CA SCREEN DOC REV: CPT | Performed by: NURSE PRACTITIONER

## 2023-10-10 PROCEDURE — 36415 COLL VENOUS BLD VENIPUNCTURE: CPT

## 2023-10-10 PROCEDURE — 4004F PT TOBACCO SCREEN RCVD TLK: CPT | Performed by: NURSE PRACTITIONER

## 2023-10-10 RX ORDER — AZITHROMYCIN 250 MG/1
250 TABLET, FILM COATED ORAL DAILY
Qty: 7 TABLET | Refills: 0 | COMMUNITY
Start: 2023-10-09 | End: 2023-10-16

## 2023-10-15 ASSESSMENT — ENCOUNTER SYMPTOMS
COUGH: 1
SORE THROAT: 0
VOMITING: 0
EYE PAIN: 0
ABDOMINAL PAIN: 0
EYES NEGATIVE: 1
EYE REDNESS: 0
EYE DISCHARGE: 0
BLOOD IN STOOL: 0
DIARRHEA: 0
SHORTNESS OF BREATH: 1
WHEEZING: 0
BACK PAIN: 0

## 2023-10-18 ENCOUNTER — OFFICE VISIT (OUTPATIENT)
Dept: CARDIAC SURGERY | Facility: CLINIC | Age: 73
End: 2023-10-18
Payer: MEDICARE

## 2023-10-18 VITALS
OXYGEN SATURATION: 98 % | DIASTOLIC BLOOD PRESSURE: 78 MMHG | SYSTOLIC BLOOD PRESSURE: 135 MMHG | BODY MASS INDEX: 19.84 KG/M2 | HEIGHT: 74 IN | WEIGHT: 154.6 LBS | HEART RATE: 65 BPM

## 2023-10-18 DIAGNOSIS — R91.1 LUNG NODULE: Primary | ICD-10-CM

## 2023-10-18 PROCEDURE — 99213 OFFICE O/P EST LOW 20 MIN: CPT | Performed by: THORACIC SURGERY (CARDIOTHORACIC VASCULAR SURGERY)

## 2023-10-18 PROCEDURE — 1159F MED LIST DOCD IN RCRD: CPT | Performed by: THORACIC SURGERY (CARDIOTHORACIC VASCULAR SURGERY)

## 2023-10-18 PROCEDURE — 1160F RVW MEDS BY RX/DR IN RCRD: CPT | Performed by: THORACIC SURGERY (CARDIOTHORACIC VASCULAR SURGERY)

## 2023-10-24 ENCOUNTER — HOSPITAL ENCOUNTER (OUTPATIENT)
Dept: RADIATION ONCOLOGY | Facility: HOSPITAL | Age: 73
Setting detail: RADIATION/ONCOLOGY SERIES
End: 2023-10-24
Payer: MEDICARE

## 2023-10-25 ENCOUNTER — CONSULT (OUTPATIENT)
Dept: RADIATION ONCOLOGY | Facility: HOSPITAL | Age: 73
End: 2023-10-25
Payer: MEDICARE

## 2023-10-25 ENCOUNTER — TELEPHONE (OUTPATIENT)
Dept: HEMATOLOGY | Age: 73
End: 2023-10-25

## 2023-10-25 VITALS
HEART RATE: 64 BPM | HEIGHT: 74 IN | BODY MASS INDEX: 20.15 KG/M2 | SYSTOLIC BLOOD PRESSURE: 177 MMHG | DIASTOLIC BLOOD PRESSURE: 69 MMHG | OXYGEN SATURATION: 99 % | WEIGHT: 157 LBS

## 2023-10-25 DIAGNOSIS — F17.200 CURRENT EVERY DAY SMOKER: ICD-10-CM

## 2023-10-25 DIAGNOSIS — C81.11 NODULAR SCLEROSIS HODGKIN LYMPHOMA OF LYMPH NODES OF NECK: Primary | ICD-10-CM

## 2023-10-25 DIAGNOSIS — Z92.3 HISTORY OF RADIATION THERAPY: ICD-10-CM

## 2023-10-25 PROCEDURE — G0463 HOSPITAL OUTPT CLINIC VISIT: HCPCS | Performed by: RADIOLOGY

## 2023-10-25 NOTE — TELEPHONE ENCOUNTER
Called patient to remind them of their appointment on 10/26/2023. Patient voiced their understanding of the appointment and confirmed they would be coming. Patient was given date & time of appt.

## 2023-10-26 ENCOUNTER — HOSPITAL ENCOUNTER (OUTPATIENT)
Dept: INFUSION THERAPY | Age: 73
Discharge: HOME | End: 2023-10-26
Payer: MEDICARE

## 2023-10-26 ENCOUNTER — OFFICE VISIT (OUTPATIENT)
Dept: HEMATOLOGY | Age: 73
End: 2023-10-26

## 2023-10-26 ENCOUNTER — TELEPHONE (OUTPATIENT)
Dept: HEMATOLOGY | Age: 73
End: 2023-10-26

## 2023-10-26 ENCOUNTER — TRANSCRIBE ORDERS (OUTPATIENT)
Dept: ADMINISTRATIVE | Facility: HOSPITAL | Age: 73
End: 2023-10-26
Payer: MEDICARE

## 2023-10-26 VITALS
BODY MASS INDEX: 19.98 KG/M2 | DIASTOLIC BLOOD PRESSURE: 78 MMHG | HEIGHT: 74 IN | HEART RATE: 78 BPM | SYSTOLIC BLOOD PRESSURE: 130 MMHG | OXYGEN SATURATION: 97 % | WEIGHT: 155.7 LBS

## 2023-10-26 DIAGNOSIS — H53.8 BLURRED VISION: ICD-10-CM

## 2023-10-26 DIAGNOSIS — C81.90 HODGKIN LYMPHOMA, UNSPECIFIED HODGKIN LYMPHOMA TYPE, UNSPECIFIED BODY REGION: ICD-10-CM

## 2023-10-26 DIAGNOSIS — M89.9 LYTIC BONE LESIONS ON XRAY: ICD-10-CM

## 2023-10-26 DIAGNOSIS — C81.72 OTHER CLASSICAL HODGKIN LYMPHOMA OF INTRATHORACIC LYMPH NODES (HCC): ICD-10-CM

## 2023-10-26 DIAGNOSIS — C81.90 HODGKIN LYMPHOMA, UNSPECIFIED HODGKIN LYMPHOMA TYPE, UNSPECIFIED BODY REGION (HCC): Primary | ICD-10-CM

## 2023-10-26 DIAGNOSIS — C81.90 HODGKIN LYMPHOMA, UNSPECIFIED HODGKIN LYMPHOMA TYPE, UNSPECIFIED BODY REGION (HCC): ICD-10-CM

## 2023-10-26 DIAGNOSIS — R42 DIZZINESS: Primary | ICD-10-CM

## 2023-10-26 DIAGNOSIS — H53.8 BLURRY VISION: ICD-10-CM

## 2023-10-26 PROCEDURE — 96372 THER/PROPH/DIAG INJ SC/IM: CPT

## 2023-10-26 PROCEDURE — 2500000003 HC RX 250 WO HCPCS: Performed by: NURSE PRACTITIONER

## 2023-10-26 RX ORDER — LIDOCAINE HYDROCHLORIDE 20 MG/ML
10 INJECTION, SOLUTION INFILTRATION; PERINEURAL ONCE
Status: COMPLETED | OUTPATIENT
Start: 2023-10-26 | End: 2023-10-26

## 2023-10-26 RX ORDER — AMOXICILLIN 500 MG/1
500 CAPSULE ORAL 2 TIMES DAILY
Qty: 22 CAPSULE | Refills: 0 | COMMUNITY
Start: 2023-10-17 | End: 2023-10-28

## 2023-10-26 RX ADMIN — LIDOCAINE HYDROCHLORIDE 10 ML: 20 INJECTION, SOLUTION INFILTRATION; PERINEURAL at 09:00

## 2023-10-26 NOTE — PROGRESS NOTES
Patient tolerated bone marrow biopsy well. Upon standing, patient became very dizzy and stumbled. Did not fall or bump into anything. Wife states that patient has been doing this frequently. When I asked the patient about it, states that it will \"hit him out of the blue. \"  He can be sitting, standing, or lying down and will get dizzy. Does not last long and vision gets blurry with it. Talked to AASHISH Bolton regarding patient. He is having a MRI of pelvis at [facility] on 10/30, she wants him to have a MRI brain at that time also. Will call and get this scheduled. Patient v/u and is agreeable to plan.

## 2023-10-26 NOTE — TELEPHONE ENCOUNTER
Order put in for MRI of brain after patient left Hem/Onc appointment. It was scheduled at [facility] to coincide with abd MRI already scheduled. Appt scheduled for Monday 10/30/23 with arrival at 11:00am, MRI to begin at 11:30am.  Called and discussed instructions with patient, and he stated he understands.

## 2023-10-26 NOTE — PROGRESS NOTES
Patient name: Maude Hunt  Patient : 1950  [id number]  10/26/2023    Procedure Note: Bone Marrow Aspirate and Biopsy    Indication:  Hodgkin lymphoma      SITE: Right Iliac crest   After informed consent was obtained the patient was placed in the Left lateral ducubitus position. A time out was performed indicating the procedure and the patient, and all was comfirmed by the nurse. The Right posterior iliac crest was located and prepped and draped in a sterile fashion Lidocaine 1% was injected for local anesthesia. An Jamshidi needle was placed and a bone marrow aspirate was obtained. The Jamshidi needle was placed in a different location and a core biopsy was obtained. Pressure was held until homeostasis was obtained. A sterile dressing was applied. The patient tolerated the procedure well with no apparent complications. Estimated blood loss was minimal. The specimen was sent to pathology for flow cytometry, cytogenetics, histology and FISH if indicated.        Norwood Lesches, APRN

## 2023-10-30 ENCOUNTER — HOSPITAL ENCOUNTER (OUTPATIENT)
Dept: MRI IMAGING | Facility: HOSPITAL | Age: 73
Discharge: HOME | End: 2023-10-30
Payer: MEDICARE

## 2023-10-30 DIAGNOSIS — N42.89 PROSTATE MASS: ICD-10-CM

## 2023-10-30 DIAGNOSIS — R97.20 ELEVATED PROSTATE SPECIFIC ANTIGEN (PSA): ICD-10-CM

## 2023-10-31 ENCOUNTER — OFFICE VISIT (OUTPATIENT)
Dept: GASTROENTEROLOGY | Facility: CLINIC | Age: 73
End: 2023-10-31
Payer: MEDICARE

## 2023-10-31 VITALS
HEIGHT: 74 IN | HEART RATE: 65 BPM | OXYGEN SATURATION: 97 % | SYSTOLIC BLOOD PRESSURE: 136 MMHG | WEIGHT: 162 LBS | DIASTOLIC BLOOD PRESSURE: 72 MMHG | TEMPERATURE: 98 F | BODY MASS INDEX: 20.79 KG/M2

## 2023-10-31 DIAGNOSIS — R94.8 ABNORMAL GASTROINTESTINAL POSITRON EMISSION TOMOGRAPHY (PET) SCAN: ICD-10-CM

## 2023-10-31 DIAGNOSIS — Z86.010 HX OF COLONIC POLYPS: Primary | ICD-10-CM

## 2023-11-01 ENCOUNTER — TELEPHONE (OUTPATIENT)
Dept: UROLOGY | Facility: CLINIC | Age: 73
End: 2023-11-01
Payer: MEDICARE

## 2023-11-01 DIAGNOSIS — Z91.041 ALLERGY TO IMAGING CONTRAST MEDIA: Primary | ICD-10-CM

## 2023-11-01 RX ORDER — PREDNISONE 50 MG/1
TABLET ORAL
Qty: 3 TABLET | Refills: 0 | Status: SHIPPED | OUTPATIENT
Start: 2023-11-01

## 2023-11-06 ENCOUNTER — TELEPHONE (OUTPATIENT)
Dept: UROLOGY | Facility: CLINIC | Age: 73
End: 2023-11-06
Payer: MEDICARE

## 2023-11-06 ENCOUNTER — HOSPITAL ENCOUNTER (OUTPATIENT)
Dept: MRI IMAGING | Facility: HOSPITAL | Age: 73
Discharge: HOME | End: 2023-11-06
Payer: MEDICARE

## 2023-11-06 ENCOUNTER — TRANSCRIBE ORDERS (OUTPATIENT)
Dept: ADMINISTRATIVE | Facility: HOSPITAL | Age: 73
End: 2023-11-06
Payer: MEDICARE

## 2023-11-06 ENCOUNTER — TELEPHONE (OUTPATIENT)
Dept: HEMATOLOGY | Age: 73
End: 2023-11-06

## 2023-11-06 DIAGNOSIS — R42 DIZZINESS: Primary | ICD-10-CM

## 2023-11-06 DIAGNOSIS — C61 PROSTATE CANCER METASTATIC TO BONE (HCC): ICD-10-CM

## 2023-11-06 DIAGNOSIS — H53.8 BLURRED VISION: ICD-10-CM

## 2023-11-06 DIAGNOSIS — R42 DIZZINESS: ICD-10-CM

## 2023-11-06 DIAGNOSIS — C79.51 PROSTATE CANCER METASTATIC TO BONE (HCC): ICD-10-CM

## 2023-11-06 DIAGNOSIS — C79.51 SECONDARY MALIGNANT NEOPLASM OF BONE AND BONE MARROW: ICD-10-CM

## 2023-11-06 DIAGNOSIS — R42 DIZZINESS AND GIDDINESS: Primary | ICD-10-CM

## 2023-11-06 DIAGNOSIS — C81.90 HODGKIN LYMPHOMA, UNSPECIFIED HODGKIN LYMPHOMA TYPE, UNSPECIFIED BODY REGION: ICD-10-CM

## 2023-11-06 DIAGNOSIS — C79.52 SECONDARY MALIGNANT NEOPLASM OF BONE AND BONE MARROW: ICD-10-CM

## 2023-11-06 DIAGNOSIS — C61 PROSTATE CANCER: ICD-10-CM
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
## 2023-11-07 ENCOUNTER — TELEPHONE (OUTPATIENT)
Dept: UROLOGY | Facility: CLINIC | Age: 73
End: 2023-11-07
Payer: MEDICARE

## 2023-11-10 ENCOUNTER — HOSPITAL ENCOUNTER (OUTPATIENT)
Facility: HOSPITAL | Age: 73
Setting detail: HOSPITAL OUTPATIENT SURGERY
Discharge: HOME | End: 2023-11-10
Attending: INTERNAL MEDICINE | Admitting: INTERNAL MEDICINE
Payer: MEDICARE

## 2023-11-10 ENCOUNTER — ANESTHESIA (OUTPATIENT)
Dept: GASTROENTEROLOGY | Facility: HOSPITAL | Age: 73
End: 2023-11-10
Payer: MEDICARE

## 2023-11-10 ENCOUNTER — ANESTHESIA EVENT (OUTPATIENT)
Dept: GASTROENTEROLOGY | Facility: HOSPITAL | Age: 73
End: 2023-11-10
Payer: MEDICARE

## 2023-11-10 ENCOUNTER — TELEPHONE (OUTPATIENT)
Dept: GASTROENTEROLOGY | Facility: CLINIC | Age: 73
End: 2023-11-10
Payer: MEDICARE

## 2023-11-10 VITALS
BODY MASS INDEX: 19.64 KG/M2 | OXYGEN SATURATION: 100 % | RESPIRATION RATE: 20 BRPM | HEIGHT: 74 IN | DIASTOLIC BLOOD PRESSURE: 78 MMHG | WEIGHT: 153 LBS | SYSTOLIC BLOOD PRESSURE: 123 MMHG | TEMPERATURE: 96.3 F | HEART RATE: 56 BPM

## 2023-11-10 DIAGNOSIS — R94.8 ABNORMAL GASTROINTESTINAL POSITRON EMISSION TOMOGRAPHY (PET) SCAN: ICD-10-CM

## 2023-11-10 DIAGNOSIS — Z86.010 HX OF COLONIC POLYPS: ICD-10-CM

## 2023-11-10 PROCEDURE — G0105 COLORECTAL SCRN; HI RISK IND: HCPCS | Performed by: INTERNAL MEDICINE

## 2023-11-10 PROCEDURE — 25010000002 PROPOFOL 10 MG/ML EMULSION: Performed by: NURSE ANESTHETIST, CERTIFIED REGISTERED

## 2023-11-10 PROCEDURE — 25810000003 SODIUM CHLORIDE 0.9 % SOLUTION: Performed by: ANESTHESIOLOGY

## 2023-11-10 RX ORDER — LIDOCAINE HYDROCHLORIDE 10 MG/ML
0.5 INJECTION, SOLUTION EPIDURAL; INFILTRATION; INTRACAUDAL; PERINEURAL ONCE AS NEEDED
Status: DISCONTINUED | OUTPATIENT
Start: 2023-11-10 | End: 2023-11-10 | Stop reason: HOSPADM

## 2023-11-10 RX ORDER — LIDOCAINE HYDROCHLORIDE 20 MG/ML
INJECTION, SOLUTION EPIDURAL; INFILTRATION; INTRACAUDAL; PERINEURAL AS NEEDED
Status: DISCONTINUED | OUTPATIENT
Start: 2023-11-10 | End: 2023-11-10 | Stop reason: SURG

## 2023-11-10 RX ORDER — SODIUM CHLORIDE 0.9 % (FLUSH) 0.9 %
10 SYRINGE (ML) INJECTION AS NEEDED
Status: DISCONTINUED | OUTPATIENT
Start: 2023-11-10 | End: 2023-11-10 | Stop reason: HOSPADM

## 2023-11-10 RX ORDER — SODIUM CHLORIDE 9 MG/ML
500 INJECTION, SOLUTION INTRAVENOUS CONTINUOUS PRN
Status: DISCONTINUED | OUTPATIENT
Start: 2023-11-10 | End: 2023-11-10 | Stop reason: HOSPADM

## 2023-11-10 RX ORDER — PROPOFOL 10 MG/ML
VIAL (ML) INTRAVENOUS AS NEEDED
Status: DISCONTINUED | OUTPATIENT
Start: 2023-11-10 | End: 2023-11-10 | Stop reason: SURG

## 2023-11-10 RX ADMIN — PROPOFOL INJECTABLE EMULSION 300 MG: 10 INJECTION, EMULSION INTRAVENOUS at 09:22

## 2023-11-10 RX ADMIN — LIDOCAINE HYDROCHLORIDE 50 MG: 20 INJECTION, SOLUTION EPIDURAL; INFILTRATION; INTRACAUDAL; PERINEURAL at 09:22

## 2023-11-10 RX ADMIN — SODIUM CHLORIDE 500 ML: 0.9 INJECTION, SOLUTION INTRAVENOUS at 08:05

## 2023-11-17 ENCOUNTER — HOSPITAL ENCOUNTER (OUTPATIENT)
Dept: CT IMAGING | Facility: HOSPITAL | Age: 73
Discharge: HOME | End: 2023-11-17
Admitting: NURSE PRACTITIONER
Payer: MEDICARE

## 2023-11-17 DIAGNOSIS — R42 DIZZINESS: Primary | ICD-10-CM

## 2023-11-17 DIAGNOSIS — G44.209 ACUTE NON INTRACTABLE TENSION-TYPE HEADACHE: ICD-10-CM

## 2023-11-17 PROCEDURE — 70450 CT HEAD/BRAIN W/O DYE: CPT

## 2023-11-17 NOTE — PROGRESS NOTES
Progress Note      Pt Name: Corry Rosario  YOB: 1950  MRN: [id number]    Date of evaluation: 11/21/2023  History Obtained From:  patient, electronic medical record    CHIEF COMPLAINT:    Chief Complaint   Patient presents with    Follow-up     Hodgkin lymphoma, unspecified Hodgkin lymphoma type, unspecified body region [facility])     HISTORY OF PRESENT ILLNESS:    Crory Rosario is a 72 y.o.  male who has a history of classic Hodgkin's lymphoma, status-post completion of 2 cycles of ABVD on 7/6/2018 followed by ISRT completed on 9/5/2018 by Dr. Erlin Patel. He had been lost to follow-up, last visit on 3/18/2022 and reestablish care on 9/21/2023. A PET scan was completed on 9/15/2023, requested by [facility] pulmonology for follow-up on right lower lobe lung nodule that identified new finding of multiple hypermetabolic bone lesions throughout the thoracic and lumbar spine, sacrum and left iliac wing along with small focus of hypermetabolic activity in the right posterior lateral prostate with an SUV of 3.8. He denied any B symptoms and a PET scan did not report any hypermetabolic activity or pathologic enlarged lymph nodes. Serology studies on 9/19/2023 ruled out multiple myeloma, identified an elevated PSA of 4.82 (0.0 -4.0) and a CEA of 4.4 (0.0-4.7). He is being followed by Dr. Madisyn Bustamante who made a referral to Dr. Martin Spearing for consideration for empiric stereotactic radiation to the lung nodules, reported Namoy Hanson was a poor candidate for resection due to his overall lung function. He was also evaluated by Dr. Erlin Patel on 10/25/2023 and reported there was no role for SBRT to the lung nodule or radiation therapy at this time. Bone marrow aspiration and biopsy on 10/26/2023 reported no evidence of involvement by classical hodgkin lymphoma seen and no evidence of metastatic carcinoma or increased plasma cell infiltration.   Naomy Hanson reported he was unable to have an MRI of the prostate due to to having

## 2023-11-21 ENCOUNTER — HOSPITAL ENCOUNTER (OUTPATIENT)
Dept: INFUSION THERAPY | Age: 73
Discharge: HOME | End: 2023-11-21
Payer: MEDICARE

## 2023-11-21 ENCOUNTER — OFFICE VISIT (OUTPATIENT)
Dept: HEMATOLOGY | Age: 73
End: 2023-11-21
Payer: MEDICARE

## 2023-11-21 ENCOUNTER — PREP FOR SURGERY (OUTPATIENT)
Dept: OTHER | Facility: HOSPITAL | Age: 73
End: 2023-11-21
Payer: MEDICARE

## 2023-11-21 ENCOUNTER — TELEPHONE (OUTPATIENT)
Dept: UROLOGY | Facility: CLINIC | Age: 73
End: 2023-11-21
Payer: MEDICARE

## 2023-11-21 VITALS
SYSTOLIC BLOOD PRESSURE: 120 MMHG | BODY MASS INDEX: 20.43 KG/M2 | TEMPERATURE: 97.7 F | DIASTOLIC BLOOD PRESSURE: 62 MMHG | WEIGHT: 159.1 LBS | HEART RATE: 76 BPM

## 2023-11-21 DIAGNOSIS — Z71.89 CARE PLAN DISCUSSED WITH PATIENT: ICD-10-CM

## 2023-11-21 DIAGNOSIS — N40.0 ENLARGED PROSTATE: ICD-10-CM

## 2023-11-21 DIAGNOSIS — C81.90 HODGKIN LYMPHOMA, UNSPECIFIED HODGKIN LYMPHOMA TYPE, UNSPECIFIED BODY REGION (HCC): Primary | ICD-10-CM

## 2023-11-21 DIAGNOSIS — C81.72 OTHER CLASSICAL HODGKIN LYMPHOMA OF INTRATHORACIC LYMPH NODES (HCC): Primary | ICD-10-CM

## 2023-11-21 DIAGNOSIS — R97.20 ELEVATED PSA: ICD-10-CM

## 2023-11-21 DIAGNOSIS — R93.89 ABNORMAL CT SCAN: ICD-10-CM

## 2023-11-21 DIAGNOSIS — Z45.2 ENCOUNTER FOR CENTRAL LINE CARE: ICD-10-CM

## 2023-11-21 DIAGNOSIS — R97.20 ELEVATED PROSTATE SPECIFIC ANTIGEN (PSA): Primary | ICD-10-CM

## 2023-11-21 DIAGNOSIS — Z71.6 TOBACCO ABUSE COUNSELING: ICD-10-CM

## 2023-11-21 DIAGNOSIS — M89.9 LYTIC BONE LESIONS ON XRAY: ICD-10-CM

## 2023-11-21 LAB
BASOPHILS # BLD: 0.08 K/UL (ref 0.01–0.08)
BASOPHILS NFR BLD: 1.4 % (ref 0.1–1.2)
EOSINOPHIL # BLD: 0.39 K/UL (ref 0.04–0.54)
EOSINOPHIL NFR BLD: 7.1 % (ref 0.7–7)
ERYTHROCYTE [DISTWIDTH] IN BLOOD BY AUTOMATED COUNT: 13.3 % (ref 11.6–14.4)
HCT VFR BLD AUTO: 45.4 % (ref 40.1–51)
HGB BLD-MCNC: 15.9 G/DL (ref 13.7–17.5)
LYMPHOCYTES # BLD: 1.3 K/UL (ref 1.18–3.74)
LYMPHOCYTES NFR BLD: 23.5 % (ref 19.3–53.1)
MCH RBC QN AUTO: 32.3 PG (ref 25.7–32.2)
MCHC RBC AUTO-ENTMCNC: 35 G/DL (ref 32.3–36.5)
MCV RBC AUTO: 92.3 FL (ref 79–92.2)
MONOCYTES # BLD: 0.68 K/UL (ref 0.24–0.82)
MONOCYTES NFR BLD: 12.3 % (ref 4.7–12.5)
NEUTROPHILS # BLD: 3.07 K/UL (ref 1.56–6.13)
NEUTS SEG NFR BLD: 55.5 % (ref 34–71.1)
PLATELET # BLD AUTO: 274 K/UL (ref 163–337)
PMV BLD AUTO: 8.3 FL (ref 7.4–10.4)
RBC # BLD AUTO: 4.92 M/UL (ref 4.63–6.08)
WBC # BLD AUTO: 5.53 K/UL (ref 4.23–9.07)

## 2023-11-21 PROCEDURE — G8427 DOCREV CUR MEDS BY ELIG CLIN: HCPCS | Performed by: NURSE PRACTITIONER

## 2023-11-21 PROCEDURE — 99215 OFFICE O/P EST HI 40 MIN: CPT | Performed by: NURSE PRACTITIONER

## 2023-11-21 PROCEDURE — 99212 OFFICE O/P EST SF 10 MIN: CPT

## 2023-11-21 PROCEDURE — 36415 COLL VENOUS BLD VENIPUNCTURE: CPT

## 2023-11-21 PROCEDURE — G8484 FLU IMMUNIZE NO ADMIN: HCPCS | Performed by: NURSE PRACTITIONER

## 2023-11-21 PROCEDURE — G8420 CALC BMI NORM PARAMETERS: HCPCS | Performed by: NURSE PRACTITIONER

## 2023-11-21 PROCEDURE — 2580000003 HC RX 258: Performed by: NURSE PRACTITIONER

## 2023-11-21 PROCEDURE — 4004F PT TOBACCO SCREEN RCVD TLK: CPT | Performed by: NURSE PRACTITIONER

## 2023-11-21 PROCEDURE — 6360000002 HC RX W HCPCS: Performed by: NURSE PRACTITIONER

## 2023-11-21 PROCEDURE — 96523 IRRIG DRUG DELIVERY DEVICE: CPT

## 2023-11-21 PROCEDURE — 1123F ACP DISCUSS/DSCN MKR DOCD: CPT | Performed by: NURSE PRACTITIONER

## 2023-11-21 PROCEDURE — 3017F COLORECTAL CA SCREEN DOC REV: CPT | Performed by: NURSE PRACTITIONER

## 2023-11-21 PROCEDURE — 85025 COMPLETE CBC W/AUTO DIFF WBC: CPT

## 2023-11-21 RX ORDER — HEPARIN 100 UNIT/ML
500 SYRINGE INTRAVENOUS PRN
Status: DISCONTINUED | OUTPATIENT
Start: 2023-11-21 | End: 2023-11-22 | Stop reason: HOSPADM

## 2023-11-21 RX ORDER — SODIUM CHLORIDE 0.9 % (FLUSH) 0.9 %
10 SYRINGE (ML) INJECTION PRN
Status: DISCONTINUED | OUTPATIENT
Start: 2023-11-21 | End: 2023-11-22 | Stop reason: HOSPADM

## 2023-11-21 RX ORDER — HEPARIN 100 UNIT/ML
500 SYRINGE INTRAVENOUS PRN
OUTPATIENT
Start: 2023-11-21

## 2023-11-21 RX ORDER — SODIUM CHLORIDE 0.9 % (FLUSH) 0.9 %
10 SYRINGE (ML) INJECTION PRN
OUTPATIENT
Start: 2023-11-21

## 2023-11-21 RX ADMIN — HEPARIN 500 UNITS: 100 SYRINGE at 12:37

## 2023-11-21 RX ADMIN — SODIUM CHLORIDE, PRESERVATIVE FREE 10 ML: 5 INJECTION INTRAVENOUS at 12:35

## 2023-11-22 ENCOUNTER — TELEPHONE (OUTPATIENT)
Dept: UROLOGY | Facility: CLINIC | Age: 73
End: 2023-11-22
Payer: MEDICARE

## 2023-11-22 PROBLEM — R97.20 ELEVATED PROSTATE SPECIFIC ANTIGEN (PSA): Status: ACTIVE | Noted: 2023-11-21

## 2023-12-01 ENCOUNTER — PRE-ADMISSION TESTING (OUTPATIENT)
Dept: PREADMISSION TESTING | Facility: HOSPITAL | Age: 73
End: 2023-12-01
Payer: MEDICARE

## 2023-12-01 VITALS
WEIGHT: 159.83 LBS | HEART RATE: 64 BPM | BODY MASS INDEX: 21.65 KG/M2 | HEIGHT: 72 IN | DIASTOLIC BLOOD PRESSURE: 66 MMHG | SYSTOLIC BLOOD PRESSURE: 154 MMHG | OXYGEN SATURATION: 97 % | RESPIRATION RATE: 16 BRPM

## 2023-12-01 LAB
ALBUMIN SERPL-MCNC: 4.4 G/DL (ref 3.5–5.2)
ALBUMIN/GLOB SERPL: 1.7 G/DL
ALP SERPL-CCNC: 116 U/L (ref 39–117)
ALT SERPL W P-5'-P-CCNC: 9 U/L (ref 1–41)
ANION GAP SERPL CALCULATED.3IONS-SCNC: 8 MMOL/L (ref 5–15)
AST SERPL-CCNC: 12 U/L (ref 1–40)
BILIRUB SERPL-MCNC: 0.6 MG/DL (ref 0–1.2)
BUN SERPL-MCNC: 11 MG/DL (ref 8–23)
BUN/CREAT SERPL: 12.2 (ref 7–25)
CALCIUM SPEC-SCNC: 9.2 MG/DL (ref 8.6–10.5)
CHLORIDE SERPL-SCNC: 106 MMOL/L (ref 98–107)
CO2 SERPL-SCNC: 29 MMOL/L (ref 22–29)
CREAT SERPL-MCNC: 0.9 MG/DL (ref 0.76–1.27)
DEPRECATED RDW RBC AUTO: 44.2 FL (ref 37–54)
EGFRCR SERPLBLD CKD-EPI 2021: 91.3 ML/MIN/1.73
ERYTHROCYTE [DISTWIDTH] IN BLOOD BY AUTOMATED COUNT: 12.9 % (ref 12.3–15.4)
GLOBULIN UR ELPH-MCNC: 2.6 GM/DL
GLUCOSE SERPL-MCNC: 95 MG/DL (ref 65–99)
HCT VFR BLD AUTO: 45.9 % (ref 37.5–51)
HGB BLD-MCNC: 15.3 G/DL (ref 13–17.7)
MCH RBC QN AUTO: 31 PG (ref 26.6–33)
MCHC RBC AUTO-ENTMCNC: 33.3 G/DL (ref 31.5–35.7)
MCV RBC AUTO: 93.1 FL (ref 79–97)
PLATELET # BLD AUTO: 276 10*3/MM3 (ref 140–450)
PMV BLD AUTO: 8.5 FL (ref 6–12)
POTASSIUM SERPL-SCNC: 3.9 MMOL/L (ref 3.5–5.2)
PROT SERPL-MCNC: 7 G/DL (ref 6–8.5)
RBC # BLD AUTO: 4.93 10*6/MM3 (ref 4.14–5.8)
SODIUM SERPL-SCNC: 143 MMOL/L (ref 136–145)
WBC NRBC COR # BLD AUTO: 5.01 10*3/MM3 (ref 3.4–10.8)

## 2023-12-01 PROCEDURE — 85027 COMPLETE CBC AUTOMATED: CPT

## 2023-12-01 PROCEDURE — 36415 COLL VENOUS BLD VENIPUNCTURE: CPT

## 2023-12-01 PROCEDURE — 80053 COMPREHEN METABOLIC PANEL: CPT

## 2023-12-01 RX ORDER — HYDROXYZINE PAMOATE 25 MG/1
25 CAPSULE ORAL 3 TIMES DAILY PRN
COMMUNITY

## 2023-12-07 ENCOUNTER — TELEPHONE (OUTPATIENT)
Dept: UROLOGY | Facility: CLINIC | Age: 73
End: 2023-12-07
Payer: MEDICARE

## 2023-12-08 ENCOUNTER — HOSPITAL ENCOUNTER (OUTPATIENT)
Facility: HOSPITAL | Age: 73
Setting detail: HOSPITAL OUTPATIENT SURGERY
Discharge: HOME | End: 2023-12-08
Attending: UROLOGY | Admitting: UROLOGY
Payer: MEDICARE

## 2023-12-08 ENCOUNTER — ANESTHESIA EVENT (OUTPATIENT)
Dept: PERIOP | Facility: HOSPITAL | Age: 73
End: 2023-12-08
Payer: MEDICARE

## 2023-12-08 ENCOUNTER — ANESTHESIA (OUTPATIENT)
Dept: PERIOP | Facility: HOSPITAL | Age: 73
End: 2023-12-08
Payer: MEDICARE

## 2023-12-08 VITALS
SYSTOLIC BLOOD PRESSURE: 134 MMHG | DIASTOLIC BLOOD PRESSURE: 64 MMHG | RESPIRATION RATE: 16 BRPM | OXYGEN SATURATION: 94 % | TEMPERATURE: 98.3 F | HEART RATE: 59 BPM

## 2023-12-08 DIAGNOSIS — R97.20 ELEVATED PROSTATE SPECIFIC ANTIGEN (PSA): ICD-10-CM

## 2023-12-08 LAB
APTT PPP: 27.6 SECONDS (ref 24.5–36)
INR PPP: 0.9 (ref 0.91–1.09)
PROTHROMBIN TIME: 12.2 SECONDS (ref 11.8–14.8)

## 2023-12-08 PROCEDURE — 76942 ECHO GUIDE FOR BIOPSY: CPT | Performed by: UROLOGY

## 2023-12-08 PROCEDURE — 25010000002 CEFAZOLIN PER 500 MG: Performed by: UROLOGY

## 2023-12-08 PROCEDURE — 85610 PROTHROMBIN TIME: CPT | Performed by: UROLOGY

## 2023-12-08 PROCEDURE — G0416 PROSTATE BIOPSY, ANY MTHD: HCPCS | Performed by: UROLOGY

## 2023-12-08 PROCEDURE — 25010000002 ONDANSETRON PER 1 MG: Performed by: NURSE ANESTHETIST, CERTIFIED REGISTERED

## 2023-12-08 PROCEDURE — 55700 PR PROSTATE NEEDLE BIOPSY ANY APPROACH: CPT | Performed by: UROLOGY

## 2023-12-08 PROCEDURE — 25010000002 PROPOFOL 10 MG/ML EMULSION: Performed by: NURSE ANESTHETIST, CERTIFIED REGISTERED

## 2023-12-08 PROCEDURE — 85730 THROMBOPLASTIN TIME PARTIAL: CPT | Performed by: UROLOGY

## 2023-12-08 PROCEDURE — S0260 H&P FOR SURGERY: HCPCS | Performed by: UROLOGY

## 2023-12-08 PROCEDURE — 25010000002 FENTANYL CITRATE (PF) 100 MCG/2ML SOLUTION: Performed by: NURSE ANESTHETIST, CERTIFIED REGISTERED

## 2023-12-08 PROCEDURE — 25810000003 LACTATED RINGERS PER 1000 ML: Performed by: UROLOGY

## 2023-12-08 RX ORDER — SODIUM CHLORIDE 9 MG/ML
40 INJECTION, SOLUTION INTRAVENOUS AS NEEDED
Status: DISCONTINUED | OUTPATIENT
Start: 2023-12-08 | End: 2023-12-08 | Stop reason: HOSPADM

## 2023-12-08 RX ORDER — FENTANYL CITRATE 50 UG/ML
25 INJECTION, SOLUTION INTRAMUSCULAR; INTRAVENOUS
Status: DISCONTINUED | OUTPATIENT
Start: 2023-12-08 | End: 2023-12-08 | Stop reason: HOSPADM

## 2023-12-08 RX ORDER — ULTRASOUND COUPLING MEDIUM
GEL (GRAM) TOPICAL AS NEEDED
Status: DISCONTINUED | OUTPATIENT
Start: 2023-12-08 | End: 2023-12-08 | Stop reason: HOSPADM

## 2023-12-08 RX ORDER — ONDANSETRON 2 MG/ML
INJECTION INTRAMUSCULAR; INTRAVENOUS AS NEEDED
Status: DISCONTINUED | OUTPATIENT
Start: 2023-12-08 | End: 2023-12-08 | Stop reason: SURG

## 2023-12-08 RX ORDER — HYDROCODONE BITARTRATE AND ACETAMINOPHEN 5; 325 MG/1; MG/1
1 TABLET ORAL EVERY 8 HOURS PRN
Qty: 6 TABLET | Refills: 0 | Status: SHIPPED | OUTPATIENT
Start: 2023-12-08

## 2023-12-08 RX ORDER — FLUMAZENIL 0.1 MG/ML
0.2 INJECTION INTRAVENOUS AS NEEDED
Status: DISCONTINUED | OUTPATIENT
Start: 2023-12-08 | End: 2023-12-08 | Stop reason: HOSPADM

## 2023-12-08 RX ORDER — IBUPROFEN 600 MG/1
600 TABLET ORAL ONCE AS NEEDED
Status: DISCONTINUED | OUTPATIENT
Start: 2023-12-08 | End: 2023-12-08 | Stop reason: HOSPADM

## 2023-12-08 RX ORDER — PROPOFOL 10 MG/ML
VIAL (ML) INTRAVENOUS AS NEEDED
Status: DISCONTINUED | OUTPATIENT
Start: 2023-12-08 | End: 2023-12-08 | Stop reason: SURG

## 2023-12-08 RX ORDER — SODIUM CHLORIDE 0.9 % (FLUSH) 0.9 %
3 SYRINGE (ML) INJECTION EVERY 12 HOURS SCHEDULED
Status: DISCONTINUED | OUTPATIENT
Start: 2023-12-08 | End: 2023-12-08 | Stop reason: HOSPADM

## 2023-12-08 RX ORDER — SODIUM CHLORIDE, SODIUM LACTATE, POTASSIUM CHLORIDE, CALCIUM CHLORIDE 600; 310; 30; 20 MG/100ML; MG/100ML; MG/100ML; MG/100ML
100 INJECTION, SOLUTION INTRAVENOUS CONTINUOUS
Status: DISCONTINUED | OUTPATIENT
Start: 2023-12-08 | End: 2023-12-08 | Stop reason: HOSPADM

## 2023-12-08 RX ORDER — MIDAZOLAM HYDROCHLORIDE 1 MG/ML
0.5 INJECTION INTRAMUSCULAR; INTRAVENOUS
Status: DISCONTINUED | OUTPATIENT
Start: 2023-12-08 | End: 2023-12-08 | Stop reason: HOSPADM

## 2023-12-08 RX ORDER — HYDROCODONE BITARTRATE AND ACETAMINOPHEN 5; 325 MG/1; MG/1
1 TABLET ORAL ONCE AS NEEDED
Status: DISCONTINUED | OUTPATIENT
Start: 2023-12-08 | End: 2023-12-08 | Stop reason: HOSPADM

## 2023-12-08 RX ORDER — FENTANYL CITRATE 50 UG/ML
INJECTION, SOLUTION INTRAMUSCULAR; INTRAVENOUS AS NEEDED
Status: DISCONTINUED | OUTPATIENT
Start: 2023-12-08 | End: 2023-12-08 | Stop reason: SURG

## 2023-12-08 RX ORDER — HYDROCODONE BITARTRATE AND ACETAMINOPHEN 10; 325 MG/1; MG/1
1 TABLET ORAL EVERY 4 HOURS PRN
Status: DISCONTINUED | OUTPATIENT
Start: 2023-12-08 | End: 2023-12-08 | Stop reason: HOSPADM

## 2023-12-08 RX ORDER — LABETALOL HYDROCHLORIDE 5 MG/ML
5 INJECTION, SOLUTION INTRAVENOUS
Status: DISCONTINUED | OUTPATIENT
Start: 2023-12-08 | End: 2023-12-08 | Stop reason: HOSPADM

## 2023-12-08 RX ORDER — SODIUM CHLORIDE, SODIUM LACTATE, POTASSIUM CHLORIDE, CALCIUM CHLORIDE 600; 310; 30; 20 MG/100ML; MG/100ML; MG/100ML; MG/100ML
1000 INJECTION, SOLUTION INTRAVENOUS CONTINUOUS
Status: DISCONTINUED | OUTPATIENT
Start: 2023-12-08 | End: 2023-12-08 | Stop reason: HOSPADM

## 2023-12-08 RX ORDER — SODIUM CHLORIDE 0.9 % (FLUSH) 0.9 %
3 SYRINGE (ML) INJECTION AS NEEDED
Status: DISCONTINUED | OUTPATIENT
Start: 2023-12-08 | End: 2023-12-08 | Stop reason: HOSPADM

## 2023-12-08 RX ORDER — ONDANSETRON 2 MG/ML
4 INJECTION INTRAMUSCULAR; INTRAVENOUS ONCE AS NEEDED
Status: DISCONTINUED | OUTPATIENT
Start: 2023-12-08 | End: 2023-12-08 | Stop reason: HOSPADM

## 2023-12-08 RX ORDER — LIDOCAINE HYDROCHLORIDE 20 MG/ML
INJECTION, SOLUTION EPIDURAL; INFILTRATION; INTRACAUDAL; PERINEURAL AS NEEDED
Status: DISCONTINUED | OUTPATIENT
Start: 2023-12-08 | End: 2023-12-08 | Stop reason: SURG

## 2023-12-08 RX ORDER — DROPERIDOL 2.5 MG/ML
0.62 INJECTION, SOLUTION INTRAMUSCULAR; INTRAVENOUS ONCE AS NEEDED
Status: DISCONTINUED | OUTPATIENT
Start: 2023-12-08 | End: 2023-12-08 | Stop reason: HOSPADM

## 2023-12-08 RX ORDER — LIDOCAINE HYDROCHLORIDE 10 MG/ML
0.5 INJECTION, SOLUTION EPIDURAL; INFILTRATION; INTRACAUDAL; PERINEURAL ONCE AS NEEDED
Status: DISCONTINUED | OUTPATIENT
Start: 2023-12-08 | End: 2023-12-08 | Stop reason: HOSPADM

## 2023-12-08 RX ORDER — SODIUM CHLORIDE 0.9 % (FLUSH) 0.9 %
3-10 SYRINGE (ML) INJECTION AS NEEDED
Status: DISCONTINUED | OUTPATIENT
Start: 2023-12-08 | End: 2023-12-08 | Stop reason: HOSPADM

## 2023-12-08 RX ORDER — NALOXONE HCL 0.4 MG/ML
0.4 VIAL (ML) INJECTION AS NEEDED
Status: DISCONTINUED | OUTPATIENT
Start: 2023-12-08 | End: 2023-12-08 | Stop reason: HOSPADM

## 2023-12-08 RX ADMIN — FENTANYL CITRATE 50 MCG: 50 INJECTION, SOLUTION INTRAMUSCULAR; INTRAVENOUS at 07:57

## 2023-12-08 RX ADMIN — SODIUM CHLORIDE, POTASSIUM CHLORIDE, SODIUM LACTATE AND CALCIUM CHLORIDE 1000 ML: 600; 310; 30; 20 INJECTION, SOLUTION INTRAVENOUS at 06:49

## 2023-12-08 RX ADMIN — CEFAZOLIN 2000 MG: 2 INJECTION, POWDER, FOR SOLUTION INTRAMUSCULAR; INTRAVENOUS at 08:04

## 2023-12-08 RX ADMIN — FENTANYL CITRATE 50 MCG: 50 INJECTION, SOLUTION INTRAMUSCULAR; INTRAVENOUS at 08:15

## 2023-12-08 RX ADMIN — PROPOFOL 150 MG: 10 INJECTION, EMULSION INTRAVENOUS at 07:57

## 2023-12-08 RX ADMIN — LIDOCAINE HYDROCHLORIDE 50 MG: 20 INJECTION, SOLUTION EPIDURAL; INFILTRATION; INTRACAUDAL; PERINEURAL at 07:56

## 2023-12-08 RX ADMIN — ONDANSETRON 4 MG: 2 INJECTION INTRAMUSCULAR; INTRAVENOUS at 08:16

## 2023-12-11 LAB
LAB AP CASE REPORT: NORMAL
Lab: NORMAL
PATH REPORT.FINAL DX SPEC: NORMAL
PATH REPORT.GROSS SPEC: NORMAL

## 2023-12-11 ASSESSMENT — ENCOUNTER SYMPTOMS
WHEEZING: 0
EYES NEGATIVE: 1
EYE DISCHARGE: 0
BLOOD IN STOOL: 0
NAUSEA: 1
BACK PAIN: 0
DIARRHEA: 0
SHORTNESS OF BREATH: 1
ABDOMINAL PAIN: 0
CONSTIPATION: 0
EYE REDNESS: 0
SORE THROAT: 0
COUGH: 1
VOMITING: 0
EYE PAIN: 0

## 2023-12-13 ENCOUNTER — APPOINTMENT (OUTPATIENT)
Dept: CT IMAGING | Facility: HOSPITAL | Age: 73
End: 2023-12-13
Payer: MEDICARE

## 2023-12-13 ENCOUNTER — HOSPITAL ENCOUNTER (EMERGENCY)
Facility: HOSPITAL | Age: 73
Discharge: HOME | End: 2023-12-13
Attending: EMERGENCY MEDICINE | Admitting: EMERGENCY MEDICINE
Payer: MEDICARE

## 2023-12-13 VITALS
WEIGHT: 165.56 LBS | RESPIRATION RATE: 18 BRPM | HEIGHT: 74 IN | HEART RATE: 60 BPM | DIASTOLIC BLOOD PRESSURE: 70 MMHG | BODY MASS INDEX: 21.25 KG/M2 | OXYGEN SATURATION: 99 % | SYSTOLIC BLOOD PRESSURE: 121 MMHG | TEMPERATURE: 98.3 F

## 2023-12-13 DIAGNOSIS — M54.31 SCIATICA OF RIGHT SIDE: ICD-10-CM

## 2023-12-13 DIAGNOSIS — R10.9 POSTOPERATIVE ABDOMINAL PAIN: Primary | ICD-10-CM

## 2023-12-13 DIAGNOSIS — G89.18 POSTOPERATIVE ABDOMINAL PAIN: Primary | ICD-10-CM

## 2023-12-13 PROBLEM — Z98.890 HISTORY OF PROSTATE BIOPSY: Status: ACTIVE | Noted: 2018-10-20

## 2023-12-13 LAB
ALBUMIN SERPL-MCNC: 4.3 G/DL (ref 3.5–5.2)
ALBUMIN/GLOB SERPL: 1.7 G/DL
ALP SERPL-CCNC: 123 U/L (ref 39–117)
ALT SERPL W P-5'-P-CCNC: 9 U/L (ref 1–41)
ANION GAP SERPL CALCULATED.3IONS-SCNC: 10 MMOL/L (ref 5–15)
AST SERPL-CCNC: 13 U/L (ref 1–40)
BACTERIA UR QL AUTO: ABNORMAL /HPF
BASOPHILS # BLD AUTO: 0.09 10*3/MM3 (ref 0–0.2)
BASOPHILS NFR BLD AUTO: 1.6 % (ref 0–1.5)
BILIRUB SERPL-MCNC: 0.6 MG/DL (ref 0–1.2)
BILIRUB UR QL STRIP: NEGATIVE
BUN SERPL-MCNC: 16 MG/DL (ref 8–23)
BUN/CREAT SERPL: 18.2 (ref 7–25)
CALCIUM SPEC-SCNC: 9 MG/DL (ref 8.6–10.5)
CHLORIDE SERPL-SCNC: 104 MMOL/L (ref 98–107)
CLARITY UR: CLEAR
CO2 SERPL-SCNC: 26 MMOL/L (ref 22–29)
COD CRY URNS QL: ABNORMAL /HPF
COLOR UR: ABNORMAL
CREAT SERPL-MCNC: 0.88 MG/DL (ref 0.76–1.27)
DEPRECATED RDW RBC AUTO: 42.3 FL (ref 37–54)
EGFRCR SERPLBLD CKD-EPI 2021: 91.9 ML/MIN/1.73
EOSINOPHIL # BLD AUTO: 0.28 10*3/MM3 (ref 0–0.4)
EOSINOPHIL NFR BLD AUTO: 5.1 % (ref 0.3–6.2)
ERYTHROCYTE [DISTWIDTH] IN BLOOD BY AUTOMATED COUNT: 12.7 % (ref 12.3–15.4)
GLOBULIN UR ELPH-MCNC: 2.6 GM/DL
GLUCOSE SERPL-MCNC: 98 MG/DL (ref 65–99)
GLUCOSE UR STRIP-MCNC: NEGATIVE MG/DL
HCT VFR BLD AUTO: 44.3 % (ref 37.5–51)
HGB BLD-MCNC: 15.1 G/DL (ref 13–17.7)
HGB UR QL STRIP.AUTO: ABNORMAL
HYALINE CASTS UR QL AUTO: ABNORMAL /LPF
IMM GRANULOCYTES # BLD AUTO: 0.02 10*3/MM3 (ref 0–0.05)
IMM GRANULOCYTES NFR BLD AUTO: 0.4 % (ref 0–0.5)
KETONES UR QL STRIP: ABNORMAL
LEUKOCYTE ESTERASE UR QL STRIP.AUTO: ABNORMAL
LIPASE SERPL-CCNC: 55 U/L (ref 13–60)
LYMPHOCYTES # BLD AUTO: 1.01 10*3/MM3 (ref 0.7–3.1)
LYMPHOCYTES NFR BLD AUTO: 18.2 % (ref 19.6–45.3)
MCH RBC QN AUTO: 31.1 PG (ref 26.6–33)
MCHC RBC AUTO-ENTMCNC: 34.1 G/DL (ref 31.5–35.7)
MCV RBC AUTO: 91.2 FL (ref 79–97)
MONOCYTES # BLD AUTO: 0.62 10*3/MM3 (ref 0.1–0.9)
MONOCYTES NFR BLD AUTO: 11.2 % (ref 5–12)
NEUTROPHILS NFR BLD AUTO: 3.52 10*3/MM3 (ref 1.7–7)
NEUTROPHILS NFR BLD AUTO: 63.5 % (ref 42.7–76)
NITRITE UR QL STRIP: NEGATIVE
NRBC BLD AUTO-RTO: 0 /100 WBC (ref 0–0.2)
PH UR STRIP.AUTO: 5.5 [PH] (ref 5–8)
PLATELET # BLD AUTO: 281 10*3/MM3 (ref 140–450)
PMV BLD AUTO: 8.4 FL (ref 6–12)
POTASSIUM SERPL-SCNC: 4.3 MMOL/L (ref 3.5–5.2)
PROT SERPL-MCNC: 6.9 G/DL (ref 6–8.5)
PROT UR QL STRIP: NEGATIVE
RBC # BLD AUTO: 4.86 10*6/MM3 (ref 4.14–5.8)
RBC # UR STRIP: ABNORMAL /HPF
REF LAB TEST METHOD: ABNORMAL
SODIUM SERPL-SCNC: 140 MMOL/L (ref 136–145)
SP GR UR STRIP: 1.02 (ref 1–1.03)
SQUAMOUS #/AREA URNS HPF: ABNORMAL /HPF
UROBILINOGEN UR QL STRIP: ABNORMAL
WBC # UR STRIP: ABNORMAL /HPF
WBC NRBC COR # BLD AUTO: 5.54 10*3/MM3 (ref 3.4–10.8)

## 2023-12-13 PROCEDURE — 99284 EMERGENCY DEPT VISIT MOD MDM: CPT

## 2023-12-13 PROCEDURE — 85025 COMPLETE CBC W/AUTO DIFF WBC: CPT | Performed by: EMERGENCY MEDICINE

## 2023-12-13 PROCEDURE — 74176 CT ABD & PELVIS W/O CONTRAST: CPT

## 2023-12-13 PROCEDURE — 83690 ASSAY OF LIPASE: CPT | Performed by: EMERGENCY MEDICINE

## 2023-12-13 PROCEDURE — 80053 COMPREHEN METABOLIC PANEL: CPT | Performed by: EMERGENCY MEDICINE

## 2023-12-13 PROCEDURE — 81001 URINALYSIS AUTO W/SCOPE: CPT | Performed by: EMERGENCY MEDICINE

## 2023-12-13 RX ORDER — SODIUM CHLORIDE 0.9 % (FLUSH) 0.9 %
10 SYRINGE (ML) INJECTION AS NEEDED
Status: DISCONTINUED | OUTPATIENT
Start: 2023-12-13 | End: 2023-12-13 | Stop reason: HOSPADM

## 2023-12-13 RX ORDER — TRAMADOL HYDROCHLORIDE 50 MG/1
50 TABLET ORAL EVERY 8 HOURS PRN
Qty: 10 TABLET | Refills: 0 | Status: SHIPPED | OUTPATIENT
Start: 2023-12-13

## 2023-12-18 ENCOUNTER — OFFICE VISIT (OUTPATIENT)
Dept: UROLOGY | Facility: CLINIC | Age: 73
End: 2023-12-18
Payer: MEDICARE

## 2023-12-18 VITALS — BODY MASS INDEX: 20.46 KG/M2 | WEIGHT: 159.4 LBS | HEIGHT: 74 IN

## 2023-12-18 DIAGNOSIS — C61 PROSTATE CANCER: Primary | ICD-10-CM

## 2023-12-18 DIAGNOSIS — C79.51 CANCER, METASTATIC TO BONE: ICD-10-CM

## 2023-12-19 ENCOUNTER — TELEPHONE (OUTPATIENT)
Dept: PULMONOLOGY | Facility: CLINIC | Age: 73
End: 2023-12-19
Payer: MEDICARE

## 2023-12-20 ENCOUNTER — TELEPHONE (OUTPATIENT)
Dept: NEUROSURGERY | Facility: CLINIC | Age: 73
End: 2023-12-20
Payer: MEDICARE

## 2023-12-22 ENCOUNTER — OFFICE VISIT (OUTPATIENT)
Dept: NEUROSURGERY | Facility: CLINIC | Age: 73
End: 2023-12-22
Payer: MEDICARE

## 2023-12-22 ENCOUNTER — PRE-ADMISSION TESTING (OUTPATIENT)
Dept: PREADMISSION TESTING | Facility: HOSPITAL | Age: 73
End: 2023-12-22
Payer: MEDICARE

## 2023-12-22 VITALS
BODY MASS INDEX: 21.53 KG/M2 | OXYGEN SATURATION: 98 % | WEIGHT: 158.95 LBS | RESPIRATION RATE: 16 BRPM | HEART RATE: 61 BPM | SYSTOLIC BLOOD PRESSURE: 135 MMHG | DIASTOLIC BLOOD PRESSURE: 59 MMHG | HEIGHT: 72 IN

## 2023-12-22 VITALS — BODY MASS INDEX: 20.28 KG/M2 | WEIGHT: 158 LBS | HEIGHT: 74 IN

## 2023-12-22 DIAGNOSIS — R94.8 ABNORMAL POSITRON EMISSION TOMOGRAPHY (PET) SCAN: Primary | ICD-10-CM

## 2023-12-22 DIAGNOSIS — C81.11 NODULAR SCLEROSIS HODGKIN LYMPHOMA OF LYMPH NODES OF NECK: ICD-10-CM

## 2023-12-22 DIAGNOSIS — C61 PROSTATE CANCER: ICD-10-CM

## 2023-12-22 DIAGNOSIS — F17.200 CURRENT EVERY DAY SMOKER: ICD-10-CM

## 2023-12-22 DIAGNOSIS — R94.8 ABNORMAL POSITRON EMISSION TOMOGRAPHY (PET) SCAN: ICD-10-CM

## 2023-12-22 LAB
BACTERIA UR QL AUTO: ABNORMAL /HPF
BILIRUB UR QL STRIP: NEGATIVE
CLARITY UR: CLEAR
COLOR UR: YELLOW
GLUCOSE UR STRIP-MCNC: NEGATIVE MG/DL
HGB UR QL STRIP.AUTO: ABNORMAL
HYALINE CASTS UR QL AUTO: ABNORMAL /LPF
KETONES UR QL STRIP: NEGATIVE
LEUKOCYTE ESTERASE UR QL STRIP.AUTO: ABNORMAL
NITRITE UR QL STRIP: NEGATIVE
PH UR STRIP.AUTO: 5.5 [PH] (ref 5–8)
PROT UR QL STRIP: NEGATIVE
RBC # UR STRIP: ABNORMAL /HPF
REF LAB TEST METHOD: ABNORMAL
SP GR UR STRIP: 1.01 (ref 1–1.03)
SQUAMOUS #/AREA URNS HPF: ABNORMAL /HPF
UROBILINOGEN UR QL STRIP: ABNORMAL
WBC # UR STRIP: ABNORMAL /HPF

## 2023-12-22 PROCEDURE — 81001 URINALYSIS AUTO W/SCOPE: CPT

## 2023-12-22 PROCEDURE — 1160F RVW MEDS BY RX/DR IN RCRD: CPT | Performed by: NEUROLOGICAL SURGERY

## 2023-12-22 PROCEDURE — 99204 OFFICE O/P NEW MOD 45 MIN: CPT | Performed by: NEUROLOGICAL SURGERY

## 2023-12-22 PROCEDURE — 1159F MED LIST DOCD IN RCRD: CPT | Performed by: NEUROLOGICAL SURGERY

## 2023-12-22 RX ORDER — HYDROCODONE BITARTRATE AND ACETAMINOPHEN 5; 325 MG/1; MG/1
1 TABLET ORAL
COMMUNITY
Start: 2023-12-19 | End: 2024-01-19

## 2024-01-03 ENCOUNTER — HOSPITAL ENCOUNTER (OUTPATIENT)
Facility: HOSPITAL | Age: 74
Setting detail: HOSPITAL OUTPATIENT SURGERY
Discharge: HOME | End: 2024-01-03
Attending: NEUROLOGICAL SURGERY | Admitting: NEUROLOGICAL SURGERY
Payer: MEDICARE

## 2024-01-03 ENCOUNTER — APPOINTMENT (OUTPATIENT)
Dept: GENERAL RADIOLOGY | Facility: HOSPITAL | Age: 74
End: 2024-01-03
Payer: MEDICARE

## 2024-01-03 ENCOUNTER — ANESTHESIA (OUTPATIENT)
Dept: PERIOP | Facility: HOSPITAL | Age: 74
End: 2024-01-03
Payer: MEDICARE

## 2024-01-03 ENCOUNTER — ANESTHESIA EVENT (OUTPATIENT)
Dept: PERIOP | Facility: HOSPITAL | Age: 74
End: 2024-01-03
Payer: MEDICARE

## 2024-01-03 VITALS
SYSTOLIC BLOOD PRESSURE: 141 MMHG | DIASTOLIC BLOOD PRESSURE: 73 MMHG | RESPIRATION RATE: 16 BRPM | HEART RATE: 66 BPM | OXYGEN SATURATION: 94 % | TEMPERATURE: 97.8 F

## 2024-01-03 DIAGNOSIS — R94.8 ABNORMAL POSITRON EMISSION TOMOGRAPHY (PET) SCAN: ICD-10-CM

## 2024-01-03 DIAGNOSIS — M54.31 SCIATICA OF RIGHT SIDE: ICD-10-CM

## 2024-01-03 LAB
ABO GROUP BLD: NORMAL
BLD GP AB SCN SERPL QL: POSITIVE
Lab: NORMAL
PRE-WARM SCREEN: NEGATIVE
RH BLD: NEGATIVE
T&S EXPIRATION DATE: NORMAL

## 2024-01-03 PROCEDURE — 25010000002 FENTANYL CITRATE (PF) 100 MCG/2ML SOLUTION: Performed by: NURSE ANESTHETIST, CERTIFIED REGISTERED

## 2024-01-03 PROCEDURE — 25010000002 EPINEPHRINE 1 MG/ML SOLUTION 1 ML AMPULE: Performed by: NEUROLOGICAL SURGERY

## 2024-01-03 PROCEDURE — 88341 IMHCHEM/IMCYTCHM EA ADD ANTB: CPT | Performed by: NEUROLOGICAL SURGERY

## 2024-01-03 PROCEDURE — 25010000002 BUPIVACAINE (PF) 0.25 % SOLUTION 30 ML VIAL: Performed by: NEUROLOGICAL SURGERY

## 2024-01-03 PROCEDURE — 86870 RBC ANTIBODY IDENTIFICATION: CPT

## 2024-01-03 PROCEDURE — 72100 X-RAY EXAM L-S SPINE 2/3 VWS: CPT

## 2024-01-03 PROCEDURE — 88311 DECALCIFY TISSUE: CPT | Performed by: NEUROLOGICAL SURGERY

## 2024-01-03 PROCEDURE — 76000 FLUOROSCOPY <1 HR PHYS/QHP: CPT

## 2024-01-03 PROCEDURE — 86921 COMPATIBILITY TEST INCUBATE: CPT

## 2024-01-03 PROCEDURE — 86850 RBC ANTIBODY SCREEN: CPT | Performed by: NEUROLOGICAL SURGERY

## 2024-01-03 PROCEDURE — 86920 COMPATIBILITY TEST SPIN: CPT

## 2024-01-03 PROCEDURE — 25810000003 LACTATED RINGERS PER 1000 ML: Performed by: NEUROLOGICAL SURGERY

## 2024-01-03 PROCEDURE — 25010000002 PROPOFOL 10 MG/ML EMULSION: Performed by: NURSE ANESTHETIST, CERTIFIED REGISTERED

## 2024-01-03 PROCEDURE — 88323 CONSLTJ&REPRT MATRL PREP SLD: CPT

## 2024-01-03 PROCEDURE — 25010000002 DEXAMETHASONE PER 1 MG: Performed by: NURSE ANESTHETIST, CERTIFIED REGISTERED

## 2024-01-03 PROCEDURE — 86922 COMPATIBILITY TEST ANTIGLOB: CPT

## 2024-01-03 PROCEDURE — 88307 TISSUE EXAM BY PATHOLOGIST: CPT | Performed by: NEUROLOGICAL SURGERY

## 2024-01-03 PROCEDURE — 88341 IMHCHEM/IMCYTCHM EA ADD ANTB: CPT

## 2024-01-03 PROCEDURE — 25010000002 CEFAZOLIN PER 500 MG: Performed by: NEUROLOGICAL SURGERY

## 2024-01-03 PROCEDURE — 25010000002 SUGAMMADEX 200 MG/2ML SOLUTION: Performed by: NURSE ANESTHETIST, CERTIFIED REGISTERED

## 2024-01-03 PROCEDURE — 20225 BONE BIOPSY TROCAR/NDL DEEP: CPT | Performed by: NEUROLOGICAL SURGERY

## 2024-01-03 PROCEDURE — 88342 IMHCHEM/IMCYTCHM 1ST ANTB: CPT | Performed by: NEUROLOGICAL SURGERY

## 2024-01-03 PROCEDURE — 86870 RBC ANTIBODY IDENTIFICATION: CPT | Performed by: NEUROLOGICAL SURGERY

## 2024-01-03 PROCEDURE — 25010000002 ONDANSETRON PER 1 MG: Performed by: NURSE ANESTHETIST, CERTIFIED REGISTERED

## 2024-01-03 PROCEDURE — 86900 BLOOD TYPING SEROLOGIC ABO: CPT | Performed by: NEUROLOGICAL SURGERY

## 2024-01-03 PROCEDURE — 86901 BLOOD TYPING SEROLOGIC RH(D): CPT | Performed by: NEUROLOGICAL SURGERY

## 2024-01-03 RX ORDER — LABETALOL HYDROCHLORIDE 5 MG/ML
5 INJECTION, SOLUTION INTRAVENOUS
Status: DISCONTINUED | OUTPATIENT
Start: 2024-01-03 | End: 2024-01-03 | Stop reason: HOSPADM

## 2024-01-03 RX ORDER — LIDOCAINE HYDROCHLORIDE 20 MG/ML
INJECTION, SOLUTION EPIDURAL; INFILTRATION; INTRACAUDAL; PERINEURAL AS NEEDED
Status: DISCONTINUED | OUTPATIENT
Start: 2024-01-03 | End: 2024-01-03 | Stop reason: SURG

## 2024-01-03 RX ORDER — SODIUM CHLORIDE 9 MG/ML
40 INJECTION, SOLUTION INTRAVENOUS AS NEEDED
Status: DISCONTINUED | OUTPATIENT
Start: 2024-01-03 | End: 2024-01-03 | Stop reason: HOSPADM

## 2024-01-03 RX ORDER — SODIUM CHLORIDE, SODIUM LACTATE, POTASSIUM CHLORIDE, CALCIUM CHLORIDE 600; 310; 30; 20 MG/100ML; MG/100ML; MG/100ML; MG/100ML
1000 INJECTION, SOLUTION INTRAVENOUS CONTINUOUS
Status: DISCONTINUED | OUTPATIENT
Start: 2024-01-03 | End: 2024-01-03 | Stop reason: HOSPADM

## 2024-01-03 RX ORDER — SUCCINYLCHOLINE/SOD CL,ISO/PF 200MG/10ML
SYRINGE (ML) INTRAVENOUS AS NEEDED
Status: DISCONTINUED | OUTPATIENT
Start: 2024-01-03 | End: 2024-01-03 | Stop reason: SURG

## 2024-01-03 RX ORDER — HYDROCODONE BITARTRATE AND ACETAMINOPHEN 5; 325 MG/1; MG/1
1 TABLET ORAL ONCE AS NEEDED
Status: DISCONTINUED | OUTPATIENT
Start: 2024-01-03 | End: 2024-01-03 | Stop reason: HOSPADM

## 2024-01-03 RX ORDER — ONDANSETRON 2 MG/ML
INJECTION INTRAMUSCULAR; INTRAVENOUS AS NEEDED
Status: DISCONTINUED | OUTPATIENT
Start: 2024-01-03 | End: 2024-01-03 | Stop reason: SURG

## 2024-01-03 RX ORDER — ONDANSETRON 2 MG/ML
4 INJECTION INTRAMUSCULAR; INTRAVENOUS ONCE AS NEEDED
Status: DISCONTINUED | OUTPATIENT
Start: 2024-01-03 | End: 2024-01-03 | Stop reason: HOSPADM

## 2024-01-03 RX ORDER — NALOXONE HYDROCHLORIDE 4 MG/.1ML
SPRAY NASAL
Qty: 2 EACH | Refills: 0 | Status: SHIPPED | OUTPATIENT
Start: 2024-01-03

## 2024-01-03 RX ORDER — EPHEDRINE SULFATE 50 MG/ML
INJECTION, SOLUTION INTRAVENOUS AS NEEDED
Status: DISCONTINUED | OUTPATIENT
Start: 2024-01-03 | End: 2024-01-03 | Stop reason: SURG

## 2024-01-03 RX ORDER — NALOXONE HCL 0.4 MG/ML
0.4 VIAL (ML) INJECTION AS NEEDED
Status: DISCONTINUED | OUTPATIENT
Start: 2024-01-03 | End: 2024-01-03 | Stop reason: HOSPADM

## 2024-01-03 RX ORDER — SODIUM CHLORIDE 0.9 % (FLUSH) 0.9 %
3 SYRINGE (ML) INJECTION EVERY 12 HOURS SCHEDULED
Status: DISCONTINUED | OUTPATIENT
Start: 2024-01-03 | End: 2024-01-03 | Stop reason: HOSPADM

## 2024-01-03 RX ORDER — LIDOCAINE HYDROCHLORIDE 10 MG/ML
0.5 INJECTION, SOLUTION EPIDURAL; INFILTRATION; INTRACAUDAL; PERINEURAL ONCE AS NEEDED
Status: DISCONTINUED | OUTPATIENT
Start: 2024-01-03 | End: 2024-01-03 | Stop reason: HOSPADM

## 2024-01-03 RX ORDER — SODIUM CHLORIDE, SODIUM LACTATE, POTASSIUM CHLORIDE, CALCIUM CHLORIDE 600; 310; 30; 20 MG/100ML; MG/100ML; MG/100ML; MG/100ML
100 INJECTION, SOLUTION INTRAVENOUS CONTINUOUS
Status: DISCONTINUED | OUTPATIENT
Start: 2024-01-03 | End: 2024-01-03 | Stop reason: HOSPADM

## 2024-01-03 RX ORDER — SODIUM CHLORIDE 0.9 % (FLUSH) 0.9 %
3 SYRINGE (ML) INJECTION AS NEEDED
Status: DISCONTINUED | OUTPATIENT
Start: 2024-01-03 | End: 2024-01-03 | Stop reason: HOSPADM

## 2024-01-03 RX ORDER — ROCURONIUM BROMIDE 10 MG/ML
INJECTION, SOLUTION INTRAVENOUS AS NEEDED
Status: DISCONTINUED | OUTPATIENT
Start: 2024-01-03 | End: 2024-01-03 | Stop reason: SURG

## 2024-01-03 RX ORDER — HYDROCODONE BITARTRATE AND ACETAMINOPHEN 10; 325 MG/1; MG/1
1 TABLET ORAL EVERY 4 HOURS PRN
Status: DISCONTINUED | OUTPATIENT
Start: 2024-01-03 | End: 2024-01-03 | Stop reason: HOSPADM

## 2024-01-03 RX ORDER — SODIUM CHLORIDE 0.9 % (FLUSH) 0.9 %
3-10 SYRINGE (ML) INJECTION AS NEEDED
Status: DISCONTINUED | OUTPATIENT
Start: 2024-01-03 | End: 2024-01-03 | Stop reason: HOSPADM

## 2024-01-03 RX ORDER — BUPIVACAINE HCL/0.9 % NACL/PF 0.125 %
PLASTIC BAG, INJECTION (ML) EPIDURAL AS NEEDED
Status: DISCONTINUED | OUTPATIENT
Start: 2024-01-03 | End: 2024-01-03 | Stop reason: SURG

## 2024-01-03 RX ORDER — TRAMADOL HYDROCHLORIDE 50 MG/1
50 TABLET ORAL EVERY 8 HOURS PRN
Qty: 9 TABLET | Refills: 0 | Status: SHIPPED | OUTPATIENT
Start: 2024-01-03

## 2024-01-03 RX ORDER — PROPOFOL 10 MG/ML
VIAL (ML) INTRAVENOUS AS NEEDED
Status: DISCONTINUED | OUTPATIENT
Start: 2024-01-03 | End: 2024-01-03 | Stop reason: SURG

## 2024-01-03 RX ORDER — FENTANYL CITRATE 50 UG/ML
25 INJECTION, SOLUTION INTRAMUSCULAR; INTRAVENOUS
Status: DISCONTINUED | OUTPATIENT
Start: 2024-01-03 | End: 2024-01-03 | Stop reason: HOSPADM

## 2024-01-03 RX ORDER — FENTANYL CITRATE 50 UG/ML
INJECTION, SOLUTION INTRAMUSCULAR; INTRAVENOUS AS NEEDED
Status: DISCONTINUED | OUTPATIENT
Start: 2024-01-03 | End: 2024-01-03 | Stop reason: SURG

## 2024-01-03 RX ORDER — FLUMAZENIL 0.1 MG/ML
0.2 INJECTION INTRAVENOUS AS NEEDED
Status: DISCONTINUED | OUTPATIENT
Start: 2024-01-03 | End: 2024-01-03 | Stop reason: HOSPADM

## 2024-01-03 RX ORDER — IBUPROFEN 600 MG/1
600 TABLET ORAL ONCE AS NEEDED
Status: DISCONTINUED | OUTPATIENT
Start: 2024-01-03 | End: 2024-01-03 | Stop reason: HOSPADM

## 2024-01-03 RX ORDER — DROPERIDOL 2.5 MG/ML
0.62 INJECTION, SOLUTION INTRAMUSCULAR; INTRAVENOUS ONCE AS NEEDED
Status: DISCONTINUED | OUTPATIENT
Start: 2024-01-03 | End: 2024-01-03 | Stop reason: HOSPADM

## 2024-01-03 RX ORDER — DEXAMETHASONE SODIUM PHOSPHATE 4 MG/ML
INJECTION, SOLUTION INTRA-ARTICULAR; INTRALESIONAL; INTRAMUSCULAR; INTRAVENOUS; SOFT TISSUE AS NEEDED
Status: DISCONTINUED | OUTPATIENT
Start: 2024-01-03 | End: 2024-01-03 | Stop reason: SURG

## 2024-01-03 RX ADMIN — ONDANSETRON 4 MG: 2 INJECTION INTRAMUSCULAR; INTRAVENOUS at 11:54

## 2024-01-03 RX ADMIN — CEFAZOLIN 2000 MG: 2 INJECTION, POWDER, FOR SOLUTION INTRAMUSCULAR; INTRAVENOUS at 11:18

## 2024-01-03 RX ADMIN — SUGAMMADEX 200 MG: 100 INJECTION, SOLUTION INTRAVENOUS at 11:54

## 2024-01-03 RX ADMIN — Medication 140 MG: at 11:12

## 2024-01-03 RX ADMIN — ROCURONIUM BROMIDE 10 MG: 10 INJECTION, SOLUTION INTRAVENOUS at 11:12

## 2024-01-03 RX ADMIN — PROPOFOL INJECTABLE EMULSION 160 MG: 10 INJECTION, EMULSION INTRAVENOUS at 11:12

## 2024-01-03 RX ADMIN — FENTANYL CITRATE 50 MCG: 50 INJECTION, SOLUTION INTRAMUSCULAR; INTRAVENOUS at 11:12

## 2024-01-03 RX ADMIN — FENTANYL CITRATE 50 MCG: 50 INJECTION, SOLUTION INTRAMUSCULAR; INTRAVENOUS at 11:28

## 2024-01-03 RX ADMIN — Medication 100 MCG: at 11:28

## 2024-01-03 RX ADMIN — EPHEDRINE SULFATE 10 MG: 50 INJECTION INTRAVENOUS at 11:38

## 2024-01-03 RX ADMIN — DEXAMETHASONE SODIUM PHOSPHATE 4 MG: 4 INJECTION, SOLUTION INTRA-ARTICULAR; INTRALESIONAL; INTRAMUSCULAR; INTRAVENOUS; SOFT TISSUE at 11:54

## 2024-01-03 RX ADMIN — LIDOCAINE HYDROCHLORIDE 100 MG: 20 INJECTION, SOLUTION EPIDURAL; INFILTRATION; INTRACAUDAL; PERINEURAL at 11:12

## 2024-01-03 RX ADMIN — SODIUM CHLORIDE, POTASSIUM CHLORIDE, SODIUM LACTATE AND CALCIUM CHLORIDE 1000 ML: 600; 310; 30; 20 INJECTION, SOLUTION INTRAVENOUS at 09:31

## 2024-01-03 RX ADMIN — ROCURONIUM BROMIDE 30 MG: 10 INJECTION, SOLUTION INTRAVENOUS at 11:18

## 2024-01-03 RX ADMIN — ONDANSETRON 4 MG: 2 INJECTION INTRAMUSCULAR; INTRAVENOUS at 11:33

## 2024-01-03 RX ADMIN — HYDROCODONE BITARTRATE AND ACETAMINOPHEN 1 TABLET: 10; 325 TABLET ORAL at 12:10

## 2024-01-03 RX ADMIN — DEXAMETHASONE SODIUM PHOSPHATE 4 MG: 4 INJECTION, SOLUTION INTRA-ARTICULAR; INTRALESIONAL; INTRAMUSCULAR; INTRAVENOUS; SOFT TISSUE at 11:33

## 2024-01-05 ENCOUNTER — HOSPITAL ENCOUNTER (OUTPATIENT)
Dept: CT IMAGING | Facility: HOSPITAL | Age: 74
Discharge: HOME | End: 2024-01-05
Payer: MEDICARE

## 2024-01-05 DIAGNOSIS — C61 PROSTATE CANCER: ICD-10-CM

## 2024-01-05 DIAGNOSIS — C79.51 CANCER, METASTATIC TO BONE: ICD-10-CM

## 2024-01-05 PROCEDURE — A9595 PIFLUFOLASTAT F 18 9 MCI SOLUTION PREFILLED SYRINGE: HCPCS | Performed by: UROLOGY

## 2024-01-05 PROCEDURE — 0 PIFLUFOLASTAT F 18 9 MCI SOLUTION PREFILLED SYRINGE: Performed by: UROLOGY

## 2024-01-05 PROCEDURE — 78815 PET IMAGE W/CT SKULL-THIGH: CPT

## 2024-01-05 RX ADMIN — PIFLUFOLASTAT F-18 1 DOSE: 80 INJECTION INTRAVENOUS at 13:21

## 2024-01-07 LAB
BH BB BLOOD EXPIRATION DATE: NORMAL
BH BB BLOOD TYPE BARCODE: 7300
BH BB BLOOD TYPE BARCODE: 7300
BH BB BLOOD TYPE BARCODE: 9500
BH BB BLOOD TYPE BARCODE: 9500
BH BB DISPENSE STATUS: NORMAL
BH BB PRODUCT CODE: NORMAL
BH BB UNIT NUMBER: NORMAL
CROSSMATCH INTERPRETATION: NORMAL
UNIT  ABO: NORMAL
UNIT  RH: NORMAL

## 2024-01-12 ENCOUNTER — TELEPHONE (OUTPATIENT)
Dept: HEMATOLOGY | Age: 74
End: 2024-01-12

## 2024-01-15 DIAGNOSIS — C85.10 HIGH GRADE B-CELL LYMPHOMA (HCC): Primary | ICD-10-CM

## 2024-01-15 LAB
CYTO UR: NORMAL
LAB AP CASE REPORT: NORMAL
Lab: NORMAL
PATH REPORT.FINAL DX SPEC: NORMAL
PATH REPORT.GROSS SPEC: NORMAL

## 2024-01-15 NOTE — PROGRESS NOTES
discussed all of the above findings included in the assessment and plan with the patient and the patient is in agreement to move forward with current recommendations/treatment.  I have addressed all of their questions and concerns that were verbalized.     FOLLOW UP:  1.  Follow-up appointment will be made once chemotherapy approved by insurance  2.  3.  Continue to follow with other medical providers as recommended.  4.  Labs at next visit: CBC and CMP    EMR Dragon/Transcription disclaimer:   Much of this encounter note is an electronic transcription/translation of spoken language to printed text. The electronic translation of spoken language may permit erroneous, or at times, nonsensical words or phrases to be inadvertently transcribed; although attempts have made to review the note for such errors, some may still exist.  Please excuse any unrecognized transcription errors and contact us if the air is unintelligible or needs documented correction.  Also, portions of this note have been copied forward, however, changed to reflect the most current clinical status of this patient.    Electronically signed by GOPAL Mcguire on 1/16/2024 at 4:06 PM  Baltazar YUSUF, vannesa starting this note as a registered nurse for GOPAL Vanegas.

## 2024-01-16 ENCOUNTER — HOSPITAL ENCOUNTER (OUTPATIENT)
Dept: INFUSION THERAPY | Age: 74
Discharge: HOME | End: 2024-01-16
Payer: MEDICARE

## 2024-01-16 ENCOUNTER — CLINICAL DOCUMENTATION (OUTPATIENT)
Dept: HEMATOLOGY | Age: 74
End: 2024-01-16

## 2024-01-16 ENCOUNTER — OFFICE VISIT (OUTPATIENT)
Dept: HEMATOLOGY | Age: 74
End: 2024-01-16
Payer: MEDICARE

## 2024-01-16 VITALS
OXYGEN SATURATION: 98 % | BODY MASS INDEX: 19.87 KG/M2 | SYSTOLIC BLOOD PRESSURE: 160 MMHG | DIASTOLIC BLOOD PRESSURE: 82 MMHG | TEMPERATURE: 97.1 F | HEART RATE: 78 BPM | WEIGHT: 154.8 LBS | HEIGHT: 74 IN

## 2024-01-16 DIAGNOSIS — T45.1X5A CINV (CHEMOTHERAPY-INDUCED NAUSEA AND VOMITING): ICD-10-CM

## 2024-01-16 DIAGNOSIS — R91.8 LUNG NODULES: ICD-10-CM

## 2024-01-16 DIAGNOSIS — C82.80 FOLLICULAR LOW GRADE B-CELL LYMPHOMA (HCC): Primary | ICD-10-CM

## 2024-01-16 DIAGNOSIS — Z71.6 TOBACCO ABUSE COUNSELING: ICD-10-CM

## 2024-01-16 DIAGNOSIS — C85.10 HIGH GRADE B-CELL LYMPHOMA (HCC): ICD-10-CM

## 2024-01-16 DIAGNOSIS — R11.2 CINV (CHEMOTHERAPY-INDUCED NAUSEA AND VOMITING): ICD-10-CM

## 2024-01-16 DIAGNOSIS — C61 PROSTATE CARCINOMA (HCC): ICD-10-CM

## 2024-01-16 DIAGNOSIS — G89.3 CANCER-RELATED PAIN: ICD-10-CM

## 2024-01-16 PROBLEM — C85.90 LYMPHOMA (HCC): Status: RESOLVED | Noted: 2020-02-14 | Resolved: 2024-01-16

## 2024-01-16 LAB
ALBUMIN SERPL-MCNC: 4.5 G/DL (ref 3.5–5.2)
ALP SERPL-CCNC: 133 U/L (ref 40–130)
ALT SERPL-CCNC: 17 U/L (ref 21–72)
ANION GAP SERPL CALCULATED.3IONS-SCNC: 17 MMOL/L (ref 7–19)
AST SERPL-CCNC: 26 U/L (ref 17–59)
BASOPHILS # BLD: 0.09 K/UL (ref 0.01–0.08)
BASOPHILS NFR BLD: 1.3 % (ref 0.1–1.2)
BILIRUB SERPL-MCNC: 0.5 MG/DL (ref 0.2–1.3)
BUN SERPL-MCNC: 12 MG/DL (ref 9–20)
CALCIUM SERPL-MCNC: 9.3 MG/DL (ref 8.4–10.2)
CHLORIDE SERPL-SCNC: 98 MMOL/L (ref 98–111)
CO2 SERPL-SCNC: 28 MMOL/L (ref 22–29)
CREAT SERPL-MCNC: 0.8 MG/DL (ref 0.6–1.2)
EOSINOPHIL # BLD: 0.39 K/UL (ref 0.04–0.54)
EOSINOPHIL NFR BLD: 5.7 % (ref 0.7–7)
ERYTHROCYTE [DISTWIDTH] IN BLOOD BY AUTOMATED COUNT: 12.7 % (ref 11.6–14.4)
GLOBULIN: 3.1 G/DL
GLUCOSE SERPL-MCNC: 125 MG/DL (ref 74–106)
HCT VFR BLD AUTO: 43.9 % (ref 40.1–51)
HGB BLD-MCNC: 15.1 G/DL (ref 13.7–17.5)
LDH SERPL-CCNC: 227 U/L (ref 120–246)
LYMPHOCYTES # BLD: 1.4 K/UL (ref 1.18–3.74)
LYMPHOCYTES NFR BLD: 20.5 % (ref 19.3–53.1)
MCH RBC QN AUTO: 32 PG (ref 25.7–32.2)
MCHC RBC AUTO-ENTMCNC: 34.4 G/DL (ref 32.3–36.5)
MCV RBC AUTO: 93 FL (ref 79–92.2)
MONOCYTES # BLD: 0.61 K/UL (ref 0.24–0.82)
MONOCYTES NFR BLD: 8.9 % (ref 4.7–12.5)
NEUTROPHILS # BLD: 4.31 K/UL (ref 1.56–6.13)
NEUTS SEG NFR BLD: 63.2 % (ref 34–71.1)
PLATELET # BLD AUTO: 601 K/UL (ref 163–337)
PMV BLD AUTO: 8 FL (ref 7.4–10.4)
POTASSIUM SERPL-SCNC: 3.7 MMOL/L (ref 3.5–5.1)
PROT SERPL-MCNC: 7.6 G/DL (ref 6.3–8.2)
RBC # BLD AUTO: 4.72 M/UL (ref 4.63–6.08)
SODIUM SERPL-SCNC: 143 MMOL/L (ref 137–145)
URATE SERPL-MCNC: 4.7 MG/DL (ref 3.5–8.5)
WBC # BLD AUTO: 6.83 K/UL (ref 4.23–9.07)

## 2024-01-16 PROCEDURE — 36415 COLL VENOUS BLD VENIPUNCTURE: CPT

## 2024-01-16 PROCEDURE — 99215 OFFICE O/P EST HI 40 MIN: CPT | Performed by: NURSE PRACTITIONER

## 2024-01-16 PROCEDURE — G8420 CALC BMI NORM PARAMETERS: HCPCS | Performed by: NURSE PRACTITIONER

## 2024-01-16 PROCEDURE — 4004F PT TOBACCO SCREEN RCVD TLK: CPT | Performed by: NURSE PRACTITIONER

## 2024-01-16 PROCEDURE — G8484 FLU IMMUNIZE NO ADMIN: HCPCS | Performed by: NURSE PRACTITIONER

## 2024-01-16 PROCEDURE — 1123F ACP DISCUSS/DSCN MKR DOCD: CPT | Performed by: NURSE PRACTITIONER

## 2024-01-16 PROCEDURE — G8427 DOCREV CUR MEDS BY ELIG CLIN: HCPCS | Performed by: NURSE PRACTITIONER

## 2024-01-16 PROCEDURE — 80053 COMPREHEN METABOLIC PANEL: CPT

## 2024-01-16 PROCEDURE — 85025 COMPLETE CBC W/AUTO DIFF WBC: CPT

## 2024-01-16 PROCEDURE — 83615 LACTATE (LD) (LDH) ENZYME: CPT

## 2024-01-16 PROCEDURE — 99212 OFFICE O/P EST SF 10 MIN: CPT

## 2024-01-16 PROCEDURE — 3017F COLORECTAL CA SCREEN DOC REV: CPT | Performed by: NURSE PRACTITIONER

## 2024-01-16 PROCEDURE — 84550 ASSAY OF BLOOD/URIC ACID: CPT

## 2024-01-16 RX ORDER — ONDANSETRON HYDROCHLORIDE 8 MG/1
8 TABLET, FILM COATED ORAL EVERY 8 HOURS PRN
Qty: 45 TABLET | Refills: 5 | Status: SHIPPED | OUTPATIENT
Start: 2024-01-16

## 2024-01-16 ASSESSMENT — ENCOUNTER SYMPTOMS
SORE THROAT: 0
DIARRHEA: 0
ABDOMINAL PAIN: 0
BACK PAIN: 1
BLOOD IN STOOL: 0
EYE REDNESS: 0
EYE PAIN: 0
RESPIRATORY NEGATIVE: 1
CONSTIPATION: 0
WHEEZING: 0
VOMITING: 0
COUGH: 0
GASTROINTESTINAL NEGATIVE: 1
SHORTNESS OF BREATH: 0
NAUSEA: 0
EYE DISCHARGE: 0
EYES NEGATIVE: 1

## 2024-01-16 NOTE — PROGRESS NOTES
for review: New diagnosis of low-grade follicular B-cell lymphoma  Bendamustine 80 mg/m² on day 1 and day 2 along with rituximab 375 mg/m² on day 1 every 28 days x 6 cycles with GSF     Baltazar RICHARDS, to develop treatment plan:  Bendamustine 80 mg/m² on day 1 and day 2 along with rituximab 375 mg/m² on day 1 every 28 days x 6 cycles with GSF     Pharmacy to review and ensure medications are available:  Bendamustine 80 mg/m² on day 1 and day 2 along with rituximab 375 mg/m² on day 1 every 28 days x 6 cycles with GSF

## 2024-01-18 DIAGNOSIS — C61 PROSTATE CARCINOMA (HCC): ICD-10-CM

## 2024-01-18 DIAGNOSIS — C82.80 FOLLICULAR LOW GRADE B-CELL LYMPHOMA (HCC): Primary | ICD-10-CM

## 2024-01-18 LAB — B2 MICROGLOB SERPL-MCNC: 3 MG/L

## 2024-01-29 ENCOUNTER — HOSPITAL ENCOUNTER (OUTPATIENT)
Dept: INFUSION THERAPY | Age: 74
Discharge: HOME | End: 2024-01-29
Payer: MEDICARE

## 2024-01-29 ENCOUNTER — OFFICE VISIT (OUTPATIENT)
Dept: HEMATOLOGY | Age: 74
End: 2024-01-29

## 2024-01-29 VITALS
HEIGHT: 74 IN | HEART RATE: 61 BPM | OXYGEN SATURATION: 98 % | TEMPERATURE: 97 F | DIASTOLIC BLOOD PRESSURE: 68 MMHG | RESPIRATION RATE: 18 BRPM | SYSTOLIC BLOOD PRESSURE: 134 MMHG | BODY MASS INDEX: 20.05 KG/M2 | WEIGHT: 156.2 LBS

## 2024-01-29 VITALS — BODY MASS INDEX: 20.05 KG/M2 | HEIGHT: 74 IN

## 2024-01-29 DIAGNOSIS — C82.80 FOLLICULAR LOW GRADE B-CELL LYMPHOMA (HCC): Primary | ICD-10-CM

## 2024-01-29 DIAGNOSIS — C85.10 HIGH GRADE B-CELL LYMPHOMA (HCC): Primary | ICD-10-CM

## 2024-01-29 DIAGNOSIS — C82.80 FOLLICULAR LOW GRADE B-CELL LYMPHOMA (HCC): ICD-10-CM

## 2024-01-29 LAB
ALBUMIN SERPL-MCNC: 4.3 G/DL (ref 3.5–5.2)
ALP SERPL-CCNC: 116 U/L (ref 40–130)
ALT SERPL-CCNC: 12 U/L (ref 21–72)
ANION GAP SERPL CALCULATED.3IONS-SCNC: 8 MMOL/L (ref 7–19)
AST SERPL-CCNC: 19 U/L (ref 17–59)
BASOPHILS # BLD: 0.09 K/UL (ref 0.01–0.08)
BASOPHILS NFR BLD: 1.5 % (ref 0.1–1.2)
BILIRUB SERPL-MCNC: 0.5 MG/DL (ref 0.2–1.3)
BUN SERPL-MCNC: 15 MG/DL (ref 9–20)
CALCIUM SERPL-MCNC: 8.4 MG/DL (ref 8.4–10.2)
CHLORIDE SERPL-SCNC: 107 MMOL/L (ref 98–111)
CO2 SERPL-SCNC: 27 MMOL/L (ref 22–29)
CREAT SERPL-MCNC: 0.9 MG/DL (ref 0.6–1.2)
EOSINOPHIL # BLD: 0.33 K/UL (ref 0.04–0.54)
EOSINOPHIL NFR BLD: 5.4 % (ref 0.7–7)
ERYTHROCYTE [DISTWIDTH] IN BLOOD BY AUTOMATED COUNT: 12.8 % (ref 11.6–14.4)
GLOBULIN: 2.8 G/DL
GLUCOSE SERPL-MCNC: 106 MG/DL (ref 74–106)
HCT VFR BLD AUTO: 43.2 % (ref 40.1–51)
HGB BLD-MCNC: 14.6 G/DL (ref 13.7–17.5)
LYMPHOCYTES # BLD: 1.25 K/UL (ref 1.18–3.74)
LYMPHOCYTES NFR BLD: 20.3 % (ref 19.3–53.1)
MCH RBC QN AUTO: 30.7 PG (ref 25.7–32.2)
MCHC RBC AUTO-ENTMCNC: 33.8 G/DL (ref 32.3–36.5)
MCV RBC AUTO: 90.8 FL (ref 79–92.2)
MONOCYTES # BLD: 0.77 K/UL (ref 0.24–0.82)
MONOCYTES NFR BLD: 12.5 % (ref 4.7–12.5)
NEUTROPHILS # BLD: 3.7 K/UL (ref 1.56–6.13)
NEUTS SEG NFR BLD: 60 % (ref 34–71.1)
PLATELET # BLD AUTO: 332 K/UL (ref 163–337)
PMV BLD AUTO: 8.1 FL (ref 7.4–10.4)
POTASSIUM SERPL-SCNC: 4.1 MMOL/L (ref 3.5–5.1)
PROT SERPL-MCNC: 7.2 G/DL (ref 6.3–8.2)
RBC # BLD AUTO: 4.76 M/UL (ref 4.63–6.08)
SODIUM SERPL-SCNC: 142 MMOL/L (ref 137–145)
WBC # BLD AUTO: 6.16 K/UL (ref 4.23–9.07)

## 2024-01-29 PROCEDURE — 6360000002 HC RX W HCPCS: Performed by: NURSE PRACTITIONER

## 2024-01-29 PROCEDURE — 96367 TX/PROPH/DG ADDL SEQ IV INF: CPT

## 2024-01-29 PROCEDURE — 2580000003 HC RX 258: Performed by: NURSE PRACTITIONER

## 2024-01-29 PROCEDURE — 36415 COLL VENOUS BLD VENIPUNCTURE: CPT

## 2024-01-29 PROCEDURE — 96413 CHEMO IV INFUSION 1 HR: CPT

## 2024-01-29 PROCEDURE — 6360000002 HC RX W HCPCS: Performed by: INTERNAL MEDICINE

## 2024-01-29 PROCEDURE — 96415 CHEMO IV INFUSION ADDL HR: CPT

## 2024-01-29 PROCEDURE — 85025 COMPLETE CBC W/AUTO DIFF WBC: CPT

## 2024-01-29 PROCEDURE — 80053 COMPREHEN METABOLIC PANEL: CPT

## 2024-01-29 PROCEDURE — 6370000000 HC RX 637 (ALT 250 FOR IP): Performed by: NURSE PRACTITIONER

## 2024-01-29 PROCEDURE — 96411 CHEMO IV PUSH ADDL DRUG: CPT

## 2024-01-29 PROCEDURE — 96375 TX/PRO/DX INJ NEW DRUG ADDON: CPT

## 2024-01-29 PROCEDURE — 2580000003 HC RX 258: Performed by: INTERNAL MEDICINE

## 2024-01-29 RX ORDER — ALBUTEROL SULFATE 90 UG/1
4 AEROSOL, METERED RESPIRATORY (INHALATION) PRN
Status: CANCELLED | OUTPATIENT
Start: 2024-01-30

## 2024-01-29 RX ORDER — MEPERIDINE HYDROCHLORIDE 25 MG/ML
12.5 INJECTION INTRAMUSCULAR; INTRAVENOUS; SUBCUTANEOUS PRN
Status: CANCELLED | OUTPATIENT
Start: 2024-01-29

## 2024-01-29 RX ORDER — MEPERIDINE HYDROCHLORIDE 25 MG/ML
12.5 INJECTION INTRAMUSCULAR; INTRAVENOUS; SUBCUTANEOUS PRN
Status: CANCELLED | OUTPATIENT
Start: 2024-01-30

## 2024-01-29 RX ORDER — ACETAMINOPHEN 500 MG
1000 TABLET ORAL ONCE
Status: COMPLETED | OUTPATIENT
Start: 2024-01-29 | End: 2024-01-29

## 2024-01-29 RX ORDER — SODIUM CHLORIDE 0.9 % (FLUSH) 0.9 %
5-40 SYRINGE (ML) INJECTION PRN
Status: DISCONTINUED | OUTPATIENT
Start: 2024-01-29 | End: 2024-01-30 | Stop reason: HOSPADM

## 2024-01-29 RX ORDER — ALBUTEROL SULFATE 90 UG/1
4 AEROSOL, METERED RESPIRATORY (INHALATION) PRN
Status: CANCELLED | OUTPATIENT
Start: 2024-01-29

## 2024-01-29 RX ORDER — SODIUM CHLORIDE 9 MG/ML
5-250 INJECTION, SOLUTION INTRAVENOUS PRN
Status: CANCELLED | OUTPATIENT
Start: 2024-01-30

## 2024-01-29 RX ORDER — EPINEPHRINE 1 MG/ML
0.3 INJECTION, SOLUTION, CONCENTRATE INTRAVENOUS PRN
Status: CANCELLED | OUTPATIENT
Start: 2024-01-29

## 2024-01-29 RX ORDER — HEPARIN 100 UNIT/ML
500 SYRINGE INTRAVENOUS PRN
Status: CANCELLED | OUTPATIENT
Start: 2024-01-30

## 2024-01-29 RX ORDER — EPINEPHRINE 1 MG/ML
0.3 INJECTION, SOLUTION, CONCENTRATE INTRAVENOUS PRN
Status: CANCELLED | OUTPATIENT
Start: 2024-01-30

## 2024-01-29 RX ORDER — ONDANSETRON 2 MG/ML
8 INJECTION INTRAMUSCULAR; INTRAVENOUS
Status: CANCELLED | OUTPATIENT
Start: 2024-01-30

## 2024-01-29 RX ORDER — SODIUM CHLORIDE 9 MG/ML
INJECTION, SOLUTION INTRAVENOUS CONTINUOUS
Status: CANCELLED | OUTPATIENT
Start: 2024-01-30

## 2024-01-29 RX ORDER — FAMOTIDINE 10 MG/ML
20 INJECTION, SOLUTION INTRAVENOUS
Status: CANCELLED | OUTPATIENT
Start: 2024-01-29

## 2024-01-29 RX ORDER — SODIUM CHLORIDE 9 MG/ML
5-250 INJECTION, SOLUTION INTRAVENOUS PRN
Status: CANCELLED | OUTPATIENT
Start: 2024-01-29

## 2024-01-29 RX ORDER — ACETAMINOPHEN 325 MG/1
650 TABLET ORAL
Status: CANCELLED | OUTPATIENT
Start: 2024-01-30

## 2024-01-29 RX ORDER — DEXAMETHASONE SODIUM PHOSPHATE 10 MG/ML
10 INJECTION, SOLUTION INTRAMUSCULAR; INTRAVENOUS ONCE
Status: CANCELLED | OUTPATIENT
Start: 2024-01-30 | End: 2024-01-30

## 2024-01-29 RX ORDER — SODIUM CHLORIDE 9 MG/ML
INJECTION, SOLUTION INTRAVENOUS CONTINUOUS
Status: CANCELLED | OUTPATIENT
Start: 2024-01-29

## 2024-01-29 RX ORDER — DIPHENHYDRAMINE HYDROCHLORIDE 50 MG/ML
50 INJECTION INTRAMUSCULAR; INTRAVENOUS
Status: CANCELLED | OUTPATIENT
Start: 2024-01-29

## 2024-01-29 RX ORDER — SODIUM CHLORIDE 0.9 % (FLUSH) 0.9 %
5-40 SYRINGE (ML) INJECTION PRN
Status: CANCELLED | OUTPATIENT
Start: 2024-01-30

## 2024-01-29 RX ORDER — DIPHENHYDRAMINE HYDROCHLORIDE 50 MG/ML
25 INJECTION INTRAMUSCULAR; INTRAVENOUS ONCE
Status: COMPLETED | OUTPATIENT
Start: 2024-01-29 | End: 2024-01-29

## 2024-01-29 RX ORDER — ACETAMINOPHEN 325 MG/1
650 TABLET ORAL
Status: CANCELLED | OUTPATIENT
Start: 2024-01-29

## 2024-01-29 RX ORDER — FAMOTIDINE 10 MG/ML
20 INJECTION, SOLUTION INTRAVENOUS
Status: CANCELLED | OUTPATIENT
Start: 2024-01-30

## 2024-01-29 RX ORDER — ONDANSETRON 2 MG/ML
8 INJECTION INTRAMUSCULAR; INTRAVENOUS
Status: CANCELLED | OUTPATIENT
Start: 2024-01-29

## 2024-01-29 RX ORDER — PALONOSETRON 0.05 MG/ML
0.25 INJECTION, SOLUTION INTRAVENOUS ONCE
Status: COMPLETED | OUTPATIENT
Start: 2024-01-29 | End: 2024-01-29

## 2024-01-29 RX ORDER — HEPARIN 100 UNIT/ML
500 SYRINGE INTRAVENOUS PRN
Status: DISCONTINUED | OUTPATIENT
Start: 2024-01-29 | End: 2024-01-30 | Stop reason: HOSPADM

## 2024-01-29 RX ORDER — SODIUM CHLORIDE 9 MG/ML
5-250 INJECTION, SOLUTION INTRAVENOUS PRN
Status: DISCONTINUED | OUTPATIENT
Start: 2024-01-29 | End: 2024-01-30 | Stop reason: HOSPADM

## 2024-01-29 RX ORDER — DIPHENHYDRAMINE HYDROCHLORIDE 50 MG/ML
50 INJECTION INTRAMUSCULAR; INTRAVENOUS
Status: CANCELLED | OUTPATIENT
Start: 2024-01-30

## 2024-01-29 RX ADMIN — HEPARIN 500 UNITS: 100 SYRINGE at 15:48

## 2024-01-29 RX ADMIN — ACETAMINOPHEN 1000 MG: 500 TABLET, FILM COATED ORAL at 11:24

## 2024-01-29 RX ADMIN — PALONOSETRON 0.25 MG: 0.05 INJECTION, SOLUTION INTRAVENOUS at 11:25

## 2024-01-29 RX ADMIN — SODIUM CHLORIDE, PRESERVATIVE FREE 10 ML: 5 INJECTION INTRAVENOUS at 15:48

## 2024-01-29 RX ADMIN — DIPHENHYDRAMINE HYDROCHLORIDE 25 MG: 50 INJECTION INTRAMUSCULAR; INTRAVENOUS at 11:24

## 2024-01-29 RX ADMIN — SODIUM CHLORIDE 700 MG: 0.9 INJECTION, SOLUTION INTRAVENOUS at 11:44

## 2024-01-29 RX ADMIN — DEXAMETHASONE SODIUM PHOSPHATE 10 MG: 10 INJECTION, SOLUTION INTRAMUSCULAR; INTRAVENOUS at 11:26

## 2024-01-29 RX ADMIN — SODIUM CHLORIDE 25 ML/HR: 9 INJECTION, SOLUTION INTRAVENOUS at 11:23

## 2024-01-29 RX ADMIN — BENDAMUSTINE HYDROCHLORIDE 150 MG: 25 INJECTION, SOLUTION INTRAVENOUS at 15:33

## 2024-01-29 NOTE — PROGRESS NOTES
Progress Note      Pt Name: Marino Bailey  YOB: 1950  MRN: [id number]    Date of evaluation: 01/30/2024  History Obtained From:  patient, electronic medical record    CHIEF COMPLAINT:    Chief Complaint   Patient presents with    Lymphoma     HISTORY OF PRESENT ILLNESS:    Marino Bailey is a 73 y.o.  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  He has a history of classic Hodgkin lymphoma, status-post completion of 2 cycles of ABVD followed by ISRT treatment completed on 9/5/2018 with no known evidence of recurrence.  Bone marrow aspiration and biopsy on 10/26/2023 reported no evidence of involvement by classical hodgkin lymphoma seen although did identify atypical lymphoid aggregates composed of CD20 positive B cell and no evidence of metastatic carcinoma or increased plasma cell infiltration.  Lumbar vertebrae biopsy on 1/3/2024 reported involvement of lymphoma of follicular center origin is favored.  recommended moving forward with first-line treatment with Bendeka and rituximab every 28 days with GSF support for 6 cycles.  In relation to his prostate cancer, he is being followed by Dr. Dunn and anticipating a follow-up to discuss further recommendations.  Marino returns today for evaluation, lab monitoring, side effect monitoring to consider moving forward with cycle 1 day 2 of Bendeka.     Today's clinic visit occurred in the treatment room while receiving intensive monitoring and toxicity assessment.  Intenstive monitoring due to the potential to cause serious morbidity or death is required.  Visit included include physical assessment, review of systems, any lab or radiographic findings that were available and plan of care are documented below.    ONCOLOGIC

## 2024-01-29 NOTE — PROGRESS NOTES
Comprehensive Nutrition Assessment    Type and Reason for Visit:  Consult    Nutrition Recommendations/Plan:   Nutrition education r/t increasing calorie/protein intake     Malnutrition Assessment:  Malnutrition Status:  At risk for malnutrition (Comment) (01/29/24 1303)    Context:  Acute Illness       Nutrition Assessment:     RL 72 y/o male presents 1/29/24 for initial RD evaluation. Referral from GOPAL Vanegas for new dx follicular B-cell lymphoma. Pt is initiated on chemotherapy today. Pt presents at risk for malnutrition AEB inadequate energy/protein intake and mild muscle wasting. Pt denies any current nutrition-related complaints. He endorses issues with chronic constipation. Pt notes he did have significant altered taste when he underwent chemotherapy before. He reports 20lb wt loss at that time.     Diet History:  Pt reports eating 5-6 small meals/snacks daily. Snacks he reports eating frequently include [place] cakes, boiled shrimp, chips, [employer] [place]. Pt drinks coffee, Koolaid, Gatorade, and Mountain Dew throughout the day. He notes Mountain Dew is his primary beverage. He has just started drinking one nutritional supplement shake daily.     Nutrition Related Findings:    Na+142, BUN:Cr 16.6, GFR>60    Anthropometric Measures:  Height: 188 cm (6' 2\")  Ideal Body Weight (IBW): 190 lbs (86 kg)       Current Body Weight: 70.9 kg (156 lb 3.2 oz), 82.2 % IBW.    Current BMI (kg/m2): 20  Wt Readings from Last 5 Encounters:   01/29/24 70.9 kg (156 lb 3.2 oz)   01/16/24 70.2 kg (154 lb 12.8 oz)   11/21/23 72.2 kg (159 lb 1.6 oz)   10/26/23 70.6 kg (155 lb 11.2 oz)   10/10/23 70.5 kg (155 lb 6.4 oz)      Nutrition Diagnosis:   Inadequate energy intake r/t catabolic illness AEB reported poor intake     Nutrition Interventions:   Food and/or Nutrient Delivery: Modify Current Diet, Snacks (Comment), Start Oral Nutrition Supplement  Nutrition Education/Counseling: Education initiated     Plan of Care

## 2024-01-30 ENCOUNTER — OFFICE VISIT (OUTPATIENT)
Dept: HEMATOLOGY | Age: 74
End: 2024-01-30
Payer: MEDICARE

## 2024-01-30 ENCOUNTER — HOSPITAL ENCOUNTER (OUTPATIENT)
Dept: INFUSION THERAPY | Age: 74
Discharge: HOME | End: 2024-01-30
Payer: MEDICARE

## 2024-01-30 VITALS
TEMPERATURE: 97.8 F | DIASTOLIC BLOOD PRESSURE: 68 MMHG | BODY MASS INDEX: 20.06 KG/M2 | SYSTOLIC BLOOD PRESSURE: 145 MMHG | HEART RATE: 70 BPM | RESPIRATION RATE: 18 BRPM | HEIGHT: 74 IN | WEIGHT: 156.31 LBS | OXYGEN SATURATION: 98 %

## 2024-01-30 DIAGNOSIS — C61 PRIMARY PROSTATE ADENOCARCINOMA (HCC): ICD-10-CM

## 2024-01-30 DIAGNOSIS — G89.3 ACUTE NEOPLASM-RELATED PAIN: ICD-10-CM

## 2024-01-30 DIAGNOSIS — R91.8 LUNG NODULES: ICD-10-CM

## 2024-01-30 DIAGNOSIS — C82.80 FOLLICULAR LOW GRADE B-CELL LYMPHOMA (HCC): Primary | ICD-10-CM

## 2024-01-30 DIAGNOSIS — G89.3 CANCER-RELATED PAIN: ICD-10-CM

## 2024-01-30 DIAGNOSIS — Z71.6 TOBACCO ABUSE COUNSELING: ICD-10-CM

## 2024-01-30 PROCEDURE — 96367 TX/PROPH/DG ADDL SEQ IV INF: CPT

## 2024-01-30 PROCEDURE — 6360000002 HC RX W HCPCS: Performed by: NURSE PRACTITIONER

## 2024-01-30 PROCEDURE — 96409 CHEMO IV PUSH SNGL DRUG: CPT

## 2024-01-30 PROCEDURE — 6360000002 HC RX W HCPCS: Performed by: INTERNAL MEDICINE

## 2024-01-30 PROCEDURE — 2580000003 HC RX 258: Performed by: INTERNAL MEDICINE

## 2024-01-30 PROCEDURE — 3017F COLORECTAL CA SCREEN DOC REV: CPT | Performed by: NURSE PRACTITIONER

## 2024-01-30 PROCEDURE — G8484 FLU IMMUNIZE NO ADMIN: HCPCS | Performed by: NURSE PRACTITIONER

## 2024-01-30 PROCEDURE — G8420 CALC BMI NORM PARAMETERS: HCPCS | Performed by: NURSE PRACTITIONER

## 2024-01-30 PROCEDURE — 1123F ACP DISCUSS/DSCN MKR DOCD: CPT | Performed by: NURSE PRACTITIONER

## 2024-01-30 PROCEDURE — 2580000003 HC RX 258: Performed by: NURSE PRACTITIONER

## 2024-01-30 PROCEDURE — 99215 OFFICE O/P EST HI 40 MIN: CPT | Performed by: NURSE PRACTITIONER

## 2024-01-30 PROCEDURE — G8428 CUR MEDS NOT DOCUMENT: HCPCS | Performed by: NURSE PRACTITIONER

## 2024-01-30 PROCEDURE — 4004F PT TOBACCO SCREEN RCVD TLK: CPT | Performed by: NURSE PRACTITIONER

## 2024-01-30 RX ORDER — SODIUM CHLORIDE 9 MG/ML
5-250 INJECTION, SOLUTION INTRAVENOUS PRN
Status: DISCONTINUED | OUTPATIENT
Start: 2024-01-30 | End: 2024-01-31 | Stop reason: HOSPADM

## 2024-01-30 RX ORDER — TRAMADOL HYDROCHLORIDE 50 MG/1
50 TABLET ORAL EVERY 4 HOURS PRN
Qty: 60 TABLET | Refills: 1 | Status: SHIPPED | OUTPATIENT
Start: 2024-01-30 | End: 2024-02-19

## 2024-01-30 RX ORDER — HEPARIN 100 UNIT/ML
500 SYRINGE INTRAVENOUS PRN
Status: DISCONTINUED | OUTPATIENT
Start: 2024-01-30 | End: 2024-01-31 | Stop reason: HOSPADM

## 2024-01-30 RX ORDER — SODIUM CHLORIDE 0.9 % (FLUSH) 0.9 %
5-40 SYRINGE (ML) INJECTION PRN
Status: DISCONTINUED | OUTPATIENT
Start: 2024-01-30 | End: 2024-01-31 | Stop reason: HOSPADM

## 2024-01-30 RX ADMIN — BENDAMUSTINE HYDROCHLORIDE 150 MG: 25 INJECTION, SOLUTION INTRAVENOUS at 15:54

## 2024-01-30 RX ADMIN — DEXAMETHASONE SODIUM PHOSPHATE 16 MG: 10 INJECTION, SOLUTION INTRAMUSCULAR; INTRAVENOUS at 15:24

## 2024-01-30 RX ADMIN — SODIUM CHLORIDE 100 ML/HR: 9 INJECTION, SOLUTION INTRAVENOUS at 15:24

## 2024-01-30 RX ADMIN — SODIUM CHLORIDE, PRESERVATIVE FREE 10 ML: 5 INJECTION INTRAVENOUS at 16:11

## 2024-01-30 RX ADMIN — HEPARIN 500 UNITS: 100 SYRINGE at 16:11

## 2024-01-30 ASSESSMENT — ENCOUNTER SYMPTOMS
EYE PAIN: 0
COUGH: 0
WHEEZING: 0
BACK PAIN: 1
VOMITING: 0
DIARRHEA: 0
SORE THROAT: 0
EYES NEGATIVE: 1
GASTROINTESTINAL NEGATIVE: 1
EYE REDNESS: 0
SHORTNESS OF BREATH: 0
CONSTIPATION: 0
NAUSEA: 0
RESPIRATORY NEGATIVE: 1
EYE DISCHARGE: 0
ABDOMINAL PAIN: 0
BLOOD IN STOOL: 0

## 2024-01-31 ENCOUNTER — HOSPITAL ENCOUNTER (OUTPATIENT)
Dept: INFUSION THERAPY | Age: 74
Discharge: HOME | End: 2024-01-31
Payer: MEDICARE

## 2024-01-31 DIAGNOSIS — C82.80 FOLLICULAR LOW GRADE B-CELL LYMPHOMA (HCC): Primary | ICD-10-CM

## 2024-01-31 PROCEDURE — 6360000002 HC RX W HCPCS: Performed by: INTERNAL MEDICINE

## 2024-01-31 PROCEDURE — 96372 THER/PROPH/DIAG INJ SC/IM: CPT

## 2024-01-31 RX ADMIN — PEGFILGRASTIM-JMDB 6 MG: 6 INJECTION SUBCUTANEOUS at 14:31

## 2024-02-07 ENCOUNTER — TELEPHONE (OUTPATIENT)
Dept: HEMATOLOGY | Age: 74
End: 2024-02-07

## 2024-02-07 NOTE — TELEPHONE ENCOUNTER
Called patient to see if his pain has improved.  Could not talk to patient but talked to wife.  States that he is still in pain but will only take the Tramadol at bedtime and then Tylenol and Ibuprofen during the day.  Patient will not take the Tramadol or Norco during the day because it makes him feel funny.  States that he is having a lot of nausea and constipated.  He is taking the Zofran which is helping control at this time.  States that he is having a lot of constipation following his treatment.  Instructed to take Miralax twice a day and if stools start getting loose, to decrease to once a day.  Instructed to call with any problems.  Wife v/u.    Received call from [facility]. Reason for Disposition   Patient wants to be seen    Protocols used: LEG PAIN-ADULT-OH    Pt is calling with c/o generalized joint pain. Pt states that this has been ongoing, however it is worsening over the last couple of days. Rates pain a 2 out of 10 currently. Pain worsens as the day goes on. Recommended that pt be seen today or tomorrow. Provided pt with care advice. Call soft transferred to [address] [place] [address] in the [address] to schedule appointment. Please do not reply to the triage nurse through this encounter. Any subsequent communication should be directly with the patient.

## 2024-02-08 ENCOUNTER — OFFICE VISIT (OUTPATIENT)
Dept: UROLOGY | Facility: CLINIC | Age: 74
End: 2024-02-08
Payer: MEDICARE

## 2024-02-08 VITALS — HEIGHT: 74 IN | WEIGHT: 157.6 LBS | BODY MASS INDEX: 20.23 KG/M2 | TEMPERATURE: 97.4 F

## 2024-02-08 DIAGNOSIS — C61 PROSTATE CANCER: Primary | ICD-10-CM

## 2024-02-08 PROCEDURE — 1160F RVW MEDS BY RX/DR IN RCRD: CPT | Performed by: UROLOGY

## 2024-02-08 PROCEDURE — 1159F MED LIST DOCD IN RCRD: CPT | Performed by: UROLOGY

## 2024-02-08 PROCEDURE — 99214 OFFICE O/P EST MOD 30 MIN: CPT | Performed by: UROLOGY

## 2024-02-08 RX ORDER — ONDANSETRON HYDROCHLORIDE 8 MG/1
1 TABLET, FILM COATED ORAL EVERY 8 HOURS PRN
COMMUNITY
Start: 2024-01-16

## 2024-02-09 PROBLEM — C61 PROSTATE CANCER: Status: ACTIVE | Noted: 2024-02-09

## 2024-02-13 ENCOUNTER — HOSPITAL ENCOUNTER (OUTPATIENT)
Dept: RADIATION ONCOLOGY | Facility: HOSPITAL | Age: 74
Setting detail: RADIATION/ONCOLOGY SERIES
End: 2024-02-13
Payer: MEDICARE

## 2024-02-13 PROBLEM — Z92.3 HISTORY OF RADIATION THERAPY: Status: ACTIVE | Noted: 2024-02-13

## 2024-02-14 ENCOUNTER — CONSULT (OUTPATIENT)
Dept: RADIATION ONCOLOGY | Facility: HOSPITAL | Age: 74
End: 2024-02-14
Payer: MEDICARE

## 2024-02-14 VITALS
RESPIRATION RATE: 18 BRPM | SYSTOLIC BLOOD PRESSURE: 125 MMHG | HEART RATE: 75 BPM | BODY MASS INDEX: 20.01 KG/M2 | WEIGHT: 155.9 LBS | OXYGEN SATURATION: 100 % | DIASTOLIC BLOOD PRESSURE: 62 MMHG | HEIGHT: 74 IN

## 2024-02-14 DIAGNOSIS — Z92.3 HISTORY OF RADIATION THERAPY: ICD-10-CM

## 2024-02-14 DIAGNOSIS — C81.11 NODULAR SCLEROSIS HODGKIN LYMPHOMA OF LYMPH NODES OF NECK: ICD-10-CM

## 2024-02-14 DIAGNOSIS — F17.200 CURRENT EVERY DAY SMOKER: ICD-10-CM

## 2024-02-14 DIAGNOSIS — C61 PROSTATE CANCER: Primary | ICD-10-CM

## 2024-02-14 PROCEDURE — G0463 HOSPITAL OUTPT CLINIC VISIT: HCPCS | Performed by: RADIOLOGY

## 2024-02-19 ENCOUNTER — INFUSION (OUTPATIENT)
Dept: ONCOLOGY | Facility: HOSPITAL | Age: 74
End: 2024-02-19
Payer: MEDICARE

## 2024-02-19 VITALS
OXYGEN SATURATION: 99 % | DIASTOLIC BLOOD PRESSURE: 59 MMHG | HEART RATE: 70 BPM | BODY MASS INDEX: 20.02 KG/M2 | TEMPERATURE: 97.9 F | SYSTOLIC BLOOD PRESSURE: 154 MMHG | RESPIRATION RATE: 18 BRPM | HEIGHT: 74 IN | WEIGHT: 156 LBS

## 2024-02-19 DIAGNOSIS — C61 PROSTATE CANCER: Primary | ICD-10-CM

## 2024-02-19 PROCEDURE — 25010000002 LEUPROLIDE ACETATE PER 7.5 MG: Performed by: UROLOGY

## 2024-02-19 PROCEDURE — 96402 CHEMO HORMON ANTINEOPL SQ/IM: CPT

## 2024-02-19 RX ADMIN — LEUPROLIDE ACETATE 7.5 MG: KIT at 14:00

## 2024-02-23 DIAGNOSIS — C82.80 FOLLICULAR LOW GRADE B-CELL LYMPHOMA (HCC): Primary | ICD-10-CM

## 2024-02-23 NOTE — PROGRESS NOTES
the air is unintelligible or needs documented correction.  Also, portions of this note have been copied forward, however, changed to reflect the most current clinical status of this patient.    Electronically signed by GOPAL Mcguire on 2/26/2024 at 11:00 AM  Baltazar YUSUF am starting this note as a registered nurse for GOPAL Vanegas.

## 2024-02-26 ENCOUNTER — HOSPITAL ENCOUNTER (OUTPATIENT)
Dept: INFUSION THERAPY | Age: 74
Discharge: HOME | End: 2024-02-26
Payer: MEDICARE

## 2024-02-26 ENCOUNTER — OFFICE VISIT (OUTPATIENT)
Dept: HEMATOLOGY | Age: 74
End: 2024-02-26
Payer: MEDICARE

## 2024-02-26 ENCOUNTER — OFFICE VISIT (OUTPATIENT)
Dept: HEMATOLOGY | Age: 74
End: 2024-02-26

## 2024-02-26 VITALS
WEIGHT: 157.7 LBS | RESPIRATION RATE: 18 BRPM | HEART RATE: 66 BPM | OXYGEN SATURATION: 96 % | BODY MASS INDEX: 20.24 KG/M2 | TEMPERATURE: 98.2 F | SYSTOLIC BLOOD PRESSURE: 121 MMHG | HEIGHT: 74 IN | DIASTOLIC BLOOD PRESSURE: 68 MMHG

## 2024-02-26 VITALS
TEMPERATURE: 98.5 F | SYSTOLIC BLOOD PRESSURE: 112 MMHG | DIASTOLIC BLOOD PRESSURE: 56 MMHG | HEART RATE: 59 BPM | OXYGEN SATURATION: 95 % | RESPIRATION RATE: 16 BRPM

## 2024-02-26 VITALS — HEIGHT: 74 IN | BODY MASS INDEX: 20.25 KG/M2

## 2024-02-26 DIAGNOSIS — C82.80 FOLLICULAR LOW GRADE B-CELL LYMPHOMA (HCC): Primary | ICD-10-CM

## 2024-02-26 DIAGNOSIS — R53.83 OTHER FATIGUE: ICD-10-CM

## 2024-02-26 DIAGNOSIS — R91.8 LUNG NODULES: ICD-10-CM

## 2024-02-26 DIAGNOSIS — R11.2 CINV (CHEMOTHERAPY-INDUCED NAUSEA AND VOMITING): ICD-10-CM

## 2024-02-26 DIAGNOSIS — T45.1X5A CINV (CHEMOTHERAPY-INDUCED NAUSEA AND VOMITING): ICD-10-CM

## 2024-02-26 DIAGNOSIS — C61 PROSTATE CARCINOMA (HCC): ICD-10-CM

## 2024-02-26 DIAGNOSIS — G89.3 ACUTE NEOPLASM-RELATED PAIN: ICD-10-CM

## 2024-02-26 LAB
ALBUMIN SERPL-MCNC: 4.1 G/DL (ref 3.5–5.2)
ALP SERPL-CCNC: 107 U/L (ref 40–130)
ALT SERPL-CCNC: 16 U/L (ref 21–72)
ANION GAP SERPL CALCULATED.3IONS-SCNC: 9 MMOL/L (ref 7–19)
AST SERPL-CCNC: 20 U/L (ref 17–59)
BASOPHILS # BLD: 0.18 K/UL (ref 0.01–0.08)
BASOPHILS NFR BLD: 3.1 % (ref 0.1–1.2)
BILIRUB SERPL-MCNC: 0.5 MG/DL (ref 0.2–1.3)
BUN SERPL-MCNC: 13 MG/DL (ref 9–20)
CALCIUM SERPL-MCNC: 9.1 MG/DL (ref 8.4–10.2)
CHLORIDE SERPL-SCNC: 107 MMOL/L (ref 98–111)
CO2 SERPL-SCNC: 24 MMOL/L (ref 22–29)
CREAT SERPL-MCNC: 0.8 MG/DL (ref 0.6–1.2)
EOSINOPHIL # BLD: 0.88 K/UL (ref 0.04–0.54)
EOSINOPHIL NFR BLD: 15.4 % (ref 0.7–7)
ERYTHROCYTE [DISTWIDTH] IN BLOOD BY AUTOMATED COUNT: 13.6 % (ref 11.6–14.4)
GLOBULIN: 2.9 G/DL
GLUCOSE SERPL-MCNC: 142 MG/DL (ref 74–106)
HCT VFR BLD AUTO: 41.5 % (ref 40.1–51)
HGB BLD-MCNC: 14.2 G/DL (ref 13.7–17.5)
LDH SERPL-CCNC: 185 U/L (ref 120–246)
LYMPHOCYTES # BLD: 0.77 K/UL (ref 1.18–3.74)
LYMPHOCYTES NFR BLD: 13.4 % (ref 19.3–53.1)
MCH RBC QN AUTO: 31.1 PG (ref 25.7–32.2)
MCHC RBC AUTO-ENTMCNC: 34.2 G/DL (ref 32.3–36.5)
MCV RBC AUTO: 90.8 FL (ref 79–92.2)
MONOCYTES # BLD: 0.75 K/UL (ref 0.24–0.82)
MONOCYTES NFR BLD: 13.1 % (ref 4.7–12.5)
NEUTROPHILS # BLD: 3.13 K/UL (ref 1.56–6.13)
NEUTS SEG NFR BLD: 54.7 % (ref 34–71.1)
PLATELET # BLD AUTO: 437 K/UL (ref 163–337)
PMV BLD AUTO: 8 FL (ref 7.4–10.4)
POTASSIUM SERPL-SCNC: 3.7 MMOL/L (ref 3.5–5.1)
PROT SERPL-MCNC: 7 G/DL (ref 6.3–8.2)
RBC # BLD AUTO: 4.57 M/UL (ref 4.63–6.08)
SODIUM SERPL-SCNC: 140 MMOL/L (ref 137–145)
WBC # BLD AUTO: 5.73 K/UL (ref 4.23–9.07)

## 2024-02-26 PROCEDURE — 1123F ACP DISCUSS/DSCN MKR DOCD: CPT | Performed by: NURSE PRACTITIONER

## 2024-02-26 PROCEDURE — 99215 OFFICE O/P EST HI 40 MIN: CPT | Performed by: NURSE PRACTITIONER

## 2024-02-26 PROCEDURE — 83615 LACTATE (LD) (LDH) ENZYME: CPT

## 2024-02-26 PROCEDURE — 96411 CHEMO IV PUSH ADDL DRUG: CPT

## 2024-02-26 PROCEDURE — 96413 CHEMO IV INFUSION 1 HR: CPT

## 2024-02-26 PROCEDURE — 36415 COLL VENOUS BLD VENIPUNCTURE: CPT

## 2024-02-26 PROCEDURE — 4004F PT TOBACCO SCREEN RCVD TLK: CPT | Performed by: NURSE PRACTITIONER

## 2024-02-26 PROCEDURE — 2580000003 HC RX 258: Performed by: NURSE PRACTITIONER

## 2024-02-26 PROCEDURE — 6370000000 HC RX 637 (ALT 250 FOR IP): Performed by: NURSE PRACTITIONER

## 2024-02-26 PROCEDURE — 96415 CHEMO IV INFUSION ADDL HR: CPT

## 2024-02-26 PROCEDURE — 85025 COMPLETE CBC W/AUTO DIFF WBC: CPT

## 2024-02-26 PROCEDURE — G8427 DOCREV CUR MEDS BY ELIG CLIN: HCPCS | Performed by: NURSE PRACTITIONER

## 2024-02-26 PROCEDURE — 80053 COMPREHEN METABOLIC PANEL: CPT

## 2024-02-26 PROCEDURE — 3017F COLORECTAL CA SCREEN DOC REV: CPT | Performed by: NURSE PRACTITIONER

## 2024-02-26 PROCEDURE — G8484 FLU IMMUNIZE NO ADMIN: HCPCS | Performed by: NURSE PRACTITIONER

## 2024-02-26 PROCEDURE — G8420 CALC BMI NORM PARAMETERS: HCPCS | Performed by: NURSE PRACTITIONER

## 2024-02-26 PROCEDURE — 6360000002 HC RX W HCPCS: Performed by: NURSE PRACTITIONER

## 2024-02-26 PROCEDURE — 96375 TX/PRO/DX INJ NEW DRUG ADDON: CPT

## 2024-02-26 RX ORDER — ONDANSETRON 2 MG/ML
8 INJECTION INTRAMUSCULAR; INTRAVENOUS
Status: CANCELLED | OUTPATIENT
Start: 2024-02-26

## 2024-02-26 RX ORDER — ALBUTEROL SULFATE 90 UG/1
4 AEROSOL, METERED RESPIRATORY (INHALATION) PRN
Status: CANCELLED | OUTPATIENT
Start: 2024-02-26

## 2024-02-26 RX ORDER — FAMOTIDINE 10 MG/ML
20 INJECTION, SOLUTION INTRAVENOUS
Status: CANCELLED | OUTPATIENT
Start: 2024-02-27

## 2024-02-26 RX ORDER — SODIUM CHLORIDE 9 MG/ML
5-250 INJECTION, SOLUTION INTRAVENOUS PRN
Status: CANCELLED | OUTPATIENT
Start: 2024-02-27

## 2024-02-26 RX ORDER — MEPERIDINE HYDROCHLORIDE 50 MG/ML
12.5 INJECTION INTRAMUSCULAR; INTRAVENOUS; SUBCUTANEOUS PRN
Status: CANCELLED | OUTPATIENT
Start: 2024-02-26

## 2024-02-26 RX ORDER — SODIUM CHLORIDE 0.9 % (FLUSH) 0.9 %
5-40 SYRINGE (ML) INJECTION PRN
Status: CANCELLED | OUTPATIENT
Start: 2024-02-26

## 2024-02-26 RX ORDER — PALONOSETRON 0.05 MG/ML
0.25 INJECTION, SOLUTION INTRAVENOUS ONCE
Status: CANCELLED | OUTPATIENT
Start: 2024-02-26 | End: 2024-02-26

## 2024-02-26 RX ORDER — ACETAMINOPHEN 325 MG/1
1000 TABLET ORAL ONCE
Status: CANCELLED | OUTPATIENT
Start: 2024-02-26 | End: 2024-02-26

## 2024-02-26 RX ORDER — ACETAMINOPHEN 325 MG/1
650 TABLET ORAL
Status: CANCELLED | OUTPATIENT
Start: 2024-02-26

## 2024-02-26 RX ORDER — PALONOSETRON 0.05 MG/ML
0.25 INJECTION, SOLUTION INTRAVENOUS ONCE
Status: COMPLETED | OUTPATIENT
Start: 2024-02-26 | End: 2024-02-26

## 2024-02-26 RX ORDER — FAMOTIDINE 10 MG/ML
20 INJECTION, SOLUTION INTRAVENOUS
Status: CANCELLED | OUTPATIENT
Start: 2024-02-26

## 2024-02-26 RX ORDER — HEPARIN SODIUM (PORCINE) LOCK FLUSH IV SOLN 100 UNIT/ML 100 UNIT/ML
500 SOLUTION INTRAVENOUS PRN
Status: CANCELLED | OUTPATIENT
Start: 2024-02-27

## 2024-02-26 RX ORDER — DIPHENHYDRAMINE HYDROCHLORIDE 50 MG/ML
25 INJECTION INTRAMUSCULAR; INTRAVENOUS ONCE
Status: COMPLETED | OUTPATIENT
Start: 2024-02-26 | End: 2024-02-26

## 2024-02-26 RX ORDER — EPINEPHRINE 1 MG/ML
0.3 INJECTION, SOLUTION, CONCENTRATE INTRAVENOUS PRN
Status: CANCELLED | OUTPATIENT
Start: 2024-02-27

## 2024-02-26 RX ORDER — SODIUM CHLORIDE 9 MG/ML
5-250 INJECTION, SOLUTION INTRAVENOUS PRN
Status: CANCELLED | OUTPATIENT
Start: 2024-02-26

## 2024-02-26 RX ORDER — MEPERIDINE HYDROCHLORIDE 50 MG/ML
12.5 INJECTION INTRAMUSCULAR; INTRAVENOUS; SUBCUTANEOUS PRN
Status: CANCELLED | OUTPATIENT
Start: 2024-02-27

## 2024-02-26 RX ORDER — ACETAMINOPHEN 500 MG
1000 TABLET ORAL ONCE
Status: COMPLETED | OUTPATIENT
Start: 2024-02-26 | End: 2024-02-26

## 2024-02-26 RX ORDER — DIPHENHYDRAMINE HYDROCHLORIDE 50 MG/ML
50 INJECTION INTRAMUSCULAR; INTRAVENOUS
Status: CANCELLED | OUTPATIENT
Start: 2024-02-26

## 2024-02-26 RX ORDER — ACETAMINOPHEN 325 MG/1
650 TABLET ORAL
Status: CANCELLED | OUTPATIENT
Start: 2024-02-27

## 2024-02-26 RX ORDER — HEPARIN 100 UNIT/ML
500 SYRINGE INTRAVENOUS PRN
Status: DISCONTINUED | OUTPATIENT
Start: 2024-02-26 | End: 2024-02-27 | Stop reason: HOSPADM

## 2024-02-26 RX ORDER — DIPHENHYDRAMINE HYDROCHLORIDE 50 MG/ML
25 INJECTION INTRAMUSCULAR; INTRAVENOUS ONCE
Status: CANCELLED | OUTPATIENT
Start: 2024-02-26 | End: 2024-02-26

## 2024-02-26 RX ORDER — DIPHENHYDRAMINE HYDROCHLORIDE 50 MG/ML
50 INJECTION INTRAMUSCULAR; INTRAVENOUS
Status: CANCELLED | OUTPATIENT
Start: 2024-02-27

## 2024-02-26 RX ORDER — SODIUM CHLORIDE 0.9 % (FLUSH) 0.9 %
5-40 SYRINGE (ML) INJECTION PRN
Status: DISCONTINUED | OUTPATIENT
Start: 2024-02-26 | End: 2024-02-27 | Stop reason: HOSPADM

## 2024-02-26 RX ORDER — SODIUM CHLORIDE 9 MG/ML
INJECTION, SOLUTION INTRAVENOUS CONTINUOUS
Status: CANCELLED | OUTPATIENT
Start: 2024-02-26

## 2024-02-26 RX ORDER — ONDANSETRON 2 MG/ML
8 INJECTION INTRAMUSCULAR; INTRAVENOUS
Status: CANCELLED | OUTPATIENT
Start: 2024-02-27

## 2024-02-26 RX ORDER — SODIUM CHLORIDE 0.9 % (FLUSH) 0.9 %
5-40 SYRINGE (ML) INJECTION PRN
Status: CANCELLED | OUTPATIENT
Start: 2024-02-27

## 2024-02-26 RX ORDER — EPINEPHRINE 1 MG/ML
0.3 INJECTION, SOLUTION, CONCENTRATE INTRAVENOUS PRN
Status: CANCELLED | OUTPATIENT
Start: 2024-02-26

## 2024-02-26 RX ORDER — PROMETHAZINE HYDROCHLORIDE 12.5 MG/1
12.5 TABLET ORAL 3 TIMES DAILY PRN
Qty: 60 TABLET | Refills: 1 | Status: SHIPPED | OUTPATIENT
Start: 2024-02-26

## 2024-02-26 RX ORDER — SODIUM CHLORIDE 9 MG/ML
INJECTION, SOLUTION INTRAVENOUS CONTINUOUS
Status: CANCELLED | OUTPATIENT
Start: 2024-02-27

## 2024-02-26 RX ORDER — ALBUTEROL SULFATE 90 UG/1
4 AEROSOL, METERED RESPIRATORY (INHALATION) PRN
Status: CANCELLED | OUTPATIENT
Start: 2024-02-27

## 2024-02-26 RX ORDER — HEPARIN SODIUM (PORCINE) LOCK FLUSH IV SOLN 100 UNIT/ML 100 UNIT/ML
500 SOLUTION INTRAVENOUS PRN
Status: CANCELLED | OUTPATIENT
Start: 2024-02-26

## 2024-02-26 RX ORDER — DEXAMETHASONE SODIUM PHOSPHATE 10 MG/ML
10 INJECTION, SOLUTION INTRAMUSCULAR; INTRAVENOUS ONCE
Status: COMPLETED | OUTPATIENT
Start: 2024-02-26 | End: 2024-02-26

## 2024-02-26 RX ADMIN — BENDAMUSTINE HYDROCHLORIDE 150 MG: 25 INJECTION, SOLUTION INTRAVENOUS at 13:34

## 2024-02-26 RX ADMIN — DEXAMETHASONE SODIUM PHOSPHATE 10 MG: 10 INJECTION, SOLUTION INTRAMUSCULAR; INTRAVENOUS at 11:27

## 2024-02-26 RX ADMIN — HEPARIN 500 UNITS: 100 SYRINGE at 13:45

## 2024-02-26 RX ADMIN — SODIUM CHLORIDE 700 MG: 9 INJECTION, SOLUTION INTRAVENOUS at 11:54

## 2024-02-26 RX ADMIN — PALONOSETRON 0.25 MG: 0.05 INJECTION, SOLUTION INTRAVENOUS at 11:30

## 2024-02-26 RX ADMIN — SODIUM CHLORIDE, PRESERVATIVE FREE 10 ML: 5 INJECTION INTRAVENOUS at 13:45

## 2024-02-26 RX ADMIN — DIPHENHYDRAMINE HYDROCHLORIDE 25 MG: 50 INJECTION INTRAMUSCULAR; INTRAVENOUS at 11:29

## 2024-02-26 RX ADMIN — ACETAMINOPHEN 1000 MG: 500 TABLET, FILM COATED ORAL at 11:24

## 2024-02-26 ASSESSMENT — ENCOUNTER SYMPTOMS
EYE DISCHARGE: 0
COUGH: 0
EYE PAIN: 0
ABDOMINAL PAIN: 0
DIARRHEA: 0
RESPIRATORY NEGATIVE: 1
SHORTNESS OF BREATH: 0
WHEEZING: 0
EYE REDNESS: 0
SORE THROAT: 0
BACK PAIN: 1
BLOOD IN STOOL: 0
CONSTIPATION: 0
EYES NEGATIVE: 1
NAUSEA: 1
VOMITING: 0

## 2024-02-27 ENCOUNTER — HOSPITAL ENCOUNTER (OUTPATIENT)
Dept: INFUSION THERAPY | Age: 74
Discharge: HOME | End: 2024-02-27
Payer: MEDICARE

## 2024-02-27 DIAGNOSIS — C82.80 FOLLICULAR LOW GRADE B-CELL LYMPHOMA (HCC): Primary | ICD-10-CM

## 2024-02-27 PROCEDURE — 96367 TX/PROPH/DG ADDL SEQ IV INF: CPT

## 2024-02-27 PROCEDURE — 6360000002 HC RX W HCPCS: Performed by: NURSE PRACTITIONER

## 2024-02-27 PROCEDURE — 96409 CHEMO IV PUSH SNGL DRUG: CPT

## 2024-02-27 PROCEDURE — 2580000003 HC RX 258: Performed by: NURSE PRACTITIONER

## 2024-02-27 RX ORDER — HEPARIN 100 UNIT/ML
500 SYRINGE INTRAVENOUS PRN
Status: DISCONTINUED | OUTPATIENT
Start: 2024-02-27 | End: 2024-02-28 | Stop reason: HOSPADM

## 2024-02-27 RX ORDER — SODIUM CHLORIDE 0.9 % (FLUSH) 0.9 %
5-40 SYRINGE (ML) INJECTION PRN
Status: DISCONTINUED | OUTPATIENT
Start: 2024-02-27 | End: 2024-02-28 | Stop reason: HOSPADM

## 2024-02-27 RX ADMIN — SODIUM CHLORIDE, PRESERVATIVE FREE 10 ML: 5 INJECTION INTRAVENOUS at 10:50

## 2024-02-27 RX ADMIN — DEXAMETHASONE SODIUM PHOSPHATE 16 MG: 10 INJECTION, SOLUTION INTRAMUSCULAR; INTRAVENOUS at 10:02

## 2024-02-27 RX ADMIN — HEPARIN 500 UNITS: 100 SYRINGE at 10:50

## 2024-02-27 RX ADMIN — BENDAMUSTINE HYDROCHLORIDE 150 MG: 25 INJECTION, SOLUTION INTRAVENOUS at 10:37

## 2024-02-27 NOTE — PROGRESS NOTES
frequent snacks throughout the day to improve overall calorie intake. Pt's wife verbalized understanding.     Goals:   Preserve lean body mass, prevent physical deconditioning, prevent wt loss    Nutrition Monitoring and Evaluation:   Behavioral-Environmental Outcomes: None Identified  Food/Nutrient Intake Outcomes: Food and Nutrient Intake, Supplement Intake  Physical Signs/Symptoms Outcomes: Biochemical Data, Nutrition Focused Physical Findings, Weight    RD will follow up in 4-6 weeks to assess nutrition progression.  Melissa Taylor, MS, RD, LD, [facility]  Contact: [phone]

## 2024-02-28 ENCOUNTER — HOSPITAL ENCOUNTER (OUTPATIENT)
Dept: INFUSION THERAPY | Age: 74
Discharge: HOME | End: 2024-02-28
Payer: MEDICARE

## 2024-02-28 DIAGNOSIS — C82.80 FOLLICULAR LOW GRADE B-CELL LYMPHOMA (HCC): Primary | ICD-10-CM

## 2024-02-28 PROCEDURE — 96372 THER/PROPH/DIAG INJ SC/IM: CPT

## 2024-02-28 PROCEDURE — 6360000002 HC RX W HCPCS: Performed by: NURSE PRACTITIONER

## 2024-02-28 RX ADMIN — PEGFILGRASTIM-JMDB 6 MG: 6 INJECTION SUBCUTANEOUS at 12:13

## 2024-02-29 LAB — B2 MICROGLOB SERPL-MCNC: 3 MG/L

## 2024-03-18 ENCOUNTER — INFUSION (OUTPATIENT)
Dept: ONCOLOGY | Facility: HOSPITAL | Age: 74
End: 2024-03-18
Payer: MEDICARE

## 2024-03-18 VITALS
BODY MASS INDEX: 20.33 KG/M2 | DIASTOLIC BLOOD PRESSURE: 59 MMHG | TEMPERATURE: 96.6 F | WEIGHT: 158.4 LBS | SYSTOLIC BLOOD PRESSURE: 131 MMHG | OXYGEN SATURATION: 100 % | RESPIRATION RATE: 18 BRPM | HEART RATE: 71 BPM | HEIGHT: 74 IN

## 2024-03-18 DIAGNOSIS — C61 PROSTATE CANCER: Primary | ICD-10-CM

## 2024-03-18 PROCEDURE — 96402 CHEMO HORMON ANTINEOPL SQ/IM: CPT

## 2024-03-18 PROCEDURE — 25010000002 LEUPROLIDE 45 MG KIT: Performed by: UROLOGY

## 2024-03-18 RX ADMIN — LEUPROLIDE ACETATE 45 MG: KIT at 14:06

## 2024-03-19 ENCOUNTER — HOSPITAL ENCOUNTER (OUTPATIENT)
Dept: RADIATION ONCOLOGY | Facility: HOSPITAL | Age: 74
Setting detail: RADIATION/ONCOLOGY SERIES
End: 2024-03-19
Payer: MEDICARE

## 2024-03-20 ENCOUNTER — OFFICE VISIT (OUTPATIENT)
Dept: RADIATION ONCOLOGY | Facility: HOSPITAL | Age: 74
End: 2024-03-20
Payer: MEDICARE

## 2024-03-20 ENCOUNTER — LAB (OUTPATIENT)
Dept: LAB | Facility: HOSPITAL | Age: 74
End: 2024-03-20
Payer: MEDICARE

## 2024-03-20 VITALS
WEIGHT: 156 LBS | DIASTOLIC BLOOD PRESSURE: 65 MMHG | BODY MASS INDEX: 20.02 KG/M2 | SYSTOLIC BLOOD PRESSURE: 126 MMHG | OXYGEN SATURATION: 97 % | HEART RATE: 67 BPM | HEIGHT: 74 IN

## 2024-03-20 DIAGNOSIS — C81.11 NODULAR SCLEROSIS HODGKIN LYMPHOMA OF LYMPH NODES OF NECK: ICD-10-CM

## 2024-03-20 DIAGNOSIS — F17.200 CURRENT EVERY DAY SMOKER: ICD-10-CM

## 2024-03-20 DIAGNOSIS — C61 PROSTATE CANCER: ICD-10-CM

## 2024-03-20 DIAGNOSIS — Z92.3 HISTORY OF RADIATION THERAPY: ICD-10-CM

## 2024-03-20 DIAGNOSIS — C61 PROSTATE CANCER: Primary | ICD-10-CM

## 2024-03-20 PROCEDURE — G0463 HOSPITAL OUTPT CLINIC VISIT: HCPCS | Performed by: RADIOLOGY

## 2024-03-20 PROCEDURE — 84153 ASSAY OF PSA TOTAL: CPT

## 2024-03-20 PROCEDURE — 36415 COLL VENOUS BLD VENIPUNCTURE: CPT

## 2024-03-21 LAB — PSA SERPL-MCNC: 1.79 NG/ML (ref 0–4)

## 2024-03-28 DIAGNOSIS — C82.80 FOLLICULAR LOW GRADE B-CELL LYMPHOMA (HCC): Primary | ICD-10-CM

## 2024-03-28 DIAGNOSIS — R53.0 NEOPLASTIC MALIGNANT RELATED FATIGUE: ICD-10-CM

## 2024-03-28 NOTE — PROGRESS NOTES
clinical status of this patient.    Electronically signed by GOPAL Mcguire on 4/1/2024 at 6:59 PM  Baltazar YUSUF am starting this note as a registered nurse for GOPAL Vanegas.

## 2024-04-01 ENCOUNTER — HOSPITAL ENCOUNTER (OUTPATIENT)
Dept: INFUSION THERAPY | Age: 74
Discharge: HOME | End: 2024-04-01
Payer: MEDICARE

## 2024-04-01 ENCOUNTER — OFFICE VISIT (OUTPATIENT)
Dept: HEMATOLOGY | Age: 74
End: 2024-04-01
Payer: MEDICARE

## 2024-04-01 VITALS
HEIGHT: 74 IN | BODY MASS INDEX: 20.28 KG/M2 | HEART RATE: 63 BPM | DIASTOLIC BLOOD PRESSURE: 69 MMHG | WEIGHT: 158 LBS | TEMPERATURE: 97.7 F | RESPIRATION RATE: 18 BRPM | SYSTOLIC BLOOD PRESSURE: 142 MMHG | OXYGEN SATURATION: 98 %

## 2024-04-01 VITALS
RESPIRATION RATE: 18 BRPM | OXYGEN SATURATION: 99 % | DIASTOLIC BLOOD PRESSURE: 67 MMHG | SYSTOLIC BLOOD PRESSURE: 154 MMHG | HEART RATE: 66 BPM

## 2024-04-01 DIAGNOSIS — R53.0 NEOPLASTIC MALIGNANT RELATED FATIGUE: ICD-10-CM

## 2024-04-01 DIAGNOSIS — C82.80 FOLLICULAR LOW GRADE B-CELL LYMPHOMA (HCC): Primary | ICD-10-CM

## 2024-04-01 DIAGNOSIS — R11.2 CINV (CHEMOTHERAPY-INDUCED NAUSEA AND VOMITING): ICD-10-CM

## 2024-04-01 DIAGNOSIS — G62.9 NEUROPATHY: Primary | ICD-10-CM

## 2024-04-01 DIAGNOSIS — T45.1X5A CINV (CHEMOTHERAPY-INDUCED NAUSEA AND VOMITING): ICD-10-CM

## 2024-04-01 DIAGNOSIS — D68.51 FACTOR V LEIDEN (HCC): ICD-10-CM

## 2024-04-01 DIAGNOSIS — G89.3 CANCER-RELATED PAIN: ICD-10-CM

## 2024-04-01 DIAGNOSIS — G62.0 DRUG-INDUCED POLYNEUROPATHY (HCC): ICD-10-CM

## 2024-04-01 DIAGNOSIS — Z71.6 TOBACCO ABUSE COUNSELING: ICD-10-CM

## 2024-04-01 DIAGNOSIS — C81.90 HODGKIN LYMPHOMA, UNSPECIFIED HODGKIN LYMPHOMA TYPE, UNSPECIFIED BODY REGION (HCC): ICD-10-CM

## 2024-04-01 DIAGNOSIS — C82.80 FOLLICULAR LOW GRADE B-CELL LYMPHOMA (HCC): ICD-10-CM

## 2024-04-01 DIAGNOSIS — Z71.89 CARE PLAN DISCUSSED WITH PATIENT: ICD-10-CM

## 2024-04-01 DIAGNOSIS — C61 PROSTATE CARCINOMA (HCC): ICD-10-CM

## 2024-04-01 LAB
ALBUMIN SERPL-MCNC: 4.3 G/DL (ref 3.5–5.2)
ALP SERPL-CCNC: 115 U/L (ref 40–130)
ALT SERPL-CCNC: 20 U/L (ref 21–72)
ANION GAP SERPL CALCULATED.3IONS-SCNC: 11 MMOL/L (ref 7–19)
AST SERPL-CCNC: 24 U/L (ref 17–59)
BASOPHILS # BLD: 0.12 K/UL (ref 0.01–0.08)
BASOPHILS NFR BLD: 1.8 % (ref 0.1–1.2)
BILIRUB SERPL-MCNC: 0.4 MG/DL (ref 0.2–1.3)
BUN SERPL-MCNC: 13 MG/DL (ref 9–20)
CALCIUM SERPL-MCNC: 9 MG/DL (ref 8.4–10.2)
CHLORIDE SERPL-SCNC: 102 MMOL/L (ref 98–111)
CO2 SERPL-SCNC: 27 MMOL/L (ref 22–29)
CREAT SERPL-MCNC: 0.8 MG/DL (ref 0.6–1.2)
EOSINOPHIL # BLD: 1.23 K/UL (ref 0.04–0.54)
EOSINOPHIL NFR BLD: 18.4 % (ref 0.7–7)
ERYTHROCYTE [DISTWIDTH] IN BLOOD BY AUTOMATED COUNT: 13.5 % (ref 11.6–14.4)
GLOBULIN: 2.8 G/DL
GLUCOSE SERPL-MCNC: 89 MG/DL (ref 74–106)
HCT VFR BLD AUTO: 43.1 % (ref 40.1–51)
HGB BLD-MCNC: 14.9 G/DL (ref 13.7–17.5)
LDH SERPL-CCNC: 210 U/L (ref 120–246)
LYMPHOCYTES # BLD: 0.72 K/UL (ref 1.18–3.74)
LYMPHOCYTES NFR BLD: 10.7 % (ref 19.3–53.1)
MCH RBC QN AUTO: 31.4 PG (ref 25.7–32.2)
MCHC RBC AUTO-ENTMCNC: 34.6 G/DL (ref 32.3–36.5)
MCV RBC AUTO: 90.9 FL (ref 79–92.2)
MONOCYTES # BLD: 0.79 K/UL (ref 0.24–0.82)
MONOCYTES NFR BLD: 11.8 % (ref 4.7–12.5)
NEUTROPHILS # BLD: 3.82 K/UL (ref 1.56–6.13)
NEUTS SEG NFR BLD: 57 % (ref 34–71.1)
PLATELET # BLD AUTO: 326 K/UL (ref 163–337)
PMV BLD AUTO: 8.1 FL (ref 7.4–10.4)
POTASSIUM SERPL-SCNC: 3.7 MMOL/L (ref 3.5–5.1)
PROT SERPL-MCNC: 7.1 G/DL (ref 6.3–8.2)
RBC # BLD AUTO: 4.74 M/UL (ref 4.63–6.08)
SODIUM SERPL-SCNC: 140 MMOL/L (ref 137–145)
TSH SERPL DL<=0.005 MIU/L-ACNC: 1.52 UIU/ML (ref 0.27–4.2)
WBC # BLD AUTO: 6.7 K/UL (ref 4.23–9.07)

## 2024-04-01 PROCEDURE — 96411 CHEMO IV PUSH ADDL DRUG: CPT

## 2024-04-01 PROCEDURE — 96361 HYDRATE IV INFUSION ADD-ON: CPT

## 2024-04-01 PROCEDURE — 1123F ACP DISCUSS/DSCN MKR DOCD: CPT | Performed by: NURSE PRACTITIONER

## 2024-04-01 PROCEDURE — 96415 CHEMO IV INFUSION ADDL HR: CPT

## 2024-04-01 PROCEDURE — 6360000002 HC RX W HCPCS: Performed by: NURSE PRACTITIONER

## 2024-04-01 PROCEDURE — 2580000003 HC RX 258: Performed by: NURSE PRACTITIONER

## 2024-04-01 PROCEDURE — 3017F COLORECTAL CA SCREEN DOC REV: CPT | Performed by: NURSE PRACTITIONER

## 2024-04-01 PROCEDURE — 83615 LACTATE (LD) (LDH) ENZYME: CPT

## 2024-04-01 PROCEDURE — 85025 COMPLETE CBC W/AUTO DIFF WBC: CPT

## 2024-04-01 PROCEDURE — 96375 TX/PRO/DX INJ NEW DRUG ADDON: CPT

## 2024-04-01 PROCEDURE — 80053 COMPREHEN METABOLIC PANEL: CPT

## 2024-04-01 PROCEDURE — G8427 DOCREV CUR MEDS BY ELIG CLIN: HCPCS | Performed by: NURSE PRACTITIONER

## 2024-04-01 PROCEDURE — 99215 OFFICE O/P EST HI 40 MIN: CPT | Performed by: NURSE PRACTITIONER

## 2024-04-01 PROCEDURE — 36415 COLL VENOUS BLD VENIPUNCTURE: CPT

## 2024-04-01 PROCEDURE — 6370000000 HC RX 637 (ALT 250 FOR IP): Performed by: NURSE PRACTITIONER

## 2024-04-01 PROCEDURE — 4004F PT TOBACCO SCREEN RCVD TLK: CPT | Performed by: NURSE PRACTITIONER

## 2024-04-01 PROCEDURE — 96413 CHEMO IV INFUSION 1 HR: CPT

## 2024-04-01 PROCEDURE — G8420 CALC BMI NORM PARAMETERS: HCPCS | Performed by: NURSE PRACTITIONER

## 2024-04-01 RX ORDER — SODIUM CHLORIDE 9 MG/ML
INJECTION, SOLUTION INTRAVENOUS CONTINUOUS
Status: CANCELLED | OUTPATIENT
Start: 2024-04-02

## 2024-04-01 RX ORDER — DIPHENHYDRAMINE HYDROCHLORIDE 50 MG/ML
50 INJECTION INTRAMUSCULAR; INTRAVENOUS
Status: CANCELLED | OUTPATIENT
Start: 2024-04-02

## 2024-04-01 RX ORDER — MEPERIDINE HYDROCHLORIDE 50 MG/ML
12.5 INJECTION INTRAMUSCULAR; INTRAVENOUS; SUBCUTANEOUS PRN
Status: CANCELLED | OUTPATIENT
Start: 2024-04-01

## 2024-04-01 RX ORDER — EPINEPHRINE 1 MG/ML
0.3 INJECTION, SOLUTION, CONCENTRATE INTRAVENOUS PRN
Status: CANCELLED | OUTPATIENT
Start: 2024-04-02

## 2024-04-01 RX ORDER — ONDANSETRON 2 MG/ML
8 INJECTION INTRAMUSCULAR; INTRAVENOUS
Status: CANCELLED | OUTPATIENT
Start: 2024-04-01

## 2024-04-01 RX ORDER — HEPARIN SODIUM (PORCINE) LOCK FLUSH IV SOLN 100 UNIT/ML 100 UNIT/ML
500 SOLUTION INTRAVENOUS PRN
Status: CANCELLED | OUTPATIENT
Start: 2024-04-02

## 2024-04-01 RX ORDER — SODIUM CHLORIDE 9 MG/ML
5-250 INJECTION, SOLUTION INTRAVENOUS PRN
Status: CANCELLED | OUTPATIENT
Start: 2024-04-01

## 2024-04-01 RX ORDER — GABAPENTIN 300 MG/1
300 CAPSULE ORAL NIGHTLY
Qty: 30 CAPSULE | Refills: 5 | Status: SHIPPED | OUTPATIENT
Start: 2024-04-01 | End: 2024-09-28

## 2024-04-01 RX ORDER — ACETAMINOPHEN 500 MG
1000 TABLET ORAL ONCE
Status: COMPLETED | OUTPATIENT
Start: 2024-04-01 | End: 2024-04-01

## 2024-04-01 RX ORDER — DIPHENHYDRAMINE HYDROCHLORIDE 50 MG/ML
25 INJECTION INTRAMUSCULAR; INTRAVENOUS ONCE
Status: COMPLETED | OUTPATIENT
Start: 2024-04-01 | End: 2024-04-01

## 2024-04-01 RX ORDER — FAMOTIDINE 10 MG/ML
20 INJECTION, SOLUTION INTRAVENOUS
Status: CANCELLED | OUTPATIENT
Start: 2024-04-02

## 2024-04-01 RX ORDER — ONDANSETRON 2 MG/ML
8 INJECTION INTRAMUSCULAR; INTRAVENOUS
Status: CANCELLED | OUTPATIENT
Start: 2024-04-02

## 2024-04-01 RX ORDER — SODIUM CHLORIDE 9 MG/ML
5-250 INJECTION, SOLUTION INTRAVENOUS PRN
Status: CANCELLED | OUTPATIENT
Start: 2024-04-02

## 2024-04-01 RX ORDER — ALBUTEROL SULFATE 90 UG/1
4 AEROSOL, METERED RESPIRATORY (INHALATION) PRN
Status: CANCELLED | OUTPATIENT
Start: 2024-04-02

## 2024-04-01 RX ORDER — SODIUM CHLORIDE 9 MG/ML
INJECTION, SOLUTION INTRAVENOUS CONTINUOUS
Status: CANCELLED | OUTPATIENT
Start: 2024-04-01

## 2024-04-01 RX ORDER — ACETAMINOPHEN 325 MG/1
650 TABLET ORAL
Status: CANCELLED | OUTPATIENT
Start: 2024-04-02

## 2024-04-01 RX ORDER — SODIUM CHLORIDE 0.9 % (FLUSH) 0.9 %
5-40 SYRINGE (ML) INJECTION PRN
Status: CANCELLED | OUTPATIENT
Start: 2024-04-02

## 2024-04-01 RX ORDER — SODIUM CHLORIDE 0.9 % (FLUSH) 0.9 %
5-40 SYRINGE (ML) INJECTION PRN
Status: DISCONTINUED | OUTPATIENT
Start: 2024-04-01 | End: 2024-04-02 | Stop reason: HOSPADM

## 2024-04-01 RX ORDER — ALBUTEROL SULFATE 90 UG/1
4 AEROSOL, METERED RESPIRATORY (INHALATION) PRN
Status: CANCELLED | OUTPATIENT
Start: 2024-04-01

## 2024-04-01 RX ORDER — FAMOTIDINE 10 MG/ML
20 INJECTION, SOLUTION INTRAVENOUS
Status: CANCELLED | OUTPATIENT
Start: 2024-04-01

## 2024-04-01 RX ORDER — HEPARIN 100 UNIT/ML
500 SYRINGE INTRAVENOUS PRN
Status: DISCONTINUED | OUTPATIENT
Start: 2024-04-01 | End: 2024-04-02 | Stop reason: HOSPADM

## 2024-04-01 RX ORDER — ACETAMINOPHEN 325 MG/1
650 TABLET ORAL
Status: CANCELLED | OUTPATIENT
Start: 2024-04-01

## 2024-04-01 RX ORDER — PALONOSETRON 0.05 MG/ML
0.25 INJECTION, SOLUTION INTRAVENOUS ONCE
Status: COMPLETED | OUTPATIENT
Start: 2024-04-01 | End: 2024-04-01

## 2024-04-01 RX ORDER — EPINEPHRINE 1 MG/ML
0.3 INJECTION, SOLUTION, CONCENTRATE INTRAVENOUS PRN
Status: CANCELLED | OUTPATIENT
Start: 2024-04-01

## 2024-04-01 RX ORDER — DIPHENHYDRAMINE HYDROCHLORIDE 50 MG/ML
50 INJECTION INTRAMUSCULAR; INTRAVENOUS
Status: CANCELLED | OUTPATIENT
Start: 2024-04-01

## 2024-04-01 RX ORDER — SODIUM CHLORIDE 9 MG/ML
5-250 INJECTION, SOLUTION INTRAVENOUS PRN
Status: DISCONTINUED | OUTPATIENT
Start: 2024-04-01 | End: 2024-04-02 | Stop reason: HOSPADM

## 2024-04-01 RX ORDER — MEPERIDINE HYDROCHLORIDE 50 MG/ML
12.5 INJECTION INTRAMUSCULAR; INTRAVENOUS; SUBCUTANEOUS PRN
Status: CANCELLED | OUTPATIENT
Start: 2024-04-02

## 2024-04-01 RX ADMIN — SODIUM CHLORIDE 700 MG: 9 INJECTION, SOLUTION INTRAVENOUS at 12:41

## 2024-04-01 RX ADMIN — HEPARIN 500 UNITS: 100 SYRINGE at 14:30

## 2024-04-01 RX ADMIN — ACETAMINOPHEN 1000 MG: 500 TABLET, FILM COATED ORAL at 12:00

## 2024-04-01 RX ADMIN — DEXAMETHASONE SODIUM PHOSPHATE 10 MG: 100 INJECTION INTRAMUSCULAR; INTRAVENOUS at 11:57

## 2024-04-01 RX ADMIN — PALONOSETRON 0.25 MG: 0.05 INJECTION, SOLUTION INTRAVENOUS at 12:00

## 2024-04-01 RX ADMIN — SODIUM CHLORIDE, PRESERVATIVE FREE 10 ML: 5 INJECTION INTRAVENOUS at 14:30

## 2024-04-01 RX ADMIN — BENDAMUSTINE HYDROCHLORIDE 150 MG: 25 INJECTION, SOLUTION INTRAVENOUS at 14:18

## 2024-04-01 RX ADMIN — SODIUM CHLORIDE 250 ML/HR: 9 INJECTION, SOLUTION INTRAVENOUS at 11:55

## 2024-04-01 RX ADMIN — DIPHENHYDRAMINE HYDROCHLORIDE 25 MG: 50 INJECTION INTRAMUSCULAR; INTRAVENOUS at 12:00

## 2024-04-01 ASSESSMENT — ENCOUNTER SYMPTOMS
SHORTNESS OF BREATH: 0
COUGH: 0
BACK PAIN: 1
EYE REDNESS: 0
VOMITING: 0
EYE DISCHARGE: 0
DIARRHEA: 0
BLOOD IN STOOL: 0
CONSTIPATION: 0
EYES NEGATIVE: 1
EYE PAIN: 0
WHEEZING: 0
NAUSEA: 1
SORE THROAT: 0

## 2024-04-02 ENCOUNTER — HOSPITAL ENCOUNTER (OUTPATIENT)
Dept: INFUSION THERAPY | Age: 74
Discharge: HOME | End: 2024-04-02
Payer: MEDICARE

## 2024-04-02 VITALS
TEMPERATURE: 97.8 F | SYSTOLIC BLOOD PRESSURE: 155 MMHG | OXYGEN SATURATION: 95 % | DIASTOLIC BLOOD PRESSURE: 76 MMHG | HEART RATE: 68 BPM | RESPIRATION RATE: 18 BRPM

## 2024-04-02 DIAGNOSIS — C82.80 FOLLICULAR LOW GRADE B-CELL LYMPHOMA (HCC): Primary | ICD-10-CM

## 2024-04-02 PROCEDURE — 96367 TX/PROPH/DG ADDL SEQ IV INF: CPT

## 2024-04-02 PROCEDURE — 96409 CHEMO IV PUSH SNGL DRUG: CPT

## 2024-04-02 PROCEDURE — 6360000002 HC RX W HCPCS: Performed by: NURSE PRACTITIONER

## 2024-04-02 PROCEDURE — 2580000003 HC RX 258: Performed by: NURSE PRACTITIONER

## 2024-04-02 RX ORDER — HEPARIN 100 UNIT/ML
500 SYRINGE INTRAVENOUS PRN
Status: DISCONTINUED | OUTPATIENT
Start: 2024-04-02 | End: 2024-04-03 | Stop reason: HOSPADM

## 2024-04-02 RX ORDER — SODIUM CHLORIDE 0.9 % (FLUSH) 0.9 %
5-40 SYRINGE (ML) INJECTION PRN
Status: DISCONTINUED | OUTPATIENT
Start: 2024-04-02 | End: 2024-04-03 | Stop reason: HOSPADM

## 2024-04-02 RX ADMIN — ONDANSETRON 16 MG: 2 INJECTION INTRAMUSCULAR; INTRAVENOUS at 14:49

## 2024-04-02 RX ADMIN — SODIUM CHLORIDE, PRESERVATIVE FREE 10 ML: 5 INJECTION INTRAVENOUS at 15:23

## 2024-04-02 RX ADMIN — BENDAMUSTINE HYDROCHLORIDE 150 MG: 25 INJECTION, SOLUTION INTRAVENOUS at 15:13

## 2024-04-02 RX ADMIN — HEPARIN 500 UNITS: 100 SYRINGE at 15:23

## 2024-04-03 ENCOUNTER — HOSPITAL ENCOUNTER (OUTPATIENT)
Dept: INFUSION THERAPY | Age: 74
Discharge: HOME | End: 2024-04-03
Payer: MEDICARE

## 2024-04-03 DIAGNOSIS — C82.80 FOLLICULAR LOW GRADE B-CELL LYMPHOMA (HCC): Primary | ICD-10-CM

## 2024-04-03 LAB — B2 MICROGLOB SERPL-MCNC: 2.8 MG/L

## 2024-04-03 PROCEDURE — 96372 THER/PROPH/DIAG INJ SC/IM: CPT

## 2024-04-03 PROCEDURE — 6360000002 HC RX W HCPCS: Performed by: NURSE PRACTITIONER

## 2024-04-03 RX ADMIN — PEGFILGRASTIM-JMDB 6 MG: 6 INJECTION SUBCUTANEOUS at 14:36

## 2024-04-26 NOTE — PROGRESS NOTES
nonsensical words or phrases to be inadvertently transcribed; although attempts have made to review the note for such errors, some may still exist.  Please excuse any unrecognized transcription errors and contact us if the air is unintelligible or needs documented correction.  Also, portions of this note have been copied forward, however, changed to reflect the most current clinical status of this patient.    Electronically signed by GOPAL Mcguire on 4/29/2024 at 11:28 AM  Baltazar YUSUF am starting this note as a registered nurse for GOPAL Vanegas.

## 2024-04-29 ENCOUNTER — OFFICE VISIT (OUTPATIENT)
Dept: HEMATOLOGY | Age: 74
End: 2024-04-29
Payer: MEDICARE

## 2024-04-29 ENCOUNTER — TRANSCRIBE ORDERS (OUTPATIENT)
Dept: ADMINISTRATIVE | Facility: HOSPITAL | Age: 74
End: 2024-04-29
Payer: MEDICARE

## 2024-04-29 ENCOUNTER — HOSPITAL ENCOUNTER (OUTPATIENT)
Dept: INFUSION THERAPY | Age: 74
Discharge: HOME | End: 2024-04-29
Payer: MEDICARE

## 2024-04-29 VITALS
HEIGHT: 74 IN | OXYGEN SATURATION: 100 % | RESPIRATION RATE: 18 BRPM | HEART RATE: 63 BPM | WEIGHT: 160.5 LBS | DIASTOLIC BLOOD PRESSURE: 90 MMHG | TEMPERATURE: 97.7 F | BODY MASS INDEX: 20.6 KG/M2 | SYSTOLIC BLOOD PRESSURE: 114 MMHG

## 2024-04-29 VITALS
SYSTOLIC BLOOD PRESSURE: 117 MMHG | HEART RATE: 59 BPM | DIASTOLIC BLOOD PRESSURE: 60 MMHG | OXYGEN SATURATION: 99 % | TEMPERATURE: 97.7 F

## 2024-04-29 DIAGNOSIS — R91.8 LUNG NODULES: ICD-10-CM

## 2024-04-29 DIAGNOSIS — Z71.89 CARE PLAN DISCUSSED WITH PATIENT: ICD-10-CM

## 2024-04-29 DIAGNOSIS — T50.8X5D: ICD-10-CM

## 2024-04-29 DIAGNOSIS — C82.80 FOLLICULAR LOW GRADE B-CELL LYMPHOMA: Primary | ICD-10-CM

## 2024-04-29 DIAGNOSIS — D68.51 FACTOR V LEIDEN (HCC): ICD-10-CM

## 2024-04-29 DIAGNOSIS — R11.2 CINV (CHEMOTHERAPY-INDUCED NAUSEA AND VOMITING): ICD-10-CM

## 2024-04-29 DIAGNOSIS — Z71.6 TOBACCO ABUSE COUNSELING: ICD-10-CM

## 2024-04-29 DIAGNOSIS — R53.83 OTHER FATIGUE: ICD-10-CM

## 2024-04-29 DIAGNOSIS — T45.1X5A CINV (CHEMOTHERAPY-INDUCED NAUSEA AND VOMITING): ICD-10-CM

## 2024-04-29 DIAGNOSIS — C61 PROSTATE CARCINOMA (HCC): ICD-10-CM

## 2024-04-29 DIAGNOSIS — C82.80 FOLLICULAR LOW GRADE B-CELL LYMPHOMA (HCC): Primary | ICD-10-CM

## 2024-04-29 DIAGNOSIS — C82.80 FOLLICULAR LOW GRADE B-CELL LYMPHOMA (HCC): ICD-10-CM

## 2024-04-29 DIAGNOSIS — G62.0 DRUG-INDUCED POLYNEUROPATHY (HCC): ICD-10-CM

## 2024-04-29 LAB
ALBUMIN SERPL-MCNC: 4.2 G/DL (ref 3.5–5.2)
ALP SERPL-CCNC: 115 U/L (ref 40–130)
ALT SERPL-CCNC: 22 U/L (ref 21–72)
ANION GAP SERPL CALCULATED.3IONS-SCNC: 10 MMOL/L (ref 7–19)
AST SERPL-CCNC: 20 U/L (ref 17–59)
BASOPHILS # BLD: 0.15 K/UL (ref 0.01–0.08)
BASOPHILS NFR BLD: 2.8 % (ref 0.1–1.2)
BILIRUB SERPL-MCNC: 0.4 MG/DL (ref 0.2–1.3)
BUN SERPL-MCNC: 14 MG/DL (ref 9–20)
CALCIUM SERPL-MCNC: 9 MG/DL (ref 8.4–10.2)
CHLORIDE SERPL-SCNC: 104 MMOL/L (ref 98–111)
CO2 SERPL-SCNC: 26 MMOL/L (ref 22–29)
CREAT SERPL-MCNC: 0.7 MG/DL (ref 0.6–1.2)
EOSINOPHIL # BLD: 1.07 K/UL (ref 0.04–0.54)
EOSINOPHIL NFR BLD: 20 % (ref 0.7–7)
ERYTHROCYTE [DISTWIDTH] IN BLOOD BY AUTOMATED COUNT: 13.5 % (ref 11.6–14.4)
GLOBULIN: 2.5 G/DL
GLUCOSE SERPL-MCNC: 153 MG/DL (ref 74–106)
HCT VFR BLD AUTO: 39.1 % (ref 40.1–51)
HGB BLD-MCNC: 13.8 G/DL (ref 13.7–17.5)
LDH SERPL-CCNC: 204 U/L (ref 120–246)
LYMPHOCYTES # BLD: 0.67 K/UL (ref 1.18–3.74)
LYMPHOCYTES NFR BLD: 12.5 % (ref 19.3–53.1)
MCH RBC QN AUTO: 32.7 PG (ref 25.7–32.2)
MCHC RBC AUTO-ENTMCNC: 35.3 G/DL (ref 32.3–36.5)
MCV RBC AUTO: 92.7 FL (ref 79–92.2)
MONOCYTES # BLD: 0.69 K/UL (ref 0.24–0.82)
MONOCYTES NFR BLD: 12.9 % (ref 4.7–12.5)
NEUTROPHILS # BLD: 2.77 K/UL (ref 1.56–6.13)
NEUTS SEG NFR BLD: 51.6 % (ref 34–71.1)
PLATELET # BLD AUTO: 376 K/UL (ref 163–337)
PMV BLD AUTO: 8.2 FL (ref 7.4–10.4)
POTASSIUM SERPL-SCNC: 3.8 MMOL/L (ref 3.5–5.1)
PROT SERPL-MCNC: 6.7 G/DL (ref 6.3–8.2)
RBC # BLD AUTO: 4.22 M/UL (ref 4.63–6.08)
SODIUM SERPL-SCNC: 140 MMOL/L (ref 137–145)
TSH SERPL DL<=0.005 MIU/L-ACNC: 1.14 UIU/ML (ref 0.27–4.2)
WBC # BLD AUTO: 5.36 K/UL (ref 4.23–9.07)

## 2024-04-29 PROCEDURE — 85025 COMPLETE CBC W/AUTO DIFF WBC: CPT

## 2024-04-29 PROCEDURE — 96367 TX/PROPH/DG ADDL SEQ IV INF: CPT

## 2024-04-29 PROCEDURE — G8420 CALC BMI NORM PARAMETERS: HCPCS | Performed by: NURSE PRACTITIONER

## 2024-04-29 PROCEDURE — 6360000002 HC RX W HCPCS: Performed by: NURSE PRACTITIONER

## 2024-04-29 PROCEDURE — 2580000003 HC RX 258: Performed by: NURSE PRACTITIONER

## 2024-04-29 PROCEDURE — 6370000000 HC RX 637 (ALT 250 FOR IP): Performed by: NURSE PRACTITIONER

## 2024-04-29 PROCEDURE — 96415 CHEMO IV INFUSION ADDL HR: CPT

## 2024-04-29 PROCEDURE — 99215 OFFICE O/P EST HI 40 MIN: CPT | Performed by: NURSE PRACTITIONER

## 2024-04-29 PROCEDURE — 36415 COLL VENOUS BLD VENIPUNCTURE: CPT

## 2024-04-29 PROCEDURE — 96413 CHEMO IV INFUSION 1 HR: CPT

## 2024-04-29 PROCEDURE — 4004F PT TOBACCO SCREEN RCVD TLK: CPT | Performed by: NURSE PRACTITIONER

## 2024-04-29 PROCEDURE — 1123F ACP DISCUSS/DSCN MKR DOCD: CPT | Performed by: NURSE PRACTITIONER

## 2024-04-29 PROCEDURE — 96375 TX/PRO/DX INJ NEW DRUG ADDON: CPT

## 2024-04-29 PROCEDURE — 96417 CHEMO IV INFUS EACH ADDL SEQ: CPT

## 2024-04-29 PROCEDURE — 80053 COMPREHEN METABOLIC PANEL: CPT

## 2024-04-29 PROCEDURE — 83615 LACTATE (LD) (LDH) ENZYME: CPT

## 2024-04-29 PROCEDURE — 3017F COLORECTAL CA SCREEN DOC REV: CPT | Performed by: NURSE PRACTITIONER

## 2024-04-29 PROCEDURE — G8427 DOCREV CUR MEDS BY ELIG CLIN: HCPCS | Performed by: NURSE PRACTITIONER

## 2024-04-29 RX ORDER — ACETAMINOPHEN 500 MG
1000 TABLET ORAL ONCE
Status: COMPLETED | OUTPATIENT
Start: 2024-04-29 | End: 2024-04-29

## 2024-04-29 RX ORDER — DIPHENHYDRAMINE HCL 50 MG
CAPSULE ORAL
Qty: 2 CAPSULE | Refills: 0 | Status: SHIPPED | OUTPATIENT
Start: 2024-04-29

## 2024-04-29 RX ORDER — MEPERIDINE HYDROCHLORIDE 50 MG/ML
12.5 INJECTION INTRAMUSCULAR; INTRAVENOUS; SUBCUTANEOUS PRN
Status: CANCELLED | OUTPATIENT
Start: 2024-04-30

## 2024-04-29 RX ORDER — ONDANSETRON 2 MG/ML
8 INJECTION INTRAMUSCULAR; INTRAVENOUS
Status: CANCELLED | OUTPATIENT
Start: 2024-04-29

## 2024-04-29 RX ORDER — SODIUM CHLORIDE 9 MG/ML
5-250 INJECTION, SOLUTION INTRAVENOUS PRN
Status: CANCELLED | OUTPATIENT
Start: 2024-04-30

## 2024-04-29 RX ORDER — SODIUM CHLORIDE 0.9 % (FLUSH) 0.9 %
5-40 SYRINGE (ML) INJECTION PRN
Status: DISCONTINUED | OUTPATIENT
Start: 2024-04-29 | End: 2024-04-30 | Stop reason: HOSPADM

## 2024-04-29 RX ORDER — MEPERIDINE HYDROCHLORIDE 50 MG/ML
12.5 INJECTION INTRAMUSCULAR; INTRAVENOUS; SUBCUTANEOUS PRN
Status: CANCELLED | OUTPATIENT
Start: 2024-04-29

## 2024-04-29 RX ORDER — SODIUM CHLORIDE 9 MG/ML
5-250 INJECTION, SOLUTION INTRAVENOUS PRN
Status: CANCELLED | OUTPATIENT
Start: 2024-04-29

## 2024-04-29 RX ORDER — ACETAMINOPHEN 325 MG/1
1000 TABLET ORAL ONCE
Status: CANCELLED | OUTPATIENT
Start: 2024-04-29 | End: 2024-04-29

## 2024-04-29 RX ORDER — ALBUTEROL SULFATE 90 UG/1
4 AEROSOL, METERED RESPIRATORY (INHALATION) PRN
Status: CANCELLED | OUTPATIENT
Start: 2024-04-30

## 2024-04-29 RX ORDER — FAMOTIDINE 10 MG/ML
20 INJECTION, SOLUTION INTRAVENOUS
Status: CANCELLED | OUTPATIENT
Start: 2024-04-30

## 2024-04-29 RX ORDER — ACETAMINOPHEN 325 MG/1
650 TABLET ORAL
Status: CANCELLED | OUTPATIENT
Start: 2024-04-30

## 2024-04-29 RX ORDER — DIPHENHYDRAMINE HYDROCHLORIDE 50 MG/ML
50 INJECTION INTRAMUSCULAR; INTRAVENOUS
Status: CANCELLED | OUTPATIENT
Start: 2024-04-30

## 2024-04-29 RX ORDER — SODIUM CHLORIDE 0.9 % (FLUSH) 0.9 %
5-40 SYRINGE (ML) INJECTION PRN
Status: CANCELLED | OUTPATIENT
Start: 2024-04-30

## 2024-04-29 RX ORDER — HEPARIN SODIUM (PORCINE) LOCK FLUSH IV SOLN 100 UNIT/ML 100 UNIT/ML
500 SOLUTION INTRAVENOUS PRN
Status: CANCELLED | OUTPATIENT
Start: 2024-04-30

## 2024-04-29 RX ORDER — PREDNISONE 50 MG/1
TABLET ORAL
Qty: 3 TABLET | Refills: 0 | Status: SHIPPED | OUTPATIENT
Start: 2024-04-29

## 2024-04-29 RX ORDER — SODIUM CHLORIDE 9 MG/ML
INJECTION, SOLUTION INTRAVENOUS CONTINUOUS
Status: CANCELLED | OUTPATIENT
Start: 2024-04-29

## 2024-04-29 RX ORDER — PALONOSETRON 0.05 MG/ML
0.25 INJECTION, SOLUTION INTRAVENOUS ONCE
Status: CANCELLED | OUTPATIENT
Start: 2024-04-29 | End: 2024-04-29

## 2024-04-29 RX ORDER — FAMOTIDINE 10 MG/ML
20 INJECTION, SOLUTION INTRAVENOUS
Status: CANCELLED | OUTPATIENT
Start: 2024-04-29

## 2024-04-29 RX ORDER — PALONOSETRON 0.05 MG/ML
0.25 INJECTION, SOLUTION INTRAVENOUS ONCE
Status: COMPLETED | OUTPATIENT
Start: 2024-04-29 | End: 2024-04-29

## 2024-04-29 RX ORDER — ONDANSETRON 2 MG/ML
8 INJECTION INTRAMUSCULAR; INTRAVENOUS
Status: CANCELLED | OUTPATIENT
Start: 2024-04-30

## 2024-04-29 RX ORDER — SODIUM CHLORIDE 0.9 % (FLUSH) 0.9 %
5-40 SYRINGE (ML) INJECTION PRN
Status: CANCELLED | OUTPATIENT
Start: 2024-04-29

## 2024-04-29 RX ORDER — ALBUTEROL SULFATE 90 UG/1
4 AEROSOL, METERED RESPIRATORY (INHALATION) PRN
Status: CANCELLED | OUTPATIENT
Start: 2024-04-29

## 2024-04-29 RX ORDER — EPINEPHRINE 1 MG/ML
0.3 INJECTION, SOLUTION, CONCENTRATE INTRAVENOUS PRN
Status: CANCELLED | OUTPATIENT
Start: 2024-04-29

## 2024-04-29 RX ORDER — ACETAMINOPHEN 325 MG/1
650 TABLET ORAL
Status: CANCELLED | OUTPATIENT
Start: 2024-04-29

## 2024-04-29 RX ORDER — EPINEPHRINE 1 MG/ML
0.3 INJECTION, SOLUTION, CONCENTRATE INTRAVENOUS PRN
Status: CANCELLED | OUTPATIENT
Start: 2024-04-30

## 2024-04-29 RX ORDER — HEPARIN 100 UNIT/ML
500 SYRINGE INTRAVENOUS PRN
Status: DISCONTINUED | OUTPATIENT
Start: 2024-04-29 | End: 2024-04-30 | Stop reason: HOSPADM

## 2024-04-29 RX ORDER — HEPARIN SODIUM (PORCINE) LOCK FLUSH IV SOLN 100 UNIT/ML 100 UNIT/ML
500 SOLUTION INTRAVENOUS PRN
Status: CANCELLED | OUTPATIENT
Start: 2024-04-29

## 2024-04-29 RX ORDER — SODIUM CHLORIDE 9 MG/ML
INJECTION, SOLUTION INTRAVENOUS CONTINUOUS
Status: CANCELLED | OUTPATIENT
Start: 2024-04-30

## 2024-04-29 RX ORDER — DIPHENHYDRAMINE HYDROCHLORIDE 50 MG/ML
50 INJECTION INTRAMUSCULAR; INTRAVENOUS
Status: CANCELLED | OUTPATIENT
Start: 2024-04-29

## 2024-04-29 RX ADMIN — DEXAMETHASONE SODIUM PHOSPHATE: 100 INJECTION INTRAMUSCULAR; INTRAVENOUS at 11:51

## 2024-04-29 RX ADMIN — HEPARIN 500 UNITS: 100 SYRINGE at 14:22

## 2024-04-29 RX ADMIN — SODIUM CHLORIDE, PRESERVATIVE FREE 10 ML: 5 INJECTION INTRAVENOUS at 14:22

## 2024-04-29 RX ADMIN — SODIUM CHLORIDE 700 MG: 9 INJECTION, SOLUTION INTRAVENOUS at 12:27

## 2024-04-29 RX ADMIN — ACETAMINOPHEN 1000 MG: 500 TABLET, FILM COATED ORAL at 11:48

## 2024-04-29 RX ADMIN — BENDAMUSTINE HYDROCHLORIDE 150 MG: 25 INJECTION, SOLUTION INTRAVENOUS at 14:07

## 2024-04-29 RX ADMIN — PALONOSETRON 0.25 MG: 0.05 INJECTION, SOLUTION INTRAVENOUS at 11:49

## 2024-04-29 RX ADMIN — DIPHENHYDRAMINE HYDROCHLORIDE: 50 INJECTION, SOLUTION INTRAMUSCULAR; INTRAVENOUS at 12:06

## 2024-04-29 ASSESSMENT — ENCOUNTER SYMPTOMS
BACK PAIN: 1
EYE REDNESS: 0
EYES NEGATIVE: 1
CONSTIPATION: 1
DIARRHEA: 0
COUGH: 0
SORE THROAT: 0
VOMITING: 0
EYE DISCHARGE: 0
ABDOMINAL PAIN: 0
BLOOD IN STOOL: 0
RESPIRATORY NEGATIVE: 1
NAUSEA: 0
WHEEZING: 0
EYE PAIN: 0
SHORTNESS OF BREATH: 0

## 2024-04-30 ENCOUNTER — HOSPITAL ENCOUNTER (OUTPATIENT)
Dept: INFUSION THERAPY | Age: 74
Discharge: HOME | End: 2024-04-30
Payer: MEDICARE

## 2024-04-30 VITALS
HEART RATE: 61 BPM | SYSTOLIC BLOOD PRESSURE: 132 MMHG | DIASTOLIC BLOOD PRESSURE: 57 MMHG | TEMPERATURE: 98 F | OXYGEN SATURATION: 99 %

## 2024-04-30 DIAGNOSIS — C82.80 FOLLICULAR LOW GRADE B-CELL LYMPHOMA (HCC): Primary | ICD-10-CM

## 2024-04-30 PROCEDURE — 2580000003 HC RX 258: Performed by: NURSE PRACTITIONER

## 2024-04-30 PROCEDURE — 96367 TX/PROPH/DG ADDL SEQ IV INF: CPT

## 2024-04-30 PROCEDURE — 96409 CHEMO IV PUSH SNGL DRUG: CPT

## 2024-04-30 PROCEDURE — 6360000002 HC RX W HCPCS: Performed by: NURSE PRACTITIONER

## 2024-04-30 RX ORDER — SODIUM CHLORIDE 0.9 % (FLUSH) 0.9 %
5-40 SYRINGE (ML) INJECTION PRN
Status: DISCONTINUED | OUTPATIENT
Start: 2024-04-30 | End: 2024-05-01 | Stop reason: HOSPADM

## 2024-04-30 RX ORDER — HEPARIN 100 UNIT/ML
500 SYRINGE INTRAVENOUS PRN
Status: DISCONTINUED | OUTPATIENT
Start: 2024-04-30 | End: 2024-05-01 | Stop reason: HOSPADM

## 2024-04-30 RX ADMIN — ONDANSETRON 16 MG: 2 INJECTION INTRAMUSCULAR; INTRAVENOUS at 13:34

## 2024-04-30 RX ADMIN — HEPARIN 500 UNITS: 100 SYRINGE at 14:20

## 2024-04-30 RX ADMIN — BENDAMUSTINE HYDROCHLORIDE 150 MG: 25 INJECTION, SOLUTION INTRAVENOUS at 14:09

## 2024-04-30 RX ADMIN — SODIUM CHLORIDE, PRESERVATIVE FREE 10 ML: 5 INJECTION INTRAVENOUS at 14:20

## 2024-05-01 ENCOUNTER — HOSPITAL ENCOUNTER (OUTPATIENT)
Dept: INFUSION THERAPY | Age: 74
Discharge: HOME | End: 2024-05-01
Payer: MEDICARE

## 2024-05-01 DIAGNOSIS — C82.80 FOLLICULAR LOW GRADE B-CELL LYMPHOMA (HCC): Primary | ICD-10-CM

## 2024-05-01 LAB — B2 MICROGLOB SERPL-MCNC: 2.8 MG/L

## 2024-05-01 PROCEDURE — 96372 THER/PROPH/DIAG INJ SC/IM: CPT

## 2024-05-01 PROCEDURE — 6360000002 HC RX W HCPCS: Performed by: NURSE PRACTITIONER

## 2024-05-01 RX ADMIN — PEGFILGRASTIM-JMDB 6 MG: 6 INJECTION SUBCUTANEOUS at 14:17

## 2024-05-16 ENCOUNTER — HOSPITAL ENCOUNTER (OUTPATIENT)
Dept: CT IMAGING | Facility: HOSPITAL | Age: 74
Discharge: HOME | End: 2024-05-16
Admitting: NURSE PRACTITIONER
Payer: MEDICARE

## 2024-05-16 PROBLEM — C82.82: Status: ACTIVE | Noted: 2024-05-16

## 2024-05-16 PROBLEM — C82.80 FOLLICULAR LOW GRADE B-CELL LYMPHOMA (HCC): Status: RESOLVED | Noted: 2024-01-16 | Resolved: 2024-05-16

## 2024-05-16 LAB — CREAT BLDA-MCNC: 0.9 MG/DL (ref 0.6–1.3)

## 2024-05-16 PROCEDURE — 82565 ASSAY OF CREATININE: CPT

## 2024-05-16 PROCEDURE — 71260 CT THORAX DX C+: CPT

## 2024-05-16 PROCEDURE — 25510000001 IOPAMIDOL 61 % SOLUTION: Performed by: NURSE PRACTITIONER

## 2024-05-16 RX ADMIN — IOPAMIDOL 59 ML: 612 INJECTION, SOLUTION INTRAVENOUS at 08:57

## 2024-05-28 ENCOUNTER — OFFICE VISIT (OUTPATIENT)
Dept: HEMATOLOGY | Age: 74
End: 2024-05-28
Payer: MEDICARE

## 2024-05-28 ENCOUNTER — HOSPITAL ENCOUNTER (OUTPATIENT)
Dept: INFUSION THERAPY | Age: 74
Discharge: HOME | End: 2024-05-28
Payer: MEDICARE

## 2024-05-28 VITALS
SYSTOLIC BLOOD PRESSURE: 130 MMHG | OXYGEN SATURATION: 99 % | DIASTOLIC BLOOD PRESSURE: 61 MMHG | TEMPERATURE: 97.3 F | RESPIRATION RATE: 16 BRPM | HEART RATE: 56 BPM

## 2024-05-28 VITALS
DIASTOLIC BLOOD PRESSURE: 66 MMHG | RESPIRATION RATE: 18 BRPM | TEMPERATURE: 97.6 F | OXYGEN SATURATION: 99 % | BODY MASS INDEX: 20.52 KG/M2 | HEIGHT: 74 IN | HEART RATE: 64 BPM | SYSTOLIC BLOOD PRESSURE: 135 MMHG | WEIGHT: 159.9 LBS

## 2024-05-28 DIAGNOSIS — C85.10 HIGH GRADE B-CELL LYMPHOMA (HCC): ICD-10-CM

## 2024-05-28 DIAGNOSIS — C82.82: Primary | ICD-10-CM

## 2024-05-28 DIAGNOSIS — C85.10 HIGH GRADE B-CELL LYMPHOMA (HCC): Primary | ICD-10-CM

## 2024-05-28 DIAGNOSIS — T50.8X5D: ICD-10-CM

## 2024-05-28 DIAGNOSIS — D68.51 FACTOR V LEIDEN (HCC): ICD-10-CM

## 2024-05-28 DIAGNOSIS — R53.83 OTHER FATIGUE: ICD-10-CM

## 2024-05-28 DIAGNOSIS — G62.0 DRUG-INDUCED POLYNEUROPATHY (HCC): Primary | ICD-10-CM

## 2024-05-28 DIAGNOSIS — R91.8 LUNG NODULES: ICD-10-CM

## 2024-05-28 DIAGNOSIS — G62.9 NEUROPATHY: ICD-10-CM

## 2024-05-28 DIAGNOSIS — C61 PROSTATE CARCINOMA (HCC): ICD-10-CM

## 2024-05-28 DIAGNOSIS — C82.80 FOLLICULAR LOW GRADE B-CELL LYMPHOMA (HCC): Primary | ICD-10-CM

## 2024-05-28 DIAGNOSIS — G89.3 CANCER-RELATED PAIN: ICD-10-CM

## 2024-05-28 LAB
ALBUMIN SERPL-MCNC: 4.5 G/DL (ref 3.5–5.2)
ALP SERPL-CCNC: 118 U/L (ref 40–130)
ALT SERPL-CCNC: 12 U/L (ref 21–72)
ANION GAP SERPL CALCULATED.3IONS-SCNC: 9 MMOL/L (ref 7–19)
AST SERPL-CCNC: 17 U/L (ref 17–59)
BASOPHILS # BLD: 0.12 K/UL (ref 0.01–0.08)
BASOPHILS NFR BLD: 1.9 % (ref 0.1–1.2)
BILIRUB SERPL-MCNC: 0.7 MG/DL (ref 0.2–1.3)
BUN SERPL-MCNC: 15 MG/DL (ref 9–20)
CALCIUM SERPL-MCNC: 9.3 MG/DL (ref 8.4–10.2)
CHLORIDE SERPL-SCNC: 103 MMOL/L (ref 98–111)
CO2 SERPL-SCNC: 26 MMOL/L (ref 22–29)
CREAT SERPL-MCNC: 0.8 MG/DL (ref 0.6–1.2)
EOSINOPHIL # BLD: 1.05 K/UL (ref 0.04–0.54)
EOSINOPHIL NFR BLD: 16.6 % (ref 0.7–7)
ERYTHROCYTE [DISTWIDTH] IN BLOOD BY AUTOMATED COUNT: 13.2 % (ref 11.6–14.4)
GLUCOSE SERPL-MCNC: 130 MG/DL (ref 74–106)
HCT VFR BLD AUTO: 38.7 % (ref 40.1–51)
HGB BLD-MCNC: 13.6 G/DL (ref 13.7–17.5)
LDH SERPL-CCNC: 177 U/L (ref 120–246)
LYMPHOCYTES # BLD: 0.44 K/UL (ref 1.18–3.74)
LYMPHOCYTES NFR BLD: 7 % (ref 19.3–53.1)
MCH RBC QN AUTO: 33.1 PG (ref 25.7–32.2)
MCHC RBC AUTO-ENTMCNC: 35.1 G/DL (ref 32.3–36.5)
MCV RBC AUTO: 94.2 FL (ref 79–92.2)
MONOCYTES # BLD: 0.69 K/UL (ref 0.24–0.82)
MONOCYTES NFR BLD: 10.9 % (ref 4.7–12.5)
NEUTROPHILS # BLD: 3.99 K/UL (ref 1.56–6.13)
NEUTS SEG NFR BLD: 63.3 % (ref 34–71.1)
PLATELET # BLD AUTO: 379 K/UL (ref 163–337)
PMV BLD AUTO: 8.1 FL (ref 7.4–10.4)
POTASSIUM SERPL-SCNC: 3.6 MMOL/L (ref 3.5–5.1)
PROT SERPL-MCNC: 6.6 G/DL (ref 6.3–8.2)
RBC # BLD AUTO: 4.11 M/UL (ref 4.63–6.08)
SODIUM SERPL-SCNC: 138 MMOL/L (ref 137–145)
WBC # BLD AUTO: 6.31 K/UL (ref 4.23–9.07)

## 2024-05-28 PROCEDURE — 1123F ACP DISCUSS/DSCN MKR DOCD: CPT | Performed by: NURSE PRACTITIONER

## 2024-05-28 PROCEDURE — 96367 TX/PROPH/DG ADDL SEQ IV INF: CPT

## 2024-05-28 PROCEDURE — 36415 COLL VENOUS BLD VENIPUNCTURE: CPT

## 2024-05-28 PROCEDURE — G8420 CALC BMI NORM PARAMETERS: HCPCS | Performed by: NURSE PRACTITIONER

## 2024-05-28 PROCEDURE — 4004F PT TOBACCO SCREEN RCVD TLK: CPT | Performed by: NURSE PRACTITIONER

## 2024-05-28 PROCEDURE — 96411 CHEMO IV PUSH ADDL DRUG: CPT

## 2024-05-28 PROCEDURE — 6360000002 HC RX W HCPCS: Performed by: NURSE PRACTITIONER

## 2024-05-28 PROCEDURE — 85025 COMPLETE CBC W/AUTO DIFF WBC: CPT

## 2024-05-28 PROCEDURE — 96413 CHEMO IV INFUSION 1 HR: CPT

## 2024-05-28 PROCEDURE — 96375 TX/PRO/DX INJ NEW DRUG ADDON: CPT

## 2024-05-28 PROCEDURE — 83615 LACTATE (LD) (LDH) ENZYME: CPT

## 2024-05-28 PROCEDURE — 3017F COLORECTAL CA SCREEN DOC REV: CPT | Performed by: NURSE PRACTITIONER

## 2024-05-28 PROCEDURE — 80053 COMPREHEN METABOLIC PANEL: CPT

## 2024-05-28 PROCEDURE — 96415 CHEMO IV INFUSION ADDL HR: CPT

## 2024-05-28 PROCEDURE — 2580000003 HC RX 258: Performed by: NURSE PRACTITIONER

## 2024-05-28 PROCEDURE — 6370000000 HC RX 637 (ALT 250 FOR IP): Performed by: NURSE PRACTITIONER

## 2024-05-28 PROCEDURE — 99215 OFFICE O/P EST HI 40 MIN: CPT | Performed by: NURSE PRACTITIONER

## 2024-05-28 PROCEDURE — G8427 DOCREV CUR MEDS BY ELIG CLIN: HCPCS | Performed by: NURSE PRACTITIONER

## 2024-05-28 RX ORDER — SODIUM CHLORIDE 0.9 % (FLUSH) 0.9 %
5-40 SYRINGE (ML) INJECTION PRN
Status: DISCONTINUED | OUTPATIENT
Start: 2024-05-28 | End: 2024-05-29 | Stop reason: HOSPADM

## 2024-05-28 RX ORDER — FAMOTIDINE 10 MG/ML
20 INJECTION, SOLUTION INTRAVENOUS
Status: CANCELLED | OUTPATIENT
Start: 2024-05-29

## 2024-05-28 RX ORDER — SODIUM CHLORIDE 9 MG/ML
5-250 INJECTION, SOLUTION INTRAVENOUS PRN
Status: CANCELLED | OUTPATIENT
Start: 2024-05-28

## 2024-05-28 RX ORDER — PALONOSETRON 0.05 MG/ML
0.25 INJECTION, SOLUTION INTRAVENOUS ONCE
Status: CANCELLED | OUTPATIENT
Start: 2024-05-28 | End: 2024-05-28

## 2024-05-28 RX ORDER — ONDANSETRON 2 MG/ML
8 INJECTION INTRAMUSCULAR; INTRAVENOUS
Status: CANCELLED | OUTPATIENT
Start: 2024-05-29

## 2024-05-28 RX ORDER — SODIUM CHLORIDE 0.9 % (FLUSH) 0.9 %
5-40 SYRINGE (ML) INJECTION PRN
Status: CANCELLED | OUTPATIENT
Start: 2024-05-29

## 2024-05-28 RX ORDER — SODIUM CHLORIDE 9 MG/ML
5-250 INJECTION, SOLUTION INTRAVENOUS PRN
Status: CANCELLED | OUTPATIENT
Start: 2024-05-29

## 2024-05-28 RX ORDER — DIPHENHYDRAMINE HYDROCHLORIDE 50 MG/ML
50 INJECTION INTRAMUSCULAR; INTRAVENOUS
Status: CANCELLED | OUTPATIENT
Start: 2024-05-28

## 2024-05-28 RX ORDER — MEPERIDINE HYDROCHLORIDE 50 MG/ML
12.5 INJECTION INTRAMUSCULAR; INTRAVENOUS; SUBCUTANEOUS PRN
Status: CANCELLED | OUTPATIENT
Start: 2024-05-29

## 2024-05-28 RX ORDER — ONDANSETRON 2 MG/ML
8 INJECTION INTRAMUSCULAR; INTRAVENOUS
Status: CANCELLED | OUTPATIENT
Start: 2024-05-28

## 2024-05-28 RX ORDER — ACETAMINOPHEN 325 MG/1
1000 TABLET ORAL ONCE
Status: CANCELLED | OUTPATIENT
Start: 2024-05-28 | End: 2024-05-28

## 2024-05-28 RX ORDER — FAMOTIDINE 10 MG/ML
20 INJECTION, SOLUTION INTRAVENOUS
Status: CANCELLED | OUTPATIENT
Start: 2024-05-28

## 2024-05-28 RX ORDER — SODIUM CHLORIDE 9 MG/ML
INJECTION, SOLUTION INTRAVENOUS CONTINUOUS
Status: CANCELLED | OUTPATIENT
Start: 2024-05-28

## 2024-05-28 RX ORDER — SODIUM CHLORIDE 0.9 % (FLUSH) 0.9 %
5-40 SYRINGE (ML) INJECTION PRN
Status: CANCELLED | OUTPATIENT
Start: 2024-05-28

## 2024-05-28 RX ORDER — SODIUM CHLORIDE 9 MG/ML
INJECTION, SOLUTION INTRAVENOUS CONTINUOUS
Status: CANCELLED | OUTPATIENT
Start: 2024-05-29

## 2024-05-28 RX ORDER — ALBUTEROL SULFATE 90 UG/1
4 AEROSOL, METERED RESPIRATORY (INHALATION) PRN
Status: CANCELLED | OUTPATIENT
Start: 2024-05-29

## 2024-05-28 RX ORDER — ACETAMINOPHEN 500 MG
1000 TABLET ORAL ONCE
Status: COMPLETED | OUTPATIENT
Start: 2024-05-28 | End: 2024-05-28

## 2024-05-28 RX ORDER — MEPERIDINE HYDROCHLORIDE 50 MG/ML
12.5 INJECTION INTRAMUSCULAR; INTRAVENOUS; SUBCUTANEOUS PRN
Status: CANCELLED | OUTPATIENT
Start: 2024-05-28

## 2024-05-28 RX ORDER — ALBUTEROL SULFATE 90 UG/1
4 AEROSOL, METERED RESPIRATORY (INHALATION) PRN
Status: CANCELLED | OUTPATIENT
Start: 2024-05-28

## 2024-05-28 RX ORDER — HEPARIN 100 UNIT/ML
500 SYRINGE INTRAVENOUS PRN
Status: DISCONTINUED | OUTPATIENT
Start: 2024-05-28 | End: 2024-05-29 | Stop reason: HOSPADM

## 2024-05-28 RX ORDER — ACETAMINOPHEN 325 MG/1
650 TABLET ORAL
Status: CANCELLED | OUTPATIENT
Start: 2024-05-28

## 2024-05-28 RX ORDER — HEPARIN SODIUM (PORCINE) LOCK FLUSH IV SOLN 100 UNIT/ML 100 UNIT/ML
500 SOLUTION INTRAVENOUS PRN
Status: CANCELLED | OUTPATIENT
Start: 2024-05-29

## 2024-05-28 RX ORDER — EPINEPHRINE 1 MG/ML
0.3 INJECTION, SOLUTION, CONCENTRATE INTRAVENOUS PRN
Status: CANCELLED | OUTPATIENT
Start: 2024-05-29

## 2024-05-28 RX ORDER — DIPHENHYDRAMINE HYDROCHLORIDE 50 MG/ML
50 INJECTION INTRAMUSCULAR; INTRAVENOUS
Status: CANCELLED | OUTPATIENT
Start: 2024-05-29

## 2024-05-28 RX ORDER — HEPARIN SODIUM (PORCINE) LOCK FLUSH IV SOLN 100 UNIT/ML 100 UNIT/ML
500 SOLUTION INTRAVENOUS PRN
Status: CANCELLED | OUTPATIENT
Start: 2024-05-28

## 2024-05-28 RX ORDER — GABAPENTIN 300 MG/1
300 CAPSULE ORAL 2 TIMES DAILY
Qty: 60 CAPSULE | Refills: 5 | Status: SHIPPED | OUTPATIENT
Start: 2024-05-28 | End: 2024-11-24

## 2024-05-28 RX ORDER — ACETAMINOPHEN 325 MG/1
650 TABLET ORAL
Status: CANCELLED | OUTPATIENT
Start: 2024-05-29

## 2024-05-28 RX ORDER — PALONOSETRON 0.05 MG/ML
0.25 INJECTION, SOLUTION INTRAVENOUS ONCE
Status: COMPLETED | OUTPATIENT
Start: 2024-05-28 | End: 2024-05-28

## 2024-05-28 RX ORDER — EPINEPHRINE 1 MG/ML
0.3 INJECTION, SOLUTION, CONCENTRATE INTRAVENOUS PRN
Status: CANCELLED | OUTPATIENT
Start: 2024-05-28

## 2024-05-28 RX ADMIN — SODIUM CHLORIDE, PRESERVATIVE FREE 10 ML: 5 INJECTION INTRAVENOUS at 14:15

## 2024-05-28 RX ADMIN — DIPHENHYDRAMINE HYDROCHLORIDE: 50 INJECTION, SOLUTION INTRAMUSCULAR; INTRAVENOUS at 11:49

## 2024-05-28 RX ADMIN — BENDAMUSTINE HYDROCHLORIDE 150 MG: 25 INJECTION, SOLUTION INTRAVENOUS at 13:55

## 2024-05-28 RX ADMIN — SODIUM CHLORIDE 700 MG: 9 INJECTION, SOLUTION INTRAVENOUS at 12:15

## 2024-05-28 RX ADMIN — PALONOSETRON 0.25 MG: 0.05 INJECTION, SOLUTION INTRAVENOUS at 11:42

## 2024-05-28 RX ADMIN — DEXAMETHASONE SODIUM PHOSPHATE: 100 INJECTION INTRAMUSCULAR; INTRAVENOUS at 11:39

## 2024-05-28 RX ADMIN — ACETAMINOPHEN 1000 MG: 500 TABLET, FILM COATED ORAL at 11:42

## 2024-05-28 RX ADMIN — HEPARIN 500 UNITS: 100 SYRINGE at 14:15

## 2024-05-28 ASSESSMENT — ENCOUNTER SYMPTOMS
BLOOD IN STOOL: 0
COUGH: 0
VOMITING: 0
EYE PAIN: 0
EYES NEGATIVE: 1
WHEEZING: 0
RESPIRATORY NEGATIVE: 1
ABDOMINAL PAIN: 0
DIARRHEA: 0
CONSTIPATION: 0
SORE THROAT: 0
SHORTNESS OF BREATH: 0
EYE DISCHARGE: 0
GASTROINTESTINAL NEGATIVE: 1
NAUSEA: 0
BACK PAIN: 0
EYE REDNESS: 0

## 2024-05-28 ASSESSMENT — PAIN SCALES - GENERAL: PAINLEVEL_OUTOF10: 0

## 2024-05-28 NOTE — PROGRESS NOTES
revealing nodular sclerosis Hodgkin's lymphoma.  3/16/2018-CT of the neck showed multiple enlarged lymph nodes at the left side of the neck with the largest measures 3.1 x 1.9 cm. Another lymph node measuring 2.1 x 1.8 cm. A third lymph node measuring 1.9 x 1.6 cm. 2 soft tissue nodules within the inferior left parathyroid gland largest measuring 1.2 cm centrally necrotic.   3/26/2018-he underwent a laryngoscopy and excisional lymph node biopsy by Dr. Amanda. 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 They're negative for CD20, PAX-5, BCL 6 and all tested B-cell antigens. Flow cytometry showed no immunophenotypic evidence of a clonal B-cell or abnormal T-cell population.   3/20/2018-CT of the abdomen and pelvis with contrast showed no evidence of intra-abdominal adenopathy   4/19/2018-he was first seen by Dr. Dey. HIV negative, hepatitis B negative, ESR 3, albumin 4.5. IPI score = 2.   4/24/2018-bone marrow biopsy showed no signs of involvement by classical Hodgkin lymphoma. One atypical lymphoid aggregates composed of CD20 positive B cells with positive B-cell gene rearrangement, which raises a concern for non-Hodgkin lymphoma.   4/24/2018-PET CT scan showed a left cervical chain and left supraclavicular region adenopathy with maximum SUV 8.2. Largest lymph node measuring 2.1 cm. Otherwise, no hypermetabolic adenopathy in the chest or abdomen.   4/25/2018- CT scan of the chest with contrast documented prominent lymph nodes in the left subclavicular region measuring up to 1.1 cm. A noncalcified 3 mm nodule in the lingual and an ill-defined 1.2 cm groundglass opacity in the right lower lobe.   5/4/2018-referred for Mediport placement. Stage IA classical Hodgkin lymphoma. As per NCCN guidelines recommended 2 cycles of ABVD followed by PET scan. If PET/CT scan Deauville 1-3 -> ISRT. If PET/CT

## 2024-05-29 ENCOUNTER — HOSPITAL ENCOUNTER (OUTPATIENT)
Dept: INFUSION THERAPY | Age: 74
Discharge: HOME | End: 2024-05-29
Payer: MEDICARE

## 2024-05-29 VITALS
TEMPERATURE: 98 F | OXYGEN SATURATION: 100 % | HEART RATE: 61 BPM | RESPIRATION RATE: 18 BRPM | DIASTOLIC BLOOD PRESSURE: 58 MMHG | SYSTOLIC BLOOD PRESSURE: 126 MMHG

## 2024-05-29 DIAGNOSIS — C82.82: Primary | ICD-10-CM

## 2024-05-29 PROCEDURE — 6360000002 HC RX W HCPCS: Performed by: NURSE PRACTITIONER

## 2024-05-29 PROCEDURE — 2580000003 HC RX 258: Performed by: NURSE PRACTITIONER

## 2024-05-29 PROCEDURE — 96409 CHEMO IV PUSH SNGL DRUG: CPT

## 2024-05-29 PROCEDURE — 96367 TX/PROPH/DG ADDL SEQ IV INF: CPT

## 2024-05-29 RX ORDER — SODIUM CHLORIDE 0.9 % (FLUSH) 0.9 %
5-40 SYRINGE (ML) INJECTION PRN
Status: DISCONTINUED | OUTPATIENT
Start: 2024-05-29 | End: 2024-05-30 | Stop reason: HOSPADM

## 2024-05-29 RX ORDER — HEPARIN 100 UNIT/ML
500 SYRINGE INTRAVENOUS PRN
Status: DISCONTINUED | OUTPATIENT
Start: 2024-05-29 | End: 2024-05-30 | Stop reason: HOSPADM

## 2024-05-29 RX ADMIN — DEXAMETHASONE SODIUM PHOSPHATE 16 MG: 10 INJECTION, SOLUTION INTRAMUSCULAR; INTRAVENOUS at 13:17

## 2024-05-29 RX ADMIN — HEPARIN 500 UNITS: 100 SYRINGE at 14:05

## 2024-05-29 RX ADMIN — SODIUM CHLORIDE, PRESERVATIVE FREE 10 ML: 5 INJECTION INTRAVENOUS at 14:05

## 2024-05-29 RX ADMIN — BENDAMUSTINE HYDROCHLORIDE 150 MG: 25 INJECTION, SOLUTION INTRAVENOUS at 13:50

## 2024-05-30 ENCOUNTER — HOSPITAL ENCOUNTER (OUTPATIENT)
Dept: INFUSION THERAPY | Age: 74
Discharge: HOME | End: 2024-05-30
Payer: MEDICARE

## 2024-05-30 DIAGNOSIS — C82.82: Primary | ICD-10-CM

## 2024-05-30 PROCEDURE — 6360000002 HC RX W HCPCS: Performed by: NURSE PRACTITIONER

## 2024-05-30 PROCEDURE — 96372 THER/PROPH/DIAG INJ SC/IM: CPT

## 2024-05-30 RX ADMIN — PEGFILGRASTIM-JMDB 6 MG: 6 INJECTION SUBCUTANEOUS at 14:08

## 2024-06-05 ENCOUNTER — TELEPHONE (OUTPATIENT)
Dept: PULMONOLOGY | Facility: CLINIC | Age: 74
End: 2024-06-05
Payer: MEDICARE

## 2024-06-05 RX ORDER — FLUTICASONE FUROATE AND VILANTEROL 100; 25 UG/1; UG/1
1 POWDER RESPIRATORY (INHALATION) DAILY
Qty: 3 EACH | Refills: 3 | Status: SHIPPED | OUTPATIENT
Start: 2024-06-05

## 2024-06-20 ENCOUNTER — HOSPITAL ENCOUNTER (OUTPATIENT)
Dept: RADIATION ONCOLOGY | Facility: HOSPITAL | Age: 74
Setting detail: RADIATION/ONCOLOGY SERIES
End: 2024-06-20
Payer: MEDICARE

## 2024-06-21 ENCOUNTER — OFFICE VISIT (OUTPATIENT)
Age: 74
End: 2024-06-21
Payer: MEDICARE

## 2024-06-21 ENCOUNTER — LAB (OUTPATIENT)
Dept: LAB | Facility: HOSPITAL | Age: 74
End: 2024-06-21
Payer: MEDICARE

## 2024-06-21 VITALS
SYSTOLIC BLOOD PRESSURE: 129 MMHG | DIASTOLIC BLOOD PRESSURE: 67 MMHG | BODY MASS INDEX: 20.41 KG/M2 | HEART RATE: 70 BPM | WEIGHT: 159 LBS | OXYGEN SATURATION: 96 % | HEIGHT: 74 IN

## 2024-06-21 DIAGNOSIS — C61 PROSTATE CANCER: Primary | ICD-10-CM

## 2024-06-21 DIAGNOSIS — Z92.3 HISTORY OF RADIATION THERAPY: ICD-10-CM

## 2024-06-21 DIAGNOSIS — F17.200 CURRENT EVERY DAY SMOKER: ICD-10-CM

## 2024-06-21 DIAGNOSIS — C61 PROSTATE CANCER: ICD-10-CM

## 2024-06-21 DIAGNOSIS — C81.11 NODULAR SCLEROSIS HODGKIN LYMPHOMA OF LYMPH NODES OF NECK: ICD-10-CM

## 2024-06-21 PROCEDURE — 84153 ASSAY OF PSA TOTAL: CPT

## 2024-06-21 PROCEDURE — 36415 COLL VENOUS BLD VENIPUNCTURE: CPT

## 2024-06-21 PROCEDURE — G0463 HOSPITAL OUTPT CLINIC VISIT: HCPCS | Performed by: RADIOLOGY

## 2024-06-21 RX ORDER — GABAPENTIN 300 MG/1
300 CAPSULE ORAL 2 TIMES DAILY
COMMUNITY
Start: 2024-04-01 | End: 2024-11-25

## 2024-06-22 LAB — PSA SERPL-MCNC: 0.21 NG/ML (ref 0–4)

## 2024-06-24 ENCOUNTER — TELEPHONE (OUTPATIENT)
Age: 74
End: 2024-06-24
Payer: MEDICARE

## 2024-06-24 NOTE — PROGRESS NOTES
transcribed; although attempts have made to review the note for such errors, some may still exist.  Please excuse any unrecognized transcription errors and contact us if the air is unintelligible or needs documented correction.  Also, portions of this note have been copied forward, however, changed to reflect the most current clinical status of this patient.    Electronically signed by GOPAL Mcguire on 6/25/2024 at 12:40 PM  Baltazar YUSUF am starting this note as a registered nurse for GOPAL Vanegas.

## 2024-06-25 ENCOUNTER — HOSPITAL ENCOUNTER (OUTPATIENT)
Dept: INFUSION THERAPY | Age: 74
Discharge: HOME | End: 2024-06-25
Payer: MEDICARE

## 2024-06-25 ENCOUNTER — OFFICE VISIT (OUTPATIENT)
Dept: HEMATOLOGY | Age: 74
End: 2024-06-25
Payer: MEDICARE

## 2024-06-25 ENCOUNTER — TRANSCRIBE ORDERS (OUTPATIENT)
Dept: ADMINISTRATIVE | Facility: HOSPITAL | Age: 74
End: 2024-06-25
Payer: MEDICARE

## 2024-06-25 VITALS
TEMPERATURE: 97.6 F | OXYGEN SATURATION: 99 % | SYSTOLIC BLOOD PRESSURE: 108 MMHG | DIASTOLIC BLOOD PRESSURE: 64 MMHG | RESPIRATION RATE: 18 BRPM | HEART RATE: 68 BPM

## 2024-06-25 VITALS
HEIGHT: 74 IN | WEIGHT: 159.1 LBS | DIASTOLIC BLOOD PRESSURE: 61 MMHG | RESPIRATION RATE: 18 BRPM | OXYGEN SATURATION: 100 % | SYSTOLIC BLOOD PRESSURE: 122 MMHG | HEART RATE: 70 BPM | BODY MASS INDEX: 20.42 KG/M2 | TEMPERATURE: 97.6 F

## 2024-06-25 DIAGNOSIS — D68.51 FACTOR V LEIDEN (HCC): ICD-10-CM

## 2024-06-25 DIAGNOSIS — R91.8 LUNG NODULES: ICD-10-CM

## 2024-06-25 DIAGNOSIS — C82.80 FOLLICULAR LOW GRADE B-CELL LYMPHOMA: Primary | ICD-10-CM

## 2024-06-25 DIAGNOSIS — C61 PROSTATE CARCINOMA (HCC): ICD-10-CM

## 2024-06-25 DIAGNOSIS — G89.3 CANCER-RELATED PAIN: ICD-10-CM

## 2024-06-25 DIAGNOSIS — T50.8X5D: ICD-10-CM

## 2024-06-25 DIAGNOSIS — C82.80 FOLLICULAR LOW GRADE B-CELL LYMPHOMA (HCC): Primary | ICD-10-CM

## 2024-06-25 DIAGNOSIS — R53.83 OTHER FATIGUE: ICD-10-CM

## 2024-06-25 DIAGNOSIS — C82.80 FOLLICULAR LOW GRADE B-CELL LYMPHOMA (HCC): ICD-10-CM

## 2024-06-25 DIAGNOSIS — C82.82: ICD-10-CM

## 2024-06-25 DIAGNOSIS — C82.82: Primary | ICD-10-CM

## 2024-06-25 DIAGNOSIS — Z71.6 TOBACCO ABUSE COUNSELING: ICD-10-CM

## 2024-06-25 DIAGNOSIS — G62.0 DRUG-INDUCED POLYNEUROPATHY (HCC): ICD-10-CM

## 2024-06-25 LAB
ALBUMIN SERPL-MCNC: 3.9 G/DL (ref 3.5–5.2)
ALP SERPL-CCNC: 133 U/L (ref 40–130)
ALT SERPL-CCNC: 11 U/L (ref 21–72)
ANION GAP SERPL CALCULATED.3IONS-SCNC: 7 MMOL/L (ref 7–19)
AST SERPL-CCNC: 16 U/L (ref 17–59)
BASOPHILS # BLD: 0.13 K/UL (ref 0.01–0.08)
BASOPHILS NFR BLD: 1.7 % (ref 0.1–1.2)
BILIRUB SERPL-MCNC: 0.7 MG/DL (ref 0.2–1.3)
BUN SERPL-MCNC: 12 MG/DL (ref 9–20)
CALCIUM SERPL-MCNC: 9.2 MG/DL (ref 8.4–10.2)
CHLORIDE SERPL-SCNC: 104 MMOL/L (ref 98–111)
CO2 SERPL-SCNC: 27 MMOL/L (ref 22–29)
CREAT SERPL-MCNC: 0.8 MG/DL (ref 0.6–1.2)
EOSINOPHIL # BLD: 0.85 K/UL (ref 0.04–0.54)
EOSINOPHIL NFR BLD: 10.9 % (ref 0.7–7)
ERYTHROCYTE [DISTWIDTH] IN BLOOD BY AUTOMATED COUNT: 12.5 % (ref 11.6–14.4)
GLOBULIN: 2.6 G/DL
GLUCOSE SERPL-MCNC: 131 MG/DL (ref 74–106)
HCT VFR BLD AUTO: 37.4 % (ref 40.1–51)
HGB BLD-MCNC: 13.3 G/DL (ref 13.7–17.5)
LYMPHOCYTES # BLD: 0.63 K/UL (ref 1.18–3.74)
LYMPHOCYTES NFR BLD: 8.1 % (ref 19.3–53.1)
MCH RBC QN AUTO: 33.6 PG (ref 25.7–32.2)
MCHC RBC AUTO-ENTMCNC: 35.6 G/DL (ref 32.3–36.5)
MCV RBC AUTO: 94.4 FL (ref 79–92.2)
MONOCYTES # BLD: 0.86 K/UL (ref 0.24–0.82)
MONOCYTES NFR BLD: 11.1 % (ref 4.7–12.5)
NEUTROPHILS # BLD: 5.29 K/UL (ref 1.56–6.13)
NEUTS SEG NFR BLD: 68.1 % (ref 34–71.1)
PLATELET # BLD AUTO: 408 K/UL (ref 163–337)
PMV BLD AUTO: 8 FL (ref 7.4–10.4)
POTASSIUM SERPL-SCNC: 3.8 MMOL/L (ref 3.5–5.1)
PROT SERPL-MCNC: 6.5 G/DL (ref 6.3–8.2)
RBC # BLD AUTO: 3.96 M/UL (ref 4.63–6.08)
SODIUM SERPL-SCNC: 138 MMOL/L (ref 137–145)
WBC # BLD AUTO: 7.77 K/UL (ref 4.23–9.07)

## 2024-06-25 PROCEDURE — 6360000002 HC RX W HCPCS: Performed by: NURSE PRACTITIONER

## 2024-06-25 PROCEDURE — 3017F COLORECTAL CA SCREEN DOC REV: CPT | Performed by: NURSE PRACTITIONER

## 2024-06-25 PROCEDURE — 85025 COMPLETE CBC W/AUTO DIFF WBC: CPT

## 2024-06-25 PROCEDURE — 4004F PT TOBACCO SCREEN RCVD TLK: CPT | Performed by: NURSE PRACTITIONER

## 2024-06-25 PROCEDURE — 6370000000 HC RX 637 (ALT 250 FOR IP): Performed by: NURSE PRACTITIONER

## 2024-06-25 PROCEDURE — 96415 CHEMO IV INFUSION ADDL HR: CPT

## 2024-06-25 PROCEDURE — 96413 CHEMO IV INFUSION 1 HR: CPT

## 2024-06-25 PROCEDURE — 96411 CHEMO IV PUSH ADDL DRUG: CPT

## 2024-06-25 PROCEDURE — 80053 COMPREHEN METABOLIC PANEL: CPT

## 2024-06-25 PROCEDURE — 1123F ACP DISCUSS/DSCN MKR DOCD: CPT | Performed by: NURSE PRACTITIONER

## 2024-06-25 PROCEDURE — 2580000003 HC RX 258: Performed by: NURSE PRACTITIONER

## 2024-06-25 PROCEDURE — 96375 TX/PRO/DX INJ NEW DRUG ADDON: CPT

## 2024-06-25 PROCEDURE — G8427 DOCREV CUR MEDS BY ELIG CLIN: HCPCS | Performed by: NURSE PRACTITIONER

## 2024-06-25 PROCEDURE — 36415 COLL VENOUS BLD VENIPUNCTURE: CPT

## 2024-06-25 PROCEDURE — 99215 OFFICE O/P EST HI 40 MIN: CPT | Performed by: NURSE PRACTITIONER

## 2024-06-25 PROCEDURE — G8420 CALC BMI NORM PARAMETERS: HCPCS | Performed by: NURSE PRACTITIONER

## 2024-06-25 RX ORDER — MEPERIDINE HYDROCHLORIDE 50 MG/ML
12.5 INJECTION INTRAMUSCULAR; INTRAVENOUS; SUBCUTANEOUS PRN
Status: CANCELLED | OUTPATIENT
Start: 2024-06-25

## 2024-06-25 RX ORDER — ACETAMINOPHEN 325 MG/1
650 TABLET ORAL
Status: CANCELLED | OUTPATIENT
Start: 2024-06-26

## 2024-06-25 RX ORDER — ALBUTEROL SULFATE 90 UG/1
4 AEROSOL, METERED RESPIRATORY (INHALATION) PRN
Status: CANCELLED | OUTPATIENT
Start: 2024-06-25

## 2024-06-25 RX ORDER — FAMOTIDINE 10 MG/ML
20 INJECTION, SOLUTION INTRAVENOUS
Status: CANCELLED | OUTPATIENT
Start: 2024-06-25

## 2024-06-25 RX ORDER — ACETAMINOPHEN 325 MG/1
1000 TABLET ORAL ONCE
Status: CANCELLED | OUTPATIENT
Start: 2024-06-25 | End: 2024-06-25

## 2024-06-25 RX ORDER — EPINEPHRINE 1 MG/ML
0.3 INJECTION, SOLUTION, CONCENTRATE INTRAVENOUS PRN
Status: CANCELLED | OUTPATIENT
Start: 2024-06-25

## 2024-06-25 RX ORDER — SODIUM CHLORIDE 9 MG/ML
5-250 INJECTION, SOLUTION INTRAVENOUS PRN
Status: CANCELLED | OUTPATIENT
Start: 2024-06-25

## 2024-06-25 RX ORDER — DIPHENHYDRAMINE HYDROCHLORIDE 50 MG/ML
50 INJECTION INTRAMUSCULAR; INTRAVENOUS
Status: CANCELLED | OUTPATIENT
Start: 2024-06-26

## 2024-06-25 RX ORDER — PALONOSETRON 0.05 MG/ML
0.25 INJECTION, SOLUTION INTRAVENOUS ONCE
Status: COMPLETED | OUTPATIENT
Start: 2024-06-25 | End: 2024-06-25

## 2024-06-25 RX ORDER — SODIUM CHLORIDE 0.9 % (FLUSH) 0.9 %
5-40 SYRINGE (ML) INJECTION PRN
Status: DISCONTINUED | OUTPATIENT
Start: 2024-06-25 | End: 2024-06-26 | Stop reason: HOSPADM

## 2024-06-25 RX ORDER — SODIUM CHLORIDE 9 MG/ML
INJECTION, SOLUTION INTRAVENOUS CONTINUOUS
Status: CANCELLED | OUTPATIENT
Start: 2024-06-25

## 2024-06-25 RX ORDER — FAMOTIDINE 10 MG/ML
20 INJECTION, SOLUTION INTRAVENOUS
Status: CANCELLED | OUTPATIENT
Start: 2024-06-26

## 2024-06-25 RX ORDER — PALONOSETRON 0.05 MG/ML
0.25 INJECTION, SOLUTION INTRAVENOUS ONCE
Status: CANCELLED | OUTPATIENT
Start: 2024-06-25 | End: 2024-06-25

## 2024-06-25 RX ORDER — SODIUM CHLORIDE 0.9 % (FLUSH) 0.9 %
5-40 SYRINGE (ML) INJECTION PRN
Status: CANCELLED | OUTPATIENT
Start: 2024-06-26

## 2024-06-25 RX ORDER — SODIUM CHLORIDE 9 MG/ML
5-250 INJECTION, SOLUTION INTRAVENOUS PRN
Status: CANCELLED | OUTPATIENT
Start: 2024-06-26

## 2024-06-25 RX ORDER — ONDANSETRON 2 MG/ML
8 INJECTION INTRAMUSCULAR; INTRAVENOUS
Status: CANCELLED | OUTPATIENT
Start: 2024-06-25

## 2024-06-25 RX ORDER — ONDANSETRON 2 MG/ML
8 INJECTION INTRAMUSCULAR; INTRAVENOUS
Status: CANCELLED | OUTPATIENT
Start: 2024-06-26

## 2024-06-25 RX ORDER — HEPARIN 100 UNIT/ML
500 SYRINGE INTRAVENOUS PRN
Status: DISCONTINUED | OUTPATIENT
Start: 2024-06-25 | End: 2024-06-26 | Stop reason: HOSPADM

## 2024-06-25 RX ORDER — DIPHENHYDRAMINE HYDROCHLORIDE 50 MG/ML
50 INJECTION INTRAMUSCULAR; INTRAVENOUS
Status: CANCELLED | OUTPATIENT
Start: 2024-06-25

## 2024-06-25 RX ORDER — HEPARIN SODIUM (PORCINE) LOCK FLUSH IV SOLN 100 UNIT/ML 100 UNIT/ML
500 SOLUTION INTRAVENOUS PRN
Status: CANCELLED | OUTPATIENT
Start: 2024-06-26

## 2024-06-25 RX ORDER — HEPARIN SODIUM (PORCINE) LOCK FLUSH IV SOLN 100 UNIT/ML 100 UNIT/ML
500 SOLUTION INTRAVENOUS PRN
Status: CANCELLED | OUTPATIENT
Start: 2024-06-25

## 2024-06-25 RX ORDER — MEPERIDINE HYDROCHLORIDE 50 MG/ML
12.5 INJECTION INTRAMUSCULAR; INTRAVENOUS; SUBCUTANEOUS PRN
Status: CANCELLED | OUTPATIENT
Start: 2024-06-26

## 2024-06-25 RX ORDER — SODIUM CHLORIDE 9 MG/ML
INJECTION, SOLUTION INTRAVENOUS CONTINUOUS
Status: CANCELLED | OUTPATIENT
Start: 2024-06-26

## 2024-06-25 RX ORDER — ACETAMINOPHEN 500 MG
1000 TABLET ORAL ONCE
Status: COMPLETED | OUTPATIENT
Start: 2024-06-25 | End: 2024-06-25

## 2024-06-25 RX ORDER — ACETAMINOPHEN 325 MG/1
650 TABLET ORAL
Status: CANCELLED | OUTPATIENT
Start: 2024-06-25

## 2024-06-25 RX ORDER — ALBUTEROL SULFATE 90 UG/1
4 AEROSOL, METERED RESPIRATORY (INHALATION) PRN
Status: CANCELLED | OUTPATIENT
Start: 2024-06-26

## 2024-06-25 RX ORDER — SODIUM CHLORIDE 0.9 % (FLUSH) 0.9 %
5-40 SYRINGE (ML) INJECTION PRN
Status: CANCELLED | OUTPATIENT
Start: 2024-06-25

## 2024-06-25 RX ORDER — EPINEPHRINE 1 MG/ML
0.3 INJECTION, SOLUTION, CONCENTRATE INTRAVENOUS PRN
Status: CANCELLED | OUTPATIENT
Start: 2024-06-26

## 2024-06-25 RX ADMIN — SODIUM CHLORIDE 700 MG: 9 INJECTION, SOLUTION INTRAVENOUS at 13:21

## 2024-06-25 RX ADMIN — HEPARIN 500 UNITS: 100 SYRINGE at 15:15

## 2024-06-25 RX ADMIN — DEXAMETHASONE SODIUM PHOSPHATE: 10 INJECTION, SOLUTION INTRAMUSCULAR; INTRAVENOUS at 12:46

## 2024-06-25 RX ADMIN — DIPHENHYDRAMINE HYDROCHLORIDE: 50 INJECTION INTRAMUSCULAR; INTRAVENOUS at 12:58

## 2024-06-25 RX ADMIN — PALONOSETRON 0.25 MG: 0.05 INJECTION, SOLUTION INTRAVENOUS at 12:46

## 2024-06-25 RX ADMIN — ACETAMINOPHEN 1000 MG: 500 TABLET, FILM COATED ORAL at 12:46

## 2024-06-25 RX ADMIN — SODIUM CHLORIDE, PRESERVATIVE FREE 10 ML: 5 INJECTION INTRAVENOUS at 15:15

## 2024-06-25 RX ADMIN — BENDAMUSTINE HYDROCHLORIDE 150 MG: 25 INJECTION, SOLUTION INTRAVENOUS at 15:01

## 2024-06-25 ASSESSMENT — ENCOUNTER SYMPTOMS: COUGH: 1

## 2024-06-26 ENCOUNTER — HOSPITAL ENCOUNTER (OUTPATIENT)
Dept: INFUSION THERAPY | Age: 74
Discharge: HOME | End: 2024-06-26
Payer: MEDICARE

## 2024-06-26 VITALS
SYSTOLIC BLOOD PRESSURE: 132 MMHG | TEMPERATURE: 97.8 F | RESPIRATION RATE: 20 BRPM | HEART RATE: 64 BPM | OXYGEN SATURATION: 99 % | DIASTOLIC BLOOD PRESSURE: 67 MMHG

## 2024-06-26 DIAGNOSIS — C82.82: Primary | ICD-10-CM

## 2024-06-26 PROCEDURE — 2580000003 HC RX 258: Performed by: NURSE PRACTITIONER

## 2024-06-26 PROCEDURE — 96409 CHEMO IV PUSH SNGL DRUG: CPT

## 2024-06-26 PROCEDURE — 6360000002 HC RX W HCPCS: Performed by: NURSE PRACTITIONER

## 2024-06-26 PROCEDURE — 96367 TX/PROPH/DG ADDL SEQ IV INF: CPT

## 2024-06-26 RX ORDER — SODIUM CHLORIDE 0.9 % (FLUSH) 0.9 %
5-40 SYRINGE (ML) INJECTION PRN
Status: DISCONTINUED | OUTPATIENT
Start: 2024-06-26 | End: 2024-06-27 | Stop reason: HOSPADM

## 2024-06-26 RX ORDER — HEPARIN 100 UNIT/ML
500 SYRINGE INTRAVENOUS PRN
Status: DISCONTINUED | OUTPATIENT
Start: 2024-06-26 | End: 2024-06-27 | Stop reason: HOSPADM

## 2024-06-26 RX ADMIN — HEPARIN 500 UNITS: 100 SYRINGE at 14:05

## 2024-06-26 RX ADMIN — BENDAMUSTINE HYDROCHLORIDE 150 MG: 25 INJECTION, SOLUTION INTRAVENOUS at 13:50

## 2024-06-26 RX ADMIN — ONDANSETRON 16 MG: 2 INJECTION INTRAMUSCULAR; INTRAVENOUS at 13:12

## 2024-06-26 RX ADMIN — SODIUM CHLORIDE, PRESERVATIVE FREE 10 ML: 5 INJECTION INTRAVENOUS at 14:05

## 2024-06-27 ENCOUNTER — HOSPITAL ENCOUNTER (OUTPATIENT)
Dept: INFUSION THERAPY | Age: 74
Discharge: HOME | End: 2024-06-27
Payer: MEDICARE

## 2024-06-27 DIAGNOSIS — C85.10 HIGH GRADE B-CELL LYMPHOMA (HCC): ICD-10-CM

## 2024-06-27 DIAGNOSIS — C82.82: Primary | ICD-10-CM

## 2024-06-27 PROCEDURE — 96372 THER/PROPH/DIAG INJ SC/IM: CPT

## 2024-06-27 PROCEDURE — 6360000002 HC RX W HCPCS: Performed by: NURSE PRACTITIONER

## 2024-06-27 RX ADMIN — PEGFILGRASTIM-JMDB 6 MG: 6 INJECTION SUBCUTANEOUS at 14:59

## 2024-06-27 ASSESSMENT — ENCOUNTER SYMPTOMS
BLOOD IN STOOL: 0
EYE DISCHARGE: 0
EYE REDNESS: 0
NAUSEA: 0
EYE PAIN: 0
VOMITING: 0
ABDOMINAL PAIN: 0
SHORTNESS OF BREATH: 0
EYES NEGATIVE: 1
GASTROINTESTINAL NEGATIVE: 1
SORE THROAT: 0
WHEEZING: 0
DIARRHEA: 0
BACK PAIN: 0
CONSTIPATION: 0

## 2024-07-23 ENCOUNTER — HOSPITAL ENCOUNTER (OUTPATIENT)
Dept: CT IMAGING | Facility: HOSPITAL | Age: 74
Discharge: HOME | End: 2024-07-23
Payer: MEDICARE

## 2024-07-23 DIAGNOSIS — C82.82: ICD-10-CM

## 2024-07-23 PROCEDURE — A9552 F18 FDG: HCPCS | Performed by: NURSE PRACTITIONER

## 2024-07-23 PROCEDURE — 0 FLUDEOXYGLUCOSE F18 SOLUTION: Performed by: NURSE PRACTITIONER

## 2024-07-23 PROCEDURE — 78815 PET IMAGE W/CT SKULL-THIGH: CPT

## 2024-07-23 RX ADMIN — FLUDEOXYGLUCOSE F 18 1 DOSE: 200 INJECTION, SOLUTION INTRAVENOUS at 10:45

## 2024-08-06 ENCOUNTER — TELEPHONE (OUTPATIENT)
Dept: HEMATOLOGY | Age: 74
End: 2024-08-06

## 2024-08-06 NOTE — TELEPHONE ENCOUNTER
I called and reminded pt. of their appt. on 08/08/2024. Made pt. aware to arrive at appt. time & not lab appt. time. I also advised pt. to arrive no sooner than appt time and the address of our NEW BUILDING. Pt voiced understanding and confirmed appt. date and time.

## 2024-08-07 DIAGNOSIS — C82.80 FOLLICULAR LOW GRADE B-CELL LYMPHOMA (HCC): Primary | ICD-10-CM

## 2024-08-07 NOTE — PROGRESS NOTES
increase in size of another right middle lobe area of nodularity now measuring 10 mm and previously measuring 8 mm. Other areas of nodularity appear relatively stable. A 4 cm transverse pleural-based opacity in the right lower lobe posteriorly measures up to 4.5 cm on the prior study. This is likely infectious or inflammatory given the slight decrease in size. Improving patchy bilateral lower lobe infiltrates. Severe centrilobular emphysema.  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 There are no pathologically enlarged lymph nodes.     06/02/2023 CT chest without contrast ([facility])- Reported numerous pulmonary nodules the majority of which are stable from the previous examination. There are 2 areas of consolidation which are more prominent than on the previous examination. Increased consolidation within the right middle lobe, particularly in the lateral segment. Mild peripheral groundglass nodularity within the superior segment of the right lower lobe abutting the major fissure similar in appearance to previous examination. There is an oblong 6 mm nodule in the anterior segment left upper stable from the previous exam. A 5 mm nodule in the superior segment left lower lobe on stable. A 5 mm groundglass nodule in the left upper lobe anteriorly is stable. There is an oblong nodule measuring 9 mm in size associated with the minor fissure on the right stable in size and appearance from the previous exam. A 3 mm subpleural nodule in the lingular segment left upper lobe is stable. There is a 4 mm nodule within the left lingula adjacent to the left heart border stable. There is increased consolidation within the lateral segment of the right middle lobe with a focus of nodularity, previously measured 2.1 x 1.3 cm in size now measures 2.0 x 1.4 cm in size with the nodular component not changed significantly. Peripheral consolidation within the right lower lobe posterolaterally measures up to 3.8 cm in

## 2024-08-08 ENCOUNTER — OFFICE VISIT (OUTPATIENT)
Dept: HEMATOLOGY | Age: 74
End: 2024-08-08
Payer: MEDICARE

## 2024-08-08 ENCOUNTER — HOSPITAL ENCOUNTER (OUTPATIENT)
Dept: GENERAL RADIOLOGY | Age: 74
Discharge: HOME | End: 2024-08-08
Payer: MEDICARE

## 2024-08-08 ENCOUNTER — HOSPITAL ENCOUNTER (OUTPATIENT)
Dept: INFUSION THERAPY | Age: 74
Discharge: HOME | End: 2024-08-08
Payer: MEDICARE

## 2024-08-08 VITALS
HEIGHT: 74 IN | HEART RATE: 67 BPM | OXYGEN SATURATION: 94 % | TEMPERATURE: 97.7 F | SYSTOLIC BLOOD PRESSURE: 126 MMHG | DIASTOLIC BLOOD PRESSURE: 74 MMHG | BODY MASS INDEX: 20.6 KG/M2 | WEIGHT: 160.5 LBS

## 2024-08-08 DIAGNOSIS — R05.2 SUBACUTE COUGH: ICD-10-CM

## 2024-08-08 DIAGNOSIS — C81.90 HODGKIN LYMPHOMA, UNSPECIFIED HODGKIN LYMPHOMA TYPE, UNSPECIFIED BODY REGION (HCC): ICD-10-CM

## 2024-08-08 DIAGNOSIS — C61 PROSTATE CARCINOMA (HCC): ICD-10-CM

## 2024-08-08 DIAGNOSIS — C85.10 HIGH GRADE B-CELL LYMPHOMA (HCC): ICD-10-CM

## 2024-08-08 DIAGNOSIS — C82.80 FOLLICULAR LOW GRADE B-CELL LYMPHOMA (HCC): ICD-10-CM

## 2024-08-08 DIAGNOSIS — C82.80 FOLLICULAR LOW GRADE B-CELL LYMPHOMA (HCC): Primary | ICD-10-CM

## 2024-08-08 DIAGNOSIS — R91.8 LUNG NODULES: ICD-10-CM

## 2024-08-08 DIAGNOSIS — Z71.6 TOBACCO ABUSE COUNSELING: ICD-10-CM

## 2024-08-08 DIAGNOSIS — G62.9 NEUROPATHY: ICD-10-CM

## 2024-08-08 DIAGNOSIS — M89.9 LESION OF BONE OF LUMBOSACRAL SPINE: ICD-10-CM

## 2024-08-08 DIAGNOSIS — D68.51 FACTOR V LEIDEN (HCC): ICD-10-CM

## 2024-08-08 LAB
ALBUMIN SERPL-MCNC: 4.3 G/DL (ref 3.5–5.2)
ALP SERPL-CCNC: 162 U/L (ref 40–129)
ALT SERPL-CCNC: 11 U/L (ref 5–41)
ANION GAP SERPL CALCULATED.3IONS-SCNC: 13 MMOL/L (ref 7–19)
AST SERPL-CCNC: 19 U/L (ref 5–40)
BASOPHILS # BLD: 0.1 K/UL (ref 0.01–0.08)
BASOPHILS NFR BLD: 1.8 % (ref 0.1–1.2)
BILIRUB SERPL-MCNC: 0.4 MG/DL (ref 0–1.2)
BUN SERPL-MCNC: 14 MG/DL (ref 8–23)
CALCIUM SERPL-MCNC: 9.6 MG/DL (ref 8.8–10.2)
CHLORIDE SERPL-SCNC: 103 MMOL/L (ref 98–107)
CO2 SERPL-SCNC: 26 MMOL/L (ref 22–29)
CREAT SERPL-MCNC: 1 MG/DL (ref 0.7–1.2)
EOSINOPHIL # BLD: 1.41 K/UL (ref 0.04–0.54)
EOSINOPHIL NFR BLD: 25.5 % (ref 0.7–7)
ERYTHROCYTE [DISTWIDTH] IN BLOOD BY AUTOMATED COUNT: 12.5 % (ref 11.6–14.4)
GLUCOSE SERPL-MCNC: 108 MG/DL (ref 70–99)
HCT VFR BLD AUTO: 41.7 % (ref 40.1–51)
HGB BLD-MCNC: 14.5 G/DL (ref 13.7–17.5)
LDH SERPL-CCNC: 165 U/L (ref 135–225)
LYMPHOCYTES # BLD: 0.56 K/UL (ref 1.18–3.74)
LYMPHOCYTES NFR BLD: 10.1 % (ref 19.3–53.1)
MCH RBC QN AUTO: 33.2 PG (ref 25.7–32.2)
MCHC RBC AUTO-ENTMCNC: 34.8 G/DL (ref 32.3–36.5)
MCV RBC AUTO: 95.4 FL (ref 79–92.2)
MONOCYTES # BLD: 0.82 K/UL (ref 0.24–0.82)
MONOCYTES NFR BLD: 14.8 % (ref 4.7–12.5)
NEUTROPHILS # BLD: 2.63 K/UL (ref 1.56–6.13)
NEUTS SEG NFR BLD: 47.4 % (ref 34–71.1)
PLATELET # BLD AUTO: 389 K/UL (ref 163–337)
PMV BLD AUTO: 8.3 FL (ref 7.4–10.4)
POTASSIUM SERPL-SCNC: 3.8 MMOL/L (ref 3.5–5.1)
PROT SERPL-MCNC: 7.3 G/DL (ref 6.4–8.3)
RBC # BLD AUTO: 4.37 M/UL (ref 4.63–6.08)
SODIUM SERPL-SCNC: 142 MMOL/L (ref 136–145)
WBC # BLD AUTO: 5.54 K/UL (ref 4.23–9.07)

## 2024-08-08 PROCEDURE — 83615 LACTATE (LD) (LDH) ENZYME: CPT

## 2024-08-08 PROCEDURE — 85025 COMPLETE CBC W/AUTO DIFF WBC: CPT

## 2024-08-08 PROCEDURE — G8420 CALC BMI NORM PARAMETERS: HCPCS | Performed by: NURSE PRACTITIONER

## 2024-08-08 PROCEDURE — 80053 COMPREHEN METABOLIC PANEL: CPT

## 2024-08-08 PROCEDURE — 1123F ACP DISCUSS/DSCN MKR DOCD: CPT | Performed by: NURSE PRACTITIONER

## 2024-08-08 PROCEDURE — 99214 OFFICE O/P EST MOD 30 MIN: CPT | Performed by: NURSE PRACTITIONER

## 2024-08-08 PROCEDURE — 4004F PT TOBACCO SCREEN RCVD TLK: CPT | Performed by: NURSE PRACTITIONER

## 2024-08-08 PROCEDURE — 3017F COLORECTAL CA SCREEN DOC REV: CPT | Performed by: NURSE PRACTITIONER

## 2024-08-08 PROCEDURE — 71046 X-RAY EXAM CHEST 2 VIEWS: CPT

## 2024-08-08 PROCEDURE — G8427 DOCREV CUR MEDS BY ELIG CLIN: HCPCS | Performed by: NURSE PRACTITIONER

## 2024-08-08 PROCEDURE — 36415 COLL VENOUS BLD VENIPUNCTURE: CPT

## 2024-08-08 PROCEDURE — 99212 OFFICE O/P EST SF 10 MIN: CPT

## 2024-08-08 RX ORDER — FLUTICASONE FUROATE AND VILANTEROL TRIFENATATE 50; 25 UG/1; UG/1
POWDER RESPIRATORY (INHALATION)
COMMUNITY
Start: 2024-07-11

## 2024-08-09 LAB — B2 MICROGLOB SERPL-MCNC: 3.4 MG/L

## 2024-08-14 ASSESSMENT — ENCOUNTER SYMPTOMS
SORE THROAT: 0
ABDOMINAL PAIN: 0
DIARRHEA: 0
WHEEZING: 0
COUGH: 0
GASTROINTESTINAL NEGATIVE: 1
SHORTNESS OF BREATH: 0
CONSTIPATION: 0
BACK PAIN: 0
EYES NEGATIVE: 1
NAUSEA: 0
BLOOD IN STOOL: 0
EYE DISCHARGE: 0
EYE PAIN: 0
VOMITING: 0
EYE REDNESS: 0
RESPIRATORY NEGATIVE: 1

## 2024-08-22 ENCOUNTER — HOSPITAL ENCOUNTER (OUTPATIENT)
Dept: RADIATION ONCOLOGY | Facility: HOSPITAL | Age: 74
Setting detail: RADIATION/ONCOLOGY SERIES
End: 2024-08-22
Payer: MEDICARE

## 2024-08-23 ENCOUNTER — LAB (OUTPATIENT)
Dept: LAB | Facility: HOSPITAL | Age: 74
End: 2024-08-23
Payer: MEDICARE

## 2024-08-23 ENCOUNTER — OFFICE VISIT (OUTPATIENT)
Age: 74
End: 2024-08-23
Payer: MEDICARE

## 2024-08-23 VITALS
WEIGHT: 165 LBS | SYSTOLIC BLOOD PRESSURE: 116 MMHG | HEIGHT: 74 IN | DIASTOLIC BLOOD PRESSURE: 61 MMHG | BODY MASS INDEX: 21.17 KG/M2

## 2024-08-23 DIAGNOSIS — C61 PROSTATE CANCER: ICD-10-CM

## 2024-08-23 DIAGNOSIS — C61 PROSTATE CANCER: Primary | ICD-10-CM

## 2024-08-23 DIAGNOSIS — F17.200 CURRENT EVERY DAY SMOKER: ICD-10-CM

## 2024-08-23 DIAGNOSIS — C81.11 NODULAR SCLEROSIS HODGKIN LYMPHOMA OF LYMPH NODES OF NECK: ICD-10-CM

## 2024-08-23 DIAGNOSIS — Z92.3 HISTORY OF RADIATION THERAPY: ICD-10-CM

## 2024-08-23 LAB — PSA SERPL-MCNC: 0.2 NG/ML (ref 0–4)

## 2024-08-23 PROCEDURE — 36415 COLL VENOUS BLD VENIPUNCTURE: CPT

## 2024-08-23 PROCEDURE — G0463 HOSPITAL OUTPT CLINIC VISIT: HCPCS | Performed by: RADIOLOGY

## 2024-08-23 PROCEDURE — 84153 ASSAY OF PSA TOTAL: CPT

## 2024-09-06 ENCOUNTER — INFUSION (OUTPATIENT)
Dept: ONCOLOGY | Facility: HOSPITAL | Age: 74
End: 2024-09-06
Payer: MEDICARE

## 2024-09-06 VITALS
RESPIRATION RATE: 20 BRPM | TEMPERATURE: 97.8 F | HEIGHT: 74 IN | SYSTOLIC BLOOD PRESSURE: 126 MMHG | HEART RATE: 75 BPM | BODY MASS INDEX: 20.79 KG/M2 | WEIGHT: 162 LBS | DIASTOLIC BLOOD PRESSURE: 59 MMHG | OXYGEN SATURATION: 98 %

## 2024-09-06 DIAGNOSIS — C61 PROSTATE CANCER: Primary | ICD-10-CM

## 2024-09-06 PROCEDURE — 25010000002 LEUPROLIDE 45 MG KIT: Performed by: UROLOGY

## 2024-09-06 PROCEDURE — 96402 CHEMO HORMON ANTINEOPL SQ/IM: CPT

## 2024-09-06 RX ADMIN — LEUPROLIDE ACETATE 45 MG: KIT at 13:04

## 2024-10-21 ENCOUNTER — HOSPITAL ENCOUNTER (OUTPATIENT)
Dept: RADIATION ONCOLOGY | Facility: HOSPITAL | Age: 74
Setting detail: RADIATION/ONCOLOGY SERIES
End: 2024-10-21
Payer: MEDICARE

## 2024-10-22 ENCOUNTER — OFFICE VISIT (OUTPATIENT)
Age: 74
End: 2024-10-22
Payer: MEDICARE

## 2024-10-22 VITALS — HEIGHT: 74 IN | BODY MASS INDEX: 21.42 KG/M2 | WEIGHT: 166.9 LBS | OXYGEN SATURATION: 98 % | HEART RATE: 64 BPM

## 2024-10-22 DIAGNOSIS — C61 PROSTATE CANCER: Primary | ICD-10-CM

## 2024-10-22 DIAGNOSIS — Z92.3 HISTORY OF RADIATION THERAPY: ICD-10-CM

## 2024-10-22 DIAGNOSIS — F17.200 CURRENT EVERY DAY SMOKER: ICD-10-CM

## 2024-10-22 DIAGNOSIS — C81.11 NODULAR SCLEROSIS HODGKIN LYMPHOMA OF LYMPH NODES OF NECK: ICD-10-CM

## 2024-10-22 PROCEDURE — G0463 HOSPITAL OUTPT CLINIC VISIT: HCPCS | Performed by: RADIOLOGY

## 2024-10-22 RX ORDER — FLUTICASONE FUROATE, UMECLIDINIUM BROMIDE AND VILANTEROL TRIFENATATE 200; 62.5; 25 UG/1; UG/1; UG/1
1 POWDER RESPIRATORY (INHALATION) DAILY
COMMUNITY
Start: 2024-10-21

## 2024-10-28 ENCOUNTER — PREP FOR SURGERY (OUTPATIENT)
Dept: UROLOGY | Facility: CLINIC | Age: 74
End: 2024-10-28
Payer: MEDICARE

## 2024-10-28 DIAGNOSIS — C61 PROSTATE CANCER: Primary | ICD-10-CM

## 2024-10-31 ENCOUNTER — TELEPHONE (OUTPATIENT)
Dept: UROLOGY | Facility: CLINIC | Age: 74
End: 2024-10-31
Payer: MEDICARE

## 2024-10-31 NOTE — TELEPHONE ENCOUNTER
Called pt with Pre op and Surgery information  Pre op: 11/12@ 1:15pm  Surgery: 11/18  I will call pt 3 days prior to surgery for their arrival time.  Pt will need to be NPO after midnight the night before surgery and complete one fleet enema the night before surgery.  Pt will report to patient registration both days listed above.

## 2024-11-06 ENCOUNTER — TELEPHONE (OUTPATIENT)
Dept: HEMATOLOGY | Age: 74
End: 2024-11-06

## 2024-11-06 NOTE — TELEPHONE ENCOUNTER
I called patient and reminded patient of their appt on 11/8/24 and patient confirmed they would be here. I also let patient know that we have moved into our new cancer facility and asked patient if they were aware of where we were now located, and patient voiced understanding of our new location. Patient knows not to arrive early and that we will get labs at the time of the follow up appointment and not the lab appointment time.

## 2024-11-08 ENCOUNTER — OFFICE VISIT (OUTPATIENT)
Dept: HEMATOLOGY | Age: 74
End: 2024-11-08
Payer: MEDICARE

## 2024-11-08 ENCOUNTER — APPOINTMENT (OUTPATIENT)
Dept: HEMATOLOGY | Age: 74
End: 2024-11-08
Payer: MEDICARE

## 2024-11-08 ENCOUNTER — TRANSCRIBE ORDERS (OUTPATIENT)
Dept: ADMINISTRATIVE | Facility: HOSPITAL | Age: 74
End: 2024-11-08
Payer: MEDICARE

## 2024-11-08 ENCOUNTER — HOSPITAL ENCOUNTER (OUTPATIENT)
Dept: INFUSION THERAPY | Age: 74
Discharge: HOME | End: 2024-11-08
Payer: MEDICARE

## 2024-11-08 VITALS
HEART RATE: 66 BPM | HEIGHT: 74 IN | BODY MASS INDEX: 21.29 KG/M2 | OXYGEN SATURATION: 96 % | SYSTOLIC BLOOD PRESSURE: 128 MMHG | TEMPERATURE: 98.2 F | WEIGHT: 165.9 LBS | DIASTOLIC BLOOD PRESSURE: 74 MMHG

## 2024-11-08 DIAGNOSIS — M54.50 ACUTE MIDLINE LOW BACK PAIN WITHOUT SCIATICA: ICD-10-CM

## 2024-11-08 DIAGNOSIS — C82.80 FOLLICULAR LOW GRADE B-CELL LYMPHOMA (HCC): Primary | ICD-10-CM

## 2024-11-08 DIAGNOSIS — C61 PROSTATE CARCINOMA (HCC): ICD-10-CM

## 2024-11-08 DIAGNOSIS — D68.51 FACTOR V LEIDEN (HCC): ICD-10-CM

## 2024-11-08 DIAGNOSIS — D75.839 THROMBOCYTOSIS: ICD-10-CM

## 2024-11-08 DIAGNOSIS — T50.995D ALLERGIC REACTION TO DYE, SUBSEQUENT ENCOUNTER: ICD-10-CM

## 2024-11-08 DIAGNOSIS — Z71.6 TOBACCO ABUSE COUNSELING: ICD-10-CM

## 2024-11-08 DIAGNOSIS — C85.10 HIGH GRADE B-CELL LYMPHOMA (HCC): ICD-10-CM

## 2024-11-08 DIAGNOSIS — G62.0 DRUG-INDUCED POLYNEUROPATHY (HCC): ICD-10-CM

## 2024-11-08 DIAGNOSIS — Z45.2 ENCOUNTER FOR CENTRAL LINE CARE: Primary | ICD-10-CM

## 2024-11-08 DIAGNOSIS — C82.80 FOLLICULAR LOW GRADE B-CELL LYMPHOMA (HCC): ICD-10-CM

## 2024-11-08 DIAGNOSIS — C82.80 FOLLICULAR LOW GRADE B-CELL LYMPHOMA: Primary | ICD-10-CM

## 2024-11-08 LAB
ALBUMIN SERPL-MCNC: 4.6 G/DL (ref 3.5–5.2)
ALP SERPL-CCNC: 192 U/L (ref 40–129)
ALT SERPL-CCNC: 12 U/L (ref 5–41)
ANION GAP SERPL CALCULATED.3IONS-SCNC: 12 MMOL/L (ref 7–19)
AST SERPL-CCNC: 19 U/L (ref 5–40)
BASOPHILS # BLD: 0.08 K/UL (ref 0.01–0.08)
BASOPHILS NFR BLD: 1.1 % (ref 0.1–1.2)
BILIRUB SERPL-MCNC: 0.3 MG/DL (ref 0–1.2)
BUN SERPL-MCNC: 12 MG/DL (ref 8–23)
CALCIUM SERPL-MCNC: 10 MG/DL (ref 8.8–10.2)
CHLORIDE SERPL-SCNC: 100 MMOL/L (ref 98–107)
CO2 SERPL-SCNC: 29 MMOL/L (ref 22–29)
CREAT SERPL-MCNC: 1.1 MG/DL (ref 0.7–1.2)
EOSINOPHIL # BLD: 0.51 K/UL (ref 0.04–0.54)
EOSINOPHIL NFR BLD: 7 % (ref 0.7–7)
ERYTHROCYTE [DISTWIDTH] IN BLOOD BY AUTOMATED COUNT: 12.9 % (ref 11.6–14.4)
GLUCOSE SERPL-MCNC: 134 MG/DL (ref 70–99)
HCT VFR BLD AUTO: 41.4 % (ref 40.1–51)
HGB BLD-MCNC: 13.8 G/DL (ref 13.7–17.5)
LDH SERPL-CCNC: 161 U/L (ref 135–225)
LYMPHOCYTES # BLD: 0.55 K/UL (ref 1.18–3.74)
LYMPHOCYTES NFR BLD: 7.5 % (ref 19.3–53.1)
MCH RBC QN AUTO: 31.1 PG (ref 25.7–32.2)
MCHC RBC AUTO-ENTMCNC: 33.3 G/DL (ref 32.3–36.5)
MCV RBC AUTO: 93.2 FL (ref 79–92.2)
MONOCYTES # BLD: 0.47 K/UL (ref 0.24–0.82)
MONOCYTES NFR BLD: 6.4 % (ref 4.7–12.5)
NEUTROPHILS # BLD: 5.67 K/UL (ref 1.56–6.13)
NEUTS SEG NFR BLD: 77.5 % (ref 34–71.1)
PLATELET # BLD AUTO: 590 K/UL (ref 163–337)
PMV BLD AUTO: 8.2 FL (ref 7.4–10.4)
POTASSIUM SERPL-SCNC: 3.8 MMOL/L (ref 3.5–5.1)
PROT SERPL-MCNC: 7.8 G/DL (ref 6.4–8.3)
RBC # BLD AUTO: 4.44 M/UL (ref 4.63–6.08)
SODIUM SERPL-SCNC: 141 MMOL/L (ref 136–145)
WBC # BLD AUTO: 7.32 K/UL (ref 4.23–9.07)

## 2024-11-08 PROCEDURE — 2580000003 HC RX 258: Performed by: NURSE PRACTITIONER

## 2024-11-08 PROCEDURE — 83615 LACTATE (LD) (LDH) ENZYME: CPT

## 2024-11-08 PROCEDURE — 99213 OFFICE O/P EST LOW 20 MIN: CPT

## 2024-11-08 PROCEDURE — 96523 IRRIG DRUG DELIVERY DEVICE: CPT

## 2024-11-08 PROCEDURE — 85025 COMPLETE CBC W/AUTO DIFF WBC: CPT

## 2024-11-08 PROCEDURE — 80053 COMPREHEN METABOLIC PANEL: CPT

## 2024-11-08 PROCEDURE — 6360000002 HC RX W HCPCS: Performed by: NURSE PRACTITIONER

## 2024-11-08 PROCEDURE — 36415 COLL VENOUS BLD VENIPUNCTURE: CPT

## 2024-11-08 RX ORDER — DIPHENHYDRAMINE HCL 50 MG
CAPSULE ORAL
Qty: 1 CAPSULE | Refills: 0 | Status: SHIPPED | OUTPATIENT
Start: 2024-11-08

## 2024-11-08 RX ORDER — SODIUM CHLORIDE 0.9 % (FLUSH) 0.9 %
10 SYRINGE (ML) INJECTION PRN
OUTPATIENT
Start: 2024-11-08

## 2024-11-08 RX ORDER — HEPARIN 100 UNIT/ML
500 SYRINGE INTRAVENOUS PRN
Status: DISCONTINUED | OUTPATIENT
Start: 2024-11-08 | End: 2024-11-09 | Stop reason: HOSPADM

## 2024-11-08 RX ORDER — SODIUM CHLORIDE 0.9 % (FLUSH) 0.9 %
10 SYRINGE (ML) INJECTION PRN
Status: CANCELLED | OUTPATIENT
Start: 2024-11-08

## 2024-11-08 RX ORDER — SODIUM CHLORIDE 0.9 % (FLUSH) 0.9 %
10 SYRINGE (ML) INJECTION PRN
Status: DISCONTINUED | OUTPATIENT
Start: 2024-11-08 | End: 2024-11-09 | Stop reason: HOSPADM

## 2024-11-08 RX ORDER — HEPARIN SODIUM (PORCINE) LOCK FLUSH IV SOLN 100 UNIT/ML 100 UNIT/ML
500 SOLUTION INTRAVENOUS PRN
Status: CANCELLED | OUTPATIENT
Start: 2024-11-08

## 2024-11-08 RX ORDER — PREDNISONE 50 MG/1
TABLET ORAL
Qty: 3 TABLET | Refills: 0 | Status: SHIPPED | OUTPATIENT
Start: 2024-11-08

## 2024-11-08 RX ORDER — HEPARIN 100 UNIT/ML
500 SYRINGE INTRAVENOUS PRN
OUTPATIENT
Start: 2024-11-08

## 2024-11-08 RX ADMIN — SODIUM CHLORIDE, PRESERVATIVE FREE 10 ML: 5 INJECTION INTRAVENOUS at 11:25

## 2024-11-08 RX ADMIN — HEPARIN 500 UNITS: 100 SYRINGE at 11:28

## 2024-11-08 ASSESSMENT — ENCOUNTER SYMPTOMS
EYE PAIN: 0
EYE REDNESS: 0
BLOOD IN STOOL: 0
COUGH: 0
SORE THROAT: 0
CONSTIPATION: 0
NAUSEA: 0
EYES NEGATIVE: 1
GASTROINTESTINAL NEGATIVE: 1
DIARRHEA: 0
WHEEZING: 0
BACK PAIN: 0
SHORTNESS OF BREATH: 0
RESPIRATORY NEGATIVE: 1
VOMITING: 0
EYE DISCHARGE: 0
ABDOMINAL PAIN: 0

## 2024-11-08 NOTE — PROGRESS NOTES
11/08/2024    MCV 93.2 (H) 11/08/2024     (HH) 11/08/2024     Lab Results   Component Value Date    NEUTROABS 5.67 11/08/2024       Lab Results   Component Value Date     11/08/2024    K 3.8 11/08/2024     11/08/2024    CO2 29 11/08/2024    BUN 12 11/08/2024    CREATININE 1.1 11/08/2024    GLUCOSE 134 (H) 11/08/2024    CALCIUM 10.0 11/08/2024    BILITOT 0.3 11/08/2024    ALKPHOS 192 (H) 11/08/2024    AST 19 11/08/2024    ALT 12 11/08/2024    LABGLOM 71 11/08/2024    GFRAA 88 09/15/2021    AGRATIO 1.8 09/15/2021    GLOB 2.6 06/25/2024       ASSESSMENT/PLAN:      1.  Low grade follicular B cell lymphoma with bone involvement (1/9/2024), stage III, Tumor cells are positive for CD20 and PAX-5 and negative for CD10, CD30, cyclin D1, BCL-2, MUM-1, and CD43.  Completed first-line treatment with Bendeka and rituximab 6 cycles on 6/26/2024. PET scan from 7/23/2024 at [facility] reported interval resolution of osseous lesions which is compatible with positive treatment response.      B2M of 3.4 and LDH of 165 on 08/08/2024    -Continue gabapentin 300 mg p.o. 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 Prostatic adenocarcinoma, acinar type, (Kevil grade 5+4 = 9), Intraductal carcinoma. No evidence of perineural invasion, 12/8/2023. Clinical stage III, high risk.  He is currently receiving ADT therapy with Lupron for anticipated 2 years and Dr. Dunn is planning on seed and gel placement on 11/18/2024 and then will follow-up with Dr Jo for radiation to prostate on 11/26/2024. He initiated the Lupron on 2/19/2024 and last  including but not limited to carcinoma, cardiovascular disease, stroke, and financial loss. I advised to quit smoking and offered resources to include nicotine patches to help with the craving. The patient is contemplated at this time. I will follow-up on smoking cessation during the next visit and continue to encourage quitting tobacco use.    8. Port-A-Cath care:     -Will be flushed every 12 weeks-flushed today    9.  Factor V Leiden deficiency: 4/1/2024, Marino reported that he also has a history of factor V Leiden that was identified greater than 30 years ago, his brother and sister also have confirmed diagnosis of factor V Leiden with his father dying from blood clots.  He denied any known history of personal blood clots and is currently taking aspirin 81 mg daily.    -Agree with continuing aspirin 81 mg p.o. daily.  No need for additional anticoagulation based on no personal history of thrombotic event    10.  PET scan on 7/23/2024 reported new infiltrates in the right lung, including an area of consolidation of the right lower lobe which most likely represents acute pneumonia.   Reported asymptomatic other than his chronic cough      I discussed all of the above findings included in the assessment and plan with the patient and the patient is in agreement to move forward with current recommendations/treatment.  I have addressed all of their questions and concerns that were verbalized.     FOLLOW UP:  1.  Follow-up appointment for 3 months for follow up for non-hodgkin's lymphoma, prostate cancer and factor V Leiden deficiency  2.  Port flush every 12 weeks  3.  Continue to follow with other medical providers as recommended.  4.  Labs at next visit: CBC, CMP, beta-2 and LDH    EMR Dragon/Transcription disclaimer:   Much of this encounter note is an electronic transcription/translation of spoken language to printed text. The electronic translation of spoken language may permit erroneous, or at times, nonsensical

## 2024-11-10 LAB — EPO SERPL-ACNC: 14 MU/ML (ref 4–27)

## 2024-11-11 LAB — B2 MICROGLOB SERPL-MCNC: 3.4 MG/L

## 2024-11-15 ENCOUNTER — TELEPHONE (OUTPATIENT)
Dept: UROLOGY | Facility: CLINIC | Age: 74
End: 2024-11-15
Payer: MEDICARE

## 2024-11-15 ENCOUNTER — HOSPITAL ENCOUNTER (OUTPATIENT)
Dept: CT IMAGING | Facility: HOSPITAL | Age: 74
Discharge: HOME | End: 2024-11-15
Payer: MEDICARE

## 2024-11-15 DIAGNOSIS — C82.80 FOLLICULAR LOW GRADE B-CELL LYMPHOMA: ICD-10-CM

## 2024-11-15 DIAGNOSIS — M54.50 ACUTE MIDLINE LOW BACK PAIN WITHOUT SCIATICA: ICD-10-CM

## 2024-11-15 LAB
CALR EXON 9 MUT ANL BLD/T: NORMAL
CITATION REF LAB TEST: NORMAL
JAK2 GENE MUT ANL BLD/T: NORMAL
JAK2 P.V617F BLD/T QL: NORMAL
LAB DIRECTOR NAME PROVIDER: NORMAL
MPL GENE MUT TESTED MAR: NORMAL
REF LAB TEST METHOD: NORMAL
REFLEX: NORMAL
TEST PERFORMANCE INFO SPEC: NORMAL

## 2024-11-15 PROCEDURE — 71260 CT THORAX DX C+: CPT

## 2024-11-15 PROCEDURE — 25510000001 IOPAMIDOL 61 % SOLUTION: Performed by: NURSE PRACTITIONER

## 2024-11-15 PROCEDURE — 72132 CT LUMBAR SPINE W/DYE: CPT

## 2024-11-15 RX ORDER — IOPAMIDOL 612 MG/ML
100 INJECTION, SOLUTION INTRAVASCULAR
Status: COMPLETED | OUTPATIENT
Start: 2024-11-15 | End: 2024-11-15

## 2024-11-15 RX ADMIN — IOPAMIDOL 100 ML: 612 INJECTION, SOLUTION INTRAVENOUS at 16:01

## 2024-11-15 ASSESSMENT — ENCOUNTER SYMPTOMS
WHEEZING: 0
EYES NEGATIVE: 1
GASTROINTESTINAL NEGATIVE: 1
COUGH: 0
BLOOD IN STOOL: 0
EYE REDNESS: 0
NAUSEA: 0
ABDOMINAL PAIN: 0
CONSTIPATION: 0
DIARRHEA: 0
EYE DISCHARGE: 0
SHORTNESS OF BREATH: 0
RESPIRATORY NEGATIVE: 1
SORE THROAT: 0
EYE PAIN: 0
VOMITING: 0
BACK PAIN: 0

## 2024-11-15 NOTE — TELEPHONE ENCOUNTER
Called patient to remind them to arrive at patient registration on 11/18 at 0600 for the procedure with Dr. Cerda. Spoke with patient. Told patient if they had any questions to please contact our office at [phone].

## 2024-11-18 ENCOUNTER — ANESTHESIA (OUTPATIENT)
Dept: PERIOP | Facility: HOSPITAL | Age: 74
End: 2024-11-18
Payer: MEDICARE

## 2024-11-18 ENCOUNTER — ANESTHESIA EVENT (OUTPATIENT)
Dept: PERIOP | Facility: HOSPITAL | Age: 74
End: 2024-11-18
Payer: MEDICARE

## 2024-11-18 ENCOUNTER — HOSPITAL ENCOUNTER (OUTPATIENT)
Facility: HOSPITAL | Age: 74
Setting detail: HOSPITAL OUTPATIENT SURGERY
Discharge: HOME | End: 2024-11-18
Attending: UROLOGY | Admitting: UROLOGY
Payer: MEDICARE

## 2024-11-18 VITALS
SYSTOLIC BLOOD PRESSURE: 155 MMHG | TEMPERATURE: 97.9 F | OXYGEN SATURATION: 96 % | RESPIRATION RATE: 16 BRPM | HEART RATE: 62 BPM | DIASTOLIC BLOOD PRESSURE: 68 MMHG

## 2024-11-18 DIAGNOSIS — C61 PROSTATE CANCER: Primary | ICD-10-CM

## 2024-11-18 PROCEDURE — 25010000002 DEXAMETHASONE PER 1 MG: Performed by: ANESTHESIOLOGY

## 2024-11-18 PROCEDURE — 25010000002 CEFAZOLIN PER 500 MG: Performed by: UROLOGY

## 2024-11-18 PROCEDURE — 25010000002 ONDANSETRON PER 1 MG

## 2024-11-18 PROCEDURE — 25010000002 LIDOCAINE PF 2% 2 % SOLUTION

## 2024-11-18 PROCEDURE — 25010000002 PROPOFOL 10 MG/ML EMULSION

## 2024-11-18 PROCEDURE — S0260 H&P FOR SURGERY: HCPCS | Performed by: UROLOGY

## 2024-11-18 PROCEDURE — A4648 IMPLANTABLE TISSUE MARKER: HCPCS | Performed by: UROLOGY

## 2024-11-18 PROCEDURE — 25810000003 LACTATED RINGERS PER 1000 ML: Performed by: UROLOGY

## 2024-11-18 PROCEDURE — 55876 PLACE RT DEVICE/MARKER PROS: CPT | Performed by: UROLOGY

## 2024-11-18 PROCEDURE — 25010000002 DEXAMETHASONE PER 1 MG

## 2024-11-18 DEVICE — IMPLANTABLE DEVICE
Type: IMPLANTABLE DEVICE | Site: PROSTATE | Status: FUNCTIONAL
Brand: GOLD FIDUCIAL

## 2024-11-18 RX ORDER — SODIUM CHLORIDE, SODIUM LACTATE, POTASSIUM CHLORIDE, CALCIUM CHLORIDE 600; 310; 30; 20 MG/100ML; MG/100ML; MG/100ML; MG/100ML
1000 INJECTION, SOLUTION INTRAVENOUS CONTINUOUS
Status: DISCONTINUED | OUTPATIENT
Start: 2024-11-18 | End: 2024-11-18 | Stop reason: HOSPADM

## 2024-11-18 RX ORDER — SODIUM CHLORIDE 0.9 % (FLUSH) 0.9 %
3 SYRINGE (ML) INJECTION AS NEEDED
Status: DISCONTINUED | OUTPATIENT
Start: 2024-11-18 | End: 2024-11-18 | Stop reason: HOSPADM

## 2024-11-18 RX ORDER — HYDROCODONE BITARTRATE AND ACETAMINOPHEN 5; 325 MG/1; MG/1
1 TABLET ORAL EVERY 4 HOURS PRN
Status: DISCONTINUED | OUTPATIENT
Start: 2024-11-18 | End: 2024-11-18 | Stop reason: HOSPADM

## 2024-11-18 RX ORDER — HYDROCODONE BITARTRATE AND ACETAMINOPHEN 5; 325 MG/1; MG/1
1 TABLET ORAL EVERY 8 HOURS PRN
Qty: 6 TABLET | Refills: 0 | Status: SHIPPED | OUTPATIENT
Start: 2024-11-18

## 2024-11-18 RX ORDER — SODIUM CHLORIDE 9 MG/ML
40 INJECTION, SOLUTION INTRAVENOUS AS NEEDED
Status: DISCONTINUED | OUTPATIENT
Start: 2024-11-18 | End: 2024-11-18 | Stop reason: HOSPADM

## 2024-11-18 RX ORDER — SODIUM CHLORIDE 0.9 % (FLUSH) 0.9 %
3 SYRINGE (ML) INJECTION EVERY 12 HOURS SCHEDULED
Status: DISCONTINUED | OUTPATIENT
Start: 2024-11-18 | End: 2024-11-18 | Stop reason: HOSPADM

## 2024-11-18 RX ORDER — SODIUM CHLORIDE 0.9 % (FLUSH) 0.9 %
SYRINGE (ML) INJECTION AS NEEDED
Status: DISCONTINUED | OUTPATIENT
Start: 2024-11-18 | End: 2024-11-18 | Stop reason: HOSPADM

## 2024-11-18 RX ORDER — DROPERIDOL 2.5 MG/ML
0.62 INJECTION, SOLUTION INTRAMUSCULAR; INTRAVENOUS ONCE AS NEEDED
Status: DISCONTINUED | OUTPATIENT
Start: 2024-11-18 | End: 2024-11-18 | Stop reason: HOSPADM

## 2024-11-18 RX ORDER — DEXAMETHASONE SODIUM PHOSPHATE 4 MG/ML
INJECTION, SOLUTION INTRA-ARTICULAR; INTRALESIONAL; INTRAMUSCULAR; INTRAVENOUS; SOFT TISSUE AS NEEDED
Status: DISCONTINUED | OUTPATIENT
Start: 2024-11-18 | End: 2024-11-18 | Stop reason: SURG

## 2024-11-18 RX ORDER — IBUPROFEN 600 MG/1
600 TABLET, FILM COATED ORAL EVERY 6 HOURS PRN
Status: DISCONTINUED | OUTPATIENT
Start: 2024-11-18 | End: 2024-11-18 | Stop reason: HOSPADM

## 2024-11-18 RX ORDER — SUCCINYLCHOLINE/SOD CL,ISO/PF 200MG/10ML
SYRINGE (ML) INTRAVENOUS AS NEEDED
Status: DISCONTINUED | OUTPATIENT
Start: 2024-11-18 | End: 2024-11-18 | Stop reason: SURG

## 2024-11-18 RX ORDER — PROPOFOL 10 MG/ML
VIAL (ML) INTRAVENOUS AS NEEDED
Status: DISCONTINUED | OUTPATIENT
Start: 2024-11-18 | End: 2024-11-18 | Stop reason: SURG

## 2024-11-18 RX ORDER — HYDROCODONE BITARTRATE AND ACETAMINOPHEN 5; 325 MG/1; MG/1
1 TABLET ORAL ONCE AS NEEDED
Status: DISCONTINUED | OUTPATIENT
Start: 2024-11-18 | End: 2024-11-18 | Stop reason: HOSPADM

## 2024-11-18 RX ORDER — FLUMAZENIL 0.1 MG/ML
0.2 INJECTION INTRAVENOUS AS NEEDED
Status: DISCONTINUED | OUTPATIENT
Start: 2024-11-18 | End: 2024-11-18 | Stop reason: HOSPADM

## 2024-11-18 RX ORDER — ACETAMINOPHEN 500 MG
1000 TABLET ORAL ONCE
Status: COMPLETED | OUTPATIENT
Start: 2024-11-18 | End: 2024-11-18

## 2024-11-18 RX ORDER — ONDANSETRON 2 MG/ML
4 INJECTION INTRAMUSCULAR; INTRAVENOUS ONCE AS NEEDED
Status: DISCONTINUED | OUTPATIENT
Start: 2024-11-18 | End: 2024-11-18 | Stop reason: HOSPADM

## 2024-11-18 RX ORDER — DEXAMETHASONE SODIUM PHOSPHATE 4 MG/ML
4 INJECTION, SOLUTION INTRA-ARTICULAR; INTRALESIONAL; INTRAMUSCULAR; INTRAVENOUS; SOFT TISSUE ONCE AS NEEDED
Status: COMPLETED | OUTPATIENT
Start: 2024-11-18 | End: 2024-11-18

## 2024-11-18 RX ORDER — LIDOCAINE HYDROCHLORIDE 20 MG/ML
INJECTION, SOLUTION EPIDURAL; INFILTRATION; INTRACAUDAL; PERINEURAL AS NEEDED
Status: DISCONTINUED | OUTPATIENT
Start: 2024-11-18 | End: 2024-11-18 | Stop reason: SURG

## 2024-11-18 RX ORDER — SODIUM CHLORIDE 0.9 % (FLUSH) 0.9 %
3-10 SYRINGE (ML) INJECTION AS NEEDED
Status: DISCONTINUED | OUTPATIENT
Start: 2024-11-18 | End: 2024-11-18 | Stop reason: HOSPADM

## 2024-11-18 RX ORDER — HYDROCODONE BITARTRATE AND ACETAMINOPHEN 10; 325 MG/1; MG/1
1 TABLET ORAL EVERY 4 HOURS PRN
Status: DISCONTINUED | OUTPATIENT
Start: 2024-11-18 | End: 2024-11-18 | Stop reason: HOSPADM

## 2024-11-18 RX ORDER — MIDAZOLAM HYDROCHLORIDE 2 MG/2ML
0.5 INJECTION, SOLUTION INTRAMUSCULAR; INTRAVENOUS
Status: DISCONTINUED | OUTPATIENT
Start: 2024-11-18 | End: 2024-11-18 | Stop reason: HOSPADM

## 2024-11-18 RX ORDER — FENTANYL CITRATE 50 UG/ML
50 INJECTION, SOLUTION INTRAMUSCULAR; INTRAVENOUS
Status: DISCONTINUED | OUTPATIENT
Start: 2024-11-18 | End: 2024-11-18 | Stop reason: HOSPADM

## 2024-11-18 RX ORDER — LABETALOL HYDROCHLORIDE 5 MG/ML
5 INJECTION, SOLUTION INTRAVENOUS
Status: DISCONTINUED | OUTPATIENT
Start: 2024-11-18 | End: 2024-11-18 | Stop reason: HOSPADM

## 2024-11-18 RX ORDER — SODIUM CHLORIDE, SODIUM LACTATE, POTASSIUM CHLORIDE, CALCIUM CHLORIDE 600; 310; 30; 20 MG/100ML; MG/100ML; MG/100ML; MG/100ML
100 INJECTION, SOLUTION INTRAVENOUS CONTINUOUS
Status: DISCONTINUED | OUTPATIENT
Start: 2024-11-18 | End: 2024-11-18 | Stop reason: HOSPADM

## 2024-11-18 RX ORDER — ULTRASOUND COUPLING MEDIUM
GEL (GRAM) TOPICAL AS NEEDED
Status: DISCONTINUED | OUTPATIENT
Start: 2024-11-18 | End: 2024-11-18 | Stop reason: HOSPADM

## 2024-11-18 RX ORDER — SCOLOPAMINE TRANSDERMAL SYSTEM 1 MG/1
1 PATCH, EXTENDED RELEASE TRANSDERMAL ONCE
Status: DISCONTINUED | OUTPATIENT
Start: 2024-11-18 | End: 2024-11-18 | Stop reason: HOSPADM

## 2024-11-18 RX ORDER — ONDANSETRON 2 MG/ML
INJECTION INTRAMUSCULAR; INTRAVENOUS AS NEEDED
Status: DISCONTINUED | OUTPATIENT
Start: 2024-11-18 | End: 2024-11-18 | Stop reason: SURG

## 2024-11-18 RX ORDER — NALOXONE HCL 0.4 MG/ML
0.4 VIAL (ML) INJECTION AS NEEDED
Status: DISCONTINUED | OUTPATIENT
Start: 2024-11-18 | End: 2024-11-18 | Stop reason: HOSPADM

## 2024-11-18 RX ORDER — LIDOCAINE HYDROCHLORIDE 10 MG/ML
0.5 INJECTION, SOLUTION EPIDURAL; INFILTRATION; INTRACAUDAL; PERINEURAL ONCE AS NEEDED
Status: DISCONTINUED | OUTPATIENT
Start: 2024-11-18 | End: 2024-11-18 | Stop reason: HOSPADM

## 2024-11-18 RX ADMIN — ONDANSETRON 4 MG: 2 INJECTION INTRAMUSCULAR; INTRAVENOUS at 10:02

## 2024-11-18 RX ADMIN — LIDOCAINE HYDROCHLORIDE 100 MG: 20 INJECTION, SOLUTION EPIDURAL; INFILTRATION; INTRACAUDAL; PERINEURAL at 09:58

## 2024-11-18 RX ADMIN — DEXAMETHASONE SODIUM PHOSPHATE 4 MG: 4 INJECTION, SOLUTION INTRA-ARTICULAR; INTRALESIONAL; INTRAMUSCULAR; INTRAVENOUS; SOFT TISSUE at 10:02

## 2024-11-18 RX ADMIN — CEFAZOLIN 2000 MG: 2 INJECTION, POWDER, FOR SOLUTION INTRAMUSCULAR; INTRAVENOUS at 10:02

## 2024-11-18 RX ADMIN — DEXAMETHASONE SODIUM PHOSPHATE 4 MG: 4 INJECTION, SOLUTION INTRA-ARTICULAR; INTRALESIONAL; INTRAMUSCULAR; INTRAVENOUS; SOFT TISSUE at 09:52

## 2024-11-18 RX ADMIN — SCOPALAMINE 1 PATCH: 1 PATCH, EXTENDED RELEASE TRANSDERMAL at 09:52

## 2024-11-18 RX ADMIN — SODIUM CHLORIDE, POTASSIUM CHLORIDE, SODIUM LACTATE AND CALCIUM CHLORIDE 1000 ML: 600; 310; 30; 20 INJECTION, SOLUTION INTRAVENOUS at 07:25

## 2024-11-18 RX ADMIN — Medication 140 MG: at 09:58

## 2024-11-18 RX ADMIN — ACETAMINOPHEN 1000 MG: 500 TABLET, FILM COATED ORAL at 09:52

## 2024-11-18 RX ADMIN — PROPOFOL 150 MG: 10 INJECTION, EMULSION INTRAVENOUS at 09:58

## 2024-11-18 NOTE — H&P
Urology H&P    Mr. Thomas is 74 y.o. male    CHIEF COMPLAINT: Patient is here forPERIRECTAL SPACE GEL APPLICATION and fiduciary markers  HPI  Patient with adenocarcinoma of the prostate to undergo EBRT.  He is here today for PERIRECTAL SPACE GEL APPLICATION and fiduciary markers.    The following portions of the patient's history were reviewed and updated as appropriate: allergies, current medications, past family history, past medical history, past social history, past surgical history and problem list.    Constitutional: Negative for chills and fever.   Gastrointestinal: Negative for abdominal pain, anal bleeding and blood in stool.   Genitourinary: Negative for flank pain and hematuria.      Medications Prior to Admission   Medication Sig Dispense Refill Last Dose/Taking    amLODIPine (NORVASC) 5 MG tablet Take 1 tablet by mouth Daily.       aspirin 81 MG EC tablet Take 1 tablet by mouth Daily.       Fluticasone Furoate-Vilanterol 100-25 MCG/ACT aerosol powder  Inhale 1 puff Daily. 3 each 3     gabapentin (NEURONTIN) 300 MG capsule Take 1 capsule by mouth 2 (Two) Times a Day.       hydrOXYzine pamoate (VISTARIL) 25 MG capsule Take 1 capsule by mouth 3 (Three) Times a Day As Needed for Itching. (Patient not taking: Reported on 10/22/2024)       multivitamin with minerals tablet tablet Take 1 tablet by mouth Daily.       naloxone (NARCAN) 4 MG/0.1ML nasal spray Call 911. Don't prime. Murray in 1 nostril for overdose. Repeat in 2-3 minutes in other nostril if no or minimal breathing/responsiveness. (Patient not taking: Reported on 10/22/2024) 2 each 0     ondansetron (ZOFRAN) 8 MG tablet Take 1 tablet by mouth Every 8 (Eight) Hours As Needed. (Patient not taking: Reported on 10/22/2024)       Trelegy Ellipta 200-62.5-25 MCG/ACT inhaler Inhale 1 puff Daily.          No current facility-administered medications for this encounter.    Past Medical History:   Diagnosis Date    Arthritis     Chronic obstructive lung  disease     COVID     Factor V Leiden     PRONE TO BLOOD CLOTS    History of colon polyps     History of hepatitis 12/06/2017 1979 the patient states he cannot remember which A,B,or C. He is unsure if he was treated.    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 04/12/2018    Pneumonia     PONV (postoperative nausea and vomiting)     Prostate cancer        Past Surgical History:   Procedure Laterality Date    ARM SKIN LESION BIOPSY / EXCISION Right     CERVICAL LYMPH NODE BIOPSY/EXCISION Left 03/26/2018    Procedure: LEFT CERVICAL LYMPH NODE BIOPSY WITH DIRECT LARYNGOSCOPY;  Surgeon: Mehul Jack MD;  Location:  [facility] OR;  Service: ENT    CHOLECYSTECTOMY      COLONOSCOPY N/A 09/18/2018    The entire examined colon is normal on direct and retroflexion views; Repeat 5 years    COLONOSCOPY N/A 11/10/2023    Diverticulosis in the left colon; The examination was otherwise normal on direct and retroflexion views; No specimens collected; Repeat 5 years    HERNIA REPAIR      KYPHOPLASTY N/A 1/3/2024    Procedure: VERTEBRAL BODY  BIOPSY T12,L2,L3;  Surgeon: Tone Mora MD;  Location:  [facility] OR;  Service: Neurosurgery;  Laterality: N/A;    LARYNGOSCOPY Left 03/26/2018    Procedure: DIRECT LARYNGOSCOPY WITH BIOPSY;  Surgeon: Mehul Jack MD;  Location:  [facility];  Service: ENT    PROSTATE BIOPSY N/A 12/8/2023    Procedure: TRANSPERINEAL PROSTATE BIOPSY WITH TRANSRECTAL ULTRASOUND GUIDANCE;  Surgeon: Joseph Cerda MD;  Location:  [facility];  Service: Urology;  Laterality: N/A;       Social History     Socioeconomic History    Marital status:    Tobacco Use    Smoking status: Every Day     Current packs/day: 1.00     Average packs/day: 1 pack/day for 62.9 years (62.9 ttl pk-yrs)     Types: Cigarettes     Start date: 1962     Passive exposure: Current    Smokeless tobacco: Never    Tobacco comments:     AVS   Vaping Use     "Vaping status: Never Used   Substance and Sexual Activity    Alcohol use: Never    Drug use: No    Sexual activity: Not Currently       Family History   Problem Relation Age of Onset    Peripheral vascular disease Father     Breast cancer Mother     Colon cancer Neg Hx     Colon polyps Neg Hx     Esophageal cancer Neg Hx     Liver cancer Neg Hx     Stomach cancer Neg Hx     Rectal cancer Neg Hx        There were no vitals taken for this visit.    Constitutional:  Patient appears well-developed and well-nourished. There are no obvious deformities. No distress. The vital signs are reviewed  Pulmonary/Chest: Effort normal.   GI: Soft. The patient exhibits no distension and no mass. There is no tenderness. There is no rebound and no guarding. No hernia.   Neurological: Patient is alert and oriented to person, place, and time.   Skin: Skin is warm and dry. Not diaphoretic.   Psychiatric:  normal mood and affect. Not agitated.       Lab Results   Component Value Date    GLUCOSE 134 (H) 11/08/2024    BUN 12 11/08/2024    CREATININE 1.1 11/08/2024    EGFRIFNONA >60 03/16/2022    BCR 18.2 12/13/2023    CO2 29 11/08/2024    CALCIUM 10.0 11/08/2024    ALBUMIN 4.6 11/08/2024    AST 19 11/08/2024    ALT 12 11/08/2024     Lab Results   Component Value Date    GLUCOSE 134 (H) 11/08/2024    CALCIUM 10.0 11/08/2024     11/08/2024    K 3.8 11/08/2024    CO2 29 11/08/2024     11/08/2024    BUN 12 11/08/2024    CREATININE 1.1 11/08/2024    EGFRIFNONA >60 03/16/2022    BCR 18.2 12/13/2023    ANIONGAP 12 11/08/2024     Lab Results   Component Value Date    WBC 7.32 11/08/2024    HGB 13.8 11/08/2024    HCT 41.4 11/08/2024    MCV 93.2 (H) 11/08/2024     (C) 11/08/2024     Lab Results   Component Value Date    PSA 0.203 08/23/2024    PSA 0.213 06/21/2024    PSA 1.790 03/20/2024     No results found for: \"URINECX\"  Brief Urine Lab Results  (Last result in the past 365 days)        Color   Clarity   Blood   Leuk Est   " Nitrite   Protein   CREAT   Urine HCG        12/22/23 0934 Yellow   Clear   Moderate (2+)   Trace   Negative   Negative                     Imaging Results (Last 7 Days)       ** No results found for the last 168 hours. **            Assessment and Plan  Prostate cancer-EBRT plan  -Plan PERIRECTAL SPACE GEL APPLICATION gel application and fiduciary markers. The risks, alternatives, and benefits of this treatment recommendation are discussed.  I did answer all questions of the patient.        (Please note that portions of this note were completed with a voice recognition program.)  Joseph Cerda MD  11/18/24  07:13 CST

## 2024-11-18 NOTE — ANESTHESIA POSTPROCEDURE EVALUATION
Patient: Prabhakar Thomas    Procedure Summary       Date: 11/18/24 Room / Location: [facility] OR  /  [facility]    Anesthesia Start: 0955 Anesthesia Stop: 1024    Procedure: TRANSRECTAL ULTRASOUND GUIDED PLACEMENT OF PROSTATE FIDUCIARY MARKERS (Rectum) Diagnosis:       Prostate cancer      (Prostate cancer [C61])    Surgeons: Joseph Cerda MD Provider: César Cornell CRNA    Anesthesia Type: general ASA Status: 3            Anesthesia Type: general    Vitals  Vitals Value Taken Time   /66 11/18/24 1056   Temp 97.9 °F (36.6 °C) 11/18/24 1023   Pulse 65 11/18/24 1056   Resp 20 11/18/24 1055   SpO2 93 % 11/18/24 1056   Vitals shown include unfiled device data.        Post Anesthesia Care and Evaluation    Patient location during evaluation: PACU  Patient participation: complete - patient participated  Level of consciousness: awake and alert  Pain management: adequate    Airway patency: patent  Anesthetic complications: No anesthetic complications    Cardiovascular status: acceptable  Respiratory status: acceptable  Hydration status: acceptable    Comments: Blood pressure 155/68, pulse 62, temperature 97.9 °F (36.6 °C), temperature source Temporal, resp. rate 16, SpO2 96%.    Pt discharged from PACU based on praneeth score >8

## 2024-11-18 NOTE — ANESTHESIA PREPROCEDURE EVALUATION
Anesthesia Evaluation     history of anesthetic complications:  PONV  NPO Solid Status: > 8 hours  NPO Liquid Status: > 8 hours           Airway   Mallampati: I  TM distance: >3 FB  No difficulty expected  Dental      Pulmonary    (+) a smoker, COPD,  (-) sleep apnea  Cardiovascular   Exercise tolerance: poor (<4 METS)    (+) hypertension  (-) CAD      Neuro/Psych  (-) seizures, TIA, CVA  GI/Hepatic/Renal/Endo    (-) liver disease, no renal disease, diabetes    Musculoskeletal     Abdominal    Substance History      OB/GYN          Other   arthritis, blood dyscrasia (factor v leiden),   history of cancer active      Other Comment: Hodgkins lymphoma, s/p treatment 2018, now receiving chemo for B cell lymphoma  Prostate cancer                Anesthesia Plan    ASA 3     general     intravenous induction     Anesthetic plan, risks, benefits, and alternatives have been provided, discussed and informed consent has been obtained with: patient.    CODE STATUS:

## 2024-11-18 NOTE — OP NOTE
Operative Summary    Prabhakar Thomas  Date of Procedure: 11/18/2024    Pre-op Diagnosis:   Prostate cancer [C61]    Post-op Diagnosis:     Post-Op Diagnosis Codes:     * Prostate cancer [C61]    Procedure/CPT® Codes:      Procedure(s):  TRANSRECTAL ULTRASOUND GUIDED PLACEMENT OF PROSTATE FIDUCIARY MARKERS AND ABORTED ATTEMPT AT SPACE OAR PLACEMENT.    Surgeon(s):  Joseph Cerda MD    Anesthesia: General    Staff:   Circulator: Marisol Toure RN; Cecelia Hua RN  Scrub Person: Juanjose Espino; Trudy Castillo    Indications for procedure:   Patient with prostate cancer that is opted for external beam radiotherapy as primary curative treatment.    Procedure details:  After appropriate anesthesia, positioning, prep and drape, timeout protocol was observed.     Patient was positioned in the dorsal lithotomy position and the perineal skin is prepped with povidone-iodine per the standard practice. Sterile drapes were then placed on the patient’s legs, genitals, ultrasound probe and the stepper.  At this point I placed the fiduciary seeds. . A transrectal ultrasound probe (TRUS) was inserted into the rectum and the space between the prostate (mid gland) and rectum is measured.  Using both transverse & sagittal ultrasound guidance,  the needles that contained the  fiduciary markers were inserted in the extreme left of the gland, just to the right of the urethra at the base and just to the right of the urethra at the apex.  Attention was now turned to placing the SpaceOAR system.. A transrectal ultrasound probe (TRUS) was confirmed to be in the appropriate position in the rectum and the space between the prostate (mid gland) and rectum is measured.  This was very small due to the lack of fat between the prostate and the anterior wall of the rectum.  Under TRUS guidance, I attempted to insert a saline syringe needle (15 cm 18G needle)  through the perineal skin, rectourethralis muscle and past the prostate apex to  the perirectal fat between Denonvilliers’ fascia at prostate mid-gland and the rectal wall.  Due to the lack of space between the anterior rectal wall and posterior aspect of prostate I was unable to place the perirectal gel.  The TRUS probe was removed from the patient, the stirrups were lowered, and patient is wheeled from the room for recovery.      Estimated Blood Loss: Less than 30 mL    Specimens:                None      Drains: none    Complications: none    Plan: Patient will begin radiation therapy per radiation oncologist plan.      (Please note that portions of this note were completed with a voice recognition program.)  Joseph Cerda MD     Date: 11/18/2024  Time: 10:41 CST  00

## 2024-11-18 NOTE — ANESTHESIA PROCEDURE NOTES
Airway  Urgency: elective    Date/Time: 11/18/2024 9:59 AM  Airway not difficult    General Information and Staff    Patient location during procedure: OR  CRNA/CAA: César Cornell CRNA    Indications and Patient Condition    Preoxygenated: yes  Mask difficulty assessment: 1 - vent by mask    Final Airway Details  Final airway type: endotracheal airway      Successful airway: ETT  Cuffed: yes   Successful intubation technique: direct laryngoscopy  Endotracheal tube insertion site: oral  Blade: [place]  Blade size: 2  ETT size (mm): 7.5  Cormack-Lehane Classification: grade I - full view of glottis  Placement verified by: chest auscultation   Cuff volume (mL): 5  Measured from: lips  ETT/EBT  to lips (cm): 22  Number of attempts at approach: 1  Assessment: lips, teeth, and gum same as pre-op and atraumatic intubation

## 2024-11-25 ENCOUNTER — HOSPITAL ENCOUNTER (OUTPATIENT)
Dept: RADIATION ONCOLOGY | Facility: HOSPITAL | Age: 74
Setting detail: RADIATION/ONCOLOGY SERIES
End: 2024-11-25
Payer: MEDICARE

## 2024-11-26 ENCOUNTER — OFFICE VISIT (OUTPATIENT)
Age: 74
End: 2024-11-26
Payer: MEDICARE

## 2024-11-26 ENCOUNTER — HOSPITAL ENCOUNTER (OUTPATIENT)
Dept: RADIATION ONCOLOGY | Facility: HOSPITAL | Age: 74
Setting detail: RADIATION/ONCOLOGY SERIES
Discharge: HOME | End: 2024-11-26
Payer: MEDICARE

## 2024-11-26 DIAGNOSIS — C81.11 NODULAR SCLEROSIS HODGKIN LYMPHOMA OF LYMPH NODES OF NECK: ICD-10-CM

## 2024-11-26 DIAGNOSIS — C61 PROSTATE CANCER: Primary | ICD-10-CM

## 2024-11-26 DIAGNOSIS — Z92.3 HISTORY OF RADIATION THERAPY: ICD-10-CM

## 2024-11-26 DIAGNOSIS — F17.200 CURRENT EVERY DAY SMOKER: ICD-10-CM

## 2024-11-26 LAB
RAD ONC ARIA COURSE END DATE: NORMAL
RAD ONC ARIA COURSE ID: NORMAL
RAD ONC ARIA COURSE LAST TREATMENT DATE: NORMAL
RAD ONC ARIA COURSE START DATE: NORMAL
RAD ONC ARIA COURSE TREATMENT ELAPSED DAYS: 14
RAD ONC ARIA FIRST TREATMENT DATE: NORMAL
RAD ONC ARIA PLAN FRACTIONS TREATED TO DATE: 10
RAD ONC ARIA PLAN ID: NORMAL
RAD ONC ARIA PLAN NAME: NORMAL
RAD ONC ARIA PLAN PRESCRIBED DOSE PER FRACTION: 2 GY
RAD ONC ARIA PLAN PRIMARY REFERENCE POINT: NORMAL
RAD ONC ARIA PLAN TOTAL FRACTIONS PRESCRIBED: 10
RAD ONC ARIA PLAN TOTAL PRESCRIBED DOSE: 2000 CGY
RAD ONC ARIA REFERENCE POINT DOSAGE GIVEN TO DATE: 17.08 GY
RAD ONC ARIA REFERENCE POINT DOSAGE GIVEN TO DATE: 20 GY
RAD ONC ARIA REFERENCE POINT ID: NORMAL
RAD ONC ARIA REFERENCE POINT ID: NORMAL

## 2024-11-26 PROCEDURE — 77334 RADIATION TREATMENT AID(S): CPT | Performed by: RADIOLOGY

## 2024-11-26 PROCEDURE — 77263 THER RADIOLOGY TX PLNG CPLX: CPT | Performed by: RADIOLOGY

## 2024-11-26 PROCEDURE — G0463 HOSPITAL OUTPT CLINIC VISIT: HCPCS | Performed by: RADIOLOGY

## 2024-11-26 NOTE — PROGRESS NOTES
[facility]  Radiation Oncology Clinic   Fox Walters MD, FACR  Benjy ZIMMERMAN  _______________________________________________  [facility]  Department of Radiation Oncology  [address] [place] [id number]  Office: [phone]  Fax: [phone]    DATE: 11/26/2024  PATIENT: Prabhakar Thomas  1950                         MEDICAL RECORD #: [id number]    1. Prostate cancer    2. 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 History of radiation therapy    4. Current every day smoker                                           REASON FOR VISIT:    No chief complaint on file.    Reason for Visit: Prabhakar Thomas is a very pleasant 74 y.o. male that returns to the clinic today for additional discussion regarding treatment for prostatic adenocarcinoma.  Dr. Cerda did take an operating room for seed placement but was unable to place the SpaceOAR gel.  Patient returns at this time for further discussion of potential radiotherapy treatment options.    History of Present Illness:  Diagnosed with prostatic adenocarcinoma, pre-PSA 4.82, [place] (5+4) 9. He does have a history of hodgkin lymphoma.      11/22/2017 - PSA: 1.640    08/25/2020 - PSA: 2.290    09/15/2023 - PET Scan (Lymphoma):  Multiple hypermetabolic bone lesions in the thoracic spine, lumbar spine, sacrum, and LEFT iliac wing. Findings are highly suspicious for osseous metastatic disease.  Small focus of hypermetabolic activity in the RIGHT posterior lateral prostate. Differential includes prostate neoplasm versus focal prostatitis. Recommend correlation with PSA and consider prostate MR or PSMA PET/CT based on PSA results.  Persistent areas of consolidation in the RIGHT middle lobe and RIGHT posterior lower lobe with low-level metabolic activity at or slightly above physiologic background level. Favor an indolent process such as postinfectious scarring although hypometabolic  indolent neoplasm is not excluded and continued follow-up is recommended. A single hypermetabolic pulmonary nodule is present in the RIGHT lower lobe. This nodule appears new compared to recent chest CTs and favored to represent an infectious or inflammatory process rather than neoplasm. However, this nodule also warrants close imaging follow-up.  Nonobstructive bilateral renal calculi.  Atherosclerotic ectatic infrarenal abdominal aorta measuring 2.7 cm diameter.    09/19/2023 - PSA: 4.82    09/27/2023 - PSA: 4.34    12/08/2023 - Prostate biopsy per :  Prostate, right mid lateral, needle biopsy:  Prostatic adenocarcinoma, acinar type, grade group 5 (Leeanne grade 5+4 = 9).  -Percentage [place] pattern 5: 70%.  -Percentage of Leeanne pattern 4: 25%.  -Cribriform pattern 4: Present.  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  No evidence of perineural invasion.  Prostate, right mid, needle biopsy: Benign prostatic parenchyma.  Prostate, right lateral base, needle biopsy: Benign prostatic parenchyma.  Prostate, right base, needle biopsy: Benign prostatic parenchyma.  Prostate, right lateral apex, needle biopsy: Benign prostatic parenchyma.  Prostate, right apex, needle biopsy: Benign prostatic parenchyma.  Prostate, left mid lateral, needle biopsy: Benign prostatic parenchyma.  Prostate, left mid, needle biopsy:  Benign prostatic parenchyma.  Acute inflammation is identified.  Prostate, left lateral base, biopsy: Benign prostatic parenchyma.  Prostate, left base, needle biopsy: Benign prostatic parenchyma.  Prostate, left lateral apex, needle biopsy: Benign prostatic parenchyma.  Prostate, left apex, needle biopsy: Benign prostatic parenchyma.    12/18/2023 - Appointment with :  He has opted for a Pylarify F-18 PSMA CT PET     01/05/2024 - PET Scan:  1.4 cm markedly PSMA PET positive lesion in the right posterolateral prostate at the  mid gland, correlating with site of biopsy-proven [place] 9 disease.  No PSMA PET positive metastatic disease. No PET positive lymph nodes or bone lesions.  The bone lesions that were hypermetabolic in the thoracic and lumbar spine on the prior FDG PET are not positive on this PSMA PET/CT. Recommend correlation with pending pathology from bone biopsy.    02/08/2024 - Appointment with Dr. Cerda:  We discussed the results of his Pylarify.    There is no evidence of metastatic prostate cancer.    I also reiterated the options of external beam radiotherapy with or without brachytherapy boost and ADT versus prostatectomy.    After discussion of risks and benefits of each of these he is elected to undergo external beam radiotherapy.    I would recommend he get 2-year ADT.    We will follow him closely with Dr. Mccarthy and Tony  ADT plan:  Leuprolide 7.5 mg injection  Lupron 45 mg injection x 4.    02/14/2024 - Consult with :  A definitive course of fractionated radiation therapy is recommended to the prostate with ADT, anticipate a course of 7000 cGy over 28 treatment fractions. The patient verbalizes understanding, voices no further questions and wishes to consider options and contact us with a decision. He will initiate lupron therapy and return in 1 month for further discussion of treatment recommendations.   Will refer back to urologist for initiation of ADT, seed placement and Space OAR gel placement. Continue ongoing management per primary care physician and other specialists.  Plan:  Radiation to the prostate would be 28 daily treatments (Monday-Friday).   Would recommend lupron therapy.   Return in 1 month to see if back is better and to see if you want prostate radiation.     02/19/2024 - One month Lupron injection received.     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   Reports significant fatigue, weakness, nausea, poor taste buds and overall not feeling well for about 2 weeks after receiving cycle 1 of Bendeka and Rituxan. When discussing side effect management he reported he only took the Zofran once a day, a lengthy discussion today related to nausea control with antiemetics. Added Phenergan 12.5 mg p.o. every 6 hours and to be rotated between Zofran 8 mg every 6 hours, recommended antibiotics routinely for the first 72 hours post chemotherapy and then as needed.  -C2D1 Bendeka 80 mg/m2 on D1 and D2 and Rituxan 375 mg/m2 on D1 every 28 days with GSF support-given today  -Return tomorrow for D2 Bendeka and then on D3 for Fulphila injection  -Recommended to obtain Imodium over-the-counter  -Benadryl at bedtime for itching and see if will help with sleep  -E-rx Phenergan 12.5 mg every 8 hours as needed for nausea/vomiting  Patient presents for follow-up for side effect managment and toxicity assessment.   Patient requires intensive monitoring due to the potential to cause serious morbidity or death.  Prostatic adenocarcinoma, acinar type, (Leeanne grade 5+4 = 9), Intraductal carcinoma. No evidence of perineural invasion, 12/8/2023. Clinical stage III, high risk.  Lab Results   Component Value Date/Time   PSA 4.34 09/27/2023 07:11 AM   CEA 4.4 09/19/2023 10:50 AM   Seen by Dr. Cerda on 2/9/2024 with recommendation for external beam radiation therapy and 2 years of ADT with recommendation return to see Dr. Cerda after he has completed radiation therapy.  He was seen in consultation by Dr. Fox Jimenez on 2/14/2024 with recommendation to proceed forward with ADT, seed placement and space OAR gel placement. Radiation to the prostate would be 28 treatments.   -1 month follow-up with Dr. Jimenez to see if he wanted to proceed with prostate radiation.  -Dr Cerda initiating Lupron 7.5 mg x1 injection which he received  on 02/19/2024, will then get Lurpon 45 mg every 6 months, this is scheduled for 03/18/2024 at [facility] center  Multiple lung nodules of unknown etiology with differential being metastatic disease or a new primary with largest in RML measuring 1.8 cm   PET scan on 9/15/2023 did not report any hypermetabolic activity or pathologic enlarged lymph nodes. Serology studies on 9/19/2023 ruled out multiple myeloma, identified an elevated PSA of 4.82 (0.0 -4.0) and a CEA of 4.4 (0.0-4.7).   He is being followed by Dr. Santos who made a referral to Dr. Jimenez for consideration for empiric stereotactic radiation to the lung nodules.Dr. Santos reported Prabhakar was a poor candidate for resection due to his overall lung function. Scheduled to follow-up with Dr. Santos in early 2024  He was also evaluated by Dr. Fox Jimenez on 10/25/2023 and reported there was no role for SBRT to the lung nodule or radiation therapy at this time.   Follow-up with Dr. Santos as recommended  Classic Hodgkin's lymphoma, stage Ia, status-post completion of 2 cycles of ABVD on 7/6/2018 followed by ISRT completed on 9/5/2018.   B2M of 3.0 on 09/19/2023  No known evidence to support recurrence of classic contras lymphoma  FOLLOW UP:  Follow-up appointment for 4 weeks for C3 Rituxan/Bendeka for non-hodgkin's lymphoma  Return for day 2 and day 3  Continue to follow with other medical providers as recommended.  Labs at next visit: CBC, TSH, beta-2 microglobulin, LDH and CMP    03/18/2024 - 6 month Lupron injection received.     03/20/2024 - Appointment with :  Prabhakar Thomas returns today for further radiotherapy recommendations regarding prostatic adenocarcinoma.  Ordered PSA today. Return to clinic for follow up appointment in 3 months to allow near completion of systemic therapy for Lymphoma.   Six month Lupron injection received on 03/18/2024.   We will defer definitive radiation to the prostate on this time to allow him to complete  systemic therapy for lymphoma.    We will plan to deliver definitive radiation to the prostate upon completion of therapy for his lymphoma.  Plan:  Return to Dr. Jimenez in 3 months to allow near completion of Bendexa/Rituxan for lymphoma  We will defer definitive radiation to prostate at this time, you are on Lupron  PSA today.    03/20/2024 - PSA: 1.790    05/16/2024 - CT chest without contrast:  Extensive chronic emphysematous lung changes.  Several pulmonary nodules which are either stable or have decreased in size since the previous study. Another follow-up examination in 12 months may be obtained to ensure long-term stability or resolution.  Stable areas of consolidation in the right lower lobe. A groundglass opacity in the right middle lobe posterolaterally appears smaller than the previous study. There is a significant decrease in density of this nodule since the previous study.  Bilateral nonobstructing renal calculi.    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   Since initiating treatment he has persistent complaints of insomnia, fatigue and pruritus, despite multiple regimen recommendations his symptoms persist.  He is taking gabapentin and reports that it may be helping some  C5D1 Bendeka 80 mg/m2 on D1 and D2 and Rituxan 375 mg/m2 on D1 every 28 days with GSF support-given today  Return tomorrow for D2 Bendeka and then on D3 for Fulphila injection  Increase frequency of gabapentin 300 mg p.o. 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  Will plan for a follow-up PET scan after completion of 6 cycles of Bendeka and rituximab.  Will consider repeating bone marrow aspiration biopsy once PET scan has been reviewed,  bone marrow aspiration biopsy on 10/26/2023 revealed Atypical lymphoid aggregates composed of CD20 positive B cells seen. No evidence of metastatic carcinoma or increased plasma cell infiltration. Negative molecular study for B cell gene rearrangement.   Patient presents for follow-up for side effect managment and toxicity assessment.   Patient requires intensive monitoring due to the potential to cause serious morbidity or death.  Prostatic adenocarcinoma, acinar type, (Leeanne grade 5+4 = 9), Intraductal carcinoma. No evidence of perineural invasion, 12/8/2023. Clinical stage III, high risk.  Seen by Dr. Cerda on 2/9/2024 with recommendation for external beam radiation therapy and 2 years of ADT. He was seen in consultation by Dr. Fox Jimenez on 2/14/2024 with recommendation to proceed forward with ADT, seed placement and space OAR gel placement. Radiation to the prostate would be 28 treatments and is being delayed until he has completed therapy for his low-grade follicular B-cell lymphoma.   PSA of 1.790 on 03/20/2024 at [facility]  Keep 3 month follow-up with Dr. Jimenez on 6/21/2024 after completion of chemotherapy to proceed with prostate radiation.-   Received Lurpon 45 mg on 03/18/2024 and will receive every 6 months at [facility] outpatient infusion center under the direction of Dr. Cerda, next dose in September  Multiple lung nodules of unknown etiology with differential being metastatic disease or a new primary with largest in RML measuring 1.8 cm   PET scan on 9/15/2023 did not report any hypermetabolic activity or pathologic enlarged lymph nodes. Serology studies on 9/19/2023 ruled out multiple myeloma, identified an elevated PSA of 4.82 (0.0 -4.0) and a CEA of 4.4 (0.0-4.7).   05/16/2024 CT chest with contrast- reported extensive chronic emphysematous lung changes. Several pulmonary nodules which are either stable or have decreased in size since the previous study. 1.8 cm RML nodule with no change, 8 mm RML nodule  previously measuring 1.1 cm, 7 mm LLL nodule previously measured 1 cm and small nodule in left lateral costophrenic sulcus measures 6 mm, previously measured 8.5 mm. Right lower lobe consolidation measures 4 x 2 cm with no change.  Follow-up with Dr. Santos as recommended  Classic Hodgkin's lymphoma, stage Ia, status-post completion of 2 cycles of ABVD on 7/6/2018 followed by ISRT completed on 9/5/2018.   No known evidence to support recurrence of classic contras lymphoma  FOLLOW UP:  Follow-up appointment for 4 weeks for C6 Rituxan/Bendeka for non-hodgkin's lymphoma  Return for day 2 and day 3  Continue to follow with other medical providers as recommended.  Labs at next visit: CBC, CMP    06/21/2024 - Appointment with :  His prostate carcinoma appears to be in remission from his ADT therapy.  His more life limiting problem is his lymphoma and he does complete his last cycle of chemotherapy this month.  At this time I do not recommend any radiation for his prostate carcinoma and further surveillance of his PSA level.  Ordered PSA today. Complete your chemotherapy regimen for Lymphoma. Next Lupron injection is scheduled on 09/03/2024. Return to clinic in 3 months or sooner if needed.   Prabhakar Thomas  reports that he has been smoking cigarettes. He started smoking about 62 years ago. He has a 62.5 pack-year smoking history. He has been exposed to tobacco smoke. He has never used smokeless tobacco. I have educated him on the risk of diseases from using tobacco products such as cancer, COPD, and heart disease. I advised him to quit and he is not willing to quit. I spent >10 minutes counseling the patient.  Plan:  PSA today  Complete your chemotherapy regimen for lymphoma  Return to Dr. Jimenez in 3 months.  Next Lupron scheduled 09/03/2024 06/21/2024 - PSA: 0.213    07/23/2024 - PET Scan:  Interval resolution of multifocal hypermetabolic osseous lesions is compatible with positive treatment response. There  are new infiltrates in the right lung, including an area of consolidation of the right lower lobe which most likely represents acute pneumonia.  Small spiculated nodule seen in the right lower lobe on the previous FDG PET/CT has resolved.    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  Completed first-line treatment with Bendeka and rituximab every 28 days with GSF support for 6 cycles on 6/26/2024 (cycle 6-day 2) for his diagnosis of low-grade follicular B-cell lymphoma. PET scan from 7/23/2024 at [facility] reported interval resolution of osseous lesions which is compatible with positive treatment response.  Previous small right lower lobe nodule resolved.  07/23/2024 PET scan ([facility])- reported interval resolution of multifocal hypermetabolic osseous lesions is compatible with positive treatment response. There are new infiltrates in the right lung, including an area of consolidation of the right lower lobe which most likely represents acute pneumonia. Small spiculated nodule seen in the right lower lobe on the previous FDG PET/CT has resolved.   -Continue gabapentin 300 mg p.o. 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 No evidence of perineural invasion, 12/8/2023. Clinical stage III, high risk.  Seen by Dr. Cerda on 2/9/2024 with recommendation for external beam radiation therapy and 2 years of ADT. He initiated  Lupron on 2/19/2024 and next dose of Lupron is due on 9/3/2024.  He was seen in follow-up by Dr. Jimenez on 6/21/2024-recommendation is to reevaluate once he has completed treatment for his low-grade follicular B-cell lymphoma.    His PSA has improved to 0.213 on 6/21/2024 compared to PSA of 1.790 on 03/20/2024 at [facility]  Keep 3 month follow-up with Dr. Jimenez in September, 2024  -Received Lurpon 45 mg on 03/18/2024 and will receive every 6 months at [facility] outpatient infusion center under the direction of Dr. Cerda, next dose in September  Multiple lung nodules of unknown etiology with differential being metastatic disease or a new primary with largest in RML measuring 1.8cm: Reported as resolved on PET scan on 7/23/2024 Small spiculated nodule seen in the right lower lobe on the previous FDG PET/CT has resolved.   -Follow-up with Dr. Santos as recommended  Classic Hodgkin's lymphoma, stage Ia, status-post completion of 2 cycles of ABVD on 7/6/2018 followed by ISRT completed on 9/5/2018.   No known evidence to support recurrence of classic contras lymphoma  FOLLOW UP:  Follow-up appointment for 3 months for follow up for non-hodgkin's lymphoma, prostate cancer and factor V Leiden deficiency  Continue to follow with other medical providers as recommended.  Labs at next visit: CBC, CMP, beta-2 and LDH    08/23/2024 - Appointment with :  I have reviewed the chart and other physicians records. PSA ordered today. Continue with Lupron as scheduled on 09/03/2024.   Return to Dr. Jimenez in 2 months to give more time to recover from recent chemotherapy.   Continue ongoing management per primary care physician and other specialists.  Plan:  PSA today  Lupron as scheduled on 09/03/2024  Return to Dr. Jimenez in 2 months to give more time to recover from recent chemotherapy.    08/23/2024 - PSA: 0.203    09/06/2024 - Received 6-month Lupron injection.     10/22/2024 - Appointment with :  returns to the clinic today  for follow up appointment regarding prostatic adenocarcinoma.   Referral to Dr. Cerda for seeds and gel. Return to Dr. Jimenez for simulation markings approximately one week after seeds and gel. Please call us when you know date of seed and gel placement in OR so we can put you on our schedule  Plan:  Referral to Dr. Cerda for seeds and gel, they will call you  Return to Dr. 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 Completed first-line treatment with Bendeka and rituximab 6 cycles on 6/26/2024. PET scan from 7/23/2024 at [facility] reported interval resolution of osseous lesions which is compatible with positive treatment response.   B2M of 3.4 and LDH of 165 on 08/08/2024  -Continue gabapentin 300 mg p.o. 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 No evidence of perineural invasion, 12/8/2023. Clinical stage III, high risk.  He is currently receiving ADT therapy with Lupron for anticipated 2 years and Dr. Cerda is planning on seed and gel placement on 11/18/2024 and then will follow-up with Dr Jimenez for radiation to prostate on 11/26/2024. He initiated the Lupron on  2/19/2024 and last dose of Lupron was given on 9/3/2024. His PSA has improved to 0.203 on 8/23/2024.   His PSA has improved to 0.213 on 6/21/2024 compared to PSA of 1.790 on 03/20/2024 at [facility]  -Follow-up with Dr. Jimenez one week after seed and gel placement to initiate radiation  -Received Lurpon 45 mg on 09/06/2024 and will receive every 6 months at [facility] outpatient infusion center under the direction of Dr. Cerda, next dose in 03/2025  FOLLOW UP:  Follow-up appointment for 3 months for follow up for non-hodgkin's lymphoma, prostate cancer and factor V Leiden deficiency  Port flush every 12 weeks  Continue to follow with other medical providers as recommended.  Labs at next visit: CBC, CMP, beta-2 and LDH    11/18/2024 - Seed placement per .         History of disseminated bone metastases of unknown primary site and potential pulmonary metastases.    03/02/2023 - CT Chest without contrast:  A new oval 2.1 x 2.6 cm opacity in the right middle lobe with indistinct margins could be a focal area of pneumonia or atelectasis. A pulmonary nodule is included. Consider short-term follow-up CT in 3 months or PET/CT for additional evaluation.  Slight increase in size of another right middle lobe area of nodularity now measuring 10 mm and previously measuring 8 mm. Other areas of nodularity appear relatively stable.  A 4 cm transverse pleural-based opacity in the right lower lobe posteriorly measures up to 4.5 cm on the prior study. This is likely infectious or inflammatory given the slight decrease in size.  Improving patchy bilateral lower lobe infiltrates. Severe centrilobular emphysema.  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 There are no pathologically enlarged lymph nodes. Heart size is mildly prominent.  Prior cholecystectomy. Nonobstructive renal stones bilaterally.      06/02/2023 - CT Chest without contrast:  Numerous pulmonary nodules the majority of which are stable from the  "previous examination.  There are 2 areas of consolidation which are more prominent than on the previous examination. This includes an area of parenchymal consolidation within the central right lung base which may represent a new superimposed infiltrate. There is also increased consolidation within the right middle lobe, particularly in the lateral segment. There was a nodular component previously measured which I feel is essentially stable from the previous exam but there is increased adjacent consolidation/infiltrate within the lateral segment of the right middle lobe. Continued follow-up will be warranted. There appears to be waxing and waning of these multiple infiltrates when reviewing more remote CTs of the chest and I suspect the changes within the right lung represent continued changes related to the same process.  Nonobstructing right-sided nephrolithiasis.    08/31/2023 - CT Chest without contrast:  Multiple pulmonary nodules some of which have developed and some of which appear to be slowly growing. Although these findings may be infectious/inflammatory, neoplasm is not fully ruled out. Follow-up PET/CT is recommended to evaluate further. Marked as \"lung findings\" in PACS for follow-up.  Focal area of consolidation in the posterior aspect of the right lower lobe is relatively stable compared back to 11/13/2022. Probable area of infection/inflammation. Neoplasm less likely.  Severe centrilobular emphysema.  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 Cardiomegaly. Dilated pulmonary arteries.  Prior cholecystectomy. Bilateral nonobstructive renal stones    09/15/2023 - PET Scan:  Multiple hypermetabolic bone lesions in the thoracic spine, lumbar spine, sacrum, and LEFT iliac wing. Findings are highly suspicious for osseous metastatic disease.  Small focus of hypermetabolic activity in the RIGHT posterior lateral prostate. Differential includes prostate neoplasm versus focal prostatitis. " Recommend correlation with PSA and consider prostate MR or PSMA PET/CT based on PSA results.  Persistent areas of consolidation in the RIGHT middle lobe and RIGHT posterior lower lobe with low-level metabolic activity at or slightly above physiologic background level. Favor an indolent process such as postinfectious scarring although hypometabolic indolent neoplasm is not excluded and continued follow-up is recommended. A single hypermetabolic pulmonary nodule is present in the RIGHT lower lobe. This nodule appears new compared to recent chest CTs and favored to represent an infectious or inflammatory process rather than neoplasm. However, this nodule also warrants close imaging follow-up.  Nonobstructive bilateral renal calculi.  Atherosclerotic ectatic infrarenal abdominal aorta measuring 2.7 cm diameter.    10/10/2023 - Appointment with :  History of lung nodules and new finding of bone lesions throughout the thoracic and lumbar spine, sacrum and left iliac wing along with small focus of hypermetabolic activity in the right posterior lateral prostate with an SUV of 3.8, PET scan on 9/15/2023.  He denied any B symptoms and a PET scan did not report any hypermetabolic activity or pathologic enlarged lymph nodes. Serology studies on 9/19/2023 ruled out multiple myeloma, identified an elevated PSA of 4.82 (0.0 -4.0) and a CEA of 4.4 (0.0-4.7).  Differential diagnosis of metastatic prostate carcinoma and metastatic lung carcinoma  Since his last office visit he has been evaluated by Dr. Cerda on 9/28/2023 and he is scheduled for an MRI of the prostate on 11/3/2023 and then further evaluation will be made in regards if a biopsy is warranted.  Prabhakar is scheduled to see cardiothoracic surgeon, Dr. Santos on 10/18/2023 for further recommendations/consideration if intervention is warranted.  Lab Results  Component Value Date/Time  PSA 4.34 09/27/2023 07:11 AM  CEA 4.4 09/19/2023 10:50 AM  09/19/2023 Serology  results  B2M 3.0  Kappa light chains 35.21  Lambda light chains 42.42  Kappa/Lambda ratio 0.83  IgG 921, IgM 178, IgA 100  M-spike not observed  Normal SPEP pattern. STEFAN gel shows a normal pattern; no monoclonal proteins seen.  Ca 9.3  Creat 0.9, GFR >60  -Keep follow up with Dr Cerda on 11/09/2023  -Keep follow up with Dr Santos on 10/18/2023  -MRI scheduled for 11/03/2023  Classic Hodgkin's lymphoma, stage Ia, status-post completion of 2 cycles of ABVD on 7/6/2018 followed by ISRT completed on 9/5/2018. He has been lost to follow-up last visit on 3/18/2022 there was no known evidence to suggest recurrence of disease.  PET scan on 9/15/2023 did not report any hypermetabolic activity in the neck or cervical lymphadenopathy. No pathological enlargement of retroperitoneal, mesenteric, pelvic or inguinal lymph nodes.  Denies any B symptoms other than unintentional weight loss of 12 pounds over the last 18 months and increase in fatigue level  Physical exam without palpable adenopathy.    10/18/2023 - Appointment with :  Note pending.    10/25/2023 - Consult with :  There is no role for SBRT to the lung nodule or other radiation therapy at this time.  Dr. Mccarthy will see the patient back for bone marrow biopsy and to determine primary site as well as systemic management.  Continue ongoing management per primary care physician and other specialists.                 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 He completed 2000 cGy in 10 fractions to the left neck on 09/05/2018.     presented to  with complains of lymphadenopathy. Neck CT was completed for further evaluation.     03/16/2018 - CT soft tissue neck:  There is extensive mucosal thickening in the left maxillary sinus, mild mucosal thickening in the right maxillary sinus, moderate amount of dependent debris is noted in the sphenoid sinus.   Skin markers were placed at 3 sites on the left neck, there  are enlarged lymph nodes at each palpable abnormality.   At the angle the mandible, there is a lymph node that measures 3.1 x 1.9 cm, probably the digastric lymph node.  Slightly more posteriorly and superiorly at level 2, there is a lymph node that measures 2.1 x 1.8 cm.  There is enlarged lymph node in the left supraclavicular space which is palpable and measures 1.2 x 1.1 cm.  More anteriorly at the same level there is a larger lymph node that measures 1.9 x 1.6 cm.   There are at least 7 additional enlarged lymph nodes within the left cervical chain.  No right-sided cervical lymphadenopathy is identified.   There is a small benign-appearing 4 x 2 mm low-attenuation nodule in the right thyroid lobe.  The salivary glands are symmetric, there were 2 nodules in the inferior left parotid gland, possibly lymph nodes, largest measures 1.2 cm and appears centrally necrotic    03/26/2018 - Left base of tongue and cervical lymph node biopsy performed by :  Left base of tongue, biopsy:  Fragments of squamous mucosa with submucosal lymphoid tissue demonstrating changes of reactive lymphoid hyperplasia.  No Manoj-Tricia cells identified.  No histologic evidence of malignancy.    Left lower cervical lymph nodes, level V, excisional biopsy:   Classical Hodgkin lymphoma, nodular sclerosis type.    Comment: Immunohistochemical studies [place] at [facility] [place] the morphologic impression of Hodgkin lymphoma.  As would be expected in Hodgkin lymphoma, flow cytometric analysis performed by [facility] reveals no immunophenotypic evidence of a clonal B-cell or an abnormal T-cell population.  Please refer to the complete pathology and flow cytometry reports from [facility] which are appended.    03/27/2018 - CT Abdomen/Pelvis with contrast:  No acute intra-abdominal or pelvic process, there is evidence of previous cholecystectomy and fatty infiltration of the liver is present. There is a  stable small 6 to 7 mm nodule in the right middle lobe compared with a previous chest CT.  Nonvisualization of the appendix but no evidence of appendicitis.  Small nonobstructing nephrolithiasis bilaterally. There are small cysts in the upper pole the left kidney that appear benign.  Evidence of previous bilateral inguinal hernia repair.  2.7 cm sebaceous cyst over the right mid back.    04/13/2018 - Appointment with :  Referral to oncology     04/24/2018 - PET Scan  Numerous enlarged lymph nodes are seen in the LEFT cervical chain and LEFT supraclavicular region. A posterior lymph node at the LEFT mandibular angle demonstrates a maximum SUV of 8.2 and measures 2 cm in diameter.  There is a 2.1 cm lymph node deep to the LEFT sternocleidomastoid that demonstrates a maximum SUV of 6.8 a medial LEFT supraclavicular lymph node demonstrates a maximum SUV of 5.6 and measures approximately 1 cm in diameter.     04/24/2018 - Bone Marrow Biopsy:  Showed no signs of involvement by classical Hodgkins lymphoma.  One atypical lymphoid aggregates composed of CD20 positive B cells with positive B-cell gene rearrangement, which raises a concern for non-Hodgkin lymphoma    04/25/2018 - CT Chest with contrast:  LEFT supraclavicular and posterior cervical lymphadenopathy is appreciated.  There is a 1.1 cm RIGHT hilar lymph node. This was not hot on the recently performed PET/CT.  A 1.2 cm groundglass opacity in the RIGHT lower lobe can be followed on subsequent exams.    05/11/2018 - 07/06/2018 - Chemotherapy course:  ABVD x 2 cycles    07/18/2018 - PET Scan performed after 2 cycles of ABVD:  There is increased uptake in the left posterior oropharynx demonstrating a maximum SUV of 3.95.   There is an adjacent calcification or metallic density, which could result in some artifact.  Otherwise, there has been complete resolution of hypermetabolism within the lymph nodes within the left neck.  Enlarged lymph nodes previously  noted along the left cervical chain now demonstrate a maximum SUV of 1.91, previously demonstrating a maximum SUV of at least 8.2.   No residual hypermetabolism is seen within the left supraclavicular lymph nodes.  There is new somewhat diffuse hypermetabolism in the lungs bilaterally, particularly in the subpleural position, with associated airspace opacities concerning for an infectious or inflammatory process.   A nodule at the left lung base does not appear hypermetabolic.   Physiologic activity is noted in the myocardium.   No foci of hypermetabolism are appreciated in either breast or either axilla.   There is mild diffuse increased uptake along the course of the esophagus, which can be seen with esophagitis.   A tiny lymph node in the superior mediastinum adjacent to the esophagus demonstrates a maximum SUV of 2.2. The lymph node appears normal in caliber. Activity within this lymph node appears new when compared to the previous exam.  There is increased activity within the rectum relative to the remainder of the bowel, demonstrating a maximum SUV of 7.95.     Addendum: The maximum SUV within the left cervical lymph nodes measures 1.91, with maximum uptake in the mediastinum demonstrating an SUV of 2.25.   Maximum SUV in the liver measures 2.41. (Score of  1 on Deauville Criteria).   Lymph nodes in the left supraclavicular region demonstrating a maximum SUV of 0.95 (Score of 1 on Deauville Criteria).    07/20/2018 - Appointment with :  Referral to radiation oncology for ISRT 20 Gy  Referred to GI for consideration of EGD/colonoscopy   Follow up in 4 weeks     07/26/2018 - Consult with :  With a curative intention, I am recommending ISRT which will consist of 20 Gy over 2 weeks.     08/22/2018 - 09/05/2018 - Completed Radiation therapy:  Received 2000 cGy in 10 fractions to left neck via external beam radiation therapy.    10/22/2018 - Appointment with :  Follow up in 3 months.      04/26/2019 - Scheduled CT Chest/Neck/Abdomen/Pelvis with contrast.            History obtained from  PATIENT, FAMILY, and CHART    PAST MEDICAL HISTORY  Past Medical History:   Diagnosis Date    Arthritis     Chronic obstructive lung disease     COVID     Factor V Leiden     PRONE TO BLOOD CLOTS    History of colon polyps     History of hepatitis 12/06/2017    1979 the patient states he cannot remember which A,B,or C. He is unsure if he was treated.    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 04/12/2018    Pneumonia     PONV (postoperative nausea and vomiting)     Prostate cancer       PAST SURGICAL HISTORY  Past Surgical History:   Procedure Laterality Date    ARM SKIN LESION BIOPSY / EXCISION Right     CERVICAL LYMPH NODE BIOPSY/EXCISION Left 03/26/2018    Procedure: LEFT CERVICAL LYMPH NODE BIOPSY WITH DIRECT LARYNGOSCOPY;  Surgeon: Mehul Jack MD;  Location:  [facility] OR;  Service: ENT    CHOLECYSTECTOMY      COLONOSCOPY N/A 09/18/2018    The entire examined colon is normal on direct and retroflexion views; Repeat 5 years    COLONOSCOPY N/A 11/10/2023    Diverticulosis in the left colon; The examination was otherwise normal on direct and retroflexion views; No specimens collected; Repeat 5 years    HERNIA REPAIR      KYPHOPLASTY N/A 1/3/2024    Procedure: VERTEBRAL BODY  BIOPSY T12,L2,L3;  Surgeon: Tone Mora MD;  Location:  [facility];  Service: Neurosurgery;  Laterality: N/A;    LARYNGOSCOPY Left 03/26/2018    Procedure: DIRECT LARYNGOSCOPY WITH BIOPSY;  Surgeon: Mehul Jack MD;  Location:  [facility];  Service: ENT    PROSTATE BIOPSY N/A 12/8/2023    Procedure: TRANSPERINEAL PROSTATE BIOPSY WITH TRANSRECTAL ULTRASOUND GUIDANCE;  Surgeon: Joseph Cerda MD;  Location:  [facility];  Service: Urology;  Laterality: N/A;      FAMILY HISTORY  family history includes Breast cancer in his mother; Peripheral vascular  disease in his father.    SOCIAL HISTORY  Social History     Tobacco Use    Smoking status: Every Day     Current packs/day: 1.00     Average packs/day: 1 pack/day for 62.9 years (62.9 ttl pk-yrs)     Types: Cigarettes     Start date: 1962     Passive exposure: Current    Smokeless tobacco: Never    Tobacco comments:     AVS   Vaping Use    Vaping status: Never Used   Substance Use Topics    Alcohol use: Never    Drug use: No     ALLERGIES  Contrast dye (echo or unknown ct/mr) and Codeine     MEDICATIONS    Current Outpatient Medications:     amLODIPine (NORVASC) 5 MG tablet, Take 1 tablet by mouth Daily., Disp: , Rfl:     aspirin 81 MG EC tablet, Take 1 tablet by mouth Daily., Disp: , Rfl:     gabapentin (NEURONTIN) 300 MG capsule, Take 1 capsule by mouth 2 (Two) Times a Day., Disp: , Rfl:     HYDROcodone-acetaminophen (NORCO) 5-325 MG per tablet, Take 1 tablet by mouth Every 8 (Eight) Hours As Needed for Moderate Pain (Pain)., Disp: 6 tablet, Rfl: 0    Leuprolide Acetate (LUPRON IJ), Inject  as directed., Disp: , Rfl:     multivitamin with minerals tablet tablet, Take 1 tablet by mouth Daily., Disp: , Rfl:     naloxone (NARCAN) 4 MG/0.1ML nasal spray, Call 911. Don't prime. Dixon in 1 nostril for overdose. Repeat in 2-3 minutes in other nostril if no or minimal breathing/responsiveness., Disp: 2 each, Rfl: 0    nystatin (MYCOSTATIN) 100,000 unit/mL suspension, Take 5 mL by mouth 4 (Four) Times a Day., Disp: 120 mL, Rfl: 0    ondansetron (ZOFRAN) 8 MG tablet, Take 1 tablet by mouth Every 8 (Eight) Hours As Needed., Disp: , Rfl:     Polyethylene Glycol 3350 (MIRALAX PO), Take 1 Scoop by mouth Daily., Disp: , Rfl:     Trelegy Ellipta 200-62.5-25 MCG/ACT inhaler, Inhale 1 puff Daily., Disp: , Rfl:     The following portions of the patient's history were reviewed and updated as appropriate: allergies, current medications, past family history, past medical history, past social history, past surgical history and problem  list.    Current outpatient and discharge medications have been reconciled for the patient.  Reviewed by: Fox Jimenez III, MD    REVIEW OF SYSTEMS  Review of Systems    PHYSICAL EXAM  VITAL SIGNS: There were no vitals filed for this visit.    Physical Exam  Vitals reviewed.   Constitutional:       Appearance: Normal appearance.   HENT:      Head: Normocephalic.      Nose: Nose normal.   Eyes:      Pupils: Pupils are equal, round, and reactive to light.   Cardiovascular:      Rate and Rhythm: Normal rate and regular rhythm.      Pulses: Normal pulses.      Heart sounds: Normal heart sounds.   Pulmonary:      Effort: Pulmonary effort is normal. No respiratory distress.      Breath sounds: Normal breath sounds. No wheezing.   Abdominal:      General: Bowel sounds are normal.      Palpations: There is no mass.   Musculoskeletal:         General: Normal range of motion.      Cervical back: Normal range of motion and neck supple. No tenderness.   Lymphadenopathy:      Cervical: No cervical adenopathy.   Skin:     General: Skin is warm and dry.      Capillary Refill: Capillary refill takes less than 2 seconds.   Neurological:      General: No focal deficit present.      Mental Status: He is alert and oriented to person, place, and time.      Motor: No weakness.   Psychiatric:         Mood and Affect: Mood normal.         Behavior: Behavior normal.       Performance Status: ECOG (1) Restricted in physically strenuous activity, ambulatory and able to do work of light nature    Clinical Quality Measures  - Pain Documented by Standardized Tool, FPS Prabhakar Thomas reports a pain score of 0. Given his pain assessment as noted, treatment options were discussed and the following options were decided upon as a follow-up plan to address the patient's pain:  No pain, no plan given .  Pain Medications               aspirin 81 MG EC tablet Take 1 tablet by mouth Daily.    gabapentin (NEURONTIN) 300 MG capsule Take 1 capsule by mouth  2 (Two) Times a Day.    HYDROcodone-acetaminophen (NORCO) 5-325 MG per tablet Take 1 tablet by mouth Every 8 (Eight) Hours As Needed for Moderate Pain (Pain).          - Body Mass Index Screening and Follow-Up Plan  BMI is within normal parameters. No other follow-up for BMI required.    - Tobacco Use: Screening and Cessation Intervention  Social History    Tobacco Use      Smoking status: Every Day        Packs/day: 1.00        Years: 1 pack/day for 62.9 years (62.9 ttl pk-yrs)        Types: Cigarettes        Start date: 1962        Passive exposure: Current      Smokeless tobacco: Never      Tobacco comments: AVS    Smoking cessation information given in after visit summary.    - Advanced Care Planning Advance Care Planning  ACP discussion was held with the patient during this visit. Patient does not have an advance directive, information provided.    - PHQ-2 Depression Screening:  Little interest or pleasure in doing things?     Feeling down, depressed, or hopeless?     PHQ-2 Total Score       ASSESSMENT AND PLAN  1. Prostate cancer    2. 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 History of radiation therapy    4. Current every day smoker      No orders of the defined types were placed in this encounter.    RECOMMENDATIONS:  Prabhakar Thomas returns to the clinic today for additional discussion regarding treatment for prostatic adenocarcinoma.  He has clinical stage IIIc adenocarcinoma the prostate    Today we did discuss proceeding with radiation in the absence of SpaceOAR gel.  I did advise him there is increased risk of chronic rectal bleeding without the SpaceOAR gel.  He verbalizes understanding and agrees to proceed with radiotherapy with this increased risk of potential rectal toxicity such as bleeding.    Prabhakar Thomas  reports that he has been smoking cigarettes. He started smoking about 62 years ago. He has a 62.9 pack-year smoking history. He has been exposed to tobacco smoke. He  has never used smokeless tobacco. I have educated him on the risk of diseases from using tobacco products such as cancer, COPD, and heart disease. I spent >10 minutes counseling the patient.    Time Spent: I spent 40 minutes caring for Prabhakar on this date of service. This time includes time spent by me in the following activities: preparing for the visit, reviewing tests, obtaining and/or reviewing a separately obtained history, performing a medically appropriate examination and/or evaluation, counseling and educating the patient/family/caregiver, ordering medications, tests, or procedures, and documenting information in the medical record.   Fox Jimenez III, MD  11/26/2024

## 2024-12-02 ENCOUNTER — HOSPITAL ENCOUNTER (OUTPATIENT)
Dept: RADIATION ONCOLOGY | Facility: HOSPITAL | Age: 74
Setting detail: RADIATION/ONCOLOGY SERIES
End: 2024-12-02
Payer: MEDICARE

## 2024-12-04 PROCEDURE — 77301 RADIOTHERAPY DOSE PLAN IMRT: CPT | Performed by: RADIOLOGY

## 2024-12-04 PROCEDURE — 77300 RADIATION THERAPY DOSE PLAN: CPT | Performed by: RADIOLOGY

## 2024-12-04 PROCEDURE — 77338 DESIGN MLC DEVICE FOR IMRT: CPT | Performed by: RADIOLOGY

## 2024-12-11 ENCOUNTER — HOSPITAL ENCOUNTER (OUTPATIENT)
Dept: RADIATION ONCOLOGY | Facility: HOSPITAL | Age: 74
Discharge: HOME | End: 2024-12-11

## 2024-12-11 ENCOUNTER — DOCUMENTATION (OUTPATIENT)
Dept: RADIATION ONCOLOGY | Facility: HOSPITAL | Age: 74
End: 2024-12-11
Payer: MEDICARE

## 2024-12-11 PROCEDURE — 77385: CPT | Performed by: RADIOLOGY

## 2024-12-11 PROCEDURE — 77014 CHG CT GUIDANCE RADIATION THERAPY FLDS PLACEMENT: CPT | Performed by: RADIOLOGY

## 2024-12-11 PROCEDURE — 77427 RADIATION TX MANAGEMENT X5: CPT | Performed by: RADIOLOGY

## 2024-12-11 NOTE — PROGRESS NOTES
VIKTOR met with Mr. Thomas who is here to start radiation treatment for prostate cancer. VIKTOR introduced self and explained role and source of support. He is 74 years old and lives with his wife, daughter, and granddaughter. His support system includes his family. Mr. Thomas does not have transportation or financial concerns. He is independent with his ADLs. Mr. Thomas is nervous about possible side effects and states, “I don't want a burn.” His father went through treatment and had tough side effects. Emotional support provided. Mr. Thomas does not take any medication for anxiety/depression and does not see a counselor. 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 No needs noted. SW encouraged him to call if assistance is needed in the future.

## 2024-12-12 ENCOUNTER — APPOINTMENT (OUTPATIENT)
Dept: RADIATION ONCOLOGY | Facility: HOSPITAL | Age: 74
End: 2024-12-12
Payer: MEDICARE

## 2024-12-12 ENCOUNTER — HOSPITAL ENCOUNTER (OUTPATIENT)
Dept: RADIATION ONCOLOGY | Facility: HOSPITAL | Age: 74
Setting detail: RADIATION/ONCOLOGY SERIES
End: 2024-12-12
Payer: MEDICARE

## 2024-12-12 LAB
RAD ONC ARIA COURSE ID: NORMAL
RAD ONC ARIA COURSE INTENT: NORMAL
RAD ONC ARIA COURSE LAST TREATMENT DATE: NORMAL
RAD ONC ARIA COURSE START DATE: NORMAL
RAD ONC ARIA COURSE TREATMENT ELAPSED DAYS: 1
RAD ONC ARIA FIRST TREATMENT DATE: NORMAL
RAD ONC ARIA PLAN FRACTIONS TREATED TO DATE: 2
RAD ONC ARIA PLAN ID: NORMAL
RAD ONC ARIA PLAN PRESCRIBED DOSE PER FRACTION: 2.5 GY
RAD ONC ARIA PLAN PRIMARY REFERENCE POINT: NORMAL
RAD ONC ARIA PLAN TOTAL FRACTIONS PRESCRIBED: 28
RAD ONC ARIA PLAN TOTAL PRESCRIBED DOSE: 7000 CGY
RAD ONC ARIA REFERENCE POINT DOSAGE GIVEN TO DATE: 5 GY
RAD ONC ARIA REFERENCE POINT ID: NORMAL
RAD ONC ARIA REFERENCE POINT SESSION DOSAGE GIVEN: 2.5 GY

## 2024-12-12 PROCEDURE — 77014 CHG CT GUIDANCE RADIATION THERAPY FLDS PLACEMENT: CPT | Performed by: RADIOLOGY

## 2024-12-12 PROCEDURE — 77385: CPT | Performed by: RADIOLOGY

## 2024-12-13 ENCOUNTER — HOSPITAL ENCOUNTER (OUTPATIENT)
Dept: RADIATION ONCOLOGY | Facility: HOSPITAL | Age: 74
Discharge: HOME | End: 2024-12-13

## 2024-12-13 LAB
RAD ONC ARIA COURSE ID: NORMAL
RAD ONC ARIA COURSE INTENT: NORMAL
RAD ONC ARIA COURSE LAST TREATMENT DATE: NORMAL
RAD ONC ARIA COURSE START DATE: NORMAL
RAD ONC ARIA COURSE TREATMENT ELAPSED DAYS: 2
RAD ONC ARIA FIRST TREATMENT DATE: NORMAL
RAD ONC ARIA PLAN FRACTIONS TREATED TO DATE: 3
RAD ONC ARIA PLAN ID: NORMAL
RAD ONC ARIA PLAN PRESCRIBED DOSE PER FRACTION: 2.5 GY
RAD ONC ARIA PLAN PRIMARY REFERENCE POINT: NORMAL
RAD ONC ARIA PLAN TOTAL FRACTIONS PRESCRIBED: 28
RAD ONC ARIA PLAN TOTAL PRESCRIBED DOSE: 7000 CGY
RAD ONC ARIA REFERENCE POINT DOSAGE GIVEN TO DATE: 7.5 GY
RAD ONC ARIA REFERENCE POINT ID: NORMAL
RAD ONC ARIA REFERENCE POINT SESSION DOSAGE GIVEN: 2.5 GY

## 2024-12-13 PROCEDURE — 77014 CHG CT GUIDANCE RADIATION THERAPY FLDS PLACEMENT: CPT | Performed by: RADIOLOGY

## 2024-12-13 PROCEDURE — 77385: CPT | Performed by: RADIOLOGY

## 2024-12-16 ENCOUNTER — HOSPITAL ENCOUNTER (OUTPATIENT)
Dept: RADIATION ONCOLOGY | Facility: HOSPITAL | Age: 74
Setting detail: RADIATION/ONCOLOGY SERIES
Discharge: HOME | End: 2024-12-16
Payer: MEDICARE

## 2024-12-16 LAB
RAD ONC ARIA COURSE ID: NORMAL
RAD ONC ARIA COURSE INTENT: NORMAL
RAD ONC ARIA COURSE LAST TREATMENT DATE: NORMAL
RAD ONC ARIA COURSE START DATE: NORMAL
RAD ONC ARIA COURSE TREATMENT ELAPSED DAYS: 5
RAD ONC ARIA FIRST TREATMENT DATE: NORMAL
RAD ONC ARIA PLAN FRACTIONS TREATED TO DATE: 4
RAD ONC ARIA PLAN ID: NORMAL
RAD ONC ARIA PLAN PRESCRIBED DOSE PER FRACTION: 2.5 GY
RAD ONC ARIA PLAN PRIMARY REFERENCE POINT: NORMAL
RAD ONC ARIA PLAN TOTAL FRACTIONS PRESCRIBED: 28
RAD ONC ARIA PLAN TOTAL PRESCRIBED DOSE: 7000 CGY
RAD ONC ARIA REFERENCE POINT DOSAGE GIVEN TO DATE: 10 GY
RAD ONC ARIA REFERENCE POINT ID: NORMAL
RAD ONC ARIA REFERENCE POINT SESSION DOSAGE GIVEN: 2.5 GY

## 2024-12-16 PROCEDURE — 77385: CPT | Performed by: RADIOLOGY

## 2024-12-16 PROCEDURE — 77014 CHG CT GUIDANCE RADIATION THERAPY FLDS PLACEMENT: CPT | Performed by: RADIOLOGY

## 2024-12-17 ENCOUNTER — HOSPITAL ENCOUNTER (OUTPATIENT)
Dept: RADIATION ONCOLOGY | Facility: HOSPITAL | Age: 74
Setting detail: RADIATION/ONCOLOGY SERIES
Discharge: HOME | End: 2024-12-17
Payer: MEDICARE

## 2024-12-17 LAB
RAD ONC ARIA COURSE ID: NORMAL
RAD ONC ARIA COURSE INTENT: NORMAL
RAD ONC ARIA COURSE LAST TREATMENT DATE: NORMAL
RAD ONC ARIA COURSE START DATE: NORMAL
RAD ONC ARIA COURSE TREATMENT ELAPSED DAYS: 6
RAD ONC ARIA FIRST TREATMENT DATE: NORMAL
RAD ONC ARIA PLAN FRACTIONS TREATED TO DATE: 5
RAD ONC ARIA PLAN ID: NORMAL
RAD ONC ARIA PLAN PRESCRIBED DOSE PER FRACTION: 2.5 GY
RAD ONC ARIA PLAN PRIMARY REFERENCE POINT: NORMAL
RAD ONC ARIA PLAN TOTAL FRACTIONS PRESCRIBED: 28
RAD ONC ARIA PLAN TOTAL PRESCRIBED DOSE: 7000 CGY
RAD ONC ARIA REFERENCE POINT DOSAGE GIVEN TO DATE: 12.5 GY
RAD ONC ARIA REFERENCE POINT ID: NORMAL
RAD ONC ARIA REFERENCE POINT SESSION DOSAGE GIVEN: 2.5 GY

## 2024-12-17 PROCEDURE — 77336 RADIATION PHYSICS CONSULT: CPT | Performed by: RADIOLOGY

## 2024-12-17 PROCEDURE — 77385: CPT | Performed by: RADIOLOGY

## 2024-12-17 PROCEDURE — 77014 CHG CT GUIDANCE RADIATION THERAPY FLDS PLACEMENT: CPT | Performed by: RADIOLOGY

## 2024-12-18 ENCOUNTER — HOSPITAL ENCOUNTER (OUTPATIENT)
Dept: RADIATION ONCOLOGY | Facility: HOSPITAL | Age: 74
Discharge: HOME | End: 2024-12-18

## 2024-12-18 LAB
RAD ONC ARIA COURSE ID: NORMAL
RAD ONC ARIA COURSE INTENT: NORMAL
RAD ONC ARIA COURSE LAST TREATMENT DATE: NORMAL
RAD ONC ARIA COURSE START DATE: NORMAL
RAD ONC ARIA COURSE TREATMENT ELAPSED DAYS: 7
RAD ONC ARIA FIRST TREATMENT DATE: NORMAL
RAD ONC ARIA PLAN FRACTIONS TREATED TO DATE: 6
RAD ONC ARIA PLAN ID: NORMAL
RAD ONC ARIA PLAN PRESCRIBED DOSE PER FRACTION: 2.5 GY
RAD ONC ARIA PLAN PRIMARY REFERENCE POINT: NORMAL
RAD ONC ARIA PLAN TOTAL FRACTIONS PRESCRIBED: 28
RAD ONC ARIA PLAN TOTAL PRESCRIBED DOSE: 7000 CGY
RAD ONC ARIA REFERENCE POINT DOSAGE GIVEN TO DATE: 15 GY
RAD ONC ARIA REFERENCE POINT ID: NORMAL
RAD ONC ARIA REFERENCE POINT SESSION DOSAGE GIVEN: 2.5 GY

## 2024-12-18 PROCEDURE — 77014 CHG CT GUIDANCE RADIATION THERAPY FLDS PLACEMENT: CPT | Performed by: RADIOLOGY

## 2024-12-18 PROCEDURE — 77427 RADIATION TX MANAGEMENT X5: CPT | Performed by: RADIOLOGY

## 2024-12-18 PROCEDURE — 77385: CPT | Performed by: RADIOLOGY

## 2024-12-19 ENCOUNTER — HOSPITAL ENCOUNTER (OUTPATIENT)
Dept: RADIATION ONCOLOGY | Facility: HOSPITAL | Age: 74
Discharge: HOME | End: 2024-12-19

## 2024-12-19 LAB
RAD ONC ARIA COURSE ID: NORMAL
RAD ONC ARIA COURSE INTENT: NORMAL
RAD ONC ARIA COURSE LAST TREATMENT DATE: NORMAL
RAD ONC ARIA COURSE START DATE: NORMAL
RAD ONC ARIA COURSE TREATMENT ELAPSED DAYS: 8
RAD ONC ARIA FIRST TREATMENT DATE: NORMAL
RAD ONC ARIA PLAN FRACTIONS TREATED TO DATE: 7
RAD ONC ARIA PLAN ID: NORMAL
RAD ONC ARIA PLAN PRESCRIBED DOSE PER FRACTION: 2.5 GY
RAD ONC ARIA PLAN PRIMARY REFERENCE POINT: NORMAL
RAD ONC ARIA PLAN TOTAL FRACTIONS PRESCRIBED: 28
RAD ONC ARIA PLAN TOTAL PRESCRIBED DOSE: 7000 CGY
RAD ONC ARIA REFERENCE POINT DOSAGE GIVEN TO DATE: 17.5 GY
RAD ONC ARIA REFERENCE POINT ID: NORMAL
RAD ONC ARIA REFERENCE POINT SESSION DOSAGE GIVEN: 2.5 GY

## 2024-12-19 PROCEDURE — 77385: CPT | Performed by: RADIOLOGY

## 2024-12-19 PROCEDURE — 77014 CHG CT GUIDANCE RADIATION THERAPY FLDS PLACEMENT: CPT | Performed by: RADIOLOGY

## 2024-12-20 ENCOUNTER — HOSPITAL ENCOUNTER (OUTPATIENT)
Dept: RADIATION ONCOLOGY | Facility: HOSPITAL | Age: 74
Setting detail: RADIATION/ONCOLOGY SERIES
Discharge: HOME | End: 2024-12-20
Payer: MEDICARE

## 2024-12-20 LAB
RAD ONC ARIA COURSE ID: NORMAL
RAD ONC ARIA COURSE INTENT: NORMAL
RAD ONC ARIA COURSE LAST TREATMENT DATE: NORMAL
RAD ONC ARIA COURSE START DATE: NORMAL
RAD ONC ARIA COURSE TREATMENT ELAPSED DAYS: 9
RAD ONC ARIA FIRST TREATMENT DATE: NORMAL
RAD ONC ARIA PLAN FRACTIONS TREATED TO DATE: 8
RAD ONC ARIA PLAN ID: NORMAL
RAD ONC ARIA PLAN PRESCRIBED DOSE PER FRACTION: 2.5 GY
RAD ONC ARIA PLAN PRIMARY REFERENCE POINT: NORMAL
RAD ONC ARIA PLAN TOTAL FRACTIONS PRESCRIBED: 28
RAD ONC ARIA PLAN TOTAL PRESCRIBED DOSE: 7000 CGY
RAD ONC ARIA REFERENCE POINT DOSAGE GIVEN TO DATE: 20 GY
RAD ONC ARIA REFERENCE POINT ID: NORMAL
RAD ONC ARIA REFERENCE POINT SESSION DOSAGE GIVEN: 2.5 GY

## 2024-12-20 PROCEDURE — 77385: CPT | Performed by: RADIOLOGY

## 2024-12-20 PROCEDURE — 77014 CHG CT GUIDANCE RADIATION THERAPY FLDS PLACEMENT: CPT | Performed by: RADIOLOGY

## 2024-12-23 ENCOUNTER — HOSPITAL ENCOUNTER (OUTPATIENT)
Dept: RADIATION ONCOLOGY | Facility: HOSPITAL | Age: 74
Setting detail: RADIATION/ONCOLOGY SERIES
Discharge: HOME | End: 2024-12-23
Payer: MEDICARE

## 2024-12-23 LAB
RAD ONC ARIA COURSE ID: NORMAL
RAD ONC ARIA COURSE INTENT: NORMAL
RAD ONC ARIA COURSE LAST TREATMENT DATE: NORMAL
RAD ONC ARIA COURSE START DATE: NORMAL
RAD ONC ARIA COURSE TREATMENT ELAPSED DAYS: 12
RAD ONC ARIA FIRST TREATMENT DATE: NORMAL
RAD ONC ARIA PLAN FRACTIONS TREATED TO DATE: 9
RAD ONC ARIA PLAN ID: NORMAL
RAD ONC ARIA PLAN PRESCRIBED DOSE PER FRACTION: 2.5 GY
RAD ONC ARIA PLAN PRIMARY REFERENCE POINT: NORMAL
RAD ONC ARIA PLAN TOTAL FRACTIONS PRESCRIBED: 28
RAD ONC ARIA PLAN TOTAL PRESCRIBED DOSE: 7000 CGY
RAD ONC ARIA REFERENCE POINT DOSAGE GIVEN TO DATE: 22.5 GY
RAD ONC ARIA REFERENCE POINT ID: NORMAL
RAD ONC ARIA REFERENCE POINT SESSION DOSAGE GIVEN: 2.5 GY

## 2024-12-23 PROCEDURE — 77385: CPT | Performed by: RADIOLOGY

## 2024-12-23 PROCEDURE — 77014 CHG CT GUIDANCE RADIATION THERAPY FLDS PLACEMENT: CPT | Performed by: RADIOLOGY

## 2024-12-26 ENCOUNTER — HOSPITAL ENCOUNTER (OUTPATIENT)
Dept: RADIATION ONCOLOGY | Facility: HOSPITAL | Age: 74
Discharge: HOME | End: 2024-12-26

## 2024-12-26 LAB
RAD ONC ARIA COURSE ID: NORMAL
RAD ONC ARIA COURSE INTENT: NORMAL
RAD ONC ARIA COURSE LAST TREATMENT DATE: NORMAL
RAD ONC ARIA COURSE START DATE: NORMAL
RAD ONC ARIA COURSE TREATMENT ELAPSED DAYS: 15
RAD ONC ARIA FIRST TREATMENT DATE: NORMAL
RAD ONC ARIA PLAN FRACTIONS TREATED TO DATE: 10
RAD ONC ARIA PLAN ID: NORMAL
RAD ONC ARIA PLAN PRESCRIBED DOSE PER FRACTION: 2.5 GY
RAD ONC ARIA PLAN PRIMARY REFERENCE POINT: NORMAL
RAD ONC ARIA PLAN TOTAL FRACTIONS PRESCRIBED: 28
RAD ONC ARIA PLAN TOTAL PRESCRIBED DOSE: 7000 CGY
RAD ONC ARIA REFERENCE POINT DOSAGE GIVEN TO DATE: 25 GY
RAD ONC ARIA REFERENCE POINT ID: NORMAL
RAD ONC ARIA REFERENCE POINT SESSION DOSAGE GIVEN: 2.5 GY

## 2024-12-26 PROCEDURE — 77385: CPT | Performed by: RADIOLOGY

## 2024-12-26 PROCEDURE — 77336 RADIATION PHYSICS CONSULT: CPT | Performed by: RADIOLOGY

## 2024-12-26 PROCEDURE — 77014 CHG CT GUIDANCE RADIATION THERAPY FLDS PLACEMENT: CPT | Performed by: RADIOLOGY

## 2024-12-27 ENCOUNTER — HOSPITAL ENCOUNTER (OUTPATIENT)
Dept: RADIATION ONCOLOGY | Facility: HOSPITAL | Age: 74
Discharge: HOME | End: 2024-12-27

## 2024-12-27 LAB
RAD ONC ARIA COURSE ID: NORMAL
RAD ONC ARIA COURSE INTENT: NORMAL
RAD ONC ARIA COURSE LAST TREATMENT DATE: NORMAL
RAD ONC ARIA COURSE START DATE: NORMAL
RAD ONC ARIA COURSE TREATMENT ELAPSED DAYS: 16
RAD ONC ARIA FIRST TREATMENT DATE: NORMAL
RAD ONC ARIA PLAN FRACTIONS TREATED TO DATE: 11
RAD ONC ARIA PLAN ID: NORMAL
RAD ONC ARIA PLAN PRESCRIBED DOSE PER FRACTION: 2.5 GY
RAD ONC ARIA PLAN PRIMARY REFERENCE POINT: NORMAL
RAD ONC ARIA PLAN TOTAL FRACTIONS PRESCRIBED: 28
RAD ONC ARIA PLAN TOTAL PRESCRIBED DOSE: 7000 CGY
RAD ONC ARIA REFERENCE POINT DOSAGE GIVEN TO DATE: 27.5 GY
RAD ONC ARIA REFERENCE POINT ID: NORMAL
RAD ONC ARIA REFERENCE POINT SESSION DOSAGE GIVEN: 2.5 GY

## 2024-12-27 PROCEDURE — 77014 CHG CT GUIDANCE RADIATION THERAPY FLDS PLACEMENT: CPT | Performed by: RADIOLOGY

## 2024-12-27 PROCEDURE — 77385: CPT | Performed by: RADIOLOGY

## 2025-01-02 ENCOUNTER — HOSPITAL ENCOUNTER (OUTPATIENT)
Dept: RADIATION ONCOLOGY | Facility: HOSPITAL | Age: 75
Setting detail: RADIATION/ONCOLOGY SERIES
End: 2025-01-02
Payer: MEDICARE

## 2025-01-02 ENCOUNTER — HOSPITAL ENCOUNTER (OUTPATIENT)
Dept: RADIATION ONCOLOGY | Facility: HOSPITAL | Age: 75
Discharge: HOME | End: 2025-01-02

## 2025-01-02 LAB
RAD ONC ARIA COURSE ID: NORMAL
RAD ONC ARIA COURSE INTENT: NORMAL
RAD ONC ARIA COURSE LAST TREATMENT DATE: NORMAL
RAD ONC ARIA COURSE START DATE: NORMAL
RAD ONC ARIA COURSE TREATMENT ELAPSED DAYS: 22
RAD ONC ARIA FIRST TREATMENT DATE: NORMAL
RAD ONC ARIA PLAN FRACTIONS TREATED TO DATE: 12
RAD ONC ARIA PLAN ID: NORMAL
RAD ONC ARIA PLAN PRESCRIBED DOSE PER FRACTION: 2.5 GY
RAD ONC ARIA PLAN PRIMARY REFERENCE POINT: NORMAL
RAD ONC ARIA PLAN TOTAL FRACTIONS PRESCRIBED: 28
RAD ONC ARIA PLAN TOTAL PRESCRIBED DOSE: 7000 CGY
RAD ONC ARIA REFERENCE POINT DOSAGE GIVEN TO DATE: 30 GY
RAD ONC ARIA REFERENCE POINT ID: NORMAL
RAD ONC ARIA REFERENCE POINT SESSION DOSAGE GIVEN: 2.5 GY

## 2025-01-02 PROCEDURE — 77014 CHG CT GUIDANCE RADIATION THERAPY FLDS PLACEMENT: CPT | Performed by: RADIOLOGY

## 2025-01-02 PROCEDURE — 77385: CPT | Performed by: RADIOLOGY

## 2025-01-03 ENCOUNTER — HOSPITAL ENCOUNTER (OUTPATIENT)
Dept: RADIATION ONCOLOGY | Facility: HOSPITAL | Age: 75
Discharge: HOME | End: 2025-01-03

## 2025-01-03 LAB
RAD ONC ARIA COURSE ID: NORMAL
RAD ONC ARIA COURSE INTENT: NORMAL
RAD ONC ARIA COURSE LAST TREATMENT DATE: NORMAL
RAD ONC ARIA COURSE START DATE: NORMAL
RAD ONC ARIA COURSE TREATMENT ELAPSED DAYS: 23
RAD ONC ARIA FIRST TREATMENT DATE: NORMAL
RAD ONC ARIA PLAN FRACTIONS TREATED TO DATE: 13
RAD ONC ARIA PLAN ID: NORMAL
RAD ONC ARIA PLAN PRESCRIBED DOSE PER FRACTION: 2.5 GY
RAD ONC ARIA PLAN PRIMARY REFERENCE POINT: NORMAL
RAD ONC ARIA PLAN TOTAL FRACTIONS PRESCRIBED: 28
RAD ONC ARIA PLAN TOTAL PRESCRIBED DOSE: 7000 CGY
RAD ONC ARIA REFERENCE POINT DOSAGE GIVEN TO DATE: 32.5 GY
RAD ONC ARIA REFERENCE POINT ID: NORMAL
RAD ONC ARIA REFERENCE POINT SESSION DOSAGE GIVEN: 2.5 GY

## 2025-01-03 PROCEDURE — 77014 CHG CT GUIDANCE RADIATION THERAPY FLDS PLACEMENT: CPT | Performed by: RADIOLOGY

## 2025-01-03 PROCEDURE — 77385: CPT | Performed by: RADIOLOGY

## 2025-01-07 ENCOUNTER — HOSPITAL ENCOUNTER (OUTPATIENT)
Dept: RADIATION ONCOLOGY | Facility: HOSPITAL | Age: 75
Discharge: HOME | End: 2025-01-07

## 2025-01-07 LAB
RAD ONC ARIA COURSE ID: NORMAL
RAD ONC ARIA COURSE INTENT: NORMAL
RAD ONC ARIA COURSE LAST TREATMENT DATE: NORMAL
RAD ONC ARIA COURSE START DATE: NORMAL
RAD ONC ARIA COURSE TREATMENT ELAPSED DAYS: 27
RAD ONC ARIA FIRST TREATMENT DATE: NORMAL
RAD ONC ARIA PLAN FRACTIONS TREATED TO DATE: 14
RAD ONC ARIA PLAN ID: NORMAL
RAD ONC ARIA PLAN PRESCRIBED DOSE PER FRACTION: 2.5 GY
RAD ONC ARIA PLAN PRIMARY REFERENCE POINT: NORMAL
RAD ONC ARIA PLAN TOTAL FRACTIONS PRESCRIBED: 28
RAD ONC ARIA PLAN TOTAL PRESCRIBED DOSE: 7000 CGY
RAD ONC ARIA REFERENCE POINT DOSAGE GIVEN TO DATE: 35 GY
RAD ONC ARIA REFERENCE POINT ID: NORMAL
RAD ONC ARIA REFERENCE POINT SESSION DOSAGE GIVEN: 2.5 GY

## 2025-01-07 PROCEDURE — 77014 CHG CT GUIDANCE RADIATION THERAPY FLDS PLACEMENT: CPT | Performed by: RADIOLOGY

## 2025-01-07 PROCEDURE — 77385: CPT | Performed by: RADIOLOGY

## 2025-01-08 ENCOUNTER — HOSPITAL ENCOUNTER (OUTPATIENT)
Dept: GENERAL RADIOLOGY | Facility: HOSPITAL | Age: 75
Discharge: HOME | End: 2025-01-08
Admitting: RADIOLOGY
Payer: MEDICARE

## 2025-01-08 ENCOUNTER — HOSPITAL ENCOUNTER (OUTPATIENT)
Dept: RADIATION ONCOLOGY | Facility: HOSPITAL | Age: 75
Discharge: HOME | End: 2025-01-08

## 2025-01-08 ENCOUNTER — TELEPHONE (OUTPATIENT)
Age: 75
End: 2025-01-08
Payer: MEDICARE

## 2025-01-08 DIAGNOSIS — C61 PROSTATE CANCER: Primary | ICD-10-CM

## 2025-01-08 DIAGNOSIS — Z92.3 HISTORY OF RADIATION THERAPY: ICD-10-CM

## 2025-01-08 DIAGNOSIS — F17.200 CURRENT EVERY DAY SMOKER: ICD-10-CM

## 2025-01-08 DIAGNOSIS — C61 PROSTATE CANCER: ICD-10-CM

## 2025-01-08 LAB
RAD ONC ARIA COURSE ID: NORMAL
RAD ONC ARIA COURSE INTENT: NORMAL
RAD ONC ARIA COURSE LAST TREATMENT DATE: NORMAL
RAD ONC ARIA COURSE START DATE: NORMAL
RAD ONC ARIA COURSE TREATMENT ELAPSED DAYS: 28
RAD ONC ARIA FIRST TREATMENT DATE: NORMAL
RAD ONC ARIA PLAN FRACTIONS TREATED TO DATE: 15
RAD ONC ARIA PLAN ID: NORMAL
RAD ONC ARIA PLAN PRESCRIBED DOSE PER FRACTION: 2.5 GY
RAD ONC ARIA PLAN PRIMARY REFERENCE POINT: NORMAL
RAD ONC ARIA PLAN TOTAL FRACTIONS PRESCRIBED: 28
RAD ONC ARIA PLAN TOTAL PRESCRIBED DOSE: 7000 CGY
RAD ONC ARIA REFERENCE POINT DOSAGE GIVEN TO DATE: 37.5 GY
RAD ONC ARIA REFERENCE POINT ID: NORMAL
RAD ONC ARIA REFERENCE POINT SESSION DOSAGE GIVEN: 2.5 GY

## 2025-01-08 PROCEDURE — 71046 X-RAY EXAM CHEST 2 VIEWS: CPT

## 2025-01-08 PROCEDURE — 77336 RADIATION PHYSICS CONSULT: CPT | Performed by: RADIOLOGY

## 2025-01-08 PROCEDURE — 77014 CHG CT GUIDANCE RADIATION THERAPY FLDS PLACEMENT: CPT | Performed by: RADIOLOGY

## 2025-01-08 PROCEDURE — 77385: CPT | Performed by: RADIOLOGY

## 2025-01-08 NOTE — TELEPHONE ENCOUNTER
Called and spoke to patient re: CXR showing area of pneumonia of pneumonia in right lung base. Instructed patient that Dr. Jimenez wants him to call Dr. Flaherty's office to let her know so antibiotic can be prescribed.  I let patient know that I did fax results to Dr. Flaherty's office as well.  Patient verbalized understanding and states that he will call Dr. Flaherty's office.

## 2025-01-13 ENCOUNTER — HOSPITAL ENCOUNTER (OUTPATIENT)
Dept: RADIATION ONCOLOGY | Facility: HOSPITAL | Age: 75
Discharge: HOME | End: 2025-01-13
Payer: MEDICARE

## 2025-01-13 LAB
RAD ONC ARIA COURSE ID: NORMAL
RAD ONC ARIA COURSE INTENT: NORMAL
RAD ONC ARIA COURSE LAST TREATMENT DATE: NORMAL
RAD ONC ARIA COURSE START DATE: NORMAL
RAD ONC ARIA COURSE TREATMENT ELAPSED DAYS: 33
RAD ONC ARIA FIRST TREATMENT DATE: NORMAL
RAD ONC ARIA PLAN FRACTIONS TREATED TO DATE: 16
RAD ONC ARIA PLAN ID: NORMAL
RAD ONC ARIA PLAN PRESCRIBED DOSE PER FRACTION: 2.5 GY
RAD ONC ARIA PLAN PRIMARY REFERENCE POINT: NORMAL
RAD ONC ARIA PLAN TOTAL FRACTIONS PRESCRIBED: 28
RAD ONC ARIA PLAN TOTAL PRESCRIBED DOSE: 7000 CGY
RAD ONC ARIA REFERENCE POINT DOSAGE GIVEN TO DATE: 40 GY
RAD ONC ARIA REFERENCE POINT ID: NORMAL
RAD ONC ARIA REFERENCE POINT SESSION DOSAGE GIVEN: 2.5 GY

## 2025-01-13 PROCEDURE — 77427 RADIATION TX MANAGEMENT X5: CPT | Performed by: RADIOLOGY

## 2025-01-13 PROCEDURE — 77385: CPT | Performed by: RADIOLOGY

## 2025-01-13 PROCEDURE — 77014 CHG CT GUIDANCE RADIATION THERAPY FLDS PLACEMENT: CPT | Performed by: RADIOLOGY

## 2025-01-14 ENCOUNTER — HOSPITAL ENCOUNTER (OUTPATIENT)
Dept: RADIATION ONCOLOGY | Facility: HOSPITAL | Age: 75
Discharge: HOME | End: 2025-01-14

## 2025-01-14 LAB
RAD ONC ARIA COURSE ID: NORMAL
RAD ONC ARIA COURSE INTENT: NORMAL
RAD ONC ARIA COURSE LAST TREATMENT DATE: NORMAL
RAD ONC ARIA COURSE START DATE: NORMAL
RAD ONC ARIA COURSE TREATMENT ELAPSED DAYS: 34
RAD ONC ARIA FIRST TREATMENT DATE: NORMAL
RAD ONC ARIA PLAN FRACTIONS TREATED TO DATE: 17
RAD ONC ARIA PLAN ID: NORMAL
RAD ONC ARIA PLAN PRESCRIBED DOSE PER FRACTION: 2.5 GY
RAD ONC ARIA PLAN PRIMARY REFERENCE POINT: NORMAL
RAD ONC ARIA PLAN TOTAL FRACTIONS PRESCRIBED: 28
RAD ONC ARIA PLAN TOTAL PRESCRIBED DOSE: 7000 CGY
RAD ONC ARIA REFERENCE POINT DOSAGE GIVEN TO DATE: 42.5 GY
RAD ONC ARIA REFERENCE POINT ID: NORMAL
RAD ONC ARIA REFERENCE POINT SESSION DOSAGE GIVEN: 2.5 GY

## 2025-01-14 PROCEDURE — 77014 CHG CT GUIDANCE RADIATION THERAPY FLDS PLACEMENT: CPT | Performed by: RADIOLOGY

## 2025-01-14 PROCEDURE — 77385: CPT | Performed by: RADIOLOGY

## 2025-01-15 ENCOUNTER — HOSPITAL ENCOUNTER (OUTPATIENT)
Dept: RADIATION ONCOLOGY | Facility: HOSPITAL | Age: 75
Discharge: HOME | End: 2025-01-15

## 2025-01-15 LAB
RAD ONC ARIA COURSE ID: NORMAL
RAD ONC ARIA COURSE INTENT: NORMAL
RAD ONC ARIA COURSE LAST TREATMENT DATE: NORMAL
RAD ONC ARIA COURSE START DATE: NORMAL
RAD ONC ARIA COURSE TREATMENT ELAPSED DAYS: 35
RAD ONC ARIA FIRST TREATMENT DATE: NORMAL
RAD ONC ARIA PLAN FRACTIONS TREATED TO DATE: 18
RAD ONC ARIA PLAN ID: NORMAL
RAD ONC ARIA PLAN PRESCRIBED DOSE PER FRACTION: 2.5 GY
RAD ONC ARIA PLAN PRIMARY REFERENCE POINT: NORMAL
RAD ONC ARIA PLAN TOTAL FRACTIONS PRESCRIBED: 28
RAD ONC ARIA PLAN TOTAL PRESCRIBED DOSE: 7000 CGY
RAD ONC ARIA REFERENCE POINT DOSAGE GIVEN TO DATE: 45 GY
RAD ONC ARIA REFERENCE POINT ID: NORMAL
RAD ONC ARIA REFERENCE POINT SESSION DOSAGE GIVEN: 2.5 GY

## 2025-01-15 PROCEDURE — 77014 CHG CT GUIDANCE RADIATION THERAPY FLDS PLACEMENT: CPT | Performed by: RADIOLOGY

## 2025-01-15 PROCEDURE — 77385: CPT | Performed by: RADIOLOGY

## 2025-01-16 ENCOUNTER — HOSPITAL ENCOUNTER (OUTPATIENT)
Dept: RADIATION ONCOLOGY | Facility: HOSPITAL | Age: 75
Discharge: HOME | End: 2025-01-16

## 2025-01-16 LAB
RAD ONC ARIA COURSE ID: NORMAL
RAD ONC ARIA COURSE INTENT: NORMAL
RAD ONC ARIA COURSE LAST TREATMENT DATE: NORMAL
RAD ONC ARIA COURSE START DATE: NORMAL
RAD ONC ARIA COURSE TREATMENT ELAPSED DAYS: 36
RAD ONC ARIA FIRST TREATMENT DATE: NORMAL
RAD ONC ARIA PLAN FRACTIONS TREATED TO DATE: 19
RAD ONC ARIA PLAN ID: NORMAL
RAD ONC ARIA PLAN PRESCRIBED DOSE PER FRACTION: 2.5 GY
RAD ONC ARIA PLAN PRIMARY REFERENCE POINT: NORMAL
RAD ONC ARIA PLAN TOTAL FRACTIONS PRESCRIBED: 28
RAD ONC ARIA PLAN TOTAL PRESCRIBED DOSE: 7000 CGY
RAD ONC ARIA REFERENCE POINT DOSAGE GIVEN TO DATE: 47.5 GY
RAD ONC ARIA REFERENCE POINT ID: NORMAL
RAD ONC ARIA REFERENCE POINT SESSION DOSAGE GIVEN: 2.5 GY

## 2025-01-16 PROCEDURE — 77014 CHG CT GUIDANCE RADIATION THERAPY FLDS PLACEMENT: CPT | Performed by: RADIOLOGY

## 2025-01-16 PROCEDURE — 77385: CPT | Performed by: RADIOLOGY

## 2025-01-17 ENCOUNTER — HOSPITAL ENCOUNTER (OUTPATIENT)
Dept: RADIATION ONCOLOGY | Facility: HOSPITAL | Age: 75
Discharge: HOME | End: 2025-01-17

## 2025-01-17 LAB
RAD ONC ARIA COURSE ID: NORMAL
RAD ONC ARIA COURSE INTENT: NORMAL
RAD ONC ARIA COURSE LAST TREATMENT DATE: NORMAL
RAD ONC ARIA COURSE START DATE: NORMAL
RAD ONC ARIA COURSE TREATMENT ELAPSED DAYS: 37
RAD ONC ARIA FIRST TREATMENT DATE: NORMAL
RAD ONC ARIA PLAN FRACTIONS TREATED TO DATE: 20
RAD ONC ARIA PLAN ID: NORMAL
RAD ONC ARIA PLAN PRESCRIBED DOSE PER FRACTION: 2.5 GY
RAD ONC ARIA PLAN PRIMARY REFERENCE POINT: NORMAL
RAD ONC ARIA PLAN TOTAL FRACTIONS PRESCRIBED: 28
RAD ONC ARIA PLAN TOTAL PRESCRIBED DOSE: 7000 CGY
RAD ONC ARIA REFERENCE POINT DOSAGE GIVEN TO DATE: 50 GY
RAD ONC ARIA REFERENCE POINT ID: NORMAL
RAD ONC ARIA REFERENCE POINT SESSION DOSAGE GIVEN: 2.5 GY

## 2025-01-17 PROCEDURE — 77385: CPT | Performed by: RADIOLOGY

## 2025-01-17 PROCEDURE — 77336 RADIATION PHYSICS CONSULT: CPT | Performed by: RADIOLOGY

## 2025-01-17 PROCEDURE — 77014 CHG CT GUIDANCE RADIATION THERAPY FLDS PLACEMENT: CPT | Performed by: RADIOLOGY

## 2025-01-20 ENCOUNTER — HOSPITAL ENCOUNTER (OUTPATIENT)
Dept: RADIATION ONCOLOGY | Facility: HOSPITAL | Age: 75
Setting detail: RADIATION/ONCOLOGY SERIES
Discharge: HOME | End: 2025-01-20
Payer: MEDICARE

## 2025-01-20 LAB
RAD ONC ARIA COURSE ID: NORMAL
RAD ONC ARIA COURSE INTENT: NORMAL
RAD ONC ARIA COURSE LAST TREATMENT DATE: NORMAL
RAD ONC ARIA COURSE START DATE: NORMAL
RAD ONC ARIA COURSE TREATMENT ELAPSED DAYS: 40
RAD ONC ARIA FIRST TREATMENT DATE: NORMAL
RAD ONC ARIA PLAN FRACTIONS TREATED TO DATE: 21
RAD ONC ARIA PLAN ID: NORMAL
RAD ONC ARIA PLAN PRESCRIBED DOSE PER FRACTION: 2.5 GY
RAD ONC ARIA PLAN PRIMARY REFERENCE POINT: NORMAL
RAD ONC ARIA PLAN TOTAL FRACTIONS PRESCRIBED: 28
RAD ONC ARIA PLAN TOTAL PRESCRIBED DOSE: 7000 CGY
RAD ONC ARIA REFERENCE POINT DOSAGE GIVEN TO DATE: 52.5 GY
RAD ONC ARIA REFERENCE POINT ID: NORMAL
RAD ONC ARIA REFERENCE POINT SESSION DOSAGE GIVEN: 2.5 GY

## 2025-01-20 PROCEDURE — 77014 CHG CT GUIDANCE RADIATION THERAPY FLDS PLACEMENT: CPT | Performed by: RADIOLOGY

## 2025-01-20 PROCEDURE — 77385: CPT | Performed by: RADIOLOGY

## 2025-01-21 ENCOUNTER — HOSPITAL ENCOUNTER (OUTPATIENT)
Dept: RADIATION ONCOLOGY | Facility: HOSPITAL | Age: 75
Setting detail: RADIATION/ONCOLOGY SERIES
Discharge: HOME | End: 2025-01-21
Payer: MEDICARE

## 2025-01-21 LAB
RAD ONC ARIA COURSE ID: NORMAL
RAD ONC ARIA COURSE INTENT: NORMAL
RAD ONC ARIA COURSE LAST TREATMENT DATE: NORMAL
RAD ONC ARIA COURSE START DATE: NORMAL
RAD ONC ARIA COURSE TREATMENT ELAPSED DAYS: 41
RAD ONC ARIA FIRST TREATMENT DATE: NORMAL
RAD ONC ARIA PLAN FRACTIONS TREATED TO DATE: 22
RAD ONC ARIA PLAN ID: NORMAL
RAD ONC ARIA PLAN PRESCRIBED DOSE PER FRACTION: 2.5 GY
RAD ONC ARIA PLAN PRIMARY REFERENCE POINT: NORMAL
RAD ONC ARIA PLAN TOTAL FRACTIONS PRESCRIBED: 28
RAD ONC ARIA PLAN TOTAL PRESCRIBED DOSE: 7000 CGY
RAD ONC ARIA REFERENCE POINT DOSAGE GIVEN TO DATE: 55 GY
RAD ONC ARIA REFERENCE POINT ID: NORMAL
RAD ONC ARIA REFERENCE POINT SESSION DOSAGE GIVEN: 2.5 GY

## 2025-01-21 PROCEDURE — 77014 CHG CT GUIDANCE RADIATION THERAPY FLDS PLACEMENT: CPT | Performed by: RADIOLOGY

## 2025-01-21 PROCEDURE — 77385: CPT | Performed by: RADIOLOGY

## 2025-01-22 ENCOUNTER — APPOINTMENT (OUTPATIENT)
Dept: RADIATION ONCOLOGY | Facility: HOSPITAL | Age: 75
End: 2025-01-22
Payer: MEDICARE

## 2025-01-22 ENCOUNTER — HOSPITAL ENCOUNTER (OUTPATIENT)
Dept: RADIATION ONCOLOGY | Facility: HOSPITAL | Age: 75
Setting detail: RADIATION/ONCOLOGY SERIES
Discharge: HOME | End: 2025-01-22
Payer: MEDICARE

## 2025-01-22 LAB
RAD ONC ARIA COURSE ID: NORMAL
RAD ONC ARIA COURSE INTENT: NORMAL
RAD ONC ARIA COURSE LAST TREATMENT DATE: NORMAL
RAD ONC ARIA COURSE START DATE: NORMAL
RAD ONC ARIA COURSE TREATMENT ELAPSED DAYS: 42
RAD ONC ARIA FIRST TREATMENT DATE: NORMAL
RAD ONC ARIA PLAN FRACTIONS TREATED TO DATE: 23
RAD ONC ARIA PLAN ID: NORMAL
RAD ONC ARIA PLAN PRESCRIBED DOSE PER FRACTION: 2.5 GY
RAD ONC ARIA PLAN PRIMARY REFERENCE POINT: NORMAL
RAD ONC ARIA PLAN TOTAL FRACTIONS PRESCRIBED: 28
RAD ONC ARIA PLAN TOTAL PRESCRIBED DOSE: 7000 CGY
RAD ONC ARIA REFERENCE POINT DOSAGE GIVEN TO DATE: 57.5 GY
RAD ONC ARIA REFERENCE POINT ID: NORMAL
RAD ONC ARIA REFERENCE POINT SESSION DOSAGE GIVEN: 2.5 GY

## 2025-01-22 PROCEDURE — 77385: CPT | Performed by: RADIOLOGY

## 2025-01-22 PROCEDURE — 77014 CHG CT GUIDANCE RADIATION THERAPY FLDS PLACEMENT: CPT | Performed by: RADIOLOGY

## 2025-01-23 ENCOUNTER — HOSPITAL ENCOUNTER (OUTPATIENT)
Dept: RADIATION ONCOLOGY | Facility: HOSPITAL | Age: 75
Discharge: HOME | End: 2025-01-23

## 2025-01-23 ENCOUNTER — HOSPITAL ENCOUNTER (OUTPATIENT)
Dept: GENERAL RADIOLOGY | Facility: HOSPITAL | Age: 75
Discharge: HOME | End: 2025-01-23
Admitting: FAMILY MEDICINE
Payer: MEDICARE

## 2025-01-23 ENCOUNTER — TRANSCRIBE ORDERS (OUTPATIENT)
Dept: ADMINISTRATIVE | Facility: HOSPITAL | Age: 75
End: 2025-01-23
Payer: MEDICARE

## 2025-01-23 DIAGNOSIS — R05.9 COUGH, UNSPECIFIED TYPE: Primary | ICD-10-CM

## 2025-01-23 DIAGNOSIS — G62.9 NEUROPATHY: ICD-10-CM

## 2025-01-23 DIAGNOSIS — R05.9 COUGH, UNSPECIFIED TYPE: ICD-10-CM

## 2025-01-23 LAB
RAD ONC ARIA COURSE ID: NORMAL
RAD ONC ARIA COURSE INTENT: NORMAL
RAD ONC ARIA COURSE LAST TREATMENT DATE: NORMAL
RAD ONC ARIA COURSE START DATE: NORMAL
RAD ONC ARIA COURSE TREATMENT ELAPSED DAYS: 43
RAD ONC ARIA FIRST TREATMENT DATE: NORMAL
RAD ONC ARIA PLAN FRACTIONS TREATED TO DATE: 24
RAD ONC ARIA PLAN ID: NORMAL
RAD ONC ARIA PLAN PRESCRIBED DOSE PER FRACTION: 2.5 GY
RAD ONC ARIA PLAN PRIMARY REFERENCE POINT: NORMAL
RAD ONC ARIA PLAN TOTAL FRACTIONS PRESCRIBED: 28
RAD ONC ARIA PLAN TOTAL PRESCRIBED DOSE: 7000 CGY
RAD ONC ARIA REFERENCE POINT DOSAGE GIVEN TO DATE: 60 GY
RAD ONC ARIA REFERENCE POINT ID: NORMAL
RAD ONC ARIA REFERENCE POINT SESSION DOSAGE GIVEN: 2.5 GY

## 2025-01-23 PROCEDURE — 77385: CPT | Performed by: RADIOLOGY

## 2025-01-23 PROCEDURE — 77014 CHG CT GUIDANCE RADIATION THERAPY FLDS PLACEMENT: CPT | Performed by: RADIOLOGY

## 2025-01-23 PROCEDURE — 71046 X-RAY EXAM CHEST 2 VIEWS: CPT

## 2025-01-24 ENCOUNTER — HOSPITAL ENCOUNTER (OUTPATIENT)
Dept: RADIATION ONCOLOGY | Facility: HOSPITAL | Age: 75
Setting detail: RADIATION/ONCOLOGY SERIES
Discharge: HOME | End: 2025-01-24
Payer: MEDICARE

## 2025-01-24 LAB
RAD ONC ARIA COURSE ID: NORMAL
RAD ONC ARIA COURSE INTENT: NORMAL
RAD ONC ARIA COURSE LAST TREATMENT DATE: NORMAL
RAD ONC ARIA COURSE START DATE: NORMAL
RAD ONC ARIA COURSE TREATMENT ELAPSED DAYS: 44
RAD ONC ARIA FIRST TREATMENT DATE: NORMAL
RAD ONC ARIA PLAN FRACTIONS TREATED TO DATE: 25
RAD ONC ARIA PLAN ID: NORMAL
RAD ONC ARIA PLAN PRESCRIBED DOSE PER FRACTION: 2.5 GY
RAD ONC ARIA PLAN PRIMARY REFERENCE POINT: NORMAL
RAD ONC ARIA PLAN TOTAL FRACTIONS PRESCRIBED: 28
RAD ONC ARIA PLAN TOTAL PRESCRIBED DOSE: 7000 CGY
RAD ONC ARIA REFERENCE POINT DOSAGE GIVEN TO DATE: 62.5 GY
RAD ONC ARIA REFERENCE POINT ID: NORMAL
RAD ONC ARIA REFERENCE POINT SESSION DOSAGE GIVEN: 2.5 GY

## 2025-01-24 PROCEDURE — 77014 CHG CT GUIDANCE RADIATION THERAPY FLDS PLACEMENT: CPT | Performed by: RADIOLOGY

## 2025-01-24 PROCEDURE — 77336 RADIATION PHYSICS CONSULT: CPT | Performed by: RADIOLOGY

## 2025-01-24 PROCEDURE — 77385: CPT | Performed by: RADIOLOGY

## 2025-01-24 RX ORDER — GABAPENTIN 300 MG/1
CAPSULE ORAL
Qty: 60 CAPSULE | Refills: 3 | Status: SHIPPED | OUTPATIENT
Start: 2025-01-24 | End: 2025-02-23

## 2025-01-27 ENCOUNTER — HOSPITAL ENCOUNTER (OUTPATIENT)
Dept: RADIATION ONCOLOGY | Facility: HOSPITAL | Age: 75
Setting detail: RADIATION/ONCOLOGY SERIES
Discharge: HOME | End: 2025-01-27
Payer: MEDICARE

## 2025-01-27 LAB
RAD ONC ARIA COURSE ID: NORMAL
RAD ONC ARIA COURSE INTENT: NORMAL
RAD ONC ARIA COURSE LAST TREATMENT DATE: NORMAL
RAD ONC ARIA COURSE START DATE: NORMAL
RAD ONC ARIA COURSE TREATMENT ELAPSED DAYS: 47
RAD ONC ARIA FIRST TREATMENT DATE: NORMAL
RAD ONC ARIA PLAN FRACTIONS TREATED TO DATE: 26
RAD ONC ARIA PLAN ID: NORMAL
RAD ONC ARIA PLAN PRESCRIBED DOSE PER FRACTION: 2.5 GY
RAD ONC ARIA PLAN PRIMARY REFERENCE POINT: NORMAL
RAD ONC ARIA PLAN TOTAL FRACTIONS PRESCRIBED: 28
RAD ONC ARIA PLAN TOTAL PRESCRIBED DOSE: 7000 CGY
RAD ONC ARIA REFERENCE POINT DOSAGE GIVEN TO DATE: 65 GY
RAD ONC ARIA REFERENCE POINT ID: NORMAL
RAD ONC ARIA REFERENCE POINT SESSION DOSAGE GIVEN: 2.5 GY

## 2025-01-27 PROCEDURE — 77385: CPT | Performed by: RADIOLOGY

## 2025-01-28 ENCOUNTER — HOSPITAL ENCOUNTER (OUTPATIENT)
Dept: RADIATION ONCOLOGY | Facility: HOSPITAL | Age: 75
Setting detail: RADIATION/ONCOLOGY SERIES
Discharge: HOME | End: 2025-01-28
Payer: MEDICARE

## 2025-01-28 LAB
RAD ONC ARIA COURSE ID: NORMAL
RAD ONC ARIA COURSE INTENT: NORMAL
RAD ONC ARIA COURSE LAST TREATMENT DATE: NORMAL
RAD ONC ARIA COURSE START DATE: NORMAL
RAD ONC ARIA COURSE TREATMENT ELAPSED DAYS: 48
RAD ONC ARIA FIRST TREATMENT DATE: NORMAL
RAD ONC ARIA PLAN FRACTIONS TREATED TO DATE: 27
RAD ONC ARIA PLAN ID: NORMAL
RAD ONC ARIA PLAN PRESCRIBED DOSE PER FRACTION: 2.5 GY
RAD ONC ARIA PLAN PRIMARY REFERENCE POINT: NORMAL
RAD ONC ARIA PLAN TOTAL FRACTIONS PRESCRIBED: 28
RAD ONC ARIA PLAN TOTAL PRESCRIBED DOSE: 7000 CGY
RAD ONC ARIA REFERENCE POINT DOSAGE GIVEN TO DATE: 67.5 GY
RAD ONC ARIA REFERENCE POINT ID: NORMAL
RAD ONC ARIA REFERENCE POINT SESSION DOSAGE GIVEN: 2.5 GY

## 2025-01-28 PROCEDURE — 77385: CPT | Performed by: RADIOLOGY

## 2025-01-29 ENCOUNTER — HOSPITAL ENCOUNTER (OUTPATIENT)
Dept: RADIATION ONCOLOGY | Facility: HOSPITAL | Age: 75
Discharge: HOME | End: 2025-01-29

## 2025-01-29 LAB
RAD ONC ARIA COURSE END DATE: NORMAL
RAD ONC ARIA COURSE ID: NORMAL
RAD ONC ARIA COURSE ID: NORMAL
RAD ONC ARIA COURSE INTENT: NORMAL
RAD ONC ARIA COURSE INTENT: NORMAL
RAD ONC ARIA COURSE LAST TREATMENT DATE: NORMAL
RAD ONC ARIA COURSE LAST TREATMENT DATE: NORMAL
RAD ONC ARIA COURSE START DATE: NORMAL
RAD ONC ARIA COURSE START DATE: NORMAL
RAD ONC ARIA COURSE TREATMENT ELAPSED DAYS: 49
RAD ONC ARIA COURSE TREATMENT ELAPSED DAYS: 49
RAD ONC ARIA FIRST TREATMENT DATE: NORMAL
RAD ONC ARIA FIRST TREATMENT DATE: NORMAL
RAD ONC ARIA PLAN FRACTIONS TREATED TO DATE: 28
RAD ONC ARIA PLAN FRACTIONS TREATED TO DATE: 28
RAD ONC ARIA PLAN ID: NORMAL
RAD ONC ARIA PLAN ID: NORMAL
RAD ONC ARIA PLAN NAME: NORMAL
RAD ONC ARIA PLAN PRESCRIBED DOSE PER FRACTION: 2.5 GY
RAD ONC ARIA PLAN PRESCRIBED DOSE PER FRACTION: 2.5 GY
RAD ONC ARIA PLAN PRIMARY REFERENCE POINT: NORMAL
RAD ONC ARIA PLAN PRIMARY REFERENCE POINT: NORMAL
RAD ONC ARIA PLAN TOTAL FRACTIONS PRESCRIBED: 28
RAD ONC ARIA PLAN TOTAL FRACTIONS PRESCRIBED: 28
RAD ONC ARIA PLAN TOTAL PRESCRIBED DOSE: 7000 CGY
RAD ONC ARIA PLAN TOTAL PRESCRIBED DOSE: 7000 CGY
RAD ONC ARIA REFERENCE POINT DOSAGE GIVEN TO DATE: 70 GY
RAD ONC ARIA REFERENCE POINT DOSAGE GIVEN TO DATE: 70 GY
RAD ONC ARIA REFERENCE POINT ID: NORMAL
RAD ONC ARIA REFERENCE POINT ID: NORMAL
RAD ONC ARIA REFERENCE POINT SESSION DOSAGE GIVEN: 2.5 GY

## 2025-01-29 PROCEDURE — 77385: CPT | Performed by: RADIOLOGY

## 2025-01-29 PROCEDURE — 77336 RADIATION PHYSICS CONSULT: CPT | Performed by: RADIOLOGY

## 2025-01-30 ENCOUNTER — TELEPHONE (OUTPATIENT)
Dept: HEMATOLOGY | Age: 75
End: 2025-01-30

## 2025-01-30 DIAGNOSIS — K62.7 RADIATION PROCTITIS: ICD-10-CM

## 2025-01-30 DIAGNOSIS — R19.7 DIARRHEA OF PRESUMED INFECTIOUS ORIGIN: ICD-10-CM

## 2025-01-30 DIAGNOSIS — R11.0 NAUSEA: Primary | ICD-10-CM

## 2025-01-30 RX ORDER — ONDANSETRON 4 MG/1
8 TABLET, FILM COATED ORAL EVERY 8 HOURS PRN
Qty: 30 TABLET | Refills: 2 | Status: SHIPPED | OUTPATIENT
Start: 2025-01-30

## 2025-01-30 NOTE — TELEPHONE ENCOUNTER
Returned telephone call to Mr. Bailey, he has complaint of abdominal pain and difficulty with bowel movements, does have some diarrhea and describes it having some jelly egg yolk in with a very foul odor.  He reported he has been on 2 separate antibiotics.  I voiced concern for C. difficile and recommended that he get stool sample for evaluation.  He also complained of significant nausea and reports he is out of his Zofran but has promethazine, I will send him a refill on the Zofran.  Instructed him to follow-up with Dr. Jo or his PCP if his symptoms do not get better.  He will keep his follow-up appointment with me on 2/14/2025.  Instructed him to seek medical attention if his condition worsens.

## 2025-02-03 DIAGNOSIS — R19.7 DIARRHEA OF PRESUMED INFECTIOUS ORIGIN: ICD-10-CM

## 2025-02-03 DIAGNOSIS — K62.7 RADIATION PROCTITIS: ICD-10-CM

## 2025-02-03 LAB
ADV 40+41 DNA STL QL NAA+NON-PROBE: NOT DETECTED
C CAYETANENSIS DNA STL QL NAA+NON-PROBE: NOT DETECTED
C COLI+JEJ+UPSA DNA STL QL NAA+NON-PROBE: NOT DETECTED
CRYPTOSP DNA STL QL NAA+NON-PROBE: NOT DETECTED
E HISTOLYT DNA STL QL NAA+NON-PROBE: NOT DETECTED
EAEC PAA PLAS AGGR+AATA ST NAA+NON-PRB: NOT DETECTED
EC STX1+STX2 GENES STL QL NAA+NON-PROBE: NOT DETECTED
EPEC EAE GENE STL QL NAA+NON-PROBE: NOT DETECTED
ETEC LTA+ST1A+ST1B TOX ST NAA+NON-PROBE: NOT DETECTED
G LAMBLIA DNA STL QL NAA+NON-PROBE: NOT DETECTED
GI PATH DNA+RNA PNL STL NAA+NON-PROBE: NOT DETECTED
NOROVIRUS GI+II RNA STL QL NAA+NON-PROBE: NOT DETECTED
P SHIGELLOIDES DNA STL QL NAA+NON-PROBE: NOT DETECTED
RVA RNA STL QL NAA+NON-PROBE: NOT DETECTED
S ENT+BONG DNA STL QL NAA+NON-PROBE: NOT DETECTED
SAPO I+II+IV+V RNA STL QL NAA+NON-PROBE: NOT DETECTED
SHIGELLA SP+EIEC IPAH ST NAA+NON-PROBE: NOT DETECTED
V CHOL+PARA+VUL DNA STL QL NAA+NON-PROBE: NOT DETECTED
V CHOLERAE DNA STL QL NAA+NON-PROBE: NOT DETECTED
Y ENTEROCOL DNA STL QL NAA+NON-PROBE: NOT DETECTED

## 2025-02-04 ENCOUNTER — TELEPHONE (OUTPATIENT)
Dept: HEMATOLOGY | Age: 75
End: 2025-02-04

## 2025-02-04 NOTE — TELEPHONE ENCOUNTER
Spoke with Mr. Bailey reports no diarrhea      Stool sample negative for any concerning findings.  He is having very small bowel movements which he describes as sticking, this is most likely associated with radiation therapy.        His nausea is controlled with Zofran.  Instructed him to contact Dr. Andujar if he continues to have issues or Dr. Jo.  He will keep his follow-up appointment with me on 2/14/2025.      He verbalized understanding agreement with current care.

## 2025-02-11 ENCOUNTER — TELEPHONE (OUTPATIENT)
Dept: HEMATOLOGY | Age: 75
End: 2025-02-11

## 2025-02-11 NOTE — TELEPHONE ENCOUNTER

## 2025-02-14 ENCOUNTER — OFFICE VISIT (OUTPATIENT)
Dept: HEMATOLOGY | Age: 75
End: 2025-02-14
Payer: MEDICARE

## 2025-02-14 ENCOUNTER — TRANSCRIBE ORDERS (OUTPATIENT)
Dept: ADMINISTRATIVE | Facility: HOSPITAL | Age: 75
End: 2025-02-14
Payer: MEDICARE

## 2025-02-14 ENCOUNTER — HOSPITAL ENCOUNTER (OUTPATIENT)
Dept: INFUSION THERAPY | Age: 75
Discharge: HOME | End: 2025-02-14
Payer: MEDICARE

## 2025-02-14 VITALS
RESPIRATION RATE: 16 BRPM | OXYGEN SATURATION: 100 % | DIASTOLIC BLOOD PRESSURE: 65 MMHG | SYSTOLIC BLOOD PRESSURE: 149 MMHG | TEMPERATURE: 97.7 F | BODY MASS INDEX: 21.47 KG/M2 | HEIGHT: 74 IN | WEIGHT: 167.3 LBS | HEART RATE: 67 BPM

## 2025-02-14 DIAGNOSIS — T50.995D ALLERGIC REACTION TO DYE, SUBSEQUENT ENCOUNTER: Primary | ICD-10-CM

## 2025-02-14 DIAGNOSIS — C82.80 FOLLICULAR LOW GRADE B-CELL LYMPHOMA (HCC): Primary | ICD-10-CM

## 2025-02-14 DIAGNOSIS — C82.80 FOLLICULAR LOW GRADE B-CELL LYMPHOMA: Primary | ICD-10-CM

## 2025-02-14 DIAGNOSIS — Z45.2 ENCOUNTER FOR CENTRAL LINE CARE: Primary | ICD-10-CM

## 2025-02-14 DIAGNOSIS — C61 PROSTATE CARCINOMA (HCC): ICD-10-CM

## 2025-02-14 DIAGNOSIS — D68.51 FACTOR V LEIDEN: ICD-10-CM

## 2025-02-14 DIAGNOSIS — C82.80 FOLLICULAR LOW GRADE B-CELL LYMPHOMA (HCC): ICD-10-CM

## 2025-02-14 DIAGNOSIS — G62.9 NEUROPATHY: ICD-10-CM

## 2025-02-14 DIAGNOSIS — G62.0 DRUG-INDUCED POLYNEUROPATHY: ICD-10-CM

## 2025-02-14 LAB
ALBUMIN SERPL-MCNC: 4.1 G/DL (ref 3.5–5.2)
ALP SERPL-CCNC: 140 U/L (ref 40–129)
ALT SERPL-CCNC: 14 U/L (ref 5–41)
ANION GAP SERPL CALCULATED.3IONS-SCNC: 12 MMOL/L (ref 7–19)
AST SERPL-CCNC: 19 U/L (ref 5–40)
BASOPHILS # BLD: 0.06 K/UL (ref 0–0.2)
BASOPHILS NFR BLD: 1.4 % (ref 0–1)
BILIRUB SERPL-MCNC: 0.3 MG/DL (ref 0–1.2)
BUN SERPL-MCNC: 13 MG/DL (ref 8–23)
CALCIUM SERPL-MCNC: 9.1 MG/DL (ref 8.8–10.2)
CHLORIDE SERPL-SCNC: 105 MMOL/L (ref 98–107)
CO2 SERPL-SCNC: 24 MMOL/L (ref 22–29)
CREAT SERPL-MCNC: 0.9 MG/DL (ref 0.7–1.2)
EOSINOPHIL # BLD: 0.68 K/UL (ref 0–0.6)
EOSINOPHIL NFR BLD: 16.2 % (ref 0–5)
ERYTHROCYTE [DISTWIDTH] IN BLOOD BY AUTOMATED COUNT: 14.3 % (ref 11.5–14.5)
GLUCOSE SERPL-MCNC: 135 MG/DL (ref 70–99)
HCT VFR BLD AUTO: 36.5 % (ref 42–52)
HGB BLD-MCNC: 12.9 G/DL (ref 14–18)
LDH SERPL-CCNC: 163 U/L (ref 135–225)
LYMPHOCYTES # BLD: 0.36 K/UL (ref 1.1–4.5)
LYMPHOCYTES NFR BLD: 8.6 % (ref 20–40)
MCH RBC QN AUTO: 32.7 PG (ref 27–31)
MCHC RBC AUTO-ENTMCNC: 35.3 G/DL (ref 33–37)
MCV RBC AUTO: 92.6 FL (ref 80–94)
MONOCYTES # BLD: 0.54 K/UL (ref 0–0.9)
MONOCYTES NFR BLD: 12.9 % (ref 1–10)
NEUTROPHILS # BLD: 2.54 K/UL (ref 1.5–7.5)
NEUTS SEG NFR BLD: 60.7 % (ref 50–65)
PLATELET # BLD AUTO: 334 K/UL (ref 130–400)
PMV BLD AUTO: 8.1 FL (ref 9.4–12.4)
POTASSIUM SERPL-SCNC: 3.6 MMOL/L (ref 3.5–5.1)
PROT SERPL-MCNC: 6.7 G/DL (ref 6.4–8.3)
RBC # BLD AUTO: 3.94 M/UL (ref 4.7–6.1)
SODIUM SERPL-SCNC: 141 MMOL/L (ref 136–145)
WBC # BLD AUTO: 4.19 K/UL (ref 4.8–10.8)

## 2025-02-14 PROCEDURE — 1123F ACP DISCUSS/DSCN MKR DOCD: CPT | Performed by: NURSE PRACTITIONER

## 2025-02-14 PROCEDURE — 1159F MED LIST DOCD IN RCRD: CPT | Performed by: NURSE PRACTITIONER

## 2025-02-14 PROCEDURE — 6360000002 HC RX W HCPCS: Performed by: NURSE PRACTITIONER

## 2025-02-14 PROCEDURE — 80053 COMPREHEN METABOLIC PANEL: CPT

## 2025-02-14 PROCEDURE — 4004F PT TOBACCO SCREEN RCVD TLK: CPT | Performed by: NURSE PRACTITIONER

## 2025-02-14 PROCEDURE — 1125F AMNT PAIN NOTED PAIN PRSNT: CPT | Performed by: NURSE PRACTITIONER

## 2025-02-14 PROCEDURE — 82232 ASSAY OF BETA-2 PROTEIN: CPT

## 2025-02-14 PROCEDURE — 36415 COLL VENOUS BLD VENIPUNCTURE: CPT

## 2025-02-14 PROCEDURE — G8427 DOCREV CUR MEDS BY ELIG CLIN: HCPCS | Performed by: NURSE PRACTITIONER

## 2025-02-14 PROCEDURE — 99214 OFFICE O/P EST MOD 30 MIN: CPT | Performed by: NURSE PRACTITIONER

## 2025-02-14 PROCEDURE — 85025 COMPLETE CBC W/AUTO DIFF WBC: CPT

## 2025-02-14 PROCEDURE — 2500000003 HC RX 250 WO HCPCS: Performed by: NURSE PRACTITIONER

## 2025-02-14 PROCEDURE — 83615 LACTATE (LD) (LDH) ENZYME: CPT

## 2025-02-14 PROCEDURE — 99213 OFFICE O/P EST LOW 20 MIN: CPT

## 2025-02-14 PROCEDURE — 3017F COLORECTAL CA SCREEN DOC REV: CPT | Performed by: NURSE PRACTITIONER

## 2025-02-14 PROCEDURE — 36591 DRAW BLOOD OFF VENOUS DEVICE: CPT

## 2025-02-14 PROCEDURE — G8420 CALC BMI NORM PARAMETERS: HCPCS | Performed by: NURSE PRACTITIONER

## 2025-02-14 RX ORDER — PREDNISONE 50 MG/1
50 TABLET ORAL EVERY 6 HOURS
Qty: 3 TABLET | Refills: 0 | Status: SHIPPED | OUTPATIENT
Start: 2025-02-14 | End: 2025-02-15

## 2025-02-14 RX ORDER — SODIUM CHLORIDE 0.9 % (FLUSH) 0.9 %
10 SYRINGE (ML) INJECTION PRN
Status: DISCONTINUED | OUTPATIENT
Start: 2025-02-14 | End: 2025-02-15 | Stop reason: HOSPADM

## 2025-02-14 RX ORDER — SODIUM CHLORIDE 0.9 % (FLUSH) 0.9 %
10 SYRINGE (ML) INJECTION PRN
OUTPATIENT
Start: 2025-02-14

## 2025-02-14 RX ORDER — HEPARIN 100 UNIT/ML
500 SYRINGE INTRAVENOUS PRN
Status: DISCONTINUED | OUTPATIENT
Start: 2025-02-14 | End: 2025-02-15 | Stop reason: HOSPADM

## 2025-02-14 RX ORDER — HEPARIN 100 UNIT/ML
500 SYRINGE INTRAVENOUS PRN
OUTPATIENT
Start: 2025-02-14

## 2025-02-14 RX ORDER — DIPHENHYDRAMINE HCL 25 MG
50 CAPSULE ORAL ONCE
Qty: 2 CAPSULE | Refills: 0 | Status: SHIPPED | OUTPATIENT
Start: 2025-02-14 | End: 2025-02-14

## 2025-02-14 RX ADMIN — HEPARIN 500 UNITS: 100 SYRINGE at 11:21

## 2025-02-14 RX ADMIN — SODIUM CHLORIDE, PRESERVATIVE FREE 10 ML: 5 INJECTION INTRAVENOUS at 11:21

## 2025-02-14 NOTE — PROGRESS NOTES
Progress Note      Pt Name: Marino Bailey  YOB: 1950  MRN: [id number]    Date of evaluation: 02/14/2025  History Obtained From:  patient, electronic medical record    CHIEF COMPLAINT:    Chief Complaint   Patient presents with    Cancer     Follicular low grade B-cell lymphoma (HCC)         HISTORY OF PRESENT ILLNESS:    Marino Bailey is a 74 y.o.  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     In relation to his prostate cancer, he is being followed by Dr. Dunn and Dr. Jo.  He is currently receiving ADT therapy with Lupron for anticipated 2 years (initiated on 2/19/2024) and Dr. Dunn and completed radiation therapy to the prostate under the direction of Dr. Jo on 1/29/2025. He initiated the Lupron on 2/19/2024. His PSA had improved to 0.203 on 8/23/2024.      In relation to his diagnosis of low-grade follicular B-cell lymphoma,  Marino completed first-line treatment with Bendeka and rituximab for 6 cycles on 6/26/2024. PET scan from 7/23/2024 at [facility] reported interval resolution of osseous lesions which is compatible with positive treatment response and   small right lower lobe nodule resolved.  CT scan of the chest and lumbar spine on 11/15/2024 were reported as stable with no new concerning findings.  Marino returns today for evaluation, lab monitoring, late side effect monitoring and further treatment recommendations.      History of Present Illness  He reports experiencing intermittent episodes of a sensation described as \"fuzzy-headedness,\" which he attributes to his usual head congestion. He has not consumed any food today, and the onset  help with the craving. The patient is contemplated at this time. I will follow-up on smoking cessation during the next visit and continue to encourage quitting tobacco use.    7. Port-A-Cath care:   -Will be flushed every 12 weeks-flushed today    8.  Factor V Leiden deficiency: 4/1/2024, Marino reported that he also has a history of factor V Leiden that was identified greater than 30 years ago, his brother and sister also have confirmed diagnosis of factor V Leiden with his father dying from blood clots.  He denied any known history of personal blood clots and is currently taking aspirin 81 mg daily.    11/08/2024 Serology results  JAK2 [id number] mutation- negative  CALR mutation- negative  Exons 12-15 mutation- negative  MPL mutation- negative  EPO 14    -Agree with continuing aspirin 81 mg p.o. daily.  No need for additional anticoagulation based on no personal history of thrombotic event    I discussed all of the above findings included in the assessment and plan with the patient and the patient is in agreement to move forward with current recommendations/treatment.  I have addressed all of their questions and concerns that were verbalized.     FOLLOW UP:  1.  Follow-up appointment for 3 months for follow up for non-hodgkin's lymphoma, prostate cancer and factor V Leiden deficiency  2.  Port flush every 12 weeks  3.  Continue to follow with other medical providers as recommended.  4.  Labs at next visit: CBC, CMP, beta-2 and LDH    EMR Dragon/Transcription disclaimer:   Much of this encounter note is an electronic transcription/translation of spoken language to printed text. The electronic translation of spoken language may permit erroneous, or at times, nonsensical words or phrases to be inadvertently transcribed; although attempts have made to review the note for such errors, some may still exist.  Please excuse any unrecognized transcription errors and contact us if the air is unintelligible or needs documented

## 2025-02-16 LAB — B2 MICROGLOB SERPL-MCNC: 3.2 MG/L

## 2025-02-19 ASSESSMENT — ENCOUNTER SYMPTOMS
EYE REDNESS: 0
WHEEZING: 0
NAUSEA: 0
DIARRHEA: 0
BLOOD IN STOOL: 0
EYES NEGATIVE: 1
BACK PAIN: 0
CONSTIPATION: 0
EYE PAIN: 0
SHORTNESS OF BREATH: 0
GASTROINTESTINAL NEGATIVE: 1
SORE THROAT: 0
EYE DISCHARGE: 0
COUGH: 0
ABDOMINAL PAIN: 0
RESPIRATORY NEGATIVE: 1
VOMITING: 0

## 2025-03-10 ENCOUNTER — HOSPITAL ENCOUNTER (OUTPATIENT)
Dept: CT IMAGING | Facility: HOSPITAL | Age: 75
Discharge: HOME | End: 2025-03-10
Payer: MEDICARE

## 2025-03-10 DIAGNOSIS — C82.80 FOLLICULAR LOW GRADE B-CELL LYMPHOMA: ICD-10-CM

## 2025-03-10 PROCEDURE — 72132 CT LUMBAR SPINE W/DYE: CPT

## 2025-03-10 PROCEDURE — 25510000001 IOPAMIDOL 61 % SOLUTION: Performed by: NURSE PRACTITIONER

## 2025-03-10 PROCEDURE — 71260 CT THORAX DX C+: CPT

## 2025-03-10 RX ORDER — IOPAMIDOL 612 MG/ML
100 INJECTION, SOLUTION INTRAVASCULAR
Status: COMPLETED | OUTPATIENT
Start: 2025-03-10 | End: 2025-03-10

## 2025-03-10 RX ADMIN — IOPAMIDOL 100 ML: 612 INJECTION, SOLUTION INTRAVENOUS at 08:17

## 2025-03-14 ENCOUNTER — TELEPHONE (OUTPATIENT)
Dept: HEMATOLOGY | Age: 75
End: 2025-03-14

## 2025-03-14 NOTE — TELEPHONE ENCOUNTER
Reviewed CT scan results from [facility] on 12/10/2025 CT of the lumbar spine showed degenerative changes with no new concerning findings and a CT scan of the chest was stable with no new findings.    History Bailey verbalized understanding and answered all of his questions to the best my ability.  He will keep his scheduled follow-up appointment in clinic on 5/16/2025 and contact clinic if he needs something sooner.

## 2025-04-14 ENCOUNTER — SOCIAL WORK (OUTPATIENT)
Dept: HEMATOLOGY | Age: 75
End: 2025-04-14

## 2025-04-14 DIAGNOSIS — C80.1 CANCER (HCC): Primary | ICD-10-CM

## 2025-04-14 PROCEDURE — NBSRV NON-BILLABLE SERVICE: Performed by: INTERNAL MEDICINE

## 2025-04-14 NOTE — PROGRESS NOTES
ESSIE Vargas completed Survivorship Care Plan visit with patient via phone. SW introduced self and explained role and source of support. SW provided and reviewed Survivorship Care Plan Packet and completed Distress screening. Patient given the opportunity to ask questions and answered these appropriately. SW provided support through active listening and discussion. Patient denies needs or concerns currently. SW encouraged the patient to call if assistance is needed in the future.

## 2025-04-24 NOTE — PROGRESS NOTES
[facility]  Radiation Oncology Clinic   Fox Walters MD, FACR  Benjy ZIMMERMAN  _______________________________________________  [facility]  Department of Radiation Oncology  [address] [place] [id number]  Office: [phone]  Fax: [phone]    DATE: 04/30/2025  PATIENT: Prabhakar Thomas  1950                         MEDICAL RECORD #: [id number]    1. Prostate cancer    2. 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 Prophylactic use of leuprolide acetate (Lupron)    4. History of radiation therapy    5. Current every day smoker                                               REASON FOR VISIT:    Chief Complaint   Patient presents with    Prostate Cancer       REASON FOR FOLLOW UP VISIT  Prabhakar Thomas is a very pleasant 74 y.o. patient that has completed radiation therapy and returns today for inital follow up exam.     History of Present Illness:  Diagnosed with prostatic adenocarcinoma, pre-PSA 4.82, Leeanne (5+4) 9. He completed 7000 cGy in 28 fractions to the prostate on 01/29/2025.   History of hodgkin lymphoma.     11/22/2017 - PSA: 1.640    08/25/2020 - PSA: 2.290    09/15/2023 - PET Scan (Lymphoma):  Multiple hypermetabolic bone lesions in the thoracic spine, lumbar spine, sacrum, and LEFT iliac wing. Findings are highly suspicious for osseous metastatic disease.  Small focus of hypermetabolic activity in the RIGHT posterior lateral prostate. Differential includes prostate neoplasm versus focal prostatitis. Recommend correlation with PSA and consider prostate MR or PSMA PET/CT based on PSA results.  Persistent areas of consolidation in the RIGHT middle lobe and RIGHT posterior lower lobe with low-level metabolic activity at or slightly above physiologic background level. Favor an indolent process such as postinfectious scarring although hypometabolic indolent neoplasm is not excluded and continued follow-up is  recommended. A single hypermetabolic pulmonary nodule is present in the RIGHT lower lobe. This nodule appears new compared to recent chest CTs and favored to represent an infectious or inflammatory process rather than neoplasm. However, this nodule also warrants close imaging follow-up.  Nonobstructive bilateral renal calculi.  Atherosclerotic ectatic infrarenal abdominal aorta measuring 2.7 cm diameter.    09/19/2023 - PSA: 4.82    09/27/2023 - PSA: 4.34    12/08/2023 - Prostate biopsy per :  Prostate, right mid lateral, needle biopsy:  Prostatic adenocarcinoma, acinar type, grade group 5 (Holmes grade 5+4 = 9).  -Percentage Leeanne pattern 5: 70%.  -Percentage of Holmes pattern 4: 25%.  -Cribriform pattern 4: Present.  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  No evidence of perineural invasion.  Prostate, right mid, needle biopsy: Benign prostatic parenchyma.  Prostate, right lateral base, needle biopsy: Benign prostatic parenchyma.  Prostate, right base, needle biopsy: Benign prostatic parenchyma.  Prostate, right lateral apex, needle biopsy: Benign prostatic parenchyma.  Prostate, right apex, needle biopsy: Benign prostatic parenchyma.  Prostate, left mid lateral, needle biopsy: Benign prostatic parenchyma.  Prostate, left mid, needle biopsy:  Benign prostatic parenchyma.  Acute inflammation is identified.  Prostate, left lateral base, biopsy: Benign prostatic parenchyma.  Prostate, left base, needle biopsy: Benign prostatic parenchyma.  Prostate, left lateral apex, needle biopsy: Benign prostatic parenchyma.  Prostate, left apex, needle biopsy: Benign prostatic parenchyma.    12/18/2023 - Appointment with :  He has opted for a Pylarify F-18 PSMA CT PET     01/05/2024 - PET Scan:  1.4 cm markedly PSMA PET positive lesion in the right posterolateral prostate at the mid gland, correlating with site of biopsy-proven Leeanne 9  disease.  No PSMA PET positive metastatic disease. No PET positive lymph nodes or bone lesions.  The bone lesions that were hypermetabolic in the thoracic and lumbar spine on the prior FDG PET are not positive on this PSMA PET/CT. Recommend correlation with pending pathology from bone biopsy.    02/08/2024 - Appointment with Dr. Cerda:  We discussed the results of his Pylarify.    There is no evidence of metastatic prostate cancer.    I also reiterated the options of external beam radiotherapy with or without brachytherapy boost and ADT versus prostatectomy.    After discussion of risks and benefits of each of these he is elected to undergo external beam radiotherapy.    I would recommend he get 2-year ADT.    We will follow him closely with Dr. Mccarthy and Tony  ADT plan:  Leuprolide 7.5 mg injection  Lupron 45 mg injection x 4.    02/14/2024 - Consult with :  A definitive course of fractionated radiation therapy is recommended to the prostate with ADT, anticipate a course of 7000 cGy over 28 treatment fractions. The patient verbalizes understanding, voices no further questions and wishes to consider options and contact us with a decision. He will initiate lupron therapy and return in 1 month for further discussion of treatment recommendations.   Will refer back to urologist for initiation of ADT, seed placement and Space OAR gel placement. Continue ongoing management per primary care physician and other specialists.  Plan:  Radiation to the prostate would be 28 daily treatments (Monday-Friday).   Would recommend lupron therapy.   Return in 1 month to see if back is better and to see if you want prostate radiation.     02/19/2024 - One month Lupron injection received.     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   Reports significant fatigue, weakness, nausea, poor taste buds and overall not feeling well for about 2 weeks after receiving cycle 1 of Bendeka and Rituxan. When discussing side effect management he reported he only took the Zofran once a day, a lengthy discussion today related to nausea control with antiemetics. Added Phenergan 12.5 mg p.o. every 6 hours and to be rotated between Zofran 8 mg every 6 hours, recommended antibiotics routinely for the first 72 hours post chemotherapy and then as needed.  -C2D1 Bendeka 80 mg/m2 on D1 and D2 and Rituxan 375 mg/m2 on D1 every 28 days with GSF support-given today  -Return tomorrow for D2 Bendeka and then on D3 for Fulphila injection  -Recommended to obtain Imodium over-the-counter  -Benadryl at bedtime for itching and see if will help with sleep  -E-rx Phenergan 12.5 mg every 8 hours as needed for nausea/vomiting  Patient presents for follow-up for side effect managment and toxicity assessment.   Patient requires intensive monitoring due to the potential to cause serious morbidity or death.  Prostatic adenocarcinoma, acinar type, ([place] grade 5+4 = 9), Intraductal carcinoma. No evidence of perineural invasion, 12/8/2023. Clinical stage III, high risk.  Lab Results   Component Value Date/Time   PSA 4.34 09/27/2023 07:11 AM   CEA 4.4 09/19/2023 10:50 AM   Seen by Dr. Cerda on 2/9/2024 with recommendation for external beam radiation therapy and 2 years of ADT with recommendation return to see Dr. Cerda after he has completed radiation therapy.  He was seen in consultation by Dr. Fox Jimenez on 2/14/2024 with recommendation to proceed forward with ADT, seed placement and space OAR gel placement. Radiation to the prostate would be 28 treatments.   -1 month follow-up with Dr. Jimenez to see if he wanted to proceed with prostate radiation.  -Dr Cerda initiating Lupron 7.5 mg x1 injection which he received on 02/19/2024, will then get Lurpon 45 mg every 6 months,  this is scheduled for 03/18/2024 at [facility]  Multiple lung nodules of unknown etiology with differential being metastatic disease or a new primary with largest in RML measuring 1.8 cm   PET scan on 9/15/2023 did not report any hypermetabolic activity or pathologic enlarged lymph nodes. Serology studies on 9/19/2023 ruled out multiple myeloma, identified an elevated PSA of 4.82 (0.0 -4.0) and a CEA of 4.4 (0.0-4.7).   He is being followed by Dr. Santos who made a referral to Dr. Jimenez for consideration for empiric stereotactic radiation to the lung nodules.Dr. Santos reported Prabhakar was a poor candidate for resection due to his overall lung function. Scheduled to follow-up with Dr. Santos in early 2024  He was also evaluated by Dr. Fox Jimenez on 10/25/2023 and reported there was no role for SBRT to the lung nodule or radiation therapy at this time.   Follow-up with Dr. Santos as recommended  Classic Hodgkin's lymphoma, stage Ia, status-post completion of 2 cycles of ABVD on 7/6/2018 followed by ISRT completed on 9/5/2018.   B2M of 3.0 on 09/19/2023  No known evidence to support recurrence of classic contras lymphoma  FOLLOW UP:  Follow-up appointment for 4 weeks for C3 Rituxan/Bendeka for non-hodgkin's lymphoma  Return for day 2 and day 3  Continue to follow with other medical providers as recommended.  Labs at next visit: CBC, TSH, beta-2 microglobulin, LDH and CMP    03/18/2024 - 6 month Lupron injection received.     03/20/2024 - Appointment with :  Prabhakar Thomas returns today for further radiotherapy recommendations regarding prostatic adenocarcinoma.  Ordered PSA today. Return to clinic for follow up appointment in 3 months to allow near completion of systemic therapy for Lymphoma.   Six month Lupron injection received on 03/18/2024.   We will defer definitive radiation to the prostate on this time to allow him to complete systemic therapy for lymphoma.    We will plan to deliver  definitive radiation to the prostate upon completion of therapy for his lymphoma.  Plan:  Return to Dr. Jimenez in 3 months to allow near completion of Bendexa/Rituxan for lymphoma  We will defer definitive radiation to prostate at this time, you are on Lupron  PSA today.    03/20/2024 - PSA: 1.790    05/16/2024 - CT chest without contrast:  Extensive chronic emphysematous lung changes.  Several pulmonary nodules which are either stable or have decreased in size since the previous study. Another follow-up examination in 12 months may be obtained to ensure long-term stability or resolution.  Stable areas of consolidation in the right lower lobe. A groundglass opacity in the right middle lobe posterolaterally appears smaller than the previous study. There is a significant decrease in density of this nodule since the previous study.  Bilateral nonobstructing renal calculi.    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   Since initiating treatment he has persistent complaints of insomnia, fatigue and pruritus, despite multiple regimen recommendations his symptoms persist.  He is taking gabapentin and reports that it may be helping some  C5D1 Bendeka 80 mg/m2 on D1 and D2 and Rituxan 375 mg/m2 on D1 every 28 days with GSF support-given today  Return tomorrow for D2 Bendeka and then on D3 for Fulphila injection  Increase frequency of gabapentin 300 mg p.o. 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  Will plan for a follow-up PET scan after completion of 6 cycles of Bendeka and rituximab.  Will consider repeating bone marrow aspiration biopsy once PET scan has been reviewed, bone marrow aspiration biopsy on 10/26/2023 revealed  Atypical lymphoid aggregates composed of CD20 positive B cells seen. No evidence of metastatic carcinoma or increased plasma cell infiltration. Negative molecular study for B cell gene rearrangement.   Patient presents for follow-up for side effect managment and toxicity assessment.   Patient requires intensive monitoring due to the potential to cause serious morbidity or death.  Prostatic adenocarcinoma, acinar type, ([place] grade 5+4 = 9), Intraductal carcinoma. No evidence of perineural invasion, 12/8/2023. Clinical stage III, high risk.  Seen by Dr. Cerda on 2/9/2024 with recommendation for external beam radiation therapy and 2 years of ADT. He was seen in consultation by Dr. Fox Jiemnez on 2/14/2024 with recommendation to proceed forward with ADT, seed placement and space OAR gel placement. Radiation to the prostate would be 28 treatments and is being delayed until he has completed therapy for his low-grade follicular B-cell lymphoma.   PSA of 1.790 on 03/20/2024 at [facility]  Keep 3 month follow-up with Dr. Jimenez on 6/21/2024 after completion of chemotherapy to proceed with prostate radiation.-   Received Lurpon 45 mg on 03/18/2024 and will receive every 6 months at [facility] outpatient infusion center under the direction of Dr. Cerda, next dose in September  Multiple lung nodules of unknown etiology with differential being metastatic disease or a new primary with largest in RML measuring 1.8 cm   PET scan on 9/15/2023 did not report any hypermetabolic activity or pathologic enlarged lymph nodes. Serology studies on 9/19/2023 ruled out multiple myeloma, identified an elevated PSA of 4.82 (0.0 -4.0) and a CEA of 4.4 (0.0-4.7).   05/16/2024 CT chest with contrast- reported extensive chronic emphysematous lung changes. Several pulmonary nodules which are either stable or have decreased in size since the previous study. 1.8 cm RML nodule with no change, 8 mm RML nodule previously measuring 1.1 cm, 7 mm LLL nodule  previously measured 1 cm and small nodule in left lateral costophrenic sulcus measures 6 mm, previously measured 8.5 mm. Right lower lobe consolidation measures 4 x 2 cm with no change.  Follow-up with Dr. Santos as recommended  Classic Hodgkin's lymphoma, stage Ia, status-post completion of 2 cycles of ABVD on 7/6/2018 followed by ISRT completed on 9/5/2018.   No known evidence to support recurrence of classic contras lymphoma  FOLLOW UP:  Follow-up appointment for 4 weeks for C6 Rituxan/Bendeka for non-hodgkin's lymphoma  Return for day 2 and day 3  Continue to follow with other medical providers as recommended.  Labs at next visit: CBC, CMP    06/21/2024 - Appointment with :  His prostate carcinoma appears to be in remission from his ADT therapy.  His more life limiting problem is his lymphoma and he does complete his last cycle of chemotherapy this month.  At this time I do not recommend any radiation for his prostate carcinoma and further surveillance of his PSA level.  Ordered PSA today. Complete your chemotherapy regimen for Lymphoma. Next Lupron injection is scheduled on 09/03/2024. Return to clinic in 3 months or sooner if needed.   Prabhakar Thomas  reports that he has been smoking cigarettes. He started smoking about 62 years ago. He has a 62.5 pack-year smoking history. He has been exposed to tobacco smoke. He has never used smokeless tobacco. I have educated him on the risk of diseases from using tobacco products such as cancer, COPD, and heart disease. I advised him to quit and he is not willing to quit. I spent >10 minutes counseling the patient.  Plan:  PSA today  Complete your chemotherapy regimen for lymphoma  Return to Dr. Jimenez in 3 months.  Next Lupron scheduled 09/03/2024 06/21/2024 - PSA: 0.213    07/23/2024 - PET Scan:  Interval resolution of multifocal hypermetabolic osseous lesions is compatible with positive treatment response. There are new infiltrates in the right lung,  including an area of consolidation of the right lower lobe which most likely represents acute pneumonia.  Small spiculated nodule seen in the right lower lobe on the previous FDG PET/CT has resolved.    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  Completed first-line treatment with Bendeka and rituximab every 28 days with GSF support for 6 cycles on 6/26/2024 (cycle 6-day 2) for his diagnosis of low-grade follicular B-cell lymphoma. PET scan from 7/23/2024 at [facility] reported interval resolution of osseous lesions which is compatible with positive treatment response.  Previous small right lower lobe nodule resolved.  07/23/2024 PET scan ([facility])- reported interval resolution of multifocal hypermetabolic osseous lesions is compatible with positive treatment response. There are new infiltrates in the right lung, including an area of consolidation of the right lower lobe which most likely represents acute pneumonia. Small spiculated nodule seen in the right lower lobe on the previous FDG PET/CT has resolved.   -Continue gabapentin 300 mg p.o. 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 No evidence of perineural invasion, 12/8/2023. Clinical stage III, high risk.  Seen by Dr. Cerda on 2/9/2024 with recommendation for external beam radiation therapy and 2 years of ADT. He initiated Lupron on 2/19/2024 and next dose of  Lupron is due on 9/3/2024.  He was seen in follow-up by Dr. Jimenez on 6/21/2024-recommendation is to reevaluate once he has completed treatment for his low-grade follicular B-cell lymphoma.    His PSA has improved to 0.213 on 6/21/2024 compared to PSA of 1.790 on 03/20/2024 at [facility]  Keep 3 month follow-up with Dr. Jimenez in September, 2024  -Received Lurpon 45 mg on 03/18/2024 and will receive every 6 months at [facility] outpatient infusion center under the direction of Dr. Cerda, next dose in September  Multiple lung nodules of unknown etiology with differential being metastatic disease or a new primary with largest in RML measuring 1.8cm: Reported as resolved on PET scan on 7/23/2024 Small spiculated nodule seen in the right lower lobe on the previous FDG PET/CT has resolved.   -Follow-up with Dr. Santos as recommended  Classic Hodgkin's lymphoma, stage Ia, status-post completion of 2 cycles of ABVD on 7/6/2018 followed by ISRT completed on 9/5/2018.   No known evidence to support recurrence of classic contras lymphoma  FOLLOW UP:  Follow-up appointment for 3 months for follow up for non-hodgkin's lymphoma, prostate cancer and factor V Leiden deficiency  Continue to follow with other medical providers as recommended.  Labs at next visit: CBC, CMP, beta-2 and LDH    08/23/2024 - Appointment with :  I have reviewed the chart and other physicians records. PSA ordered today. Continue with Lupron as scheduled on 09/03/2024.   Return to Dr. Jimenez in 2 months to give more time to recover from recent chemotherapy.   Continue ongoing management per primary care physician and other specialists.  Plan:  PSA today  Lupron as scheduled on 09/03/2024  Return to Dr. Jimenez in 2 months to give more time to recover from recent chemotherapy.    08/23/2024 - PSA: 0.203    09/06/2024 - Received 6-month Lupron injection.     10/22/2024 - Appointment with :  returns to the clinic today for follow up appointment regarding  prostatic adenocarcinoma.   Referral to Dr. Cerda for seeds and gel. Return to Dr. Jimenez for simulation markings approximately one week after seeds and gel. Please call us when you know date of seed and gel placement in OR so we can put you on our schedule  Plan:  Referral to Dr. Cerda for seeds and gel, they will call you  Return to Dr. 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 Completed first-line treatment with Bendeka and rituximab 6 cycles on 6/26/2024. PET scan from 7/23/2024 at [facility] reported interval resolution of osseous lesions which is compatible with positive treatment response.   B2M of 3.4 and LDH of 165 on 08/08/2024  -Continue gabapentin 300 mg p.o. 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 No evidence of perineural invasion, 12/8/2023. Clinical stage III, high risk.  He is currently receiving ADT therapy with Lupron for anticipated 2 years and Dr. Cerda is planning on seed and gel placement on 11/18/2024 and then will follow-up with Dr Jimenez for radiation to prostate on 11/26/2024. He initiated the Lupron on 2/19/2024 and last dose of Lupron was  given on 9/3/2024. His PSA has improved to 0.203 on 8/23/2024.   His PSA has improved to 0.213 on 6/21/2024 compared to PSA of 1.790 on 03/20/2024 at [facility]  -Follow-up with Dr. Jimenez one week after seed and gel placement to initiate radiation  -Received Lurpon 45 mg on 09/06/2024 and will receive every 6 months at [facility] outpatient infusion center under the direction of Dr. Cerda, next dose in 03/2025  FOLLOW UP:  Follow-up appointment for 3 months for follow up for non-hodgkin's lymphoma, prostate cancer and factor V Leiden deficiency  Port flush every 12 weeks  Continue to follow with other medical providers as recommended.  Labs at next visit: CBC, CMP, beta-2 and LDH    11/18/2024 - Seed placement per .     11/26/2024 - Appointment with :  Today we did discuss proceeding with radiation in the absence of SpaceOAR gel.  I did advise him there is increased risk of chronic rectal bleeding without the SpaceOAR gel.  He verbalizes understanding and agrees to proceed with radiotherapy with this increased risk of potential rectal toxicity such as bleeding.    12/11/2024 - 01/29/2025 - Completed radiation course:  Received 7000 cGy in 28 fractions to the prostate.    02/19/2025 - Appointment with ERASMO Hankins:  Follow-up appointment for 3 months for follow up for non-hodgkin's lymphoma, prostate cancer and factor V Leiden deficiency  Port flush every 12 weeks    -------------------------------------------------------------------------------------------------------------------  History of disseminated bone metastases of unknown primary site and potential pulmonary metastases.    03/02/2023 - CT Chest without contrast:  A new oval 2.1 x 2.6 cm opacity in the right middle lobe with indistinct margins could be a focal area of pneumonia or atelectasis. A pulmonary nodule is included. Consider short-term follow-up CT in 3 months or PET/CT for additional evaluation.  Slight increase in size of another right  "middle lobe area of nodularity now measuring 10 mm and previously measuring 8 mm. Other areas of nodularity appear relatively stable.  A 4 cm transverse pleural-based opacity in the right lower lobe posteriorly measures up to 4.5 cm on the prior study. This is likely infectious or inflammatory given the slight decrease in size.  Improving patchy bilateral lower lobe infiltrates. Severe centrilobular emphysema.  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 There are no pathologically enlarged lymph nodes. Heart size is mildly prominent.  Prior cholecystectomy. Nonobstructive renal stones bilaterally.      06/02/2023 - CT Chest without contrast:  Numerous pulmonary nodules the majority of which are stable from the previous examination.  There are 2 areas of consolidation which are more prominent than on the previous examination. This includes an area of parenchymal consolidation within the central right lung base which may represent a new superimposed infiltrate. There is also increased consolidation within the right middle lobe, particularly in the lateral segment. There was a nodular component previously measured which I feel is essentially stable from the previous exam but there is increased adjacent consolidation/infiltrate within the lateral segment of the right middle lobe. Continued follow-up will be warranted. There appears to be waxing and waning of these multiple infiltrates when reviewing more remote CTs of the chest and I suspect the changes within the right lung represent continued changes related to the same process.  Nonobstructing right-sided nephrolithiasis.    08/31/2023 - CT Chest without contrast:  Multiple pulmonary nodules some of which have developed and some of which appear to be slowly growing. Although these findings may be infectious/inflammatory, neoplasm is not fully ruled out. Follow-up PET/CT is recommended to evaluate further. Marked as \"lung findings\" in PACS for " follow-up.  Focal area of consolidation in the posterior aspect of the right lower lobe is relatively stable compared back to 11/13/2022. Probable area of infection/inflammation. Neoplasm less likely.  Severe centrilobular emphysema.  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 Cardiomegaly. Dilated pulmonary arteries.  Prior cholecystectomy. Bilateral nonobstructive renal stones    09/15/2023 - PET Scan:  Multiple hypermetabolic bone lesions in the thoracic spine, lumbar spine, sacrum, and LEFT iliac wing. Findings are highly suspicious for osseous metastatic disease.  Small focus of hypermetabolic activity in the RIGHT posterior lateral prostate. Differential includes prostate neoplasm versus focal prostatitis. Recommend correlation with PSA and consider prostate MR or PSMA PET/CT based on PSA results.  Persistent areas of consolidation in the RIGHT middle lobe and RIGHT posterior lower lobe with low-level metabolic activity at or slightly above physiologic background level. Favor an indolent process such as postinfectious scarring although hypometabolic indolent neoplasm is not excluded and continued follow-up is recommended. A single hypermetabolic pulmonary nodule is present in the RIGHT lower lobe. This nodule appears new compared to recent chest CTs and favored to represent an infectious or inflammatory process rather than neoplasm. However, this nodule also warrants close imaging follow-up.  Nonobstructive bilateral renal calculi.  Atherosclerotic ectatic infrarenal abdominal aorta measuring 2.7 cm diameter.    10/10/2023 - Appointment with :  History of lung nodules and new finding of bone lesions throughout the thoracic and lumbar spine, sacrum and left iliac wing along with small focus of hypermetabolic activity in the right posterior lateral prostate with an SUV of 3.8, PET scan on 9/15/2023.  He denied any B symptoms and a PET scan did not report any hypermetabolic  activity or pathologic enlarged lymph nodes. Serology studies on 9/19/2023 ruled out multiple myeloma, identified an elevated PSA of 4.82 (0.0 -4.0) and a CEA of 4.4 (0.0-4.7).  Differential diagnosis of metastatic prostate carcinoma and metastatic lung carcinoma  Since his last office visit he has been evaluated by Dr. Cerda on 9/28/2023 and he is scheduled for an MRI of the prostate on 11/3/2023 and then further evaluation will be made in regards if a biopsy is warranted.  Prabhakar is scheduled to see cardiothoracic surgeon, Dr. Santos on 10/18/2023 for further recommendations/consideration if intervention is warranted.  Lab Results  Component Value Date/Time  PSA 4.34 09/27/2023 07:11 AM  CEA 4.4 09/19/2023 10:50 AM  09/19/2023 Serology results  B2M 3.0  Kappa light chains 35.21  Lambda light chains 42.42  Kappa/Lambda ratio 0.83  IgG 921, IgM 178, IgA 100  M-spike not observed  Normal SPEP pattern. STEFAN gel shows a normal pattern; no monoclonal proteins seen.  Ca 9.3  Creat 0.9, GFR >60  -Keep follow up with Dr Cerda on 11/09/2023  -Keep follow up with Dr Santos on 10/18/2023  -MRI scheduled for 11/03/2023  Classic Hodgkin's lymphoma, stage Ia, status-post completion of 2 cycles of ABVD on 7/6/2018 followed by ISRT completed on 9/5/2018. He has been lost to follow-up last visit on 3/18/2022 there was no known evidence to suggest recurrence of disease.  PET scan on 9/15/2023 did not report any hypermetabolic activity in the neck or cervical lymphadenopathy. No pathological enlargement of retroperitoneal, mesenteric, pelvic or inguinal lymph nodes.  Denies any B symptoms other than unintentional weight loss of 12 pounds over the last 18 months and increase in fatigue level  Physical exam without palpable adenopathy.    10/18/2023 - Appointment with :  Note pending.    10/25/2023 - Consult with :  There is no role for SBRT to the lung nodule or other radiation therapy at this time.  Dr. Mccarthy will  see the patient back for bone marrow biopsy and to determine primary site as well as systemic management.  Continue ongoing management per primary care physician and other specialists.     03/10/2025 - CT chest with contrast:  Stable exam with no thoracic lymphadenopathy identified.  Chronic changes in the lungs of scarring and emphysema and stable subpleural nodular opacity in the right lower lobe.        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 He completed 2000 cGy in 10 fractions to the left neck on 09/05/2018.  03/16/2018 - CT soft tissue neck:  There is extensive mucosal thickening in the left maxillary sinus, mild mucosal thickening in the right maxillary sinus, moderate amount of dependent debris is noted in the sphenoid sinus.   Skin markers were placed at 3 sites on the left neck, there are enlarged lymph nodes at each palpable abnormality.   At the angle the mandible, there is a lymph node that measures 3.1 x 1.9 cm, probably the digastric lymph node.  Slightly more posteriorly and superiorly at level 2, there is a lymph node that measures 2.1 x 1.8 cm.  There is enlarged lymph node in the left supraclavicular space which is palpable and measures 1.2 x 1.1 cm.  More anteriorly at the same level there is a larger lymph node that measures 1.9 x 1.6 cm.   There are at least 7 additional enlarged lymph nodes within the left cervical chain.  No right-sided cervical lymphadenopathy is identified.   There is a small benign-appearing 4 x 2 mm low-attenuation nodule in the right thyroid lobe.  The salivary glands are symmetric, there were 2 nodules in the inferior left parotid gland, possibly lymph nodes, largest measures 1.2 cm and appears centrally necrotic    03/26/2018 - Left base of tongue and cervical lymph node biopsy performed by :  Left base of  tongue, biopsy:  Fragments of squamous mucosa with submucosal lymphoid tissue demonstrating changes of reactive lymphoid hyperplasia.  No Manoj-Tricia cells identified.  No histologic evidence of malignancy.    Left lower cervical lymph nodes, level V, excisional biopsy:   Classical Hodgkin lymphoma, nodular sclerosis type.    Comment: Immunohistochemical studies performed at [facility] [place] support the morphologic impression of Hodgkin lymphoma.  As would be expected in Hodgkin lymphoma, flow cytometric analysis performed by [facility] [place] reveals no immunophenotypic evidence of a clonal B-cell or an abnormal T-cell population.  Please refer to the complete pathology and flow cytometry reports from [facility] [place] which are appended.    03/27/2018 - CT Abdomen/Pelvis with contrast:  No acute intra-abdominal or pelvic process, there is evidence of previous cholecystectomy and fatty infiltration of the liver is present. There is a stable small 6 to 7 mm nodule in the right middle lobe compared with a previous chest CT.  Nonvisualization of the appendix but no evidence of appendicitis.  Small nonobstructing nephrolithiasis bilaterally. There are small cysts in the upper pole the left kidney that appear benign.  Evidence of previous bilateral inguinal hernia repair.  2.7 cm sebaceous cyst over the right mid back.    04/13/2018 - Appointment with :  Referral to oncology     04/24/2018 - PET Scan  Numerous enlarged lymph nodes are seen in the LEFT cervical chain and LEFT supraclavicular region. A posterior lymph node at the LEFT mandibular angle demonstrates a maximum SUV of 8.2 and measures 2 cm in diameter.  There is a 2.1 cm lymph node deep to the LEFT sternocleidomastoid that demonstrates a maximum SUV of 6.8 a medial LEFT supraclavicular lymph node demonstrates a maximum SUV of 5.6 and measures approximately 1 cm in diameter.     04/24/2018 - Bone Marrow Biopsy:  Showed no signs of  involvement by classical Hodgkins lymphoma.  One atypical lymphoid aggregates composed of CD20 positive B cells with positive B-cell gene rearrangement, which raises a concern for non-Hodgkin lymphoma    04/25/2018 - CT Chest with contrast:  LEFT supraclavicular and posterior cervical lymphadenopathy is appreciated.  There is a 1.1 cm RIGHT hilar lymph node. This was not hot on the recently performed PET/CT.  A 1.2 cm groundglass opacity in the RIGHT lower lobe can be followed on subsequent exams.    05/11/2018 - 07/06/2018 - Chemotherapy course:  ABVD x 2 cycles    07/18/2018 - PET Scan performed after 2 cycles of ABVD:  There is increased uptake in the left posterior oropharynx demonstrating a maximum SUV of 3.95.   There is an adjacent calcification or metallic density, which could result in some artifact.  Otherwise, there has been complete resolution of hypermetabolism within the lymph nodes within the left neck.  Enlarged lymph nodes previously noted along the left cervical chain now demonstrate a maximum SUV of 1.91, previously demonstrating a maximum SUV of at least 8.2.   No residual hypermetabolism is seen within the left supraclavicular lymph nodes.  There is new somewhat diffuse hypermetabolism in the lungs bilaterally, particularly in the subpleural position, with associated airspace opacities concerning for an infectious or inflammatory process.   A nodule at the left lung base does not appear hypermetabolic.   Physiologic activity is noted in the myocardium.   No foci of hypermetabolism are appreciated in either breast or either axilla.   There is mild diffuse increased uptake along the course of the esophagus, which can be seen with esophagitis.   A tiny lymph node in the superior mediastinum adjacent to the esophagus demonstrates a maximum SUV of 2.2. The lymph node appears normal in caliber. Activity within this lymph node appears new when compared to the previous exam.  There is increased activity  within the rectum relative to the remainder of the bowel, demonstrating a maximum SUV of 7.95.     Addendum: The maximum SUV within the left cervical lymph nodes measures 1.91, with maximum uptake in the mediastinum demonstrating an SUV of 2.25.   Maximum SUV in the liver measures 2.41. (Score of  1 on Deauville Criteria).   Lymph nodes in the left supraclavicular region demonstrating a maximum SUV of 0.95 (Score of 1 on Deauville Criteria).    07/20/2018 - Appointment with :  Referral to radiation oncology for ISRT 20 Gy  Referred to GI for consideration of EGD/colonoscopy   Follow up in 4 weeks     07/26/2018 - Consult with :  With a curative intention, I am recommending ISRT which will consist of 20 Gy over 2 weeks.     08/22/2018 - 09/05/2018 - Completed Radiation therapy:  Received 2000 cGy in 10 fractions to left neck via external beam radiation therapy.    10/22/2018 - Appointment with :  Follow up in 3 months.     04/26/2019 - Scheduled CT Chest/Neck/Abdomen/Pelvis with contrast.      History obtained from  PATIENT and CHART    PAST MEDICAL HISTORY  Past Medical History:   Diagnosis Date    Arthritis     Chronic obstructive lung disease     COVID     Factor V Leiden     PRONE TO BLOOD CLOTS    History of colon polyps     History of hepatitis 12/06/2017    1979 the patient states he cannot remember which A,B,or C. He is unsure if he was treated.    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 04/12/2018    Pneumonia     PONV (postoperative nausea and vomiting)     Prostate cancer       PAST SURGICAL HISTORY  Past Surgical History:   Procedure Laterality Date    ARM SKIN LESION BIOPSY / EXCISION Right     CERVICAL LYMPH NODE BIOPSY/EXCISION Left 03/26/2018    Procedure: LEFT CERVICAL LYMPH NODE BIOPSY WITH DIRECT LARYNGOSCOPY;  Surgeon: Mehul Jack MD;  Location: [facility];  Service: ENT     CHOLECYSTECTOMY      COLONOSCOPY N/A 09/18/2018    The entire examined colon is normal on direct and retroflexion views; Repeat 5 years    COLONOSCOPY N/A 11/10/2023    Diverticulosis in the left colon; The examination was otherwise normal on direct and retroflexion views; No specimens collected; Repeat 5 years    HERNIA REPAIR      KYPHOPLASTY N/A 1/3/2024    Procedure: VERTEBRAL BODY  BIOPSY T12,L2,L3;  Surgeon: Tone Mora MD;  Location: [facility] OR;  Service: Neurosurgery;  Laterality: N/A;    LARYNGOSCOPY Left 03/26/2018    Procedure: DIRECT LARYNGOSCOPY WITH BIOPSY;  Surgeon: Mehul Jack MD;  Location:  [facility] OR;  Service: ENT    PROSTATE BIOPSY N/A 12/8/2023    Procedure: TRANSPERINEAL PROSTATE BIOPSY WITH TRANSRECTAL ULTRASOUND GUIDANCE;  Surgeon: Joseph Cerda MD;  Location:  [facility] OR;  Service: Urology;  Laterality: N/A;      FAMILY HISTORY  family history includes Breast cancer in his mother; Peripheral vascular disease in his father.    SOCIAL HISTORY  Social History     Tobacco Use    Smoking status: Every Day     Current packs/day: 1.00     Average packs/day: 1 pack/day for 63.3 years (63.3 ttl pk-yrs)     Types: Cigarettes     Start date: 1962     Passive exposure: Current    Smokeless tobacco: Never    Tobacco comments:     AVS   Vaping Use    Vaping status: Never Used   Substance Use Topics    Alcohol use: Never    Drug use: No     ALLERGIES  Contrast dye (echo or unknown ct/mr) and Codeine     MEDICATIONS    Current Outpatient Medications:     amLODIPine (NORVASC) 5 MG tablet, Take 1 tablet by mouth Daily., Disp: , Rfl:     aspirin 81 MG EC tablet, Take 1 tablet by mouth Daily., Disp: , Rfl:     HYDROcodone-acetaminophen (NORCO) 5-325 MG per tablet, Take 1 tablet by mouth Every 8 (Eight) Hours As Needed for Moderate Pain (Pain)., Disp: 6 tablet, Rfl: 0    Leuprolide Acetate (LUPRON IJ), Inject  as directed., Disp: , Rfl:     multivitamin with minerals tablet tablet, Take 1 tablet  by mouth Daily., Disp: , Rfl:     naloxone (NARCAN) 4 MG/0.1ML nasal spray, Call 911. Don't prime. Albers in 1 nostril for overdose. Repeat in 2-3 minutes in other nostril if no or minimal breathing/responsiveness., Disp: 2 each, Rfl: 0    nystatin (MYCOSTATIN) 100,000 unit/mL suspension, Take 5 mL by mouth 4 (Four) Times a Day., Disp: 120 mL, Rfl: 0    ondansetron (ZOFRAN) 8 MG tablet, Take 1 tablet by mouth Every 8 (Eight) Hours As Needed., Disp: , Rfl:     Polyethylene Glycol 3350 (MIRALAX PO), Take 1 Scoop by mouth Daily., Disp: , Rfl:     Trelegy Ellipta 200-62.5-25 MCG/ACT inhaler, Inhale 1 puff Daily., Disp: , Rfl:     gabapentin (NEURONTIN) 300 MG capsule, Take 1 capsule by mouth 2 (Two) Times a Day., Disp: , Rfl:     Current outpatient and discharge medications have been reconciled for the patient.  Reviewed by: ERASMO Macdonald    The following portions of the patient's history were reviewed and updated as appropriate: allergies, current medications, past family history, past medical history, past social history, past surgical history and problem list.    REVIEW OF SYSTEMS  Review of Systems   Constitutional:  Positive for fatigue.   HENT: Negative.     Eyes: Negative.    Respiratory: Negative.     Cardiovascular: Negative.    Gastrointestinal:  Positive for constipation (chronic; takes Miralax occasionally).   Endocrine: Negative.    Genitourinary:  Positive for frequency and urgency.        Nocturia 2x   Musculoskeletal: Negative.    Skin: Negative.    Allergic/Immunologic: Negative.    Neurological: Negative.    Hematological: Negative.    Psychiatric/Behavioral: Negative.           KARINA Questionnaire (Sexual Health Inventory for Men)  Over the past 6 months…    1) How do you rate your confidence that you could get and keep an erection? 1 - Very Low   2)  When you had erections with sexual stimulation, how often were your erections hard enough for penetration (entering your partner)? 0 - No sexual  "activity   3)  During sexual intercourse, how often were you able to maintain your erection after you had penetrated (entered) your partner? 0 - Did not attempt intercourse   4) During sexual intercourse, how difficult was it to maintain your erection to completion of intercourse? 0 - Did not attempt intercourse   5) When you attempted sexual intercourse, how often was it satisfactory for you? 0 - Did not attempt intercourse   Total score:  1            1-7 Severe ED    8-11 Moderate ED    12-16 Mild to Moderate ED    17-21 Mild ED    22-25 No Signs of ED     IPSS Questionnaire (AUA-7):  Over the past month…    1)  How often have you had a sensation of not emptying your bladder completely after you finish urinating?  0 - Not at all   2)  How often have you had to urinate again less than two hours after you finished urinating? 0 - Not at all   3)  How often have you found you stopped and started again several times when you urinated?  0 - Not at all   4) How difficult have you found it to postpone urination?  0 - Not at all   5) How often have you had a weak urinary stream?  0 - Not at all   6) How often have you had to push or strain to begin urination?  0 - Not at all   7) How many times did you most typically get up to urinate from the time you went to bed until the time you got up in the morning?  2 - 2 times   Total score: 2 0-7 mildly symptomatic    8-19 moderately symptomatic    20-35 severely symptomatic     PHYSICAL EXAM  VITAL SIGNS:   Vitals:    04/30/25 0905   BP: 154/52   Pulse: 72   SpO2: 98%  Comment: room air   Weight: 76 kg (167 lb 9.6 oz)   Height: 188 cm (74\")   PainSc: 0-No pain       Physical Exam  Vitals reviewed.   Constitutional:       Appearance: Normal appearance.   HENT:      Head: Normocephalic.      Nose: Nose normal.   Eyes:      Pupils: Pupils are equal, round, and reactive to light.   Cardiovascular:      Rate and Rhythm: Normal rate and regular rhythm.      Pulses: Normal pulses.      " Heart sounds: Normal heart sounds.   Pulmonary:      Effort: Pulmonary effort is normal. No respiratory distress.      Breath sounds: Normal breath sounds. No wheezing.   Abdominal:      General: Bowel sounds are normal.      Palpations: There is no mass.   Musculoskeletal:         General: Normal range of motion.      Cervical back: Normal range of motion and neck supple. No tenderness.   Lymphadenopathy:      Cervical: No cervical adenopathy.   Skin:     General: Skin is warm and dry.      Capillary Refill: Capillary refill takes less than 2 seconds.   Neurological:      General: No focal deficit present.      Mental Status: He is alert and oriented to person, place, and time.      Motor: No weakness.   Psychiatric:         Mood and Affect: Mood normal.         Behavior: Behavior normal.         Performance Status: ECOG (1) Restricted in physically strenuous activity, ambulatory and able to do work of light nature    Clinical Quality Measures  - Pain Documented by Standardized Tool, FPS Prabhakar Thomas reports a pain score of 0.  Given his pain assessment as noted, treatment options were discussed and the following options were decided upon as a follow-up plan to address the patient's pain: continuation of current treatment plan for pain.  Pain Medications              aspirin 81 MG EC tablet Take 1 tablet by mouth Daily.    HYDROcodone-acetaminophen (NORCO) 5-325 MG per tablet Take 1 tablet by mouth Every 8 (Eight) Hours As Needed for Moderate Pain (Pain).    gabapentin (NEURONTIN) 300 MG capsule Take 1 capsule by mouth 2 (Two) Times a Day.          - Body Mass Index Screening and Follow-Up Plan  BMI is within normal parameters. No other follow-up for BMI required.    - Tobacco Use: Screening and Cessation Intervention  Social History    Tobacco Use      Smoking status: Every Day        Packs/day: 1.00        Years: 1 pack/day for 63.3 years (63.3 ttl pk-yrs)        Types: Cigarettes        Start date: 1962         Passive exposure: Current      Smokeless tobacco: Never      Tobacco comments: AVS    Smoking cessation information given in after visit summary.    - Advanced Care Planning Advance Care Planning   ACP discussion was held with the patient during this visit. Patient does not have an advance directive, information provided.    - PHQ-2 Depression Screening:  Little interest or pleasure in doing things? Not at all   Feeling down, depressed, or hopeless? Not at all   PHQ-2 Total Score 0     ASSESSMENT AND PLAN  1. Prostate cancer    2. 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 Prophylactic use of leuprolide acetate (Lupron)    4. History of radiation therapy    5. Current every day smoker      Orders Placed This Encounter   Procedures    PSA Diagnostic     Standing Status:   Future     Number of Occurrences:   1     Expected Date:   4/30/2025     Expiration Date:   7/30/2026     Release to patient:   Routine Release [[id number]]    Comprehensive Metabolic Panel     Standing Status:   Future     Number of Occurrences:   1     Expected Date:   4/30/2025     Expiration Date:   7/30/2026     Release to patient:   Routine Release [[id number]]     RECOMMENDATIONS:   Prabhakar Thomas is status post completion of radiation therapy to the prostate, returns to our clinic today for inital follow up exam. Diagnosed with prostatic adenocarcinoma, pre-PSA 4.82, [place] (5+4) 9. He completed 7000 cGy in 28 fractions to the prostate on 01/29/2025. History of hodgkin lymphoma.     On exam, he is currently without symptoms or evidence for recurrent or metastatic disease. Reports fatigue but denies urinary sym. He will obtain a PSA and CMP today. He is due for a lupron injection now, I will order this to be scheduled. The plan is for 2 total years of ADT therapy. He does not wish to return to urology for follow up. He will continue follow-up/surveillance as discussed in 6 months or sooner if needed and will continue to  see the other health care providers as per their scheduling.    Prabhakar Thomas  reports that he has been smoking cigarettes. He started smoking about 63 years ago. He has a 63.3 pack-year smoking history. He has been exposed to tobacco smoke. He has never used smokeless tobacco. I have educated him on the risk of diseases from using tobacco products such as cancer, COPD, and heart disease. I spent >10 minutes counseling the patient.    Patient Instructions   1) PSA today  2) due for lupron 6 month injection now and again in 6 months  3) return in 6 months for follow up, with bloodwork before.    Return in about 6 months (around 10/30/2025).     Time Spent: I spent 42 minutes caring for Prabhakar on this date of service. This time includes time spent by me in the following activities: preparing for the visit, reviewing tests, obtaining and/or reviewing a separately obtained history, performing a medically appropriate examination and/or evaluation, counseling and educating the patient/family/caregiver, ordering medications, tests, or procedures, referring and communicating with other health care professionals, documenting information in the medical record, independently interpreting results and communicating that information with the patient/family/caregiver, and care coordination.   Benjy Black, APRN  04/30/2025

## 2025-04-28 ENCOUNTER — HOSPITAL ENCOUNTER (OUTPATIENT)
Dept: RADIATION ONCOLOGY | Facility: HOSPITAL | Age: 75
Setting detail: RADIATION/ONCOLOGY SERIES
End: 2025-04-28
Payer: MEDICARE

## 2025-04-30 ENCOUNTER — OFFICE VISIT (OUTPATIENT)
Age: 75
End: 2025-04-30
Payer: MEDICARE

## 2025-04-30 ENCOUNTER — LAB (OUTPATIENT)
Dept: LAB | Facility: HOSPITAL | Age: 75
End: 2025-04-30
Payer: MEDICARE

## 2025-04-30 VITALS
BODY MASS INDEX: 21.51 KG/M2 | HEIGHT: 74 IN | OXYGEN SATURATION: 98 % | HEART RATE: 72 BPM | DIASTOLIC BLOOD PRESSURE: 52 MMHG | WEIGHT: 167.6 LBS | SYSTOLIC BLOOD PRESSURE: 154 MMHG

## 2025-04-30 DIAGNOSIS — Z92.3 HISTORY OF RADIATION THERAPY: ICD-10-CM

## 2025-04-30 DIAGNOSIS — C81.11 NODULAR SCLEROSIS HODGKIN LYMPHOMA OF LYMPH NODES OF NECK: ICD-10-CM

## 2025-04-30 DIAGNOSIS — Z79.818 PROPHYLACTIC USE OF LEUPROLIDE ACETATE (LUPRON): ICD-10-CM

## 2025-04-30 DIAGNOSIS — F17.200 CURRENT EVERY DAY SMOKER: ICD-10-CM

## 2025-04-30 DIAGNOSIS — C61 PROSTATE CANCER: ICD-10-CM

## 2025-04-30 DIAGNOSIS — C61 PROSTATE CANCER: Primary | ICD-10-CM

## 2025-04-30 LAB
ALBUMIN SERPL-MCNC: 4.1 G/DL (ref 3.5–5.2)
ALBUMIN/GLOB SERPL: 1.6 G/DL
ALP SERPL-CCNC: 144 U/L (ref 39–117)
ALT SERPL W P-5'-P-CCNC: 10 U/L (ref 1–41)
ANION GAP SERPL CALCULATED.3IONS-SCNC: 10 MMOL/L (ref 5–15)
AST SERPL-CCNC: 13 U/L (ref 1–40)
BILIRUB SERPL-MCNC: 0.3 MG/DL (ref 0–1.2)
BUN SERPL-MCNC: 11 MG/DL (ref 8–23)
BUN/CREAT SERPL: 12.6 (ref 7–25)
CALCIUM SPEC-SCNC: 9.1 MG/DL (ref 8.6–10.5)
CHLORIDE SERPL-SCNC: 104 MMOL/L (ref 98–107)
CO2 SERPL-SCNC: 26 MMOL/L (ref 22–29)
CREAT SERPL-MCNC: 0.87 MG/DL (ref 0.76–1.27)
EGFRCR SERPLBLD CKD-EPI 2021: 91.7 ML/MIN/1.73
GLOBULIN UR ELPH-MCNC: 2.6 GM/DL
GLUCOSE SERPL-MCNC: 86 MG/DL (ref 65–99)
POTASSIUM SERPL-SCNC: 3.5 MMOL/L (ref 3.5–5.2)
PROT SERPL-MCNC: 6.7 G/DL (ref 6–8.5)
PSA SERPL-MCNC: <0.014 NG/ML (ref 0–4)
SODIUM SERPL-SCNC: 140 MMOL/L (ref 136–145)

## 2025-04-30 PROCEDURE — 1159F MED LIST DOCD IN RCRD: CPT

## 2025-04-30 PROCEDURE — 84153 ASSAY OF PSA TOTAL: CPT

## 2025-04-30 PROCEDURE — 1160F RVW MEDS BY RX/DR IN RCRD: CPT

## 2025-04-30 PROCEDURE — G0463 HOSPITAL OUTPT CLINIC VISIT: HCPCS | Performed by: RADIOLOGY

## 2025-04-30 PROCEDURE — 99024 POSTOP FOLLOW-UP VISIT: CPT

## 2025-04-30 PROCEDURE — 80053 COMPREHEN METABOLIC PANEL: CPT

## 2025-04-30 PROCEDURE — 36415 COLL VENOUS BLD VENIPUNCTURE: CPT

## 2025-04-30 PROCEDURE — 1126F AMNT PAIN NOTED NONE PRSNT: CPT

## 2025-04-30 NOTE — PATIENT INSTRUCTIONS
1) PSA today  2) due for lupron 6 month injection now and again in 6 months  3) return in 6 months for follow up, with bloodwork before.

## 2025-05-09 ENCOUNTER — INFUSION (OUTPATIENT)
Dept: ONCOLOGY | Facility: HOSPITAL | Age: 75
End: 2025-05-09
Payer: MEDICARE

## 2025-05-09 VITALS
SYSTOLIC BLOOD PRESSURE: 151 MMHG | DIASTOLIC BLOOD PRESSURE: 72 MMHG | HEART RATE: 69 BPM | RESPIRATION RATE: 18 BRPM | OXYGEN SATURATION: 96 % | TEMPERATURE: 96.7 F

## 2025-05-09 DIAGNOSIS — C61 PROSTATE CANCER: Primary | ICD-10-CM

## 2025-05-09 PROCEDURE — 96402 CHEMO HORMON ANTINEOPL SQ/IM: CPT

## 2025-05-09 PROCEDURE — 25010000002 LEUPROLIDE 45 MG KIT

## 2025-05-09 RX ADMIN — LEUPROLIDE ACETATE 45 MG: KIT at 14:00

## 2025-05-13 ENCOUNTER — TELEPHONE (OUTPATIENT)
Dept: HEMATOLOGY | Age: 75
End: 2025-05-13

## 2025-05-13 NOTE — TELEPHONE ENCOUNTER

## 2025-05-16 ENCOUNTER — HOSPITAL ENCOUNTER (OUTPATIENT)
Dept: INFUSION THERAPY | Age: 75
Discharge: HOME | End: 2025-05-16
Payer: MEDICARE

## 2025-05-16 ENCOUNTER — TRANSCRIBE ORDERS (OUTPATIENT)
Dept: ADMINISTRATIVE | Facility: HOSPITAL | Age: 75
End: 2025-05-16
Payer: MEDICARE

## 2025-05-16 ENCOUNTER — OFFICE VISIT (OUTPATIENT)
Dept: HEMATOLOGY | Age: 75
End: 2025-05-16
Payer: MEDICARE

## 2025-05-16 VITALS
TEMPERATURE: 97.4 F | RESPIRATION RATE: 18 BRPM | OXYGEN SATURATION: 96 % | HEIGHT: 74 IN | BODY MASS INDEX: 21.56 KG/M2 | DIASTOLIC BLOOD PRESSURE: 93 MMHG | WEIGHT: 168 LBS | SYSTOLIC BLOOD PRESSURE: 138 MMHG | HEART RATE: 71 BPM

## 2025-05-16 DIAGNOSIS — T50.995D ALLERGIC REACTION TO DYE, SUBSEQUENT ENCOUNTER: ICD-10-CM

## 2025-05-16 DIAGNOSIS — C82.80 FOLLICULAR LOW GRADE B-CELL LYMPHOMA: Primary | ICD-10-CM

## 2025-05-16 DIAGNOSIS — R91.8 LUNG NODULES: ICD-10-CM

## 2025-05-16 DIAGNOSIS — C82.80 FOLLICULAR LOW GRADE B-CELL LYMPHOMA (HCC): Primary | ICD-10-CM

## 2025-05-16 DIAGNOSIS — D68.51 FACTOR V LEIDEN: ICD-10-CM

## 2025-05-16 DIAGNOSIS — Z45.2 ENCOUNTER FOR CENTRAL LINE CARE: Primary | ICD-10-CM

## 2025-05-16 DIAGNOSIS — C61 PROSTATE CARCINOMA (HCC): ICD-10-CM

## 2025-05-16 DIAGNOSIS — C82.80 FOLLICULAR LOW GRADE B-CELL LYMPHOMA (HCC): ICD-10-CM

## 2025-05-16 DIAGNOSIS — C81.90 HODGKIN LYMPHOMA, UNSPECIFIED HODGKIN LYMPHOMA TYPE, UNSPECIFIED BODY REGION (HCC): ICD-10-CM

## 2025-05-16 LAB
ALBUMIN SERPL-MCNC: 4.2 G/DL (ref 3.5–5.2)
ALP SERPL-CCNC: 144 U/L (ref 40–129)
ALT SERPL-CCNC: 9 U/L (ref 5–41)
ANION GAP SERPL CALCULATED.3IONS-SCNC: 11 MMOL/L (ref 7–19)
AST SERPL-CCNC: 15 U/L (ref 5–40)
BASOPHILS # BLD: 0.06 K/UL (ref 0–0.2)
BASOPHILS NFR BLD: 1.4 % (ref 0–1)
BILIRUB SERPL-MCNC: 0.4 MG/DL (ref 0–1.2)
BUN SERPL-MCNC: 10 MG/DL (ref 8–23)
CALCIUM SERPL-MCNC: 8.6 MG/DL (ref 8.8–10.2)
CHLORIDE SERPL-SCNC: 104 MMOL/L (ref 98–107)
CO2 SERPL-SCNC: 24 MMOL/L (ref 22–29)
CREAT SERPL-MCNC: 0.9 MG/DL (ref 0.7–1.2)
EOSINOPHIL # BLD: 0.33 K/UL (ref 0–0.6)
EOSINOPHIL NFR BLD: 7.5 % (ref 0–5)
ERYTHROCYTE [DISTWIDTH] IN BLOOD BY AUTOMATED COUNT: 12.6 % (ref 11.5–14.5)
GLUCOSE SERPL-MCNC: 130 MG/DL (ref 70–99)
HCT VFR BLD AUTO: 36.8 % (ref 42–52)
HGB BLD-MCNC: 13.3 G/DL (ref 14–18)
LDH SERPL-CCNC: 160 U/L (ref 135–225)
LYMPHOCYTES # BLD: 0.79 K/UL (ref 1.1–4.5)
LYMPHOCYTES NFR BLD: 18 % (ref 20–40)
MCH RBC QN AUTO: 33.8 PG (ref 27–31)
MCHC RBC AUTO-ENTMCNC: 36.1 G/DL (ref 33–37)
MCV RBC AUTO: 93.4 FL (ref 80–94)
MONOCYTES # BLD: 0.49 K/UL (ref 0–0.9)
MONOCYTES NFR BLD: 11.2 % (ref 1–10)
NEUTROPHILS # BLD: 2.71 K/UL (ref 1.5–7.5)
NEUTS SEG NFR BLD: 61.7 % (ref 50–65)
PLATELET # BLD AUTO: 342 K/UL (ref 130–400)
PMV BLD AUTO: 8.3 FL (ref 9.4–12.4)
POTASSIUM SERPL-SCNC: 3.4 MMOL/L (ref 3.5–5.1)
PROT SERPL-MCNC: 6.6 G/DL (ref 6.4–8.3)
RBC # BLD AUTO: 3.94 M/UL (ref 4.7–6.1)
SODIUM SERPL-SCNC: 139 MMOL/L (ref 136–145)
WBC # BLD AUTO: 4.39 K/UL (ref 4.8–10.8)

## 2025-05-16 PROCEDURE — 96523 IRRIG DRUG DELIVERY DEVICE: CPT

## 2025-05-16 PROCEDURE — 1123F ACP DISCUSS/DSCN MKR DOCD: CPT | Performed by: NURSE PRACTITIONER

## 2025-05-16 PROCEDURE — G8420 CALC BMI NORM PARAMETERS: HCPCS | Performed by: NURSE PRACTITIONER

## 2025-05-16 PROCEDURE — 2500000003 HC RX 250 WO HCPCS: Performed by: NURSE PRACTITIONER

## 2025-05-16 PROCEDURE — 99213 OFFICE O/P EST LOW 20 MIN: CPT

## 2025-05-16 PROCEDURE — G8427 DOCREV CUR MEDS BY ELIG CLIN: HCPCS | Performed by: NURSE PRACTITIONER

## 2025-05-16 PROCEDURE — 80053 COMPREHEN METABOLIC PANEL: CPT

## 2025-05-16 PROCEDURE — 36415 COLL VENOUS BLD VENIPUNCTURE: CPT

## 2025-05-16 PROCEDURE — 85025 COMPLETE CBC W/AUTO DIFF WBC: CPT

## 2025-05-16 PROCEDURE — 99214 OFFICE O/P EST MOD 30 MIN: CPT | Performed by: NURSE PRACTITIONER

## 2025-05-16 PROCEDURE — 3017F COLORECTAL CA SCREEN DOC REV: CPT | Performed by: NURSE PRACTITIONER

## 2025-05-16 PROCEDURE — 83615 LACTATE (LD) (LDH) ENZYME: CPT

## 2025-05-16 PROCEDURE — 6360000002 HC RX W HCPCS: Performed by: NURSE PRACTITIONER

## 2025-05-16 PROCEDURE — 82232 ASSAY OF BETA-2 PROTEIN: CPT

## 2025-05-16 PROCEDURE — 1159F MED LIST DOCD IN RCRD: CPT | Performed by: NURSE PRACTITIONER

## 2025-05-16 PROCEDURE — 4004F PT TOBACCO SCREEN RCVD TLK: CPT | Performed by: NURSE PRACTITIONER

## 2025-05-16 RX ORDER — SODIUM CHLORIDE 0.9 % (FLUSH) 0.9 %
10 SYRINGE (ML) INJECTION PRN
Status: DISCONTINUED | OUTPATIENT
Start: 2025-05-16 | End: 2025-05-17 | Stop reason: HOSPADM

## 2025-05-16 RX ORDER — SODIUM CHLORIDE 0.9 % (FLUSH) 0.9 %
10 SYRINGE (ML) INJECTION PRN
OUTPATIENT
Start: 2025-05-16

## 2025-05-16 RX ORDER — HEPARIN 100 UNIT/ML
500 SYRINGE INTRAVENOUS PRN
Status: DISCONTINUED | OUTPATIENT
Start: 2025-05-16 | End: 2025-05-17 | Stop reason: HOSPADM

## 2025-05-16 RX ORDER — HEPARIN 100 UNIT/ML
500 SYRINGE INTRAVENOUS PRN
OUTPATIENT
Start: 2025-05-16

## 2025-05-16 RX ADMIN — HEPARIN 500 UNITS: 100 SYRINGE at 11:16

## 2025-05-16 RX ADMIN — SODIUM CHLORIDE, PRESERVATIVE FREE 10 ML: 5 INJECTION INTRAVENOUS at 11:16

## 2025-05-16 NOTE — PROGRESS NOTES
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 No evidence of perineural invasion.  Clinical stage III, high risk.    Treatment summary:  Dr Dunn initiated Lupron 7.5 mg x1 injection, received on 02/19/2024.  Will then get Lurpon 45 mg every 6 months, this is scheduled for 03/18/2024 at [facility] outpatient infusion center  2/14/2024 - Dr. Christopher Jo recommended to proceed forward with ADT, seed placement and space OAR gel placement.  Radiation to the prostate would be 28 treatments. 12/12/2024 to 01/29/2025; 7000 cGy over 28 fractions    12/08/2023 Prostate biopsy (Dr Dunn):  Prostate, right mid lateral, needle biopsy:  Prostatic adenocarcinoma, acinar type, grade group 5 ([place] grade 5+4 = 9).  -Percentage Jeff pattern 5: 70%.  -Percentage of Jeff pattern 4: 25%.  -Cribriform pattern 4: Present.  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  No evidence of perineural invasion.    01/08/2024 PSMA PET scan ([facility])- reported 1.4 cm markedly PSMA PET positive lesion in the right posterolateral prostate at the mid gland, correlating with site of biopsy-proven Jeff 9 disease.  No PSMA PET positive metastatic disease. No PET positive lymph nodes or bone lesions. The bone lesions that were hypermetabolic in the thoracic and lumbar spine on the prior FDG PET are not positive on this PSMA PET/CT    01/30/2024 Initiated Bendeka 80 mg/m2 on D1 and D2 and Rituxan 375 mg/m2 on D1 every 28 days with GSF support x 6 cycles    ONCOLOGIC HISTORY#1:     Diagnosis   NS-Classical Hodgkin lymphoma, March 2018   IPS score 2 points   Stage IA    Treatment summary  5/11/2018 through 7/6/2018- completion of ABVD chemotherapy x 2 cycles.   8/22/2018 through 9/5/2018- ISRT 20Gy  .     Mr Marino Baiely was seen in initial oncology consultation on 4/19/2018 referred by   smoking for approximately 4-5 minutes. Specifically, we discussed the risk related tobacco including but not limited to carcinoma, cardiovascular disease, stroke, and financial loss. I advised to quit smoking and offered resources to include nicotine patches to help with the craving. The patient is contemplated at this time. I will follow-up on smoking cessation during the next visit and continue to encourage quitting tobacco use.    8. Port-A-Cath care:   -Will be flushed every 12 weeks-flushed today    9.  Factor V Leiden deficiency: 4/1/2024, Marino reported that he also has a history of factor V Leiden that was identified greater than 30 years ago, his brother and sister also have confirmed diagnosis of factor V Leiden with his father dying from blood clots.  He denied any known history of personal blood clots and is currently taking aspirin 81 mg daily.    -Agree with continuing aspirin 81 mg p.o. daily.  No need for additional anticoagulation based on no personal history of thrombotic event    I discussed all of the above findings included in the assessment and plan with the patient and the patient is in agreement to move forward with current recommendations/treatment.  I have addressed all of their questions and concerns that were verbalized.     FOLLOW UP:  1.  Follow-up appointment for 3 months for follow up for non-hodgkin's lymphoma, prostate cancer and factor V Leiden deficiency  2.  Port flush every 12 weeks  3.  Continue to follow with other medical providers as recommended.  4.  Labs at next visit: CBC, CMP, beta-2 and LDH    EMR Dragon/Transcription disclaimer:   Much of this encounter note is an electronic transcription/translation of spoken language to printed text. The electronic translation of spoken language may permit erroneous, or at times, nonsensical words or phrases to be inadvertently transcribed; although attempts have made to review the note for such errors, some may still exist.  Please

## 2025-05-19 LAB — B2 MICROGLOB SERPL-MCNC: 3.3 MG/L

## 2025-05-22 ENCOUNTER — RESULTS FOLLOW-UP (OUTPATIENT)
Dept: HEMATOLOGY | Age: 75
End: 2025-05-22

## 2025-05-22 ASSESSMENT — ENCOUNTER SYMPTOMS
RESPIRATORY NEGATIVE: 1
BLOOD IN STOOL: 0
DIARRHEA: 0
BACK PAIN: 0
WHEEZING: 0
COUGH: 0
EYES NEGATIVE: 1
VOMITING: 0
GASTROINTESTINAL NEGATIVE: 1
NAUSEA: 0
EYE DISCHARGE: 0
SORE THROAT: 0
EYE PAIN: 0
SHORTNESS OF BREATH: 0
EYE REDNESS: 0
CONSTIPATION: 0
ABDOMINAL PAIN: 0

## 2025-07-01 ENCOUNTER — TRANSCRIBE ORDERS (OUTPATIENT)
Dept: ADMINISTRATIVE | Facility: HOSPITAL | Age: 75
End: 2025-07-01
Payer: MEDICARE

## 2025-07-01 ENCOUNTER — TELEPHONE (OUTPATIENT)
Dept: HEMATOLOGY | Age: 75
End: 2025-07-01

## 2025-07-01 DIAGNOSIS — M54.50 ACUTE BILATERAL LOW BACK PAIN WITHOUT SCIATICA: Primary | ICD-10-CM

## 2025-07-01 DIAGNOSIS — C82.80 FOLLICULAR LOW GRADE B-CELL LYMPHOMA: Primary | ICD-10-CM

## 2025-07-01 DIAGNOSIS — R91.8 LUNG NODULES: ICD-10-CM

## 2025-07-01 DIAGNOSIS — C61 PROSTATE CARCINOMA: ICD-10-CM

## 2025-07-01 DIAGNOSIS — M54.41 ACUTE BILATERAL LOW BACK PAIN WITH BILATERAL SCIATICA: ICD-10-CM

## 2025-07-01 DIAGNOSIS — C82.80 FOLLICULAR LOW GRADE B-CELL LYMPHOMA (HCC): ICD-10-CM

## 2025-07-01 DIAGNOSIS — C61 PROSTATE CARCINOMA (HCC): ICD-10-CM

## 2025-07-01 DIAGNOSIS — Z91.041 CONTRAST MEDIA ALLERGY: ICD-10-CM

## 2025-07-01 DIAGNOSIS — G89.3 NEOPLASM RELATED PAIN: Primary | ICD-10-CM

## 2025-07-01 DIAGNOSIS — M54.42 ACUTE BILATERAL LOW BACK PAIN WITH BILATERAL SCIATICA: ICD-10-CM

## 2025-07-01 DIAGNOSIS — G89.3 NEOPLASM RELATED PAIN: ICD-10-CM

## 2025-07-01 DIAGNOSIS — M54.50 ACUTE BILATERAL LOW BACK PAIN WITHOUT SCIATICA: ICD-10-CM

## 2025-07-01 RX ORDER — DIPHENHYDRAMINE HCL 50 MG
CAPSULE ORAL
Qty: 2 CAPSULE | Refills: 0 | Status: SHIPPED | OUTPATIENT
Start: 2025-07-01

## 2025-07-01 RX ORDER — PREDNISONE 50 MG/1
TABLET ORAL
Qty: 3 TABLET | Refills: 0 | Status: SHIPPED | OUTPATIENT
Start: 2025-07-01

## 2025-07-01 RX ORDER — TRAMADOL HYDROCHLORIDE 75 MG/1
1 TABLET, COATED ORAL EVERY 6 HOURS PRN
Qty: 40 TABLET | Refills: 0 | Status: CANCELLED | OUTPATIENT
Start: 2025-07-01

## 2025-07-01 RX ORDER — TRAMADOL HYDROCHLORIDE 75 MG/1
1 TABLET, COATED ORAL EVERY 6 HOURS PRN
Qty: 40 TABLET | Refills: 0 | Status: SHIPPED | OUTPATIENT
Start: 2025-07-01

## 2025-07-01 NOTE — TELEPHONE ENCOUNTER
Called patient and advised of scheduled STAT CT scans for 7/2 @ 3PM [facility]. NPO 6 hrs

## 2025-07-01 NOTE — TELEPHONE ENCOUNTER
Received call from Dr Andujar's office stating that patient is having increased low back pain and hip pain.  Called patient and he stated that the pain started about 3 days ago and at times he has trouble getting out of the chair.  States that it feels the same way as when his lymphoma started.  Talked to GOPAL Vanegas and scans were due in 09/2025.  Will move this up to stat and get a CT of the lumbar spine also.  Instructed patient that someone will call him with the appointment.  Patient v/u.     Talked to AASHISH Vanegas regarding increase pain in back and hip.  Ordered Tramadol

## 2025-07-02 ENCOUNTER — HOSPITAL ENCOUNTER (OUTPATIENT)
Dept: CT IMAGING | Facility: HOSPITAL | Age: 75
Discharge: HOME | End: 2025-07-02
Admitting: NURSE PRACTITIONER
Payer: MEDICARE

## 2025-07-02 DIAGNOSIS — R91.8 LUNG NODULES: ICD-10-CM

## 2025-07-02 DIAGNOSIS — C61 PROSTATE CARCINOMA: ICD-10-CM

## 2025-07-02 DIAGNOSIS — C82.80 FOLLICULAR LOW GRADE B-CELL LYMPHOMA: ICD-10-CM

## 2025-07-02 DIAGNOSIS — M54.50 ACUTE BILATERAL LOW BACK PAIN WITHOUT SCIATICA: ICD-10-CM

## 2025-07-02 PROCEDURE — 74178 CT ABD&PLV WO CNTR FLWD CNTR: CPT

## 2025-07-02 PROCEDURE — 25510000001 IOPAMIDOL 61 % SOLUTION: Performed by: NURSE PRACTITIONER

## 2025-07-02 PROCEDURE — 72132 CT LUMBAR SPINE W/DYE: CPT

## 2025-07-02 PROCEDURE — 71260 CT THORAX DX C+: CPT

## 2025-07-02 RX ORDER — IOPAMIDOL 612 MG/ML
100 INJECTION, SOLUTION INTRAVASCULAR
Status: COMPLETED | OUTPATIENT
Start: 2025-07-02 | End: 2025-07-02

## 2025-07-02 RX ADMIN — IOPAMIDOL 70 ML: 612 INJECTION, SOLUTION INTRAVENOUS at 17:39

## 2025-07-03 ENCOUNTER — TRANSCRIBE ORDERS (OUTPATIENT)
Dept: ADMINISTRATIVE | Facility: HOSPITAL | Age: 75
End: 2025-07-03
Payer: MEDICARE

## 2025-07-03 ENCOUNTER — TELEPHONE (OUTPATIENT)
Dept: HEMATOLOGY | Age: 75
End: 2025-07-03

## 2025-07-03 DIAGNOSIS — C82.80 FOLLICULAR LOW GRADE B-CELL LYMPHOMA: Primary | ICD-10-CM

## 2025-07-03 DIAGNOSIS — J18.9 COMMUNITY ACQUIRED PNEUMONIA OF RIGHT LOWER LOBE OF LUNG: Primary | ICD-10-CM

## 2025-07-03 DIAGNOSIS — M54.50 ACUTE BILATERAL LOW BACK PAIN WITHOUT SCIATICA: ICD-10-CM

## 2025-07-03 DIAGNOSIS — C61 PROSTATE CARCINOMA: ICD-10-CM

## 2025-07-03 RX ORDER — AZITHROMYCIN 250 MG/1
TABLET, FILM COATED ORAL
Qty: 6 TABLET | Refills: 0 | Status: SHIPPED | OUTPATIENT
Start: 2025-07-03 | End: 2025-07-13

## 2025-07-03 NOTE — TELEPHONE ENCOUNTER
Spoke with Mr. Bailey related to CT scan results to include lumbar spine, chest, abdomen and pelvis that were completed at [facility] on 7/2/2025.      CT of the lumbar, abdomen and pelvis are stable unrevealing of any new concerning findings compared to scans on 3/10/2025.    CT scan of the chest reported a pleural-based masslike infiltrate measuring 3.5 x 2.8 cm compared to 2.5 x 3 cm on 3/10/2025, reported as a chronic area of rounded atelectasis or pneumonia with an increase in size although neoplasm cannot be excluded.    Recommendation was to move forward with PET scan or treat with antibiotics and repeat a chest x-ray in 2 weeks and then follow-up with a repeat CT scan of the chest at 3-month interval.    Mr. Bailey preferred to treat with antibiotics and repeat a chest x-ray in 2 weeks.  He feels like he does have some pneumonia along with his daughter and spouse also verbalizing this concern.    He has been complaining of bilateral hip and lower back pain and reports the tramadol is helping.    Electronic prescription sent amoxicillin-clavulanate and azithromycin.      Mr. Bailey verbalized understanding and is in agreement with plan of care.

## 2025-07-17 ENCOUNTER — HOSPITAL ENCOUNTER (OUTPATIENT)
Dept: GENERAL RADIOLOGY | Facility: HOSPITAL | Age: 75
Discharge: HOME | End: 2025-07-17
Admitting: NURSE PRACTITIONER
Payer: MEDICARE

## 2025-07-17 PROCEDURE — 71046 X-RAY EXAM CHEST 2 VIEWS: CPT

## 2025-07-22 ENCOUNTER — TRANSCRIBE ORDERS (OUTPATIENT)
Dept: ADMINISTRATIVE | Facility: HOSPITAL | Age: 75
End: 2025-07-22
Payer: MEDICARE

## 2025-07-22 ENCOUNTER — TELEPHONE (OUTPATIENT)
Dept: HEMATOLOGY | Age: 75
End: 2025-07-22

## 2025-07-22 DIAGNOSIS — C81.90 HODGKIN LYMPHOMA, UNSPECIFIED HODGKIN LYMPHOMA TYPE, UNSPECIFIED BODY REGION: ICD-10-CM

## 2025-07-22 DIAGNOSIS — C82.80 FOLLICULAR LOW GRADE B-CELL LYMPHOMA: Primary | ICD-10-CM

## 2025-07-22 DIAGNOSIS — C61 PROSTATE CARCINOMA (HCC): ICD-10-CM

## 2025-07-22 DIAGNOSIS — R91.8 LUNG NODULES: ICD-10-CM

## 2025-07-22 DIAGNOSIS — C61 PROSTATE CARCINOMA: ICD-10-CM

## 2025-07-22 DIAGNOSIS — C82.80 FOLLICULAR LOW GRADE B-CELL LYMPHOMA (HCC): Primary | ICD-10-CM

## 2025-07-22 DIAGNOSIS — C81.90 HODGKIN LYMPHOMA, UNSPECIFIED HODGKIN LYMPHOMA TYPE, UNSPECIFIED BODY REGION (HCC): ICD-10-CM

## 2025-07-22 NOTE — TELEPHONE ENCOUNTER
Spoke with Mr. Bailey related to CT scan of the chest results from 7/17/2025, he completed antibiotic therapy.  Showed chronic changes with emphysema with underlying fibrosis.  He reports that he has stopped using his inhalers due to financial restraint.  He has been seen by GOPAL Sloan with [facility] pulmonology in the past, he agreed for a referral back to see her.      In regards to the CT scan of the chest on 7/2/2025 there was a masslike infiltrate in the right lower lobe, we treated with antibiotics which was followed by the repeat chest x-ray on 7/17/2025.      Plan is to request a PET scan and then see him follow-up in clinic on 9/30/2025, this will be almost 3 months after CT of the chest.      Mr. Bailey agreed with current recommendations and is in agreement with current plan of care.

## 2025-07-28 ENCOUNTER — OFFICE VISIT (OUTPATIENT)
Dept: PULMONOLOGY | Facility: CLINIC | Age: 75
End: 2025-07-28
Payer: MEDICARE

## 2025-07-28 VITALS
HEIGHT: 74 IN | OXYGEN SATURATION: 98 % | HEART RATE: 71 BPM | DIASTOLIC BLOOD PRESSURE: 74 MMHG | SYSTOLIC BLOOD PRESSURE: 134 MMHG | RESPIRATION RATE: 18 BRPM | WEIGHT: 162.2 LBS | BODY MASS INDEX: 20.82 KG/M2

## 2025-07-28 DIAGNOSIS — J43.9 PULMONARY EMPHYSEMA, UNSPECIFIED EMPHYSEMA TYPE: ICD-10-CM

## 2025-07-28 DIAGNOSIS — J44.9 CHRONIC OBSTRUCTIVE PULMONARY DISEASE, UNSPECIFIED COPD TYPE: ICD-10-CM

## 2025-07-28 DIAGNOSIS — R91.8 MULTIPLE LUNG NODULES: Primary | ICD-10-CM

## 2025-07-28 DIAGNOSIS — F17.200 CURRENT EVERY DAY SMOKER: ICD-10-CM

## 2025-07-28 PROCEDURE — 99214 OFFICE O/P EST MOD 30 MIN: CPT | Performed by: INTERNAL MEDICINE

## 2025-07-28 RX ORDER — TRAMADOL HYDROCHLORIDE 75 MG/1
1 TABLET, COATED ORAL
COMMUNITY
Start: 2025-07-01

## 2025-07-28 NOTE — PROGRESS NOTES
Clinic Note - Follow Up            NAME: Prabhakar Thomas  MRN: [id number]  AGE: 74 y.o.            : 1950  PRIMARY CARE PROVIDER: Long Flaherty MD               Reason for Visit:  Prabhakar Thomas presents to clinic today for follow up for the evaluation of abnormal chest CT.    History of Present Illness:  Mr. Thomas is a 74-year-old male who presents to clinic today for follow-up.  Past medical history includes chronic hypoxic respiratory failure, COPD, emphysema, prostate cancer, lymphoma status posttreatment, tobacco use.    The patient was last seen in clinic on 10/5/2023 by ERASMO Bo.  Since that time the patient been lost to pulmonary clinic follow-up.  He was being followed for multiple pulmonary nodules, COPD, emphysema.  Since his last pulmonary visit the patient is undergoing treatment for prostate cancer.  He had 6 rounds of chemotherapy and 28 rounds of radiation completed in January of this year.  He is currently on Lupron.  Per the oncology notes the patient is also undergoing treatment for his lymphoma.    The patient had a CT of his abdomen and pelvis per oncology on .  CT showed increase in size of the right lower lobe masslike opacity.  The patient underwent a course of antibiotics and had a follow-up chest x-ray on .  Chest x-ray showed similar right lower lobe opacity.  Due to the CT finding he has been referred back to pulmonology.  I reviewed the imaging with the patient and his wife in clinic today.  This finding was present in March of this year, though as noted it does appear larger.  I expressed my concern for malignancy including possible primary lung cancer.    From a pulmonary standpoint the patient was on Trelegy previously.  However due to insurance reason he was no longer able to afford this medication.  Was also previously on Symbicort but had to stop this also for insurance reasons.  He does have supplemental oxygen at home which he admits to  not using due to burdensome.    Review of Systems:  A full 12-point review of systems was performed and was negative except as documented in the HPI.            Physical Exam:  General: No acute distress, cooperative.  Head: Atraumatic, normocephalic, normal inspection.  Eyes: EOMI, normal inspection.  ENT: Mucous membranes moist, normal inspection. No thrush.  Heart: RRR, no murmur  Lungs: Diminished with mild wheezing bilaterally worse on the right  MSK: No edema or clubbing  GI/abdominal: Soft, nontender.  Neurologic: Alert, oriented.  Cranial nerves II through XII grossly intact.      Imaging Review:  I personally reviewed the chest imaging done this year:  Chest imaging has shown chronic emphysematous changes with hyperinflated lungs.  Right lower lobe opacity present in March and enlarged in July.      Pulmonary Functions Testing Results:  10/2023: , FEV1 89, ratio 67, DLCO 41            Problem List:  Problem List Items Addressed This Visit       Current every day smoker    Multiple lung nodules - Primary    Relevant Medications    Glycopyrrolate-Formoterol 9-4.8 MCG/ACT aerosol    Other Relevant Orders    NM PET/CT Skull Base to Mid Thigh    Chronic obstructive pulmonary disease    Relevant Medications    Glycopyrrolate-Formoterol 9-4.8 MCG/ACT aerosol         Pulmonary Assessment:  Patient is a 74-year-old male who presents to clinic today for follow-up of the above problems.  From the pulmonary nodule standpoint we reviewed his previous imaging in clinic today.  I expressed my concern for malignancy given the persistent and enlarging opacity in the right lower lobe.  We discussed options including no further workup/follow-up, additional imaging, biopsy.  I did recommend proceeding with biopsy after PET scan.  The patient and his wife are agreeable to obtaining the PET scan and evaluating at that time.  For insurance reasons the patient was not able to fill his Trelegy.  He has noted increased  dyspnea since that time.  He was given samples in clinic today.  I will send a prescription for Bevespi.  The patient or his wife will call clinic if they are not able to fill this medication due to insurance for cost reasons.      Plan:   - PET/CT now  - Possible bronchoscopy with EBUS pending results of the PET scan  - Start Bevespi            Follow Up:  6 weeks         Thank you Long Flaherty MD for allowing me to participate in this patient's care.           Past Medical History:   Past Medical History:   Diagnosis Date    Arthritis     Chronic obstructive lung disease     COVID     Factor V Leiden     PRONE TO BLOOD CLOTS    History of colon polyps     History of hepatitis 12/06/2017 1979 the patient states he cannot remember which A,B,or C. He is unsure if he was treated.    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 04/12/2018    Pneumonia     PONV (postoperative nausea and vomiting)     Prostate cancer          Past Surgical History:   Past Surgical History:   Procedure Laterality Date    ARM SKIN LESION BIOPSY / EXCISION Right     CERVICAL LYMPH NODE BIOPSY/EXCISION Left 03/26/2018    Procedure: LEFT CERVICAL LYMPH NODE BIOPSY WITH DIRECT LARYNGOSCOPY;  Surgeon: Mehul Jack MD;  Location: [facility] OR;  Service: ENT    CHOLECYSTECTOMY      COLONOSCOPY N/A 09/18/2018    The entire examined colon is normal on direct and retroflexion views; Repeat 5 years    COLONOSCOPY N/A 11/10/2023    Diverticulosis in the left colon; The examination was otherwise normal on direct and retroflexion views; No specimens collected; Repeat 5 years    HERNIA REPAIR      KYPHOPLASTY N/A 1/3/2024    Procedure: VERTEBRAL BODY  BIOPSY T12,L2,L3;  Surgeon: Tone Mora MD;  Location: [facility] OR;  Service: Neurosurgery;  Laterality: N/A;    LARYNGOSCOPY Left 03/26/2018    Procedure: DIRECT LARYNGOSCOPY WITH BIOPSY;  Surgeon: Mehul Merritt  MD Guero;  Location:  [facility] OR;  Service: ENT    PROSTATE BIOPSY N/A 12/8/2023    Procedure: TRANSPERINEAL PROSTATE BIOPSY WITH TRANSRECTAL ULTRASOUND GUIDANCE;  Surgeon: Joseph Cerda MD;  Location:  [facility] OR;  Service: Urology;  Laterality: N/A;         Family History:   Family History   Problem Relation Age of Onset    Peripheral vascular disease Father     Breast cancer Mother     Colon cancer Neg Hx     Colon polyps Neg Hx     Esophageal cancer Neg Hx     Liver cancer Neg Hx     Stomach cancer Neg Hx     Rectal cancer Neg Hx          Medications:   Prior to Admission medications    Medication Sig Start Date End Date Taking? Authorizing Provider   amLODIPine (NORVASC) 5 MG tablet Take 1 tablet by mouth Daily. 6/5/21  Yes Martha Mix MD   aspirin 81 MG EC tablet Take 1 tablet by mouth Daily.   Yes Martha Mix MD   traMADol HCl 75 MG tablet Take 1 tablet by mouth. 7/1/25  Yes Martha Mix MD   gabapentin (NEURONTIN) 300 MG capsule Take 1 capsule by mouth 2 (Two) Times a Day. 4/1/24 11/25/24  Martha Mix MD   HYDROcodone-acetaminophen (NORCO) 5-325 MG per tablet Take 1 tablet by mouth Every 8 (Eight) Hours As Needed for Moderate Pain (Pain).  Patient not taking: Reported on 7/28/2025 11/18/24   Joseph Cerda MD   Leuprolide Acetate (LUPRON IJ) Inject  as directed.  Patient not taking: Reported on 7/28/2025    Martha Mix MD   multivitamin with minerals tablet tablet Take 1 tablet by mouth Daily.  Patient not taking: Reported on 7/28/2025    Martha Mix MD   naloxone (NARCAN) 4 MG/0.1ML nasal spray Call 911. Don't prime. [place] in 1 nostril for overdose. Repeat in 2-3 minutes in other nostril if no or minimal breathing/responsiveness.  Patient not taking: Reported on 7/28/2025 1/3/24   Hugo Lawson APRN   nystatin (MYCOSTATIN) 100,000 unit/mL suspension Take 5 mL by mouth 4 (Four) Times a Day.  Patient not taking: Reported on 7/28/2025  "11/21/24   Katlyn Fernandez APRN   ondansetron (ZOFRAN) 8 MG tablet Take 1 tablet by mouth Every 8 (Eight) Hours As Needed.  Patient not taking: Reported on 7/28/2025 1/16/24   Martha Mix MD   Polyethylene Glycol 3350 (MIRALAX PO) Take 1 Scoop by mouth Daily.  Patient not taking: Reported on 7/28/2025    Martha Mix MD Trelegy Ellipta 200-62.5-25 MCG/ACT inhaler Inhale 1 puff Daily.  Patient not taking: Reported on 7/28/2025 10/21/24   ProviderMartha MD         Allergies:   Contrast dye (echo or unknown ct/mr) and Codeine      Results Review:             Visit Vitals  /74   Pulse 71   Resp 18   Ht 188 cm (74\")   Wt 73.6 kg (162 lb 3.2 oz)   SpO2 98% Comment: r/a   BMI 20.83 kg/m²           Social History:     Social History     Socioeconomic History    Marital status:    Tobacco Use    Smoking status: Every Day     Current packs/day: 1.00     Average packs/day: 1 pack/day for 63.6 years (63.6 ttl pk-yrs)     Types: Cigarettes     Start date: 1962     Passive exposure: Current    Smokeless tobacco: Never    Tobacco comments:     AVS   Vaping Use    Vaping status: Never Used   Substance and Sexual Activity    Alcohol use: Never    Drug use: No    Sexual activity: Not Currently                Hayden Weaver DO  Pulmonary/Critical Care  7/28/2025  14:55 CDT  "

## 2025-07-28 NOTE — LETTER
2025     Long Flaherty MD  [address] [facility] [address]    Patient: Prabhakar Thomas   YOB: 1950   Date of Visit: 2025     Dear Dr. Reynold MD:    Thank you for referring Prabhakar Thomas to me for evaluation. Below are the relevant portions of my assessment and plan of care.    If you have questions, please do not hesitate to call me. I look forward to following Prabhakar along with you.         Sincerely,        Shreya Weaver,         CC: ERASMO Hankins      Progress Notes:    Clinic Note - Follow Up            NAME: Prabhakar Thomas  MRN: [id number]  AGE: 74 y.o.            : 1950  PRIMARY CARE PROVIDER: Long Flaherty MD               Reason for Visit:  Prabhakar Thomas presents to clinic today for follow up for the evaluation of abnormal chest CT.    History of Present Illness:  Mr. Thomas is a 74-year-old male who presents to clinic today for follow-up.  Past medical history includes chronic hypoxic respiratory failure, COPD, emphysema, prostate cancer, lymphoma status posttreatment, tobacco use.    The patient was last seen in clinic on 10/5/2023 by ERASMO Bo.  Since that time the patient been lost to pulmonary clinic follow-up.  He was being followed for multiple pulmonary nodules, COPD, emphysema.  Since his last pulmonary visit the patient is undergoing treatment for prostate cancer.  He had 6 rounds of chemotherapy and 28 rounds of radiation completed in January of this year.  He is currently on Lupron.  Per the oncology notes the patient is also undergoing treatment for his lymphoma.    The patient had a CT of his abdomen and pelvis per oncology on .  CT showed increase in size of the right lower lobe masslike opacity.  The patient underwent a course of antibiotics and had a follow-up chest x-ray on .  Chest x-ray showed similar right lower lobe opacity.  Due to the CT finding he has been referred back to pulmonology.  I  reviewed the imaging with the patient and his wife in clinic today.  This finding was present in March of this year, though as noted it does appear larger.  I expressed my concern for malignancy including possible primary lung cancer.    From a pulmonary standpoint the patient was on Trelegy previously.  However due to insurance reason he was no longer able to afford this medication.  Was also previously on Symbicort but had to stop this also for insurance reasons.  He does have supplemental oxygen at home which he admits to not using due to burdensome.    Review of Systems:  A full 12-point review of systems was performed and was negative except as documented in the HPI.            Physical Exam:  General: No acute distress, cooperative.  Head: Atraumatic, normocephalic, normal inspection.  Eyes: EOMI, normal inspection.  ENT: Mucous membranes moist, normal inspection. No thrush.  Heart: RRR, no murmur  Lungs: Diminished with mild wheezing bilaterally worse on the right  MSK: No edema or clubbing  GI/abdominal: Soft, nontender.  Neurologic: Alert, oriented.  Cranial nerves II through XII grossly intact.      Imaging Review:  I personally reviewed the chest imaging done this year:  Chest imaging has shown chronic emphysematous changes with hyperinflated lungs.  Right lower lobe opacity present in March and enlarged in July.      Pulmonary Functions Testing Results:  10/2023: , FEV1 89, ratio 67, DLCO 41            Problem List:  Problem List Items Addressed This Visit       Current every day smoker    Multiple lung nodules - Primary    Relevant Medications    Glycopyrrolate-Formoterol 9-4.8 MCG/ACT aerosol    Other Relevant Orders    NM PET/CT Skull Base to Mid Thigh    Chronic obstructive pulmonary disease    Relevant Medications    Glycopyrrolate-Formoterol 9-4.8 MCG/ACT aerosol         Pulmonary Assessment:  Patient is a 74-year-old male who presents to clinic today for follow-up of the above problems.   From the pulmonary nodule standpoint we reviewed his previous imaging in clinic today.  I expressed my concern for malignancy given the persistent and enlarging opacity in the right lower lobe.  We discussed options including no further workup/follow-up, additional imaging, biopsy.  I did recommend proceeding with biopsy after PET scan.  The patient and his wife are agreeable to obtaining the PET scan and evaluating at that time.  For insurance reasons the patient was not able to fill his Trelegy.  He has noted increased dyspnea since that time.  He was given samples in clinic today.  I will send a prescription for Bevespi.  The patient or his wife will call clinic if they are not able to fill this medication due to insurance for cost reasons.      Plan:   - PET/CT now  - Possible bronchoscopy with EBUS pending results of the PET scan  - Start Bevespi            Follow Up:  6 weeks         Thank you Long Flaherty MD for allowing me to participate in this patient's care.           Past Medical History:   Past Medical History:   Diagnosis Date   • Arthritis    • Chronic obstructive lung disease    • COVID    • Factor V Leiden     PRONE TO BLOOD CLOTS   • History of colon polyps    • History of hepatitis 12/06/2017    1979 the patient states he cannot remember which A,B,or C. He is unsure if he was treated.   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   • Pneumonia    • PONV (postoperative nausea and vomiting)    • Prostate cancer          Past Surgical History:   Past Surgical History:   Procedure Laterality Date   • ARM SKIN LESION BIOPSY / EXCISION Right    • CERVICAL LYMPH NODE BIOPSY/EXCISION Left 03/26/2018    Procedure: LEFT CERVICAL LYMPH NODE BIOPSY WITH DIRECT LARYNGOSCOPY;  Surgeon: Mehul Jack MD;  Location: [facility];  Service: ENT   • CHOLECYSTECTOMY     • COLONOSCOPY N/A 09/18/2018    The entire  examined colon is normal on direct and retroflexion views; Repeat 5 years   • COLONOSCOPY N/A 11/10/2023    Diverticulosis in the left colon; The examination was otherwise normal on direct and retroflexion views; No specimens collected; Repeat 5 years   • HERNIA REPAIR     • KYPHOPLASTY N/A 1/3/2024    Procedure: VERTEBRAL BODY  BIOPSY T12,L2,L3;  Surgeon: Tone Mora MD;  Location:  [facility];  Service: Neurosurgery;  Laterality: N/A;   • LARYNGOSCOPY Left 03/26/2018    Procedure: DIRECT LARYNGOSCOPY WITH BIOPSY;  Surgeon: Mehul Jack MD;  Location:  [facility];  Service: ENT   • PROSTATE BIOPSY N/A 12/8/2023    Procedure: TRANSPERINEAL PROSTATE BIOPSY WITH TRANSRECTAL ULTRASOUND GUIDANCE;  Surgeon: Joseph Cerda MD;  Location:  [facility];  Service: Urology;  Laterality: N/A;         Family History:   Family History   Problem Relation Age of Onset   • Peripheral vascular disease Father    • Breast cancer Mother    • Colon cancer Neg Hx    • Colon polyps Neg Hx    • Esophageal cancer Neg Hx    • Liver cancer Neg Hx    • Stomach cancer Neg Hx    • Rectal cancer Neg Hx          Medications:   Prior to Admission medications    Medication Sig Start Date End Date Taking? Authorizing Provider   amLODIPine (NORVASC) 5 MG tablet Take 1 tablet by mouth Daily. 6/5/21  Yes Martha Mix MD   aspirin 81 MG EC tablet Take 1 tablet by mouth Daily.   Yes Martha Mix MD   traMADol HCl 75 MG tablet Take 1 tablet by mouth. 7/1/25  Yes Martha Mix MD   gabapentin (NEURONTIN) 300 MG capsule Take 1 capsule by mouth 2 (Two) Times a Day. 4/1/24 11/25/24  Martha Mix MD   HYDROcodone-acetaminophen (NORCO) 5-325 MG per tablet Take 1 tablet by mouth Every 8 (Eight) Hours As Needed for Moderate Pain (Pain).  Patient not taking: Reported on 7/28/2025 11/18/24   Joseph Cerda MD   Leuprolide Acetate (LUPRON IJ) Inject  as directed.  Patient not taking: Reported on 7/28/2025    Provider  "MD Martha   multivitamin with minerals tablet tablet Take 1 tablet by mouth Daily.  Patient not taking: Reported on 7/28/2025    Martha Mix MD   naloxone (NARCAN) 4 MG/0.1ML nasal spray Call 911. Don't prime. [place] in 1 nostril for overdose. Repeat in 2-3 minutes in other nostril if no or minimal breathing/responsiveness.  Patient not taking: Reported on 7/28/2025 1/3/24   Hugo Lawson APRN   nystatin (MYCOSTATIN) 100,000 unit/mL suspension Take 5 mL by mouth 4 (Four) Times a Day.  Patient not taking: Reported on 7/28/2025 11/21/24   Katlyn Fernandez APRN   ondansetron (ZOFRAN) 8 MG tablet Take 1 tablet by mouth Every 8 (Eight) Hours As Needed.  Patient not taking: Reported on 7/28/2025 1/16/24   Martha Mix MD   Polyethylene Glycol 3350 (MIRALAX PO) Take 1 Scoop by mouth Daily.  Patient not taking: Reported on 7/28/2025    Martha Mix MD Trelegy Ellipta 200-62.5-25 MCG/ACT inhaler Inhale 1 puff Daily.  Patient not taking: Reported on 7/28/2025 10/21/24   Martha Mix MD         Allergies:   Contrast dye (echo or unknown ct/mr) and Codeine      Results Review:             Visit Vitals  /74   Pulse 71   Resp 18   Ht 188 cm (74\")   Wt 73.6 kg (162 lb 3.2 oz)   SpO2 98% Comment: r/a   BMI 20.83 kg/m²           Social History:     Social History     Socioeconomic History   • Marital status:    Tobacco Use   • Smoking status: Every Day     Current packs/day: 1.00     Average packs/day: 1 pack/day for 63.6 years (63.6 ttl pk-yrs)     Types: Cigarettes     Start date: 1962     Passive exposure: Current   • Smokeless tobacco: Never   • Tobacco comments:     AVS   Vaping Use   • Vaping status: Never Used   Substance and Sexual Activity   • Alcohol use: Never   • Drug use: No   • Sexual activity: Not Currently                Hayden Weaver DO  Pulmonary/Critical Care  7/28/2025  14:55 CDT  "

## 2025-07-29 ENCOUNTER — TELEPHONE (OUTPATIENT)
Dept: HEMATOLOGY | Age: 75
End: 2025-07-29

## 2025-07-29 PROBLEM — J43.9 PULMONARY EMPHYSEMA: Status: ACTIVE | Noted: 2025-07-29

## 2025-07-29 PROBLEM — J44.9 CHRONIC OBSTRUCTIVE PULMONARY DISEASE: Status: ACTIVE | Noted: 2025-07-29

## 2025-07-29 NOTE — TELEPHONE ENCOUNTER
Called and spoke with Marino, notified him of his pet scan at [facility] on Friday 8/1 at 9:00 a.m.. Also went over the pet scan instructions with him. He noted that he has had a pet scan before and knew the prep.

## 2025-08-01 ENCOUNTER — TELEPHONE (OUTPATIENT)
Dept: HEMATOLOGY | Age: 75
End: 2025-08-01

## 2025-08-01 ENCOUNTER — HOSPITAL ENCOUNTER (OUTPATIENT)
Dept: CT IMAGING | Facility: HOSPITAL | Age: 75
Discharge: HOME | End: 2025-08-01
Payer: MEDICARE

## 2025-08-01 DIAGNOSIS — R10.2 PELVIC PAIN IN MALE: Primary | ICD-10-CM

## 2025-08-01 DIAGNOSIS — R91.8 LUNG NODULES: ICD-10-CM

## 2025-08-01 DIAGNOSIS — C82.80 FOLLICULAR LOW GRADE B-CELL LYMPHOMA: ICD-10-CM

## 2025-08-01 DIAGNOSIS — C61 PROSTATE CARCINOMA: ICD-10-CM

## 2025-08-01 DIAGNOSIS — C81.90 HODGKIN LYMPHOMA, UNSPECIFIED HODGKIN LYMPHOMA TYPE, UNSPECIFIED BODY REGION: ICD-10-CM

## 2025-08-01 LAB — GLUCOSE BLDC GLUCOMTR-MCNC: 97 MG/DL (ref 70–130)

## 2025-08-01 PROCEDURE — 78815 PET IMAGE W/CT SKULL-THIGH: CPT

## 2025-08-01 PROCEDURE — 82948 REAGENT STRIP/BLOOD GLUCOSE: CPT

## 2025-08-01 PROCEDURE — 34310000005 FLUDEOXYGLUCOSE F18 SOLUTION: Performed by: NURSE PRACTITIONER

## 2025-08-01 PROCEDURE — A9552 F18 FDG: HCPCS | Performed by: NURSE PRACTITIONER

## 2025-08-01 RX ADMIN — FLUDEOXYGLUCOSE F18 1 DOSE: 300 INJECTION INTRAVENOUS at 09:55

## 2025-08-01 NOTE — TELEPHONE ENCOUNTER
Spoke with Mr. Bailey related to PET scan results from 8/1/2025 at [facility].  The right lower lobe known nodule had an SUV of 3.8 which is improved from SUV of 4.8 on 7/23/2024.  It was reported as suspected infection or inflammatory process and less likely neoplasm.      Other known lymph nodes were stable.    There was mild uptake in the sacral alar regions bilaterally extending transversely across the sacrum without a bone lesion.  A subtle insufficiency type fracture may be present causing the uptake.      Marino reported that he has been having significant pain between his hips and \"sometimes I feel like it is going to break if I move the wrong way.\"    Will request plain film x-rays of the pelvis for further evaluation     Marino verbalized understanding agreement with current plan of care

## 2025-08-04 ENCOUNTER — HOSPITAL ENCOUNTER (OUTPATIENT)
Dept: GENERAL RADIOLOGY | Facility: HOSPITAL | Age: 75
Discharge: HOME | End: 2025-08-04
Admitting: NURSE PRACTITIONER
Payer: MEDICARE

## 2025-08-04 ENCOUNTER — TRANSCRIBE ORDERS (OUTPATIENT)
Dept: ADMINISTRATIVE | Facility: HOSPITAL | Age: 75
End: 2025-08-04
Payer: MEDICARE

## 2025-08-04 DIAGNOSIS — R10.2 PELVIC PAIN IN MALE: ICD-10-CM

## 2025-08-04 DIAGNOSIS — R10.2 PELVIC PAIN IN MALE: Primary | ICD-10-CM

## 2025-08-04 PROCEDURE — 73523 X-RAY EXAM HIPS BI 5/> VIEWS: CPT

## 2025-08-07 ENCOUNTER — TELEPHONE (OUTPATIENT)
Dept: HEMATOLOGY | Age: 75
End: 2025-08-07

## 2025-08-07 DIAGNOSIS — G89.3 NEOPLASM RELATED PAIN: ICD-10-CM

## 2025-08-07 RX ORDER — TRAMADOL HYDROCHLORIDE 75 MG/1
1 TABLET, COATED ORAL EVERY 6 HOURS PRN
Qty: 90 TABLET | Refills: 0 | Status: SHIPPED | OUTPATIENT
Start: 2025-08-07

## 2025-08-12 ENCOUNTER — HOSPITAL ENCOUNTER (OUTPATIENT)
Dept: INFUSION THERAPY | Age: 75
Discharge: HOME | End: 2025-08-12
Payer: MEDICARE

## 2025-08-12 VITALS
HEIGHT: 74 IN | RESPIRATION RATE: 18 BRPM | DIASTOLIC BLOOD PRESSURE: 59 MMHG | OXYGEN SATURATION: 99 % | BODY MASS INDEX: 20.79 KG/M2 | TEMPERATURE: 97.6 F | SYSTOLIC BLOOD PRESSURE: 138 MMHG | HEART RATE: 70 BPM | WEIGHT: 162 LBS

## 2025-08-12 DIAGNOSIS — C82.80 FOLLICULAR LOW GRADE B-CELL LYMPHOMA (HCC): ICD-10-CM

## 2025-08-12 DIAGNOSIS — Z45.2 ENCOUNTER FOR CENTRAL LINE CARE: Primary | ICD-10-CM

## 2025-08-12 LAB
BASOPHILS # BLD: 0.07 K/UL (ref 0–0.2)
BASOPHILS NFR BLD: 1.4 % (ref 0–1)
EOSINOPHIL # BLD: 0.29 K/UL (ref 0–0.6)
EOSINOPHIL NFR BLD: 6 % (ref 0–5)
ERYTHROCYTE [DISTWIDTH] IN BLOOD BY AUTOMATED COUNT: 13 % (ref 11.5–14.5)
HCT VFR BLD AUTO: 38.4 % (ref 42–52)
HGB BLD-MCNC: 13.3 G/DL (ref 14–18)
LYMPHOCYTES # BLD: 0.97 K/UL (ref 1.1–4.5)
LYMPHOCYTES NFR BLD: 19.9 % (ref 20–40)
MCH RBC QN AUTO: 32.1 PG (ref 27–31)
MCHC RBC AUTO-ENTMCNC: 34.6 G/DL (ref 33–37)
MCV RBC AUTO: 92.8 FL (ref 80–94)
MONOCYTES # BLD: 0.55 K/UL (ref 0–0.9)
MONOCYTES NFR BLD: 11.3 % (ref 1–10)
NEUTROPHILS # BLD: 2.98 K/UL (ref 1.5–7.5)
NEUTS SEG NFR BLD: 61.2 % (ref 50–65)
PLATELET # BLD AUTO: 350 K/UL (ref 130–400)
PMV BLD AUTO: 8.4 FL (ref 9.4–12.4)
RBC # BLD AUTO: 4.14 M/UL (ref 4.7–6.1)
WBC # BLD AUTO: 4.87 K/UL (ref 4.8–10.8)

## 2025-08-12 PROCEDURE — 2500000003 HC RX 250 WO HCPCS: Performed by: NURSE PRACTITIONER

## 2025-08-12 PROCEDURE — 96523 IRRIG DRUG DELIVERY DEVICE: CPT

## 2025-08-12 PROCEDURE — 6360000002 HC RX W HCPCS: Performed by: NURSE PRACTITIONER

## 2025-08-12 PROCEDURE — 85025 COMPLETE CBC W/AUTO DIFF WBC: CPT

## 2025-08-12 PROCEDURE — 36415 COLL VENOUS BLD VENIPUNCTURE: CPT

## 2025-08-12 RX ORDER — HEPARIN 100 UNIT/ML
500 SYRINGE INTRAVENOUS PRN
OUTPATIENT
Start: 2025-08-12

## 2025-08-12 RX ORDER — SODIUM CHLORIDE 0.9 % (FLUSH) 0.9 %
10 SYRINGE (ML) INJECTION PRN
Status: DISCONTINUED | OUTPATIENT
Start: 2025-08-12 | End: 2025-08-13 | Stop reason: HOSPADM

## 2025-08-12 RX ORDER — HEPARIN 100 UNIT/ML
500 SYRINGE INTRAVENOUS PRN
Status: DISCONTINUED | OUTPATIENT
Start: 2025-08-12 | End: 2025-08-13 | Stop reason: HOSPADM

## 2025-08-12 RX ORDER — SODIUM CHLORIDE 0.9 % (FLUSH) 0.9 %
10 SYRINGE (ML) INJECTION PRN
OUTPATIENT
Start: 2025-08-12

## 2025-08-12 RX ADMIN — SODIUM CHLORIDE, PRESERVATIVE FREE 10 ML: 5 INJECTION INTRAVENOUS at 12:08

## 2025-08-12 RX ADMIN — HEPARIN 500 UNITS: 100 SYRINGE at 12:08

## (undated) DEVICE — NEEDLE, QUINCKE 22GX7": Brand: MEDLINE

## (undated) DEVICE — MASK,OXYGEN,MED CONC,ADLT,7' TUB, UC: Brand: PENDING

## (undated) DEVICE — GOWN,NON-REINFORCED,SIRUS,SET IN SLV,XXL: Brand: MEDLINE

## (undated) DEVICE — CVR UNIV C/ARM

## (undated) DEVICE — SUT MNCRYL 4/0 PS2 27IN UD MCP426H

## (undated) DEVICE — FRCP BX RADJAW4 NDL 2.8 240 STD OG

## (undated) DEVICE — THE CHANNEL CLEANING BRUSH IS A NYLON FLEXI BRUSH ATTACHED TO A FLEXIBLE PLASTIC SHEATH DESIGNED TO SAFELY REMOVE DEBRIS FROM FLEXIBLE ENDOSCOPES.

## (undated) DEVICE — SENSR O2 OXIMAX FNGR A/ 18IN NONSTR

## (undated) DEVICE — UTILITY MARKER W/MED LABELS: Brand: MEDLINE

## (undated) DEVICE — BAPTIST TURNOVER KIT: Brand: MEDLINE INDUSTRIES, INC.

## (undated) DEVICE — 3M™ STERI-STRIP™ REINFORCED ADHESIVE SKIN CLOSURES, R1546, 1/4 IN X 4 IN (6 MM X 100 MM), 10 STRIPS/ENVELOPE: Brand: 3M™ STERI-STRIP™

## (undated) DEVICE — SPNG GZ WOVN 4X4IN 12PLY 10/BX STRL

## (undated) DEVICE — TUBING, SUCTION, 1/4" X 12', STRAIGHT: Brand: MEDLINE

## (undated) DEVICE — TOWEL,OR,DSP,ST,BLUE,STD,4/PK,20PK/CS: Brand: MEDLINE

## (undated) DEVICE — Device: Brand: DEFENDO AIR/WATER/SUCTION AND BIOPSY VALVE

## (undated) DEVICE — GAUZE,SPONGE,4"X4",16PLY,XRAY,STRL,LF: Brand: MEDLINE

## (undated) DEVICE — YANKAUER,BULB TIP WITH VENT: Brand: ARGYLE

## (undated) DEVICE — PK KYPHOPLASTY 30

## (undated) DEVICE — DURAHOOK 1/4HOOK PK/6

## (undated) DEVICE — SYS SPACR PROST SPACEOAR/VUE HYDROGEL 10ML: Type: IMPLANTABLE DEVICE | Site: PROSTATE | Status: NON-FUNCTIONAL

## (undated) DEVICE — TOWEL,OR,DSP,ST,BLUE,DLX,10/PK,8PK/CS: Brand: MEDLINE

## (undated) DEVICE — C-ARM: Brand: UNBRANDED

## (undated) DEVICE — SUT MNCRYL 4/0 P3 18IN UD MCP494G

## (undated) DEVICE — INTRODUCER T15J OIS ONE-STEP TRO AND DIA: Brand: KYPHON® ONE-STEP™ OSTEO INTRODUCER™ SYSTEM

## (undated) DEVICE — SYR LUERLOK 20CC BX/50

## (undated) DEVICE — CUFF,BP,DISP,1 TUBE,ADULT,HP: Brand: MEDLINE

## (undated) DEVICE — CONTAINER,SPECIMEN,OR STERILE,4OZ: Brand: MEDLINE

## (undated) DEVICE — TRY PREP SCRB VAG PVP

## (undated) DEVICE — GLV SURG BIOGEL LTX PF 6 1/2

## (undated) DEVICE — GUIDE NDL BIOP BIPLANE 17G STRL 1P/U

## (undated) DEVICE — PK ENT HD AND NK 30

## (undated) DEVICE — SUTURE VCRL SZ 2-0 L27IN ABSRB UD L26MM SH 1/2 CIR J417H

## (undated) DEVICE — 3M™ TEGADERM™ TRANSPARENT FILM DRESSING FRAME STYLE, 1626, 4 IN X 4-3/4 IN (10 CM X 12 CM), 50/CT 4CT/CASE: Brand: 3M™ TEGADERM™

## (undated) DEVICE — BONE BIOPSY DEVICE F05A BBD SIZE 3: Brand: MEDTRONIC REUSABLE INSTRUMENTS

## (undated) DEVICE — GLV SURG BIOGEL LTX PF 8

## (undated) DEVICE — NDL HYPO PRECISIONGLIDE/REG 18G 11/2 PNK

## (undated) DEVICE — CODMAN® SURGICAL PATTIES 1/2" X 3" (1.27CM X 7.62CM): Brand: CODMAN®

## (undated) DEVICE — PACK,SET UP,NO DRAPES: Brand: MEDLINE

## (undated) DEVICE — ADHESIVE LIQ MASTISOL ST

## (undated) DEVICE — ADHS LIQ MASTISOL 2/3ML

## (undated) DEVICE — BHS TURNOVER CYSTO: Brand: MEDLINE INDUSTRIES, INC.

## (undated) DEVICE — SPK10277 JACKSON/PRO-AXIS KIT: Brand: SPK10277 JACKSON/PRO-AXIS KIT

## (undated) DEVICE — SPONGE GZ W4XL4IN RAYON POLY FILL CVR W/ NONWOVEN FAB

## (undated) DEVICE — STERILE LATEX POWDER FREE SURGICAL GLOVES WITH HYDROGEL COATING: Brand: PROTEXIS

## (undated) DEVICE — SUT VIC 3/0 RB1 27IN UD VCP215H

## (undated) DEVICE — ENDOGATOR AUXILIARY WATER JET CONNECTOR: Brand: ENDOGATOR

## (undated) DEVICE — GLV SURG BIOGEL M LTX PF 8

## (undated) DEVICE — GLV SURG BIOGEL M LTX PF 7 1/2

## (undated) DEVICE — CONN FLX BREATHE CIRCT

## (undated) DEVICE — SUTURE VCRL SZ 4-0 L18IN ABSRB UD L19MM PS-2 3/8 CIR PRIM J496H

## (undated) DEVICE — DISPOSABLE BIPOLAR CABLE 12FT. (3.6M): Brand: KIRWAN

## (undated) DEVICE — SUTURE VCRL SZ 3-0 L27IN ABSRB UD L26MM SH 1/2 CIR J416H

## (undated) DEVICE — DRSNG TELFA PAD NONADH STR 1S 3X8IN

## (undated) DEVICE — 9165 UNIVERSAL PATIENT PLATE: Brand: 3M™

## (undated) DEVICE — CHLORAPREP 26ML ORANGE

## (undated) DEVICE — STANDARD SURGICAL GOWN, L: Brand: CONVERTORS

## (undated) DEVICE — THREE QUARTER SHEET: Brand: CONVERTORS

## (undated) DEVICE — MAX-CORE® DISPOSABLE CORE BIOPSY INSTRUMENT, 18G X 20CM: Brand: MAX-CORE

## (undated) DEVICE — VAGINAL PREP TRAY: Brand: MEDLINE INDUSTRIES, INC.

## (undated) DEVICE — MAJOR BSIN SETUP PK

## (undated) DEVICE — AIRLIFE™ NASAL OXYGEN CANNULA CURVED, NONFLARED TIP, WITH 7' FEET (2.1 M) CRUSH RESISTANT TUBING, OVER-THE-EAR STYLE: Brand: AIRLIFE™

## (undated) DEVICE — TBG SMPL FLTR LINE NASL 02/C02 A/ BX/100

## (undated) DEVICE — PK TURNOVER RM ADV